# Patient Record
Sex: MALE | Race: WHITE | Employment: UNEMPLOYED | URBAN - METROPOLITAN AREA
[De-identification: names, ages, dates, MRNs, and addresses within clinical notes are randomized per-mention and may not be internally consistent; named-entity substitution may affect disease eponyms.]

---

## 2016-10-06 LAB — HBA1C MFR BLD: 8.9 %

## 2017-01-18 ENCOUNTER — OFFICE VISIT (OUTPATIENT)
Dept: CARDIOLOGY CLINIC | Age: 47
End: 2017-01-18

## 2017-01-18 VITALS
DIASTOLIC BLOOD PRESSURE: 88 MMHG | WEIGHT: 279 LBS | HEIGHT: 72 IN | HEART RATE: 72 BPM | BODY MASS INDEX: 37.79 KG/M2 | SYSTOLIC BLOOD PRESSURE: 138 MMHG

## 2017-01-18 DIAGNOSIS — I10 ESSENTIAL HYPERTENSION: Chronic | ICD-10-CM

## 2017-01-18 DIAGNOSIS — E78.2 MIXED HYPERLIPIDEMIA: Chronic | ICD-10-CM

## 2017-01-18 DIAGNOSIS — I25.2 HISTORY OF MYOCARDIAL INFARCTION: ICD-10-CM

## 2017-01-18 DIAGNOSIS — I25.10 CORONARY ARTERY DISEASE INVOLVING NATIVE CORONARY ARTERY OF NATIVE HEART WITHOUT ANGINA PECTORIS: Primary | ICD-10-CM

## 2017-01-18 DIAGNOSIS — Z98.61 HISTORY OF PTCA: ICD-10-CM

## 2017-01-18 PROCEDURE — 99214 OFFICE O/P EST MOD 30 MIN: CPT | Performed by: INTERNAL MEDICINE

## 2017-01-18 RX ORDER — AMOXICILLIN 500 MG
2 CAPSULE ORAL DAILY
COMMUNITY
End: 2017-08-11 | Stop reason: SDUPTHER

## 2017-01-26 ENCOUNTER — PROCEDURE VISIT (OUTPATIENT)
Dept: CARDIOLOGY CLINIC | Age: 47
End: 2017-01-26

## 2017-01-26 DIAGNOSIS — E78.2 MIXED HYPERLIPIDEMIA: Chronic | ICD-10-CM

## 2017-01-26 DIAGNOSIS — I10 ESSENTIAL HYPERTENSION: Chronic | ICD-10-CM

## 2017-01-26 DIAGNOSIS — R07.89 ATYPICAL CHEST PAIN: Primary | ICD-10-CM

## 2017-01-26 DIAGNOSIS — Z98.61 HISTORY OF PTCA: ICD-10-CM

## 2017-01-26 DIAGNOSIS — I25.2 HISTORY OF MYOCARDIAL INFARCTION: ICD-10-CM

## 2017-01-26 DIAGNOSIS — I25.10 CORONARY ARTERY DISEASE INVOLVING NATIVE CORONARY ARTERY OF NATIVE HEART WITHOUT ANGINA PECTORIS: ICD-10-CM

## 2017-01-26 PROCEDURE — 93017 CV STRESS TEST TRACING ONLY: CPT | Performed by: INTERNAL MEDICINE

## 2017-01-26 PROCEDURE — 93016 CV STRESS TEST SUPVJ ONLY: CPT | Performed by: INTERNAL MEDICINE

## 2017-01-26 PROCEDURE — 78452 HT MUSCLE IMAGE SPECT MULT: CPT | Performed by: INTERNAL MEDICINE

## 2017-01-26 PROCEDURE — A9500 TC99M SESTAMIBI: HCPCS | Performed by: INTERNAL MEDICINE

## 2017-01-26 PROCEDURE — 93018 CV STRESS TEST I&R ONLY: CPT | Performed by: INTERNAL MEDICINE

## 2017-01-27 ENCOUNTER — TELEPHONE (OUTPATIENT)
Dept: CARDIOLOGY CLINIC | Age: 47
End: 2017-01-27

## 2017-01-30 ENCOUNTER — TELEPHONE (OUTPATIENT)
Dept: CARDIOLOGY CLINIC | Age: 47
End: 2017-01-30

## 2017-02-15 ENCOUNTER — OFFICE VISIT (OUTPATIENT)
Dept: CARDIOLOGY CLINIC | Age: 47
End: 2017-02-15

## 2017-02-15 VITALS
HEIGHT: 72 IN | BODY MASS INDEX: 37.87 KG/M2 | DIASTOLIC BLOOD PRESSURE: 68 MMHG | SYSTOLIC BLOOD PRESSURE: 122 MMHG | WEIGHT: 279.6 LBS | HEART RATE: 78 BPM

## 2017-02-15 DIAGNOSIS — I10 ESSENTIAL HYPERTENSION: Chronic | ICD-10-CM

## 2017-02-15 DIAGNOSIS — E78.2 MIXED HYPERLIPIDEMIA: Chronic | ICD-10-CM

## 2017-02-15 DIAGNOSIS — I25.2 HISTORY OF MYOCARDIAL INFARCTION: ICD-10-CM

## 2017-02-15 DIAGNOSIS — Z98.61 HISTORY OF PTCA: ICD-10-CM

## 2017-02-15 DIAGNOSIS — I25.10 CORONARY ARTERY DISEASE INVOLVING NATIVE CORONARY ARTERY OF NATIVE HEART WITHOUT ANGINA PECTORIS: Primary | ICD-10-CM

## 2017-02-15 DIAGNOSIS — E11.59 TYPE 2 DIABETES MELLITUS WITH OTHER CIRCULATORY COMPLICATION, WITHOUT LONG-TERM CURRENT USE OF INSULIN (HCC): ICD-10-CM

## 2017-02-15 PROCEDURE — MISCLOD MISC IP SERVICE NONBILLABLE: Performed by: INTERNAL MEDICINE

## 2017-02-28 ENCOUNTER — SURG/PROC ORDERS (OUTPATIENT)
Dept: CARDIOLOGY | Age: 47
End: 2017-02-28

## 2017-02-28 ENCOUNTER — HOSPITAL ENCOUNTER (OUTPATIENT)
Dept: GENERAL RADIOLOGY | Age: 47
Discharge: HOME | End: 2017-02-28
Attending: INTERNAL MEDICINE | Admitting: INTERNAL MEDICINE

## 2017-02-28 DIAGNOSIS — Z01.818 PRE-OP EXAMINATION: ICD-10-CM

## 2017-02-28 LAB
ANION GAP SERPL CALCULATED.3IONS-SCNC: 17 MMOL/L (ref 4–16)
APTT: 23.4 SECONDS (ref 21.2–33)
BUN BLDV-MCNC: 15 MG/DL (ref 6–23)
CALCIUM SERPL-MCNC: 9.1 MG/DL (ref 8.3–10.6)
CHLORIDE BLD-SCNC: 98 MMOL/L (ref 99–110)
CO2: 22 MMOL/L (ref 21–32)
CREAT SERPL-MCNC: 0.8 MG/DL (ref 0.9–1.3)
GFR AFRICAN AMERICAN: >60 ML/MIN/1.73M2
GFR NON-AFRICAN AMERICAN: >60 ML/MIN/1.73M2
GLUCOSE BLD-MCNC: 170 MG/DL (ref 70–140)
HCT VFR BLD CALC: 48.7 % (ref 42–52)
HEMOGLOBIN: 16.3 GM/DL (ref 13.5–18)
INR BLD: 0.87 INDEX
MCH RBC QN AUTO: 31 PG (ref 27–31)
MCHC RBC AUTO-ENTMCNC: 33.5 % (ref 32–36)
MCV RBC AUTO: 92.8 FL (ref 78–100)
PDW BLD-RTO: 11.9 % (ref 11.7–14.9)
PLATELET # BLD: 229 K/CU MM (ref 140–440)
PMV BLD AUTO: 11.2 FL (ref 7.5–11.1)
POTASSIUM SERPL-SCNC: 4.4 MMOL/L (ref 3.5–5.1)
PROTHROMBIN TIME: 9.9 SECONDS (ref 9.12–12.5)
RBC # BLD: 5.25 M/CU MM (ref 4.6–6.2)
SODIUM BLD-SCNC: 137 MMOL/L (ref 135–145)
WBC # BLD: 7.6 K/CU MM (ref 4–10.5)

## 2017-02-28 RX ORDER — DIPHENHYDRAMINE HCL 25 MG
25 TABLET ORAL
Status: CANCELLED | OUTPATIENT
Start: 2017-02-28 | End: 2017-02-28

## 2017-02-28 RX ORDER — DIAZEPAM 5 MG/1
5 TABLET ORAL
Status: CANCELLED | OUTPATIENT
Start: 2017-02-28 | End: 2017-02-28

## 2017-02-28 RX ORDER — SODIUM CHLORIDE 0.9 % (FLUSH) 0.9 %
10 SYRINGE (ML) INJECTION PRN
Status: CANCELLED | OUTPATIENT
Start: 2017-02-28

## 2017-02-28 RX ORDER — SODIUM CHLORIDE 9 MG/ML
INJECTION, SOLUTION INTRAVENOUS CONTINUOUS
Status: CANCELLED | OUTPATIENT
Start: 2017-02-28

## 2017-02-28 RX ORDER — SODIUM CHLORIDE 0.9 % (FLUSH) 0.9 %
10 SYRINGE (ML) INJECTION EVERY 12 HOURS SCHEDULED
Status: CANCELLED | OUTPATIENT
Start: 2017-02-28

## 2017-03-03 ENCOUNTER — TELEPHONE (OUTPATIENT)
Dept: CARDIOLOGY CLINIC | Age: 47
End: 2017-03-03

## 2017-03-03 RX ORDER — METOPROLOL SUCCINATE 50 MG/1
50 TABLET, EXTENDED RELEASE ORAL DAILY
Qty: 90 TABLET | Refills: 3 | Status: SHIPPED | OUTPATIENT
Start: 2017-03-03 | End: 2017-07-12 | Stop reason: SDUPTHER

## 2017-03-07 RX ORDER — PRASUGREL 10 MG/1
10 TABLET, FILM COATED ORAL DAILY
Qty: 28 TABLET | Refills: 0 | COMMUNITY
Start: 2017-03-07 | End: 2017-03-13 | Stop reason: SDUPTHER

## 2017-03-13 ENCOUNTER — OFFICE VISIT (OUTPATIENT)
Dept: CARDIOLOGY CLINIC | Age: 47
End: 2017-03-13

## 2017-03-13 VITALS
WEIGHT: 280.6 LBS | HEIGHT: 72 IN | DIASTOLIC BLOOD PRESSURE: 86 MMHG | HEART RATE: 68 BPM | SYSTOLIC BLOOD PRESSURE: 118 MMHG | BODY MASS INDEX: 38.01 KG/M2

## 2017-03-13 DIAGNOSIS — I25.10 CORONARY ARTERY DISEASE INVOLVING NATIVE CORONARY ARTERY OF NATIVE HEART WITHOUT ANGINA PECTORIS: Primary | ICD-10-CM

## 2017-03-13 DIAGNOSIS — Z98.61 HISTORY OF PTCA 1: ICD-10-CM

## 2017-03-13 DIAGNOSIS — I10 ESSENTIAL HYPERTENSION: ICD-10-CM

## 2017-03-13 DIAGNOSIS — Z98.61 HISTORY OF PTCA: ICD-10-CM

## 2017-03-13 DIAGNOSIS — E78.2 MIXED HYPERLIPIDEMIA: ICD-10-CM

## 2017-03-13 DIAGNOSIS — E11.59 TYPE 2 DIABETES MELLITUS WITH OTHER CIRCULATORY COMPLICATION, WITHOUT LONG-TERM CURRENT USE OF INSULIN (HCC): ICD-10-CM

## 2017-03-13 DIAGNOSIS — I25.2 HISTORY OF MYOCARDIAL INFARCTION: ICD-10-CM

## 2017-03-13 PROCEDURE — 93000 ELECTROCARDIOGRAM COMPLETE: CPT | Performed by: INTERNAL MEDICINE

## 2017-03-13 PROCEDURE — 99214 OFFICE O/P EST MOD 30 MIN: CPT | Performed by: INTERNAL MEDICINE

## 2017-03-13 RX ORDER — PRASUGREL 10 MG/1
10 TABLET, FILM COATED ORAL DAILY
Qty: 28 TABLET | Refills: 0 | COMMUNITY
Start: 2017-03-13 | End: 2017-04-26

## 2017-03-14 ENCOUNTER — TELEPHONE (OUTPATIENT)
Dept: CARDIOLOGY CLINIC | Age: 47
End: 2017-03-14

## 2017-03-21 ENCOUNTER — PROCEDURE VISIT (OUTPATIENT)
Dept: CARDIOLOGY CLINIC | Age: 47
End: 2017-03-21

## 2017-03-21 DIAGNOSIS — E78.2 MIXED HYPERLIPIDEMIA: ICD-10-CM

## 2017-03-21 DIAGNOSIS — I25.10 CORONARY ARTERY DISEASE INVOLVING NATIVE CORONARY ARTERY OF NATIVE HEART WITHOUT ANGINA PECTORIS: ICD-10-CM

## 2017-03-21 DIAGNOSIS — Z98.61 HISTORY OF PTCA: ICD-10-CM

## 2017-03-21 DIAGNOSIS — I10 ESSENTIAL HYPERTENSION: ICD-10-CM

## 2017-03-21 DIAGNOSIS — Z98.61 HISTORY OF PTCA 1: ICD-10-CM

## 2017-03-21 DIAGNOSIS — I25.2 HISTORY OF MYOCARDIAL INFARCTION: ICD-10-CM

## 2017-03-21 DIAGNOSIS — Z92.89 H/O ECHOCARDIOGRAM: ICD-10-CM

## 2017-03-21 DIAGNOSIS — E11.59 TYPE 2 DIABETES MELLITUS WITH OTHER CIRCULATORY COMPLICATION, WITHOUT LONG-TERM CURRENT USE OF INSULIN (HCC): ICD-10-CM

## 2017-03-21 PROCEDURE — 93015 CV STRESS TEST SUPVJ I&R: CPT | Performed by: INTERNAL MEDICINE

## 2017-03-21 RX ORDER — VALSARTAN 320 MG/1
320 TABLET ORAL DAILY
Qty: 30 TABLET | Refills: 5 | Status: SHIPPED | OUTPATIENT
Start: 2017-03-21 | End: 2017-07-12 | Stop reason: DRUGHIGH

## 2017-04-25 ENCOUNTER — TELEPHONE (OUTPATIENT)
Dept: CARDIOLOGY CLINIC | Age: 47
End: 2017-04-25

## 2017-04-26 RX ORDER — ICOSAPENT ETHYL 1000 MG/1
2 CAPSULE ORAL 2 TIMES DAILY
Qty: 120 CAPSULE | Refills: 0 | COMMUNITY
Start: 2017-04-26 | End: 2017-06-05 | Stop reason: SDUPTHER

## 2017-04-26 RX ORDER — CLOPIDOGREL BISULFATE 75 MG/1
75 TABLET ORAL DAILY
Qty: 30 TABLET | Refills: 6 | Status: SHIPPED | OUTPATIENT
Start: 2017-04-26 | End: 2017-11-30 | Stop reason: SDUPTHER

## 2017-05-11 ENCOUNTER — TELEPHONE (OUTPATIENT)
Dept: CARDIOLOGY CLINIC | Age: 47
End: 2017-05-11

## 2017-06-05 RX ORDER — ICOSAPENT ETHYL 1000 MG/1
2 CAPSULE ORAL 2 TIMES DAILY
Qty: 120 CAPSULE | Refills: 0 | Status: SHIPPED | OUTPATIENT
Start: 2017-06-05 | End: 2017-07-12 | Stop reason: SDUPTHER

## 2017-07-10 ENCOUNTER — HOSPITAL ENCOUNTER (OUTPATIENT)
Dept: GENERAL RADIOLOGY | Age: 47
Discharge: HOME | End: 2017-07-10
Attending: INTERNAL MEDICINE | Admitting: INTERNAL MEDICINE

## 2017-07-10 LAB
ALBUMIN SERPL-MCNC: 4 GM/DL (ref 3.4–5)
ANION GAP SERPL CALCULATED.3IONS-SCNC: 17 MMOL/L (ref 4–16)
BUN BLDV-MCNC: 21 MG/DL (ref 6–23)
CALCIUM SERPL-MCNC: 9.2 MG/DL (ref 8.3–10.6)
CHLORIDE BLD-SCNC: 100 MMOL/L (ref 99–110)
CHOLESTEROL: 147 MG/DL
CO2: 21 MMOL/L (ref 21–32)
CREAT SERPL-MCNC: 0.9 MG/DL (ref 0.9–1.3)
ESTIMATED AVERAGE GLUCOSE: 174 MG/DL
GFR AFRICAN AMERICAN: >60 ML/MIN/1.73M2
GFR NON-AFRICAN AMERICAN: >60 ML/MIN/1.73M2
GLUCOSE BLD-MCNC: 103 MG/DL (ref 70–140)
HBA1C MFR BLD: 7.7 % (ref 4.2–6.3)
HDLC SERPL-MCNC: 53 MG/DL
LDL CHOLESTEROL DIRECT: 78 MG/DL
PHOSPHORUS: 2.9 MG/DL (ref 2.5–4.9)
POTASSIUM SERPL-SCNC: 4.4 MMOL/L (ref 3.5–5.1)
SODIUM BLD-SCNC: 138 MMOL/L (ref 135–145)
TRIGL SERPL-MCNC: 191 MG/DL

## 2017-07-12 ENCOUNTER — OFFICE VISIT (OUTPATIENT)
Dept: CARDIOLOGY CLINIC | Age: 47
End: 2017-07-12

## 2017-07-12 ENCOUNTER — TELEPHONE (OUTPATIENT)
Dept: CARDIOLOGY CLINIC | Age: 47
End: 2017-07-12

## 2017-07-12 VITALS
HEART RATE: 70 BPM | WEIGHT: 299 LBS | BODY MASS INDEX: 40.5 KG/M2 | SYSTOLIC BLOOD PRESSURE: 140 MMHG | DIASTOLIC BLOOD PRESSURE: 94 MMHG | HEIGHT: 72 IN

## 2017-07-12 DIAGNOSIS — I25.10 CORONARY ARTERY DISEASE INVOLVING NATIVE CORONARY ARTERY OF NATIVE HEART WITHOUT ANGINA PECTORIS: Primary | ICD-10-CM

## 2017-07-12 DIAGNOSIS — Z82.49 FAMILY HISTORY OF CORONARY ARTERY DISEASE: ICD-10-CM

## 2017-07-12 DIAGNOSIS — Z98.61 HISTORY OF PTCA: ICD-10-CM

## 2017-07-12 DIAGNOSIS — E78.5 HYPERLIPIDEMIA, UNSPECIFIED HYPERLIPIDEMIA TYPE: ICD-10-CM

## 2017-07-12 DIAGNOSIS — I25.10 CORONARY ARTERY DISEASE INVOLVING NATIVE HEART WITHOUT ANGINA PECTORIS, UNSPECIFIED VESSEL OR LESION TYPE: ICD-10-CM

## 2017-07-12 DIAGNOSIS — E11.59 TYPE 2 DIABETES MELLITUS WITH OTHER CIRCULATORY COMPLICATION, WITHOUT LONG-TERM CURRENT USE OF INSULIN (HCC): ICD-10-CM

## 2017-07-12 DIAGNOSIS — E78.2 MIXED HYPERLIPIDEMIA: ICD-10-CM

## 2017-07-12 DIAGNOSIS — I10 ESSENTIAL HYPERTENSION: ICD-10-CM

## 2017-07-12 DIAGNOSIS — I25.2 HISTORY OF MYOCARDIAL INFARCTION: ICD-10-CM

## 2017-07-12 PROCEDURE — 99214 OFFICE O/P EST MOD 30 MIN: CPT | Performed by: INTERNAL MEDICINE

## 2017-07-12 RX ORDER — METOPROLOL SUCCINATE 50 MG/1
50 TABLET, EXTENDED RELEASE ORAL 2 TIMES DAILY
Qty: 180 TABLET | Refills: 3 | Status: SHIPPED | OUTPATIENT
Start: 2017-07-12 | End: 2017-07-12 | Stop reason: DRUGHIGH

## 2017-07-12 RX ORDER — ASPIRIN 81 MG/1
81 TABLET ORAL DAILY
Qty: 30 TABLET | Refills: 6 | Status: SHIPPED | OUTPATIENT
Start: 2017-07-12

## 2017-07-12 RX ORDER — ATORVASTATIN CALCIUM 40 MG/1
40 TABLET, FILM COATED ORAL DAILY
Qty: 90 TABLET | Refills: 3 | Status: SHIPPED | OUTPATIENT
Start: 2017-07-12 | End: 2017-10-18 | Stop reason: SDUPTHER

## 2017-07-12 RX ORDER — VALSARTAN 320 MG/1
320 TABLET ORAL DAILY
Qty: 90 TABLET | Refills: 3 | Status: SHIPPED | OUTPATIENT
Start: 2017-07-12 | End: 2017-10-18 | Stop reason: SDUPTHER

## 2017-07-12 RX ORDER — ICOSAPENT ETHYL 1000 MG/1
2 CAPSULE ORAL 2 TIMES DAILY
Qty: 180 CAPSULE | Refills: 3 | Status: SHIPPED | OUTPATIENT
Start: 2017-07-12 | End: 2017-07-17 | Stop reason: SDUPTHER

## 2017-07-12 RX ORDER — METOPROLOL TARTRATE 100 MG/1
TABLET ORAL
Qty: 60 TABLET | Refills: 3 | Status: SHIPPED | OUTPATIENT
Start: 2017-07-12 | End: 2018-01-16 | Stop reason: ALTCHOICE

## 2017-07-12 RX ORDER — VALSARTAN 320 MG/1
320 TABLET ORAL DAILY
COMMUNITY
End: 2017-07-12 | Stop reason: SDUPTHER

## 2017-07-17 ENCOUNTER — TELEPHONE (OUTPATIENT)
Dept: CARDIOLOGY CLINIC | Age: 47
End: 2017-07-17

## 2017-07-17 RX ORDER — ICOSAPENT ETHYL 1000 MG/1
2 CAPSULE ORAL 2 TIMES DAILY
Qty: 64 CAPSULE | Refills: 3 | COMMUNITY
Start: 2017-07-17 | End: 2017-07-31 | Stop reason: SDUPTHER

## 2017-07-31 RX ORDER — ICOSAPENT ETHYL 1000 MG/1
2 CAPSULE ORAL 2 TIMES DAILY
Qty: 64 CAPSULE | Refills: 3 | COMMUNITY
Start: 2017-07-31 | End: 2017-08-11 | Stop reason: SDUPTHER

## 2017-08-01 ENCOUNTER — TELEPHONE (OUTPATIENT)
Dept: CARDIOLOGY CLINIC | Age: 47
End: 2017-08-01

## 2017-08-11 ENCOUNTER — TELEPHONE (OUTPATIENT)
Dept: CARDIOLOGY CLINIC | Age: 47
End: 2017-08-11

## 2017-08-11 RX ORDER — ICOSAPENT ETHYL 1000 MG/1
2 CAPSULE ORAL 2 TIMES DAILY
Qty: 64 CAPSULE | Refills: 3 | COMMUNITY
Start: 2017-08-11 | End: 2018-01-16 | Stop reason: ALTCHOICE

## 2017-08-11 RX ORDER — ICOSAPENT ETHYL 1000 MG/1
2 CAPSULE ORAL 2 TIMES DAILY
Qty: 60 CAPSULE | Refills: 3 | Status: SHIPPED | OUTPATIENT
Start: 2017-08-11 | End: 2018-01-16 | Stop reason: SDUPTHER

## 2017-10-18 ENCOUNTER — TELEPHONE (OUTPATIENT)
Dept: CARDIOLOGY CLINIC | Age: 47
End: 2017-10-18

## 2017-10-18 DIAGNOSIS — I10 ESSENTIAL HYPERTENSION: ICD-10-CM

## 2017-10-18 DIAGNOSIS — I25.10 CORONARY ARTERY DISEASE INVOLVING NATIVE HEART WITHOUT ANGINA PECTORIS, UNSPECIFIED VESSEL OR LESION TYPE: ICD-10-CM

## 2017-10-18 DIAGNOSIS — E78.5 HYPERLIPIDEMIA, UNSPECIFIED HYPERLIPIDEMIA TYPE: ICD-10-CM

## 2017-10-18 NOTE — TELEPHONE ENCOUNTER
Pt needs refills for Valsartan, Plavix and Metoprolol  Called in to [facility]. Pt is stating he doesn't have any more and is going to run out.

## 2017-10-23 RX ORDER — ATORVASTATIN CALCIUM 40 MG/1
40 TABLET, FILM COATED ORAL DAILY
Qty: 90 TABLET | Refills: 3 | Status: SHIPPED | OUTPATIENT
Start: 2017-10-23 | End: 2021-01-27 | Stop reason: SDUPTHER

## 2017-10-23 RX ORDER — METOPROLOL TARTRATE 50 MG/1
50 TABLET, FILM COATED ORAL 2 TIMES DAILY
Qty: 180 TABLET | Refills: 3 | Status: SHIPPED | OUTPATIENT
Start: 2017-10-23 | End: 2018-10-28 | Stop reason: SDUPTHER

## 2017-10-23 RX ORDER — VALSARTAN 320 MG/1
320 TABLET ORAL DAILY
Qty: 90 TABLET | Refills: 3 | Status: SHIPPED | OUTPATIENT
Start: 2017-10-23 | End: 2020-09-24

## 2017-12-01 RX ORDER — CLOPIDOGREL BISULFATE 75 MG/1
75 TABLET ORAL DAILY
Qty: 30 TABLET | Refills: 6 | Status: SHIPPED | OUTPATIENT
Start: 2017-12-01 | End: 2018-06-29 | Stop reason: SDUPTHER

## 2018-01-16 ENCOUNTER — OFFICE VISIT (OUTPATIENT)
Dept: CARDIOLOGY CLINIC | Age: 48
End: 2018-01-16

## 2018-01-16 VITALS
HEIGHT: 72 IN | SYSTOLIC BLOOD PRESSURE: 132 MMHG | DIASTOLIC BLOOD PRESSURE: 82 MMHG | HEART RATE: 62 BPM | WEIGHT: 315 LBS | BODY MASS INDEX: 42.66 KG/M2

## 2018-01-16 DIAGNOSIS — E78.2 MIXED HYPERLIPIDEMIA: ICD-10-CM

## 2018-01-16 DIAGNOSIS — I25.2 HISTORY OF MYOCARDIAL INFARCTION: ICD-10-CM

## 2018-01-16 DIAGNOSIS — Z82.49 FAMILY HISTORY OF CORONARY ARTERY DISEASE: ICD-10-CM

## 2018-01-16 DIAGNOSIS — E11.59 TYPE 2 DIABETES MELLITUS WITH OTHER CIRCULATORY COMPLICATION, WITHOUT LONG-TERM CURRENT USE OF INSULIN (HCC): ICD-10-CM

## 2018-01-16 DIAGNOSIS — I25.10 CORONARY ARTERY DISEASE INVOLVING NATIVE CORONARY ARTERY OF NATIVE HEART WITHOUT ANGINA PECTORIS: Primary | ICD-10-CM

## 2018-01-16 DIAGNOSIS — I10 ESSENTIAL HYPERTENSION: ICD-10-CM

## 2018-01-16 DIAGNOSIS — Z98.61 HISTORY OF PTCA: ICD-10-CM

## 2018-01-16 PROCEDURE — G8484 FLU IMMUNIZE NO ADMIN: HCPCS | Performed by: INTERNAL MEDICINE

## 2018-01-16 PROCEDURE — G8417 CALC BMI ABV UP PARAM F/U: HCPCS | Performed by: INTERNAL MEDICINE

## 2018-01-16 PROCEDURE — G8598 ASA/ANTIPLAT THER USED: HCPCS | Performed by: INTERNAL MEDICINE

## 2018-01-16 PROCEDURE — 1036F TOBACCO NON-USER: CPT | Performed by: INTERNAL MEDICINE

## 2018-01-16 PROCEDURE — 99214 OFFICE O/P EST MOD 30 MIN: CPT | Performed by: INTERNAL MEDICINE

## 2018-01-16 PROCEDURE — 3046F HEMOGLOBIN A1C LEVEL >9.0%: CPT | Performed by: INTERNAL MEDICINE

## 2018-01-16 PROCEDURE — G8427 DOCREV CUR MEDS BY ELIG CLIN: HCPCS | Performed by: INTERNAL MEDICINE

## 2018-01-16 RX ORDER — ICOSAPENT ETHYL 1000 MG/1
2 CAPSULE ORAL 2 TIMES DAILY
Qty: 120 CAPSULE | Refills: 11 | Status: SHIPPED | OUTPATIENT
Start: 2018-01-16 | End: 2020-09-24

## 2018-01-16 NOTE — PROGRESS NOTES
OFFICE PROGRESS NOTES      Amilcar Lara is a 56 y.o. male who has    CHIEF COMPLAINT AS FOLLOWS:  CHEST PAIN: Patient denies any C/O chest pains at this time. SOB: No C/O SOB at this time. LEG EDEMA: No leg edema   PALPITATIONS: Denies any C/O Palpitations                                   DIZZINESS: No C/O Dizziness                   . SYNCOPE: None   OTHER:                                     HPI: Patient is here for F/U on his CAD, HTN & Dyslipidemia problems. He does not have any complaints at this time.     Estella Penn has the following history recorded in care path:  Patient Active Problem List    Diagnosis Date Noted    Precordial pain      Priority: High    Coronary artery disease involving native coronary artery of native heart without angina pectoris      Priority: High    Mixed hyperlipidemia      Priority: High    Essential hypertension      Priority: High    H/O echocardiogram 04/15/2015     Priority: Low    Type 2 diabetes mellitus with circulatory disorder, without long-term current use of insulin (HCC)      Priority: Low    History of PTCA      Priority: Low    Chest pain 07/04/2012     Priority: Low    History of myocardial infarction 04/01/2006     Priority: Low    History of PTCA 04/01/2006     Priority: Low    Family history of coronary artery disease      Current Outpatient Prescriptions   Medication Sig Dispense Refill    clopidogrel (PLAVIX) 75 MG tablet Take 1 tablet by mouth daily 30 tablet 6    valsartan (DIOVAN) 320 MG tablet Take 1 tablet by mouth daily 90 tablet 3    atorvastatin (LIPITOR) 40 MG tablet Take 1 tablet by mouth daily 90 tablet 3    metoprolol tartrate (LOPRESSOR) 50 MG tablet Take 1 tablet by mouth 2 times daily 180 tablet 3    Icosapent Ethyl (VASCEPA) 1 g CAPS capsule Take 2 capsules by mouth 2 times daily 60 capsule 3    aspirin EC 81 MG EC tablet Take 1 tablet by mouth daily 30 tablet 6    metFORMIN (GLUCOPHAGE) 500 MG tablet Take 1 tablet by mouth 2 times daily (with meals) Start taking tomorrow 60 tablet 3    Insulin Glargine (TOUJEO SOLOSTAR SC) Inject into the skin      Insulin Aspart (NOVOLOG FLEXPEN SC) Inject into the skin as needed      furosemide (LASIX) 20 MG tablet Take 20 mg by mouth daily.  Vitamin D (CHOLECALCIFEROL) 1000 UNITS CAPS capsule Take 1,000 Units by mouth daily.  nitroGLYCERIN (NITROSTAT) 0.4 MG SL tablet Place 1 tablet under the tongue every 5 minutes as needed for Chest pain. 30 tablet 6    allopurinol (ZYLOPRIM) 100 MG tablet Take 100 mg by mouth daily.  FOLIC ACID PO Take  by mouth.  therapeutic multivitamin-minerals (THERAGRAN-M) tablet Take 1 tablet by mouth daily. No current facility-administered medications for this visit. Allergies: Review of patient's allergies indicates no known allergies. Past Medical History:   Diagnosis Date    CAD (coronary artery disease)     H/O MI.    Family history of coronary artery disease     H/O echocardiogram 6/08 6/08 EF>55% TRACE MR, TR    H/O echocardiogram 4/15/15    EF 50-55% Mild left atrial enlargement. Normal LV systolic function. Trace MR and TR. Normal sized abdominal aorta.      History of cardiac catheterization 03/01/2017    Stenting of the LAD    History of cardiovascular stress test 6/06 6/08 10/10 7/12     7/12 LAD territory ISCHEMIA, EF 62%    History of exercise stress test 03/20/2017    treadmill    History of myocardial infarction 4/2006    History of nuclear stress test 01/26/2017    lexiscan-scar,no ischemia,EF52%,apical hdyskinesis    History of PTCA 4/2006    STENT TO RCA    History of PTCA 7/12    STENT TO LAD, RCA    Hyperlipidemia     Hypertension 7/4/2012    Hypertriglyceridemia     Type II or unspecified type diabetes mellitus without mention of complication, not stated as uncontrolled      Past Surgical History:   Procedure Laterality Supple. No jugular venous distention noted. No carotid bruits. Cardiovascular  Normal S1 and S2 without obvious murmur or gallop. Extremities - No cyanosis, clubbing, or significant edema. Pulmonary  No respiratory distress. No wheezes or rales. Abdomen  No masses, tenderness, or organomegaly. Musculoskeletal  No significant edema. No joint deformities. No muscle wasting. Neurologic  Cranial nerves II through XII are grossly intact. There were no gross focal neurologic abnormalities. Lab Review   Lab Results   Component Value Date    CKTOTAL 123 07/07/2014    CKMB 1.3 07/04/2012    TROPONINI 0.031 07/05/2012     BNP:  No results found for: BNP  PT/INR:    Lab Results   Component Value Date    INR 0.87 02/28/2017     Lab Results   Component Value Date    LABA1C 7.7 (H) 07/10/2017    LABA1C 8.9 10/06/2016     Lab Results   Component Value Date    WBC 7.8 03/02/2017    HCT 45.7 03/02/2017    MCV 91.2 03/02/2017     03/02/2017     Lab Results   Component Value Date    CHOL 147 07/10/2017    TRIG 191 (H) 07/10/2017    HDL 53 07/10/2017    LDLCALC 73 06/29/2015    LDLDIRECT 78 07/10/2017     Lab Results   Component Value Date    ALT 42 (H) 06/29/2015    AST 23 06/29/2015     BMP:    Lab Results   Component Value Date     07/10/2017    K 4.4 07/10/2017     07/10/2017    CO2 21 07/10/2017    BUN 21 07/10/2017    CREATININE 0.9 07/10/2017     CMP:   Lab Results   Component Value Date     07/10/2017    K 4.4 07/10/2017     07/10/2017    CO2 21 07/10/2017    BUN 21 07/10/2017    PROT 6.7 06/29/2015    PROT 6.5 07/13/2012     TSH:  No results found for: TSH    QUALITY MEASURES REVIEWED:  1.CAD:Patient is taking anti platelet agent:Yes  2. DYSLIPIDEMIA: Patient is on cholesterol lowering medication:Yes  3. Beta-Blocker therapy for CAD, if prior Myocardial Infarction:Yes  4. Counselled regarding smoking cessation. 5. Atrial fibrillation & anticoagulation therapy No  6. Discussed weight management strategies. Impression:    1. Coronary artery disease involving native coronary artery of native heart without angina pectoris    2. Mixed hyperlipidemia    3. Essential hypertension    4. Type 2 diabetes mellitus with other circulatory complication, without long-term current use of insulin ([facility]    5. History of PTCA    6. History of myocardial infarction    7. Family history of coronary artery disease       Patient Active Problem List   Diagnosis Code    Chest pain R07.9    Type 2 diabetes mellitus with circulatory disorder, without long-term current use of insulin ([facility]) E11.59    History of myocardial infarction I25.2    History of PTCA Z98.61    History of PTCA Z80.64    H/O echocardiogram Z92.89    Precordial pain R07.2    Coronary artery disease involving native coronary artery of native heart without angina pectoris I25.10    Mixed hyperlipidemia E78.2    Essential hypertension I10    Family history of coronary artery disease Z82.49       Assessment & Plan:    CAD:Yes   clinically stable. Patient is on optimal medical regimen ( see medication list above )  -     CORONARY ARTERY DISEASE: Patient is currently  asymptomatic from CAD. - changes in  treatment:   no           - Testing ordered:  no  [facility] classification: 1  [place] RISK SCORE:  [place] RISK SCORE:  HTN:well controlled on current medical regimen, see list above. - changes in  treatment:   no   CARDIOMYOPATHY: None known   CONGESTIVE HEART FAILURE: NO KNOWN HISTORY.     VHD: No significant VHD noted  DYSLIPIDEMIA: Patient's profile is at / near [employer],                                                             Tolerating current medical regimen wellyes,                                  See most recent Lab values in Labs section above.    Diabetes mellitis: .yes,                                      Managed by family MD                                     BS under good control no Hgb A1c avilable yes,   OTHER RELEVANT DIAGNOSIS:as noted in patient's active problem list:Morbid Obesity, Had significant weight gain in last 10 months, about 36 pounds. TESTS ORDERED: Sleep Apnea study                                      All previously ordered tests reviewed. ARRHYTHMIAS: None known   MEDICATIONS: CPM   Office f/u in three months. Refer to weight loss Clinic. Primary/secondary prevention is the goal by aggressive risk modification, healthy and therapeutic life style changes for cardiovascular risk reduction. Various goals are discussed and multiple questions answered.

## 2018-01-17 ENCOUNTER — HOSPITAL ENCOUNTER (OUTPATIENT)
Dept: GENERAL RADIOLOGY | Age: 48
Discharge: HOME | End: 2018-01-17
Attending: INTERNAL MEDICINE | Admitting: INTERNAL MEDICINE

## 2018-01-17 LAB
ALBUMIN SERPL-MCNC: 4 GM/DL (ref 3.4–5)
ANION GAP SERPL CALCULATED.3IONS-SCNC: 16 MMOL/L (ref 4–16)
BUN BLDV-MCNC: 17 MG/DL (ref 6–23)
CALCIUM SERPL-MCNC: 9.2 MG/DL (ref 8.3–10.6)
CHLORIDE BLD-SCNC: 95 MMOL/L (ref 99–110)
CO2: 26 MMOL/L (ref 21–32)
CREAT SERPL-MCNC: 1.1 MG/DL (ref 0.9–1.3)
CREATININE URINE: 139.3 MG/DL (ref 39–259)
GFR AFRICAN AMERICAN: >60 ML/MIN/1.73M2
GFR NON-AFRICAN AMERICAN: >60 ML/MIN/1.73M2
GLUCOSE BLD-MCNC: 213 MG/DL (ref 70–99)
PHOSPHORUS: 4.6 MG/DL (ref 2.5–4.9)
POTASSIUM SERPL-SCNC: 4.5 MMOL/L (ref 3.5–5.1)
PROT/CREAT RATIO, UR: ABNORMAL
SODIUM BLD-SCNC: 137 MMOL/L (ref 135–145)
URINE TOTAL PROTEIN: >600 MG/DL

## 2018-01-19 ENCOUNTER — TELEPHONE (OUTPATIENT)
Dept: BARIATRICS/WEIGHT MGMT | Age: 48
End: 2018-01-19

## 2018-01-23 ENCOUNTER — TELEPHONE (OUTPATIENT)
Dept: BARIATRICS/WEIGHT MGMT | Age: 48
End: 2018-01-23

## 2018-01-23 NOTE — TELEPHONE ENCOUNTER
Received a referral from Dr Shirley Mckeon for bariatric program, called patient to sign up for seminar, no answer. Left message to call back.

## 2018-01-29 ENCOUNTER — TELEPHONE (OUTPATIENT)
Dept: BARIATRICS/WEIGHT MGMT | Age: 48
End: 2018-01-29

## 2018-06-15 ENCOUNTER — TELEPHONE (OUTPATIENT)
Dept: CARDIOLOGY CLINIC | Age: 48
End: 2018-06-15

## 2018-06-30 RX ORDER — CLOPIDOGREL BISULFATE 75 MG/1
75 TABLET ORAL DAILY
Qty: 30 TABLET | Refills: 5 | Status: SHIPPED | OUTPATIENT
Start: 2018-06-30 | End: 2021-01-27 | Stop reason: SDUPTHER

## 2018-08-06 ENCOUNTER — HOSPITAL ENCOUNTER (OUTPATIENT)
Dept: GENERAL RADIOLOGY | Age: 48
Discharge: HOME | End: 2018-08-06
Attending: INTERNAL MEDICINE | Admitting: INTERNAL MEDICINE

## 2018-08-06 ENCOUNTER — HOSPITAL ENCOUNTER (OUTPATIENT)
Dept: ULTRASOUND IMAGING | Age: 48
Discharge: HOME | End: 2018-08-06
Attending: INTERNAL MEDICINE | Admitting: INTERNAL MEDICINE

## 2018-08-06 DIAGNOSIS — Z78.9 AMERICAN DIABETES ASSOCIATION (ADA) 1100 CALORIE DIET: ICD-10-CM

## 2018-08-06 DIAGNOSIS — Q60.0 AGENESIS OF LEFT KIDNEY: ICD-10-CM

## 2018-08-06 DIAGNOSIS — R80.8 NEPHROGENOUS PROTEINURIA: ICD-10-CM

## 2018-08-06 LAB
ALBUMIN SERPL-MCNC: 4.1 GM/DL (ref 3.4–5)
ALP BLD-CCNC: 92 IU/L (ref 40–128)
ALT SERPL-CCNC: 35 U/L (ref 10–40)
ANION GAP SERPL CALCULATED.3IONS-SCNC: 20 MMOL/L (ref 4–16)
AST SERPL-CCNC: 26 IU/L (ref 15–37)
BACTERIA: NEGATIVE /HPF
BASOPHILS ABSOLUTE: 0.1 K/CU MM
BASOPHILS RELATIVE PERCENT: 0.5 % (ref 0–1)
BILIRUB SERPL-MCNC: 0.3 MG/DL (ref 0–1)
BILIRUBIN URINE: NEGATIVE MG/DL
BLOOD, URINE: NEGATIVE
BUN BLDV-MCNC: 12 MG/DL (ref 6–23)
CALCIUM SERPL-MCNC: 9.3 MG/DL (ref 8.3–10.6)
CHLORIDE BLD-SCNC: 96 MMOL/L (ref 99–110)
CHOLESTEROL: 140 MG/DL
CLARITY: CLEAR
CO2: 21 MMOL/L (ref 21–32)
COLOR: COLORLESS
CREAT SERPL-MCNC: 1.1 MG/DL (ref 0.9–1.3)
DIFFERENTIAL TYPE: ABNORMAL
EOSINOPHILS ABSOLUTE: 0.1 K/CU MM
EOSINOPHILS RELATIVE PERCENT: 0.8 % (ref 0–3)
ESTIMATED AVERAGE GLUCOSE: 232 MG/DL
GFR AFRICAN AMERICAN: >60 ML/MIN/1.73M2
GFR NON-AFRICAN AMERICAN: >60 ML/MIN/1.73M2
GLUCOSE FASTING: 102 MG/DL (ref 70–99)
GLUCOSE, URINE: NEGATIVE MG/DL
HBA1C MFR BLD: 9.7 % (ref 4.2–6.3)
HCT VFR BLD CALC: 47.8 % (ref 42–52)
HDLC SERPL-MCNC: 42 MG/DL
HEMOGLOBIN: 16.4 GM/DL (ref 13.5–18)
IMMATURE NEUTROPHIL %: 0.9 % (ref 0–0.43)
KETONES, URINE: NEGATIVE MG/DL
LDL CHOLESTEROL DIRECT: 82 MG/DL
LEUKOCYTE ESTERASE, URINE: NEGATIVE
LYMPHOCYTES ABSOLUTE: 2.8 K/CU MM
LYMPHOCYTES RELATIVE PERCENT: 27.1 % (ref 24–44)
MAGNESIUM: 1.8 MG/DL (ref 1.8–2.4)
MCH RBC QN AUTO: 32 PG (ref 27–31)
MCHC RBC AUTO-ENTMCNC: 34.3 % (ref 32–36)
MCV RBC AUTO: 93.4 FL (ref 78–100)
MONOCYTES ABSOLUTE: 0.9 K/CU MM
MONOCYTES RELATIVE PERCENT: 8.3 % (ref 0–4)
NITRITE URINE, QUANTITATIVE: NEGATIVE
NUCLEATED RBC %: 0 %
PDW BLD-RTO: 12.4 % (ref 11.7–14.9)
PH, URINE: 6 (ref 5–8)
PLATELET # BLD: 248 K/CU MM (ref 140–440)
PMV BLD AUTO: 10.8 FL (ref 7.5–11.1)
POTASSIUM SERPL-SCNC: 4.3 MMOL/L (ref 3.5–5.1)
PROTEIN UA: 100 MG/DL
RBC # BLD: 5.12 M/CU MM (ref 4.6–6.2)
RBC URINE: <1 /HPF (ref 0–3)
SEGMENTED NEUTROPHILS ABSOLUTE COUNT: 6.4 K/CU MM
SEGMENTED NEUTROPHILS RELATIVE PERCENT: 62.4 % (ref 36–66)
SODIUM BLD-SCNC: 137 MMOL/L (ref 135–145)
SPECIFIC GRAVITY UA: 1 (ref 1–1.03)
SQUAMOUS EPITHELIAL: <1 /HPF
TOTAL IMMATURE NEUTOROPHIL: 0.09 K/CU MM
TOTAL NUCLEATED RBC: 0 K/CU MM
TOTAL PROTEIN: 5.9 GM/DL (ref 6.4–8.2)
TOTAL RETICULOCYTE COUNT: 0.13 K/CU MM
TRICHOMONAS: ABNORMAL /HPF
TRIGL SERPL-MCNC: 235 MG/DL
TSH HIGH SENSITIVITY: 1.52 UIU/ML (ref 0.27–4.2)
URIC ACID: 5.8 MG/DL (ref 3.5–7.2)
UROBILINOGEN, URINE: NORMAL MG/DL (ref 0.2–1)
WBC # BLD: 10.2 K/CU MM (ref 4–10.5)
WBC UA: <1 /HPF (ref 0–2)

## 2018-08-14 ENCOUNTER — HOSPITAL ENCOUNTER (OUTPATIENT)
Dept: OTHER | Age: 48
Discharge: HOME | End: 2018-08-14
Attending: INTERNAL MEDICINE | Admitting: INTERNAL MEDICINE

## 2018-08-15 LAB
Lab: 24 HRS
VOLUME, (UVOL): 2000 MLS

## 2018-08-16 LAB — PROTEIN ELP 24 HOUR URINE: 6690 MG/24 HR

## 2018-08-19 LAB
ALBUMIN, U: DETECTED
ALPHA 1, URINE: DETECTED
ALPHA 2, URINE: DETECTED
BETA URINE: DETECTED
FREE KAPPA LT CHAINS,UR: 5.93 MG/DL
FREE LAMBDA LT CHAINS,UR: 0.95 MG/DL
GAMMA GLOBULIN, URINE: DETECTED
IFE INTERPRETATION:U: ABNORMAL
KAPPA/LAMBDA RATIO,U: 6.24 RATIO
Lab: 24 HR
PROTEIN, TOTAL URINE: 4738 MG/D
URINE FREE KAPPA EXCRETION/DAY: 118.6 MG/D
URINE FREE LAMBDA EXCRETION/DAY: 19 MG/D
VOLUME, (UVOL): 2000 ML

## 2018-08-31 NOTE — TELEPHONE ENCOUNTER
2nd message left on voicemail to call and advise if patient wants to go to [facility] for valsartan or change to another medication and continue to go to [facility].

## 2018-09-05 RX ORDER — VALSARTAN 320 MG/1
TABLET ORAL
Qty: 30 TABLET | Refills: 2 | OUTPATIENT
Start: 2018-09-05

## 2018-10-29 RX ORDER — METOPROLOL TARTRATE 50 MG/1
50 TABLET, FILM COATED ORAL 2 TIMES DAILY
Qty: 60 TABLET | Refills: 0 | Status: SHIPPED | OUTPATIENT
Start: 2018-10-29 | End: 2018-11-23 | Stop reason: SDUPTHER

## 2018-11-27 RX ORDER — METOPROLOL TARTRATE 50 MG/1
50 TABLET, FILM COATED ORAL 2 TIMES DAILY
Qty: 60 TABLET | Refills: 11 | Status: SHIPPED | OUTPATIENT
Start: 2018-11-27 | End: 2021-01-27 | Stop reason: SDUPTHER

## 2018-11-30 ENCOUNTER — HOSPITAL ENCOUNTER (OUTPATIENT)
Age: 48
Discharge: HOME | End: 2018-11-30
Payer: COMMERCIAL

## 2018-11-30 LAB
ALBUMIN SERPL-MCNC: 4 GM/DL (ref 3.4–5)
ANION GAP SERPL CALCULATED.3IONS-SCNC: 17 MMOL/L (ref 4–16)
BUN BLDV-MCNC: 15 MG/DL (ref 6–23)
CALCIUM SERPL-MCNC: 8.8 MG/DL (ref 8.3–10.6)
CHLORIDE BLD-SCNC: 98 MMOL/L (ref 99–110)
CO2: 23 MMOL/L (ref 21–32)
CREAT SERPL-MCNC: 1 MG/DL (ref 0.9–1.3)
CREATININE URINE: 95.3 MG/DL (ref 39–259)
GFR AFRICAN AMERICAN: >60 ML/MIN/1.73M2
GFR NON-AFRICAN AMERICAN: >60 ML/MIN/1.73M2
GLUCOSE BLD-MCNC: 144 MG/DL (ref 70–99)
PHOSPHORUS: 3.3 MG/DL (ref 2.5–4.9)
POTASSIUM SERPL-SCNC: 4.4 MMOL/L (ref 3.5–5.1)
PROT/CREAT RATIO, UR: 5
SODIUM BLD-SCNC: 138 MMOL/L (ref 135–145)
URINE TOTAL PROTEIN: 474.2 MG/DL

## 2018-11-30 PROCEDURE — 84403 ASSAY OF TOTAL TESTOSTERONE: CPT

## 2018-11-30 PROCEDURE — 82570 ASSAY OF URINE CREATININE: CPT

## 2018-11-30 PROCEDURE — 36415 COLL VENOUS BLD VENIPUNCTURE: CPT

## 2018-11-30 PROCEDURE — 84156 ASSAY OF PROTEIN URINE: CPT

## 2018-11-30 PROCEDURE — 80069 RENAL FUNCTION PANEL: CPT

## 2018-11-30 PROCEDURE — 84270 ASSAY OF SEX HORMONE GLOBUL: CPT

## 2018-12-04 LAB
SEX HORMONE BINDING GLOBULIN: 55
TESTOSTERONE FREE PERCENT: 1.3
TESTOSTERONE FREE: 51
TESTOSTERONE TOTAL-MALE: 380

## 2019-08-16 ENCOUNTER — HOSPITAL ENCOUNTER (OUTPATIENT)
Age: 49
Discharge: HOME | End: 2019-08-16
Payer: COMMERCIAL

## 2019-08-16 LAB
ALBUMIN SERPL-MCNC: 4.2 GM/DL (ref 3.4–5)
ALBUMIN SERPL-MCNC: 4.3 GM/DL (ref 3.4–5)
ALP BLD-CCNC: 89 IU/L (ref 40–128)
ALT SERPL-CCNC: 29 U/L (ref 10–40)
ANION GAP SERPL CALCULATED.3IONS-SCNC: 16 MMOL/L (ref 4–16)
ANION GAP SERPL CALCULATED.3IONS-SCNC: 18 MMOL/L (ref 4–16)
AST SERPL-CCNC: 22 IU/L (ref 15–37)
BACTERIA: NEGATIVE /HPF
BASOPHILS ABSOLUTE: 0.1 K/CU MM
BASOPHILS RELATIVE PERCENT: 0.5 % (ref 0–1)
BILIRUB SERPL-MCNC: 0.3 MG/DL (ref 0–1)
BILIRUBIN URINE: ABNORMAL MG/DL
BLOOD, URINE: NEGATIVE
BUN BLDV-MCNC: 24 MG/DL (ref 6–23)
BUN BLDV-MCNC: 25 MG/DL (ref 6–23)
CALCIUM SERPL-MCNC: 9.6 MG/DL (ref 8.3–10.6)
CALCIUM SERPL-MCNC: 9.7 MG/DL (ref 8.3–10.6)
CHLORIDE BLD-SCNC: 97 MMOL/L (ref 99–110)
CHLORIDE BLD-SCNC: 99 MMOL/L (ref 99–110)
CHOLESTEROL: 182 MG/DL
CLARITY: CLEAR
CO2: 21 MMOL/L (ref 21–32)
CO2: 22 MMOL/L (ref 21–32)
COLOR: ABNORMAL
CREAT SERPL-MCNC: 1.6 MG/DL (ref 0.9–1.3)
CREAT SERPL-MCNC: 1.6 MG/DL (ref 0.9–1.3)
CREATININE URINE: 419.2 MG/DL (ref 39–259)
D DIMER: <200 NG/ML(DDU)
DIFFERENTIAL TYPE: ABNORMAL
EOSINOPHILS ABSOLUTE: 0.1 K/CU MM
EOSINOPHILS RELATIVE PERCENT: 0.9 % (ref 0–3)
ESTIMATED AVERAGE GLUCOSE: 177 MG/DL
GFR AFRICAN AMERICAN: 56 ML/MIN/1.73M2
GFR AFRICAN AMERICAN: 56 ML/MIN/1.73M2
GFR NON-AFRICAN AMERICAN: 46 ML/MIN/1.73M2
GFR NON-AFRICAN AMERICAN: 46 ML/MIN/1.73M2
GLUCOSE BLD-MCNC: 127 MG/DL (ref 70–99)
GLUCOSE BLD-MCNC: 138 MG/DL (ref 70–99)
GLUCOSE, URINE: NEGATIVE MG/DL
HBA1C MFR BLD: 7.8 % (ref 4.2–6.3)
HCT VFR BLD CALC: 49.4 % (ref 42–52)
HDLC SERPL-MCNC: 40 MG/DL
HEMOGLOBIN: 16.1 GM/DL (ref 13.5–18)
HYALINE CASTS: 2 /LPF
ICTOTEST: NEGATIVE
IMMATURE NEUTROPHIL %: 0.9 % (ref 0–0.43)
KETONES, URINE: ABNORMAL MG/DL
LDL CHOLESTEROL DIRECT: 113 MG/DL
LEUKOCYTE ESTERASE, URINE: NEGATIVE
LYMPHOCYTES ABSOLUTE: 2.5 K/CU MM
LYMPHOCYTES RELATIVE PERCENT: 19.4 % (ref 24–44)
MCH RBC QN AUTO: 31.1 PG (ref 27–31)
MCHC RBC AUTO-ENTMCNC: 32.6 % (ref 32–36)
MCV RBC AUTO: 95.6 FL (ref 78–100)
MONOCYTES ABSOLUTE: 1 K/CU MM
MONOCYTES RELATIVE PERCENT: 7.5 % (ref 0–4)
MUCUS: ABNORMAL HPF
NITRITE URINE, QUANTITATIVE: NEGATIVE
NUCLEATED RBC %: 0 %
PDW BLD-RTO: 12.9 % (ref 11.7–14.9)
PH, URINE: 5 (ref 5–8)
PHOSPHORUS: 3.5 MG/DL (ref 2.5–4.9)
PLATELET # BLD: 269 K/CU MM (ref 140–440)
PMV BLD AUTO: 12 FL (ref 7.5–11.1)
POTASSIUM SERPL-SCNC: 5.1 MMOL/L (ref 3.5–5.1)
POTASSIUM SERPL-SCNC: 5.2 MMOL/L (ref 3.5–5.1)
PROT/CREAT RATIO, UR: ABNORMAL
PROTEIN UA: >500 MG/DL
RBC # BLD: 5.17 M/CU MM (ref 4.6–6.2)
RBC URINE: 3 /HPF (ref 0–3)
SEGMENTED NEUTROPHILS ABSOLUTE COUNT: 9.2 K/CU MM
SEGMENTED NEUTROPHILS RELATIVE PERCENT: 70.8 % (ref 36–66)
SODIUM BLD-SCNC: 135 MMOL/L (ref 135–145)
SODIUM BLD-SCNC: 138 MMOL/L (ref 135–145)
SPECIFIC GRAVITY UA: 1.04 (ref 1–1.03)
SPECIFIC GRAVITY UA: ABNORMAL (ref 1–1.03)
SQUAMOUS EPITHELIAL: 1 /HPF
TOTAL IMMATURE NEUTOROPHIL: 0.12 K/CU MM
TOTAL NUCLEATED RBC: 0 K/CU MM
TOTAL PROTEIN: 6.3 GM/DL (ref 6.4–8.2)
TRICHOMONAS: ABNORMAL /HPF
TRIGL SERPL-MCNC: 229 MG/DL
TSH HIGH SENSITIVITY: 1.96 UIU/ML (ref 0.27–4.2)
URIC ACID: 7.3 MG/DL (ref 3.5–7.2)
URINE TOTAL PROTEIN: 67.2 MG/DL
UROBILINOGEN, URINE: NORMAL MG/DL (ref 0.2–1)
WBC # BLD: 13 K/CU MM (ref 4–10.5)
WBC UA: 5 /HPF (ref 0–2)

## 2019-08-16 PROCEDURE — 83036 HEMOGLOBIN GLYCOSYLATED A1C: CPT

## 2019-08-16 PROCEDURE — 81001 URINALYSIS AUTO W/SCOPE: CPT

## 2019-08-16 PROCEDURE — 84156 ASSAY OF PROTEIN URINE: CPT

## 2019-08-16 PROCEDURE — 85379 FIBRIN DEGRADATION QUANT: CPT

## 2019-08-16 PROCEDURE — 84443 ASSAY THYROID STIM HORMONE: CPT

## 2019-08-16 PROCEDURE — 85025 COMPLETE CBC W/AUTO DIFF WBC: CPT

## 2019-08-16 PROCEDURE — 80053 COMPREHEN METABOLIC PANEL: CPT

## 2019-08-16 PROCEDURE — 82570 ASSAY OF URINE CREATININE: CPT

## 2019-08-16 PROCEDURE — 36415 COLL VENOUS BLD VENIPUNCTURE: CPT

## 2019-08-16 PROCEDURE — 83721 ASSAY OF BLOOD LIPOPROTEIN: CPT

## 2019-08-16 PROCEDURE — 80061 LIPID PANEL: CPT

## 2019-08-16 PROCEDURE — 84550 ASSAY OF BLOOD/URIC ACID: CPT

## 2019-08-16 PROCEDURE — 80069 RENAL FUNCTION PANEL: CPT

## 2020-03-23 ENCOUNTER — HOSPITAL ENCOUNTER (OUTPATIENT)
Age: 50
Discharge: HOME | End: 2020-03-23
Payer: COMMERCIAL

## 2020-03-23 ENCOUNTER — HOSPITAL ENCOUNTER (OUTPATIENT)
Age: 50
Setting detail: SPECIMEN
Discharge: HOME | End: 2020-03-23
Payer: COMMERCIAL

## 2020-03-23 LAB
ALBUMIN SERPL-MCNC: 3.8 GM/DL (ref 3.4–5)
ANION GAP SERPL CALCULATED.3IONS-SCNC: 15 MMOL/L (ref 4–16)
BACTERIA: NEGATIVE /HPF
BILIRUBIN URINE: NEGATIVE MG/DL
BLOOD, URINE: NEGATIVE
BUN BLDV-MCNC: 23 MG/DL (ref 6–23)
CALCIUM SERPL-MCNC: 9 MG/DL (ref 8.3–10.6)
CHLORIDE BLD-SCNC: 98 MMOL/L (ref 99–110)
CHOLESTEROL, FASTING: 138 MG/DL
CLARITY: CLEAR
CO2: 23 MMOL/L (ref 21–32)
COLOR: YELLOW
CREAT SERPL-MCNC: 1.8 MG/DL (ref 0.9–1.3)
CREATININE URINE: 183.5 MG/DL (ref 39–259)
ESTIMATED AVERAGE GLUCOSE: 169 MG/DL
GFR AFRICAN AMERICAN: 49 ML/MIN/1.73M2
GFR NON-AFRICAN AMERICAN: 40 ML/MIN/1.73M2
GLUCOSE BLD-MCNC: 170 MG/DL (ref 70–99)
GLUCOSE, URINE: 50 MG/DL
HBA1C MFR BLD: 7.5 % (ref 4.2–6.3)
HDLC SERPL-MCNC: 36 MG/DL
KETONES, URINE: NEGATIVE MG/DL
LDL CHOLESTEROL DIRECT: 80 MG/DL
LEUKOCYTE ESTERASE, URINE: NEGATIVE
MUCUS: ABNORMAL HPF
NITRITE URINE, QUANTITATIVE: NEGATIVE
PH, URINE: 5 (ref 5–8)
PHOSPHORUS: 3.5 MG/DL (ref 2.5–4.9)
POTASSIUM SERPL-SCNC: 4.6 MMOL/L (ref 3.5–5.1)
PROT/CREAT RATIO, UR: ABNORMAL
PROTEIN UA: >500 MG/DL
RBC URINE: <1 /HPF (ref 0–3)
SODIUM BLD-SCNC: 136 MMOL/L (ref 135–145)
SPECIFIC GRAVITY UA: 1.02 (ref 1–1.03)
SQUAMOUS EPITHELIAL: <1 /HPF
TRICHOMONAS: ABNORMAL /HPF
TRIGLYCERIDE, FASTING: 206 MG/DL
URINE TOTAL PROTEIN: 562.4 MG/DL
UROBILINOGEN, URINE: NORMAL MG/DL (ref 0.2–1)
WBC UA: 1 /HPF (ref 0–2)

## 2020-03-23 PROCEDURE — 82570 ASSAY OF URINE CREATININE: CPT

## 2020-03-23 PROCEDURE — 36415 COLL VENOUS BLD VENIPUNCTURE: CPT

## 2020-03-23 PROCEDURE — 84156 ASSAY OF PROTEIN URINE: CPT

## 2020-03-23 PROCEDURE — 80061 LIPID PANEL: CPT

## 2020-03-23 PROCEDURE — 83036 HEMOGLOBIN GLYCOSYLATED A1C: CPT

## 2020-03-23 PROCEDURE — 81001 URINALYSIS AUTO W/SCOPE: CPT

## 2020-03-23 PROCEDURE — 80069 RENAL FUNCTION PANEL: CPT

## 2020-09-22 ENCOUNTER — HOSPITAL ENCOUNTER (OUTPATIENT)
Age: 50
Discharge: HOME | End: 2020-09-22
Payer: COMMERCIAL

## 2020-09-22 LAB
ALBUMIN SERPL-MCNC: 4.3 GM/DL (ref 3.4–5)
ANION GAP SERPL CALCULATED.3IONS-SCNC: 12 MMOL/L (ref 4–16)
BUN BLDV-MCNC: 23 MG/DL (ref 6–23)
CALCIUM SERPL-MCNC: 9 MG/DL (ref 8.3–10.6)
CHLORIDE BLD-SCNC: 98 MMOL/L (ref 99–110)
CO2: 26 MMOL/L (ref 21–32)
CREAT SERPL-MCNC: 1.7 MG/DL (ref 0.9–1.3)
CREATININE URINE: 206.6 MG/DL (ref 39–259)
GFR AFRICAN AMERICAN: 52 ML/MIN/1.73M2
GFR NON-AFRICAN AMERICAN: 43 ML/MIN/1.73M2
GLUCOSE BLD-MCNC: 105 MG/DL (ref 70–99)
PHOSPHORUS: 3.7 MG/DL (ref 2.5–4.9)
POTASSIUM SERPL-SCNC: 4.5 MMOL/L (ref 3.5–5.1)
PROT/CREAT RATIO, UR: ABNORMAL
SODIUM BLD-SCNC: 136 MMOL/L (ref 135–145)
URINE TOTAL PROTEIN: >600 MG/DL

## 2020-09-22 PROCEDURE — 82570 ASSAY OF URINE CREATININE: CPT

## 2020-09-22 PROCEDURE — 84156 ASSAY OF PROTEIN URINE: CPT

## 2020-09-22 PROCEDURE — 36415 COLL VENOUS BLD VENIPUNCTURE: CPT

## 2020-09-22 PROCEDURE — 80069 RENAL FUNCTION PANEL: CPT

## 2020-09-24 PROBLEM — I15.9 HYPERTENSION, SECONDARY: Status: ACTIVE | Noted: 2020-09-24

## 2020-09-24 PROBLEM — Q60.2 RENAL AGENESIS: Status: ACTIVE | Noted: 2020-09-24

## 2020-09-24 PROBLEM — N18.30 CKD (CHRONIC KIDNEY DISEASE), STAGE III (HCC): Status: ACTIVE | Noted: 2020-09-24

## 2020-09-24 PROBLEM — R80.1 PERSISTENT PROTEINURIA: Status: ACTIVE | Noted: 2020-09-24

## 2020-09-24 PROBLEM — N52.1 ERECTILE DISORDER DUE TO MEDICAL CONDITION IN MALE PATIENT: Status: ACTIVE | Noted: 2020-09-24

## 2020-09-24 PROBLEM — E88.81 METABOLIC SYNDROME: Status: ACTIVE | Noted: 2020-09-24

## 2020-09-24 PROBLEM — E88.810 METABOLIC SYNDROME: Status: ACTIVE | Noted: 2020-09-24

## 2020-09-24 PROBLEM — N17.9 ACUTE RENAL FAILURE (HCC): Status: ACTIVE | Noted: 2020-09-24

## 2020-09-24 PROBLEM — E11.21 DIABETIC NEPHROPATHY ASSOCIATED WITH TYPE 2 DIABETES MELLITUS (HCC): Status: ACTIVE | Noted: 2020-09-24

## 2021-01-14 ENCOUNTER — TELEPHONE (OUTPATIENT)
Dept: CARDIOLOGY CLINIC | Age: 51
End: 2021-01-14

## 2021-01-14 NOTE — TELEPHONE ENCOUNTER
Faxed records release request to [phone] [employer] [place]. Faxed to [phone]. Call [phone]  Option 2 to check on progress.

## 2021-01-20 ENCOUNTER — HOSPITAL ENCOUNTER (OUTPATIENT)
Age: 51
Discharge: HOME | End: 2021-01-20
Payer: COMMERCIAL

## 2021-01-20 LAB
ALBUMIN SERPL-MCNC: 3.9 GM/DL (ref 3.4–5)
ANION GAP SERPL CALCULATED.3IONS-SCNC: 12 MMOL/L (ref 4–16)
BACTERIA: NEGATIVE /HPF
BASOPHILS ABSOLUTE: 0.1 K/CU MM
BASOPHILS RELATIVE PERCENT: 0.5 % (ref 0–1)
BILIRUBIN URINE: NEGATIVE MG/DL
BLOOD, URINE: NEGATIVE
BUN BLDV-MCNC: 24 MG/DL (ref 6–23)
CALCIUM SERPL-MCNC: 8.5 MG/DL (ref 8.3–10.6)
CHLORIDE BLD-SCNC: 98 MMOL/L (ref 99–110)
CHOLESTEROL: 174 MG/DL
CLARITY: CLEAR
CO2: 23 MMOL/L (ref 21–32)
COLOR: YELLOW
CREAT SERPL-MCNC: 1.6 MG/DL (ref 0.9–1.3)
CREATININE URINE: 51.4 MG/DL (ref 39–259)
DIFFERENTIAL TYPE: ABNORMAL
EOSINOPHILS ABSOLUTE: 0.2 K/CU MM
EOSINOPHILS RELATIVE PERCENT: 1.8 % (ref 0–3)
ESTIMATED AVERAGE GLUCOSE: 174 MG/DL
GFR AFRICAN AMERICAN: 56 ML/MIN/1.73M2
GFR NON-AFRICAN AMERICAN: 46 ML/MIN/1.73M2
GLUCOSE BLD-MCNC: 127 MG/DL (ref 70–99)
GLUCOSE, URINE: NEGATIVE MG/DL
HBA1C MFR BLD: 7.7 % (ref 4.2–6.3)
HCT VFR BLD CALC: 51.3 % (ref 42–52)
HDLC SERPL-MCNC: 39 MG/DL
HEMOGLOBIN: 16.9 GM/DL (ref 13.5–18)
IMMATURE NEUTROPHIL %: 0.8 % (ref 0–0.43)
KETONES, URINE: NEGATIVE MG/DL
LDL CHOLESTEROL DIRECT: 92 MG/DL
LEUKOCYTE ESTERASE, URINE: NEGATIVE
LYMPHOCYTES ABSOLUTE: 2.2 K/CU MM
LYMPHOCYTES RELATIVE PERCENT: 24.4 % (ref 24–44)
MCH RBC QN AUTO: 31 PG (ref 27–31)
MCHC RBC AUTO-ENTMCNC: 32.9 % (ref 32–36)
MCV RBC AUTO: 94 FL (ref 78–100)
MONOCYTES ABSOLUTE: 0.6 K/CU MM
MONOCYTES RELATIVE PERCENT: 6.1 % (ref 0–4)
NITRITE URINE, QUANTITATIVE: NEGATIVE
NUCLEATED RBC %: 0 %
PDW BLD-RTO: 12.4 % (ref 11.7–14.9)
PH, URINE: 5 (ref 5–8)
PHOSPHORUS: 3.3 MG/DL (ref 2.5–4.9)
PLATELET # BLD: 244 K/CU MM (ref 140–440)
PMV BLD AUTO: 11.2 FL (ref 7.5–11.1)
POTASSIUM SERPL-SCNC: 4.6 MMOL/L (ref 3.5–5.1)
PROT/CREAT RATIO, UR: 6.4
PROTEIN UA: >500 MG/DL
RBC # BLD: 5.46 M/CU MM (ref 4.6–6.2)
RBC URINE: 1 /HPF (ref 0–3)
SEGMENTED NEUTROPHILS ABSOLUTE COUNT: 6.1 K/CU MM
SEGMENTED NEUTROPHILS RELATIVE PERCENT: 66.4 % (ref 36–66)
SODIUM BLD-SCNC: 133 MMOL/L (ref 135–145)
SPECIFIC GRAVITY UA: 1.01 (ref 1–1.03)
SQUAMOUS EPITHELIAL: <1 /HPF
TOTAL IMMATURE NEUTOROPHIL: 0.07 K/CU MM
TOTAL NUCLEATED RBC: 0 K/CU MM
TRICHOMONAS: ABNORMAL /HPF
TRIGL SERPL-MCNC: 360 MG/DL
TSH HIGH SENSITIVITY: 1.78 UIU/ML (ref 0.27–4.2)
URIC ACID: 5.9 MG/DL (ref 3.5–7.2)
URINE TOTAL PROTEIN: 331.3 MG/DL
UROBILINOGEN, URINE: NORMAL MG/DL (ref 0.2–1)
WBC # BLD: 9.1 K/CU MM (ref 4–10.5)
WBC UA: 1 /HPF (ref 0–2)

## 2021-01-20 PROCEDURE — 81001 URINALYSIS AUTO W/SCOPE: CPT

## 2021-01-20 PROCEDURE — 82570 ASSAY OF URINE CREATININE: CPT

## 2021-01-20 PROCEDURE — 84550 ASSAY OF BLOOD/URIC ACID: CPT

## 2021-01-20 PROCEDURE — 80061 LIPID PANEL: CPT

## 2021-01-20 PROCEDURE — 83721 ASSAY OF BLOOD LIPOPROTEIN: CPT

## 2021-01-20 PROCEDURE — 84443 ASSAY THYROID STIM HORMONE: CPT

## 2021-01-20 PROCEDURE — 85025 COMPLETE CBC W/AUTO DIFF WBC: CPT

## 2021-01-20 PROCEDURE — 36415 COLL VENOUS BLD VENIPUNCTURE: CPT

## 2021-01-20 PROCEDURE — 84156 ASSAY OF PROTEIN URINE: CPT

## 2021-01-20 PROCEDURE — 83036 HEMOGLOBIN GLYCOSYLATED A1C: CPT

## 2021-01-20 PROCEDURE — 80069 RENAL FUNCTION PANEL: CPT

## 2021-02-02 ENCOUNTER — TELEPHONE (OUTPATIENT)
Dept: CARDIOLOGY CLINIC | Age: 51
End: 2021-02-02

## 2021-02-02 NOTE — TELEPHONE ENCOUNTER
Faxed records release 2nd request to [phone] [employer] [place]. Faxed to [phone]. Call [phone]  Option 2 to check on progress.

## 2021-05-06 ENCOUNTER — HOSPITAL ENCOUNTER (OUTPATIENT)
Age: 51
Discharge: HOME | End: 2021-05-06
Payer: COMMERCIAL

## 2021-05-06 LAB
ALBUMIN SERPL-MCNC: 4.1 GM/DL (ref 3.4–5)
ALP BLD-CCNC: 89 IU/L (ref 40–128)
ALT SERPL-CCNC: 21 U/L (ref 10–40)
ANION GAP SERPL CALCULATED.3IONS-SCNC: 12 MMOL/L (ref 4–16)
AST SERPL-CCNC: 15 IU/L (ref 15–37)
BILIRUB SERPL-MCNC: 0.3 MG/DL (ref 0–1)
BUN BLDV-MCNC: 30 MG/DL (ref 6–23)
CALCIUM SERPL-MCNC: 8.9 MG/DL (ref 8.3–10.6)
CHLORIDE BLD-SCNC: 103 MMOL/L (ref 99–110)
CO2: 22 MMOL/L (ref 21–32)
CREAT SERPL-MCNC: 1.9 MG/DL (ref 0.9–1.3)
CREATININE URINE: 154.9 MG/DL (ref 39–259)
ESTIMATED AVERAGE GLUCOSE: 171 MG/DL
GFR AFRICAN AMERICAN: 46 ML/MIN/1.73M2
GFR NON-AFRICAN AMERICAN: 38 ML/MIN/1.73M2
GLUCOSE BLD-MCNC: 124 MG/DL (ref 70–99)
HBA1C MFR BLD: 7.6 % (ref 4.2–6.3)
POTASSIUM SERPL-SCNC: 4.5 MMOL/L (ref 3.5–5.1)
PROT/CREAT RATIO, UR: >3.9
SODIUM BLD-SCNC: 137 MMOL/L (ref 135–145)
TOTAL PROTEIN: 6.1 GM/DL (ref 6.4–8.2)
URINE TOTAL PROTEIN: >600 MG/DL

## 2021-05-06 PROCEDURE — 82570 ASSAY OF URINE CREATININE: CPT

## 2021-05-06 PROCEDURE — 36415 COLL VENOUS BLD VENIPUNCTURE: CPT

## 2021-05-06 PROCEDURE — 83036 HEMOGLOBIN GLYCOSYLATED A1C: CPT

## 2021-05-06 PROCEDURE — 80053 COMPREHEN METABOLIC PANEL: CPT

## 2021-05-06 PROCEDURE — 84156 ASSAY OF PROTEIN URINE: CPT

## 2021-06-15 ENCOUNTER — TELEPHONE (OUTPATIENT)
Dept: CARDIOLOGY CLINIC | Age: 51
End: 2021-06-15

## 2021-06-15 ENCOUNTER — OFFICE VISIT (OUTPATIENT)
Dept: FAMILY MEDICINE CLINIC | Age: 51
End: 2021-06-15
Payer: COMMERCIAL

## 2021-06-15 VITALS
HEIGHT: 72 IN | DIASTOLIC BLOOD PRESSURE: 79 MMHG | OXYGEN SATURATION: 97 % | HEART RATE: 70 BPM | BODY MASS INDEX: 41.04 KG/M2 | WEIGHT: 303 LBS | RESPIRATION RATE: 16 BRPM | SYSTOLIC BLOOD PRESSURE: 136 MMHG

## 2021-06-15 DIAGNOSIS — I15.9 HYPERTENSION, SECONDARY: ICD-10-CM

## 2021-06-15 DIAGNOSIS — E66.01 CLASS 3 SEVERE OBESITY DUE TO EXCESS CALORIES WITH SERIOUS COMORBIDITY AND BODY MASS INDEX (BMI) OF 40.0 TO 44.9 IN ADULT (HCC): ICD-10-CM

## 2021-06-15 DIAGNOSIS — M48.061 SPINAL STENOSIS OF LUMBAR REGION, UNSPECIFIED WHETHER NEUROGENIC CLAUDICATION PRESENT: ICD-10-CM

## 2021-06-15 DIAGNOSIS — N18.32 STAGE 3B CHRONIC KIDNEY DISEASE (HCC): ICD-10-CM

## 2021-06-15 DIAGNOSIS — E88.81 METABOLIC SYNDROME: ICD-10-CM

## 2021-06-15 DIAGNOSIS — Z12.11 COLON CANCER SCREENING: ICD-10-CM

## 2021-06-15 DIAGNOSIS — Z76.89 ENCOUNTER TO ESTABLISH CARE: Primary | ICD-10-CM

## 2021-06-15 DIAGNOSIS — E11.59 TYPE 2 DIABETES MELLITUS WITH OTHER CIRCULATORY COMPLICATION, WITHOUT LONG-TERM CURRENT USE OF INSULIN (HCC): ICD-10-CM

## 2021-06-15 DIAGNOSIS — M54.50 LUMBAR BACK PAIN: ICD-10-CM

## 2021-06-15 DIAGNOSIS — N52.1 ERECTILE DISORDER DUE TO MEDICAL CONDITION IN MALE PATIENT: ICD-10-CM

## 2021-06-15 DIAGNOSIS — K59.00 CONSTIPATION, UNSPECIFIED CONSTIPATION TYPE: ICD-10-CM

## 2021-06-15 DIAGNOSIS — I10 ESSENTIAL HYPERTENSION: ICD-10-CM

## 2021-06-15 DIAGNOSIS — E11.21 DIABETIC NEPHROPATHY ASSOCIATED WITH TYPE 2 DIABETES MELLITUS (HCC): ICD-10-CM

## 2021-06-15 DIAGNOSIS — I25.10 CORONARY ARTERY DISEASE INVOLVING NATIVE CORONARY ARTERY OF NATIVE HEART WITHOUT ANGINA PECTORIS: ICD-10-CM

## 2021-06-15 PROBLEM — E66.813 CLASS 3 SEVERE OBESITY DUE TO EXCESS CALORIES WITH SERIOUS COMORBIDITY AND BODY MASS INDEX (BMI) OF 40.0 TO 44.9 IN ADULT: Status: ACTIVE | Noted: 2021-06-15

## 2021-06-15 PROCEDURE — 3051F HG A1C>EQUAL 7.0%<8.0%: CPT | Performed by: STUDENT IN AN ORGANIZED HEALTH CARE EDUCATION/TRAINING PROGRAM

## 2021-06-15 PROCEDURE — 99204 OFFICE O/P NEW MOD 45 MIN: CPT | Performed by: STUDENT IN AN ORGANIZED HEALTH CARE EDUCATION/TRAINING PROGRAM

## 2021-06-15 RX ORDER — NITROGLYCERIN 0.4 MG/1
0.4 TABLET SUBLINGUAL EVERY 5 MIN PRN
COMMUNITY
End: 2021-08-16 | Stop reason: SDUPTHER

## 2021-06-15 RX ORDER — INSULIN LISPRO 200 [IU]/ML
INJECTION, SOLUTION SUBCUTANEOUS
COMMUNITY
Start: 2021-05-03 | End: 2021-06-15

## 2021-06-15 RX ORDER — PEN NEEDLE, DIABETIC 30 GX5/16"
1 NEEDLE, DISPOSABLE MISCELLANEOUS DAILY
Qty: 100 EACH | Refills: 5 | Status: SHIPPED | OUTPATIENT
Start: 2021-06-15 | End: 2021-06-15

## 2021-06-15 RX ORDER — LIRAGLUTIDE 6 MG/ML
1.8 INJECTION SUBCUTANEOUS DAILY
Qty: 15 PEN | Refills: 3 | Status: SHIPPED | OUTPATIENT
Start: 2021-06-15 | End: 2022-06-06 | Stop reason: SDUPTHER

## 2021-06-15 RX ORDER — GLUCOSAMINE HCL/CHONDROITIN SU 500-400 MG
1 CAPSULE ORAL
COMMUNITY

## 2021-06-15 RX ORDER — PEN NEEDLE, DIABETIC 30 GX5/16"
1 NEEDLE, DISPOSABLE MISCELLANEOUS DAILY
Qty: 500 EACH | Refills: 2
Start: 2021-06-15 | End: 2022-06-06

## 2021-06-15 RX ORDER — METOPROLOL TARTRATE 50 MG/1
50 TABLET, FILM COATED ORAL 2 TIMES DAILY
Qty: 180 TABLET | Refills: 3 | Status: SHIPPED | OUTPATIENT
Start: 2021-06-15 | End: 2022-06-06 | Stop reason: SDUPTHER

## 2021-06-15 RX ORDER — POLYETHYLENE GLYCOL 3350 17 G/17G
17 POWDER, FOR SOLUTION ORAL DAILY PRN
Qty: 510 G | Refills: 0 | Status: SHIPPED | OUTPATIENT
Start: 2021-06-15 | End: 2021-07-15

## 2021-06-15 RX ORDER — LIRAGLUTIDE 6 MG/ML
INJECTION SUBCUTANEOUS
COMMUNITY
Start: 2021-06-01 | End: 2021-06-15 | Stop reason: SDUPTHER

## 2021-06-15 RX ORDER — PEN NEEDLE, DIABETIC 30 GX5/16"
1 NEEDLE, DISPOSABLE MISCELLANEOUS DAILY
COMMUNITY
End: 2021-06-15 | Stop reason: SDUPTHER

## 2021-06-15 RX ORDER — INSULIN DEGLUDEC 200 U/ML
66 INJECTION, SOLUTION SUBCUTANEOUS NIGHTLY
Qty: 8 PEN | Refills: 3 | Status: SHIPPED | OUTPATIENT
Start: 2021-06-15 | End: 2021-07-15

## 2021-06-15 RX ORDER — CALCIUM POLYCARBOPHIL 625 MG
625 TABLET ORAL DAILY
Qty: 14 TABLET | Refills: 0 | Status: SHIPPED | OUTPATIENT
Start: 2021-06-15

## 2021-06-15 RX ORDER — FUROSEMIDE 20 MG/1
20 TABLET ORAL DAILY
Qty: 90 TABLET | Refills: 1 | Status: SHIPPED | OUTPATIENT
Start: 2021-06-15 | End: 2021-08-16 | Stop reason: SDUPTHER

## 2021-06-15 RX ORDER — INSULIN DEGLUDEC 200 U/ML
INJECTION, SOLUTION SUBCUTANEOUS
COMMUNITY
Start: 2021-05-03 | End: 2021-06-15 | Stop reason: SDUPTHER

## 2021-06-15 ASSESSMENT — ENCOUNTER SYMPTOMS
WHEEZING: 0
BACK PAIN: 1
SHORTNESS OF BREATH: 0
SORE THROAT: 0
NAUSEA: 0
ABDOMINAL PAIN: 0

## 2021-06-15 NOTE — PROGRESS NOTES
6/15/2021    Landry Morley    Chief Complaint   Patient presents with    New Patient     Presenting to establish PCP. Previous Dr. Margy Ortiz.  Weight Management     Would like referral to Dr. Rodriguez Aguillon        HPI  History was obtained from patient. Nancy Mendiola is a 49 y.o. male with a PMHx CAD s/p stent placement LAD/RCA, HTN, DM2, CKD, HLD, obesity  as listed below who presents today to establish care. Patient complaining of constipation, was prescribed senna a few years ago, currently not working. Normally has BM daily, well formed. Sometimes can be constipated for 2-3 days. Patient notes chronic back pain 2/2 herniated disc. 2/2012 MRI reviewed severe effacement of right L5 nerve root, spinal canal stenosis. 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 Hx. CKD, proteinuria following with Dr. Tanvir Regalado, plan for 24 hour urine next visit    Hx. CAD s/p stent placement has followed with Dr. Naheed Vigil in the past however has not seen since 2018, plans to re-establish with him. HTN stable today    DM2 Dr. Tanvir Regalado stopped metformin around 2 years ago. Recent a1c 5/2021 7.6. Currently on 65U Tresiba daily. Per patient glucose in AM in 130s. No noted hypoglycemic episodes. Patient recently quit alcohol, improved diet   Hx. Of signifiant obesity however weight has trending down. Through diet/decreased alcohol. Patient assuming a1c will be better with weight loss    Living with wife, has  3 adult children. 1. Encounter to establish care    2. Coronary artery disease involving native coronary artery of native heart without angina pectoris    3. Essential hypertension    4. Type 2 diabetes mellitus with other circulatory complication, without long-term current use of insulin ([address]    5. Hypertension, secondary    6. Diabetic nephropathy associated with type 2 diabetes mellitus ([address]    7. Stage 3b chronic kidney disease ([address]    8.  Metabolic syndrome 9. Erectile disorder due to medical condition in male patient    10. Class 3 severe obesity due to excess calories with serious comorbidity and body mass index (BMI) of 40.0 to 44.9 in adult ([facility]    11. Colon cancer screening    12. Lumbar back pain    13. Spinal stenosis of lumbar region, unspecified whether neurogenic claudication present    14. Constipation, unspecified constipation type             REVIEW OF SYMPTOMS    Review of Systems   Constitutional: Negative for chills and fatigue. HENT: Negative for congestion and sore throat. Respiratory: Negative for shortness of breath and wheezing. Cardiovascular: Negative for chest pain and palpitations. Gastrointestinal: Negative for abdominal pain and nausea. Genitourinary: Negative for frequency and urgency. Musculoskeletal: Positive for arthralgias and back pain. Neurological: Negative for light-headedness. PAST MEDICAL HISTORY  Past Medical History:   Diagnosis Date    CAD (coronary artery disease)     H/O MI.    Family history of coronary artery disease     H/O echocardiogram 6/08 6/08 EF>55% TRACE MR, TR    H/O echocardiogram 4/15/15    EF 50-55% Mild left atrial enlargement. Normal LV systolic function. Trace MR and TR. Normal sized abdominal aorta.      History of cardiac catheterization 03/01/2017    Stenting of the LAD    History of cardiovascular stress test 6/06 6/08 10/10 7/12     7/12 LAD territory ISCHEMIA, EF 62%    History of exercise stress test 03/20/2017    treadmill    History of myocardial infarction 4/2006    History of nuclear stress test 01/26/2017    lexiscan-scar,no ischemia,EF52%,apical hdyskinesis    History of PTCA 4/2006    STENT TO RCA    History of PTCA 7/12    STENT TO LAD, RCA    Hyperlipidemia     Hypertension 7/4/2012    Hypertriglyceridemia     Type II or unspecified type diabetes mellitus without mention of complication, not stated as uncontrolled        FAMILY HISTORY  Family History Problem Relation Age of Onset    High Blood Pressure Mother     High Cholesterol Mother     Heart Disease Father     Cancer Sister     Arthritis Maternal Grandmother     Cancer Maternal Grandfather        SOCIAL HISTORY  Social History     Socioeconomic History    Marital status:      Spouse name: None    Number of children: None    Years of education: None    Highest education level: None   Occupational History    Occupation:    Tobacco Use    Smoking status: Former Smoker     Packs/day: 1.00     Types: Cigarettes     Quit date: 12/3/2016     Years since quittin.5    Smokeless tobacco: Never Used   Substance and Sexual Activity    Alcohol use: Yes     Alcohol/week: 1.0 standard drinks     Types: 1 Cans of beer per week     Comment: once a week beer liquor    Drug use: No    Sexual activity: Yes     Partners: Female   Other Topics Concern    None   Social History Narrative    None     Social Determinants of Health     Financial Resource Strain:     Difficulty of Paying Living Expenses:    Food Insecurity:     Worried About Running Out of Food in the Last Year:     Ran Out of Food in the Last Year:    Transportation Needs:     Lack of Transportation (Medical):      Lack of Transportation (Non-Medical):    Physical Activity:     Days of Exercise per Week:     Minutes of Exercise per Session:    Stress:     Feeling of Stress :    Social Connections:     Frequency of Communication with Friends and Family:     Frequency of Social Gatherings with Friends and Family:     Attends Mandaen Services:     Active Member of Clubs or Organizations:     Attends Club or Organization Meetings:     Marital Status:    Intimate Partner Violence:     Fear of Current or Ex-Partner:     Emotionally Abused:     Physically Abused:     Sexually Abused:         SURGICAL HISTORY  Past Surgical History:   Procedure Laterality Date    CORONARY ANGIOPLASTY WITH STENT PLACEMENT      HERNIA REPAIR                   CURRENT MEDICATIONS  Current Outpatient Medications   Medication Sig Dispense Refill    nitroGLYCERIN (NITROSTAT) 0.4 MG SL tablet Place 0.4 mg under the tongue every 5 minutes as needed for Chest pain up to max of 3 total doses. If no relief after 1 dose, call 911.  blood glucose monitor strips 1 strip by Other route Test 4 times a day & as needed for symptoms of irregular blood glucose. Dispense sufficient amount for indicated testing frequency plus additional to accommodate PRN testing needs.       Insulin Pen Needle (PEN NEEDLES 3/16\") 31G X 5 MM MISC 1 each by Does not apply route daily To be used  4 times daily as directed on package 100 each 5    metoprolol tartrate (LOPRESSOR) 50 MG tablet Take 1 tablet by mouth 2 times daily 180 tablet 3    furosemide (LASIX) 20 MG tablet Take 1 tablet by mouth daily 90 tablet 1    TRESIBA FLEXTOUCH 200 UNIT/ML SOPN Inject 66 Units into the skin nightly 8 pen 3    VICTOZA 18 MG/3ML SOPN SC injection Inject 1.8 mg into the skin daily 15 pen 3    Calcium Polycarbophil (FIBER) 625 MG TABS Take 1 tablet by mouth daily 14 tablet 0    polyethylene glycol (GLYCOLAX) 17 GM/SCOOP powder Take 17 g by mouth daily as needed (constipation) 510 g 0    sildenafil (VIAGRA) 100 MG tablet Take 1 tablet by mouth as needed for Erectile Dysfunction (as needed  daily PRN sexual dysfunction) 20 tablet 5    atorvastatin (LIPITOR) 40 MG tablet Take 1 tablet by mouth daily 90 tablet 3    clopidogrel (PLAVIX) 75 MG tablet Take 1 tablet by mouth daily 90 tablet 3    olmesartan (BENICAR) 40 MG tablet Take 1 tablet by mouth daily 90 tablet 3    allopurinol (ZYLOPRIM) 100 MG tablet Take 1 tablet by mouth daily 90 tablet 3    insulin lispro (HUMALOG) 100 UNIT/ML injection vial Inject into the skin 3 times daily (before meals) SS      Omega-3 Fatty Acids (FISH OIL) 1200 MG CAPS Take by mouth 2 times daily      aspirin EC 81 MG EC tablet Take 1 tablet by mouth daily 30 tablet 6    Vitamin D (CHOLECALCIFEROL) 1000 UNITS CAPS capsule Take 1,000 Units by mouth daily.  FOLIC ACID PO Take  by mouth.  therapeutic multivitamin-minerals (THERAGRAN-M) tablet Take 1 tablet by mouth daily. No current facility-administered medications for this visit. ALLERGIES  No Known Allergies    PHYSICAL EXAM    /79   Pulse 70   Resp 16   Ht 6' (1.829 m)   Wt (!) 303 lb (137.4 kg)   SpO2 97%   BMI 41.09 kg/m²     Physical Exam  Constitutional:       Appearance: Normal appearance. HENT:      Head: Normocephalic and atraumatic. Eyes:      Extraocular Movements: Extraocular movements intact. Pupils: Pupils are equal, round, and reactive to light. Cardiovascular:      Rate and Rhythm: Normal rate and regular rhythm. Pulses: Normal pulses. Heart sounds: No murmur heard. No friction rub. No gallop. Musculoskeletal:      Cervical back: Neck supple. Feet:      Right foot:      Protective Sensation: 10 sites tested. 10 sites sensed. Left foot:      Protective Sensation: 10 sites tested. 10 sites sensed. Skin:     General: Skin is warm and dry. Neurological:      General: No focal deficit present. Mental Status: He is alert. Psychiatric:         Mood and Affect: Mood normal.         Behavior: Behavior normal.         ASSESSMENT & PLAN    1. Encounter to establish care  -prior labs reviewed diabetes moderately controlled  -plan to have colon ca. Screening sometime this year  -need to discuss covid vaccine next visit    2. Coronary artery disease involving native coronary artery of native heart without angina pectoris  -plan to re-establish with Cardiology Dr. Esther Rodriguez  -continue  Bb, Statin, 81asa, arb    3. Essential hypertension  -stable on current regimen  - metoprolol tartrate (LOPRESSOR) 50 MG tablet; Take 1 tablet by mouth 2 times daily  Dispense: 180 tablet; Refill: 3  - furosemide (LASIX) 20 MG tablet;  Take 1 tablet by mouth daily  Dispense: 90 tablet; Refill: 1    4. Type 2 diabetes mellitus with other circulatory complication, without long-term current use of insulin (HCC)  -will keep current regimen for now. Trial of diet/lifestyle moderately controlled DM  -no episodes of hypoglycemia  -consider addition of SGLT2    - Insulin Pen Needle (PEN NEEDLES 3/16\") 31G X 5 MM MISC; 1 each by Does not apply route daily To be used  4 times daily as directed on package  Dispense: 100 each; Refill: 5  - TRESIBA FLEXTOUCH 200 UNIT/ML SOPN; Inject 66 Units into the skin nightly  Dispense: 8 pen; Refill: 3  - VICTOZA 18 MG/3ML SOPN SC injection; Inject 1.8 mg into the skin daily  Dispense: 15 pen; Refill: 3  -  DIABETES FOOT EXAM  - Hemoglobin A1C; Future  - BASIC METABOLIC PANEL; Future    6. Diabetic nephropathy associated with type 2 diabetes mellitus ([facility]  -fairly well controlled per patient improvement with weight loss    7. Stage 3b chronic kidney disease ([facility]  -continue to monitor, continue arb   -following w/ Nephrology Dr. Felicia Fry, plan further assess proteinuria 24 hr urine pending    8. Metabolic syndrome  -weight trending down, improving    9. Erectile disorder due to medical condition in male patient  -continue viagra PRN    10. Class 3 severe obesity due to excess calories with serious comorbidity and body mass index (BMI) of 40.0 to 44.9 in adult [facility])  -as above    11. Colon cancer screening  - Ambulatory referral to Gastroenterology    12. Lumbar back pain  -significant hx. 2/2 herniated disc. 2/2012 MRI reviewed severe effacement of right L5 nerve root, spinal canal stenosis. Injury work related  - [id number], [place], Pain Management, [facility]    10. Spinal stenosis of lumbar region, unspecified whether neurogenic claudication present  -stable  [facility] Cardiology, [facility]    14.  Constipation, unspecified constipation type  -stable  - Calcium Polycarbophil (FIBER) 625 MG TABS; Take 1 tablet by mouth

## 2021-06-17 ENCOUNTER — TELEPHONE (OUTPATIENT)
Dept: CARDIOLOGY CLINIC | Age: 51
End: 2021-06-17

## 2021-06-21 ENCOUNTER — TELEPHONE (OUTPATIENT)
Dept: CARDIOLOGY CLINIC | Age: 51
End: 2021-06-21

## 2021-06-28 ENCOUNTER — TELEPHONE (OUTPATIENT)
Dept: FAMILY MEDICINE CLINIC | Age: 51
End: 2021-06-28

## 2021-06-28 DIAGNOSIS — E11.59 TYPE 2 DIABETES MELLITUS WITH OTHER CIRCULATORY COMPLICATION, WITHOUT LONG-TERM CURRENT USE OF INSULIN (HCC): ICD-10-CM

## 2021-07-07 RX ORDER — INSULIN LISPRO 100 [IU]/ML
INJECTION, SOLUTION INTRAVENOUS; SUBCUTANEOUS
Qty: 15 PEN | Refills: 1 | OUTPATIENT
Start: 2021-07-07

## 2021-07-07 RX ORDER — PEN NEEDLE, DIABETIC 30 GX5/16"
1 NEEDLE, DISPOSABLE MISCELLANEOUS DAILY
Qty: 500 EACH | Refills: 2 | OUTPATIENT
Start: 2021-07-07 | End: 2021-08-06

## 2021-08-11 ENCOUNTER — HOSPITAL ENCOUNTER (OUTPATIENT)
Age: 51
Discharge: HOME | End: 2021-08-11
Payer: COMMERCIAL

## 2021-08-11 DIAGNOSIS — E11.59 TYPE 2 DIABETES MELLITUS WITH OTHER CIRCULATORY COMPLICATION, WITHOUT LONG-TERM CURRENT USE OF INSULIN (HCC): ICD-10-CM

## 2021-08-11 DIAGNOSIS — E11.21 DIABETIC NEPHROPATHY ASSOCIATED WITH TYPE 2 DIABETES MELLITUS (HCC): ICD-10-CM

## 2021-08-11 DIAGNOSIS — N18.32 STAGE 3B CHRONIC KIDNEY DISEASE (HCC): ICD-10-CM

## 2021-08-11 LAB
ALBUMIN SERPL-MCNC: 4 GM/DL (ref 3.4–5)
ANION GAP SERPL CALCULATED.3IONS-SCNC: 14 MMOL/L (ref 4–16)
BUN BLDV-MCNC: 30 MG/DL (ref 6–23)
CALCIUM SERPL-MCNC: 9.1 MG/DL (ref 8.3–10.6)
CHLORIDE BLD-SCNC: 101 MMOL/L (ref 99–110)
CO2: 21 MMOL/L (ref 21–32)
CREAT SERPL-MCNC: 1.7 MG/DL (ref 0.9–1.3)
ESTIMATED AVERAGE GLUCOSE: 148 MG/DL
GFR AFRICAN AMERICAN: 52 ML/MIN/1.73M2
GFR NON-AFRICAN AMERICAN: 43 ML/MIN/1.73M2
GLUCOSE BLD-MCNC: 104 MG/DL (ref 70–99)
HBA1C MFR BLD: 6.8 % (ref 4.2–6.3)
PHOSPHORUS: 3.6 MG/DL (ref 2.5–4.9)
POTASSIUM SERPL-SCNC: 4.4 MMOL/L (ref 3.5–5.1)
SODIUM BLD-SCNC: 136 MMOL/L (ref 135–145)

## 2021-08-11 PROCEDURE — 80069 RENAL FUNCTION PANEL: CPT

## 2021-08-11 PROCEDURE — 36415 COLL VENOUS BLD VENIPUNCTURE: CPT

## 2021-08-11 PROCEDURE — 80048 BASIC METABOLIC PNL TOTAL CA: CPT

## 2021-08-11 PROCEDURE — 84156 ASSAY OF PROTEIN URINE: CPT

## 2021-08-11 PROCEDURE — 83036 HEMOGLOBIN GLYCOSYLATED A1C: CPT

## 2021-08-12 NOTE — RESULT ENCOUNTER NOTE
Landry, Your recent lab results show improvement in hgba1c now 6.8, excellent job. In addition your CKD is stable.

## 2021-08-16 ENCOUNTER — VIRTUAL VISIT (OUTPATIENT)
Dept: FAMILY MEDICINE CLINIC | Age: 51
End: 2021-08-16
Payer: COMMERCIAL

## 2021-08-16 DIAGNOSIS — E11.59 TYPE 2 DIABETES MELLITUS WITH OTHER CIRCULATORY COMPLICATION, WITHOUT LONG-TERM CURRENT USE OF INSULIN (HCC): Primary | ICD-10-CM

## 2021-08-16 DIAGNOSIS — N18.32 STAGE 3B CHRONIC KIDNEY DISEASE (HCC): ICD-10-CM

## 2021-08-16 DIAGNOSIS — Z12.11 COLON CANCER SCREENING: ICD-10-CM

## 2021-08-16 DIAGNOSIS — I10 ESSENTIAL HYPERTENSION: ICD-10-CM

## 2021-08-16 DIAGNOSIS — I25.10 CORONARY ARTERY DISEASE INVOLVING NATIVE CORONARY ARTERY OF NATIVE HEART WITHOUT ANGINA PECTORIS: ICD-10-CM

## 2021-08-16 DIAGNOSIS — K59.00 CONSTIPATION, UNSPECIFIED CONSTIPATION TYPE: ICD-10-CM

## 2021-08-16 DIAGNOSIS — E78.1 HYPERTRIGLYCERIDEMIA: ICD-10-CM

## 2021-08-16 PROCEDURE — 99214 OFFICE O/P EST MOD 30 MIN: CPT | Performed by: STUDENT IN AN ORGANIZED HEALTH CARE EDUCATION/TRAINING PROGRAM

## 2021-08-16 RX ORDER — POLYETHYLENE GLYCOL 3350 17 G/17G
17 POWDER, FOR SOLUTION ORAL DAILY PRN
COMMUNITY
End: 2021-08-16 | Stop reason: SDUPTHER

## 2021-08-16 RX ORDER — POLYETHYLENE GLYCOL 3350 17 G/17G
17 POWDER, FOR SOLUTION ORAL DAILY PRN
Qty: 527 G | Refills: 5 | Status: SHIPPED | OUTPATIENT
Start: 2021-08-16

## 2021-08-16 RX ORDER — SENNOSIDES 8.6 MG/1
TABLET ORAL
Qty: 30 TABLET | Refills: 5 | Status: SHIPPED | OUTPATIENT
Start: 2021-08-16 | End: 2022-06-06 | Stop reason: SDUPTHER

## 2021-08-16 RX ORDER — PEN NEEDLE, DIABETIC 30 GX5/16"
1 NEEDLE, DISPOSABLE MISCELLANEOUS DAILY
Qty: 100 EACH | Refills: 5 | Status: SHIPPED | OUTPATIENT
Start: 2021-08-16 | End: 2022-06-06 | Stop reason: SDUPTHER

## 2021-08-16 RX ORDER — SENNOSIDES 8.6 MG/1
TABLET ORAL
COMMUNITY
Start: 2021-06-09 | End: 2021-08-16 | Stop reason: SDUPTHER

## 2021-08-16 RX ORDER — ATORVASTATIN CALCIUM 40 MG/1
40 TABLET, FILM COATED ORAL DAILY
Qty: 90 TABLET | Refills: 3 | Status: SHIPPED | OUTPATIENT
Start: 2021-08-16 | End: 2022-06-06 | Stop reason: SDUPTHER

## 2021-08-16 RX ORDER — FUROSEMIDE 20 MG/1
20 TABLET ORAL DAILY
Qty: 90 TABLET | Refills: 1 | Status: SHIPPED | OUTPATIENT
Start: 2021-08-16 | End: 2022-01-18 | Stop reason: SDUPTHER

## 2021-08-16 RX ORDER — ALLOPURINOL 100 MG/1
100 TABLET ORAL DAILY
Qty: 90 TABLET | Refills: 3 | Status: SHIPPED | OUTPATIENT
Start: 2021-08-16 | End: 2022-08-24 | Stop reason: ALTCHOICE

## 2021-08-16 RX ORDER — INSULIN DEGLUDEC 200 U/ML
INJECTION, SOLUTION SUBCUTANEOUS
COMMUNITY
Start: 2021-08-02 | End: 2022-06-09

## 2021-08-16 RX ORDER — NITROGLYCERIN 0.4 MG/1
0.4 TABLET SUBLINGUAL EVERY 5 MIN PRN
Qty: 25 TABLET | Refills: 1 | Status: SHIPPED | OUTPATIENT
Start: 2021-08-16 | End: 2022-09-22 | Stop reason: SDUPTHER

## 2021-08-16 ASSESSMENT — ENCOUNTER SYMPTOMS
BACK PAIN: 0
WHEEZING: 0
SHORTNESS OF BREATH: 0
ABDOMINAL PAIN: 0
NAUSEA: 0
SORE THROAT: 0

## 2021-08-16 NOTE — PROGRESS NOTES
with Nephrology plan for 24 hour urine  -constipation fair control on current regimen  -elevation TG earlier this year will repeat and consider adjustment pending labs  -Discussed to reestablish with Cardiology  -Will consider colon cancer screening, requesting cologuard    SUBJECTIVE/OBJECTIVE:  DAVIS Cortes presents for 3 month follow up. No acute complaints compliant with medications. Patient now off humalog, a1c much improved on 1.8mg victoza. Currently on 66U Tresiba. Patient does check bp at home he states they are now well controlled 130s SBP. Following with Dr. Nilesh Ramirez. Review of Systems   Constitutional: Negative for chills and fatigue. HENT: Negative for congestion and sore throat. Respiratory: Negative for shortness of breath and wheezing. Cardiovascular: Negative for chest pain and palpitations. Gastrointestinal: Negative for abdominal pain and nausea. Genitourinary: Negative for frequency and urgency. Musculoskeletal: Negative for back pain. Neurological: Negative for light-headedness.        Patient-Reported Vitals 8/16/2021   Patient-Reported Weight 303lbs   Patient-Reported Height 6        Physical Exam      Constitutional: [x] Appears well-developed and well-nourished [x] No apparent distress      [] Abnormal -     Mental status: [x] Alert and awake  [x] Oriented to person/place/time [x] Able to follow commands    [] Abnormal -     Eyes:   EOM    [x]  Normal    [] Abnormal -   Sclera  [x]  Normal    [] Abnormal -          Discharge [x]  None visible   [] Abnormal -     HENT: [x] Normocephalic, atraumatic  [] Abnormal -   [x] Mouth/Throat: Mucous membranes are moist    External Ears [x] Normal  [] Abnormal -    Neck: [x] No visualized mass [] Abnormal -     Pulmonary/Chest: [x] Respiratory effort normal   [x] No visualized signs of difficulty breathing or respiratory distress        [] Abnormal -      Musculoskeletal:   [x] Normal gait with no signs of ataxia         [x] Normal range of motion of neck        [] Abnormal -     Neurological:        [x] No Facial Asymmetry (Cranial nerve 7 motor function) (limited exam due to video visit)          [x] No gaze palsy        [] Abnormal -          Skin:        [x] No significant exanthematous lesions or discoloration noted on facial skin         [] Abnormal -            Psychiatric:       [x] Normal Affect [] Abnormal -        [x] No Hallucinations    Other pertinent observable physical exam findings:-          On this date 8/16/2021 I have spent 25 minutes reviewing previous notes, test results and face to face (virtual) with the patient discussing the diagnosis and importance of compliance with the treatment plan as well as documenting on the day of the visit. Kenya Syed, was evaluated through a synchronous (real-time) audio-video encounter. The patient (or guardian if applicable) is aware that this is a billable service. Verbal consent to proceed has been obtained within the past 12 months. The visit was conducted pursuant to the emergency declaration under the [address] [place] [address] and the [employer] and [employer] General Act. Patient identification was verified, and a caregiver was present when appropriate. The patient was located in a state where the provider was credentialed to provide care. An electronic signature was used to authenticate this note.     --Milagro Dee DO

## 2021-08-19 ENCOUNTER — TELEPHONE (OUTPATIENT)
Dept: CARDIOLOGY CLINIC | Age: 51
End: 2021-08-19

## 2021-08-27 ENCOUNTER — TELEPHONE (OUTPATIENT)
Dept: FAMILY MEDICINE CLINIC | Age: 51
End: 2021-08-27

## 2021-08-31 LAB — FECAL BLOOD IMMUNOCHEMICAL TEST: NORMAL

## 2021-09-12 ENCOUNTER — HOSPITAL ENCOUNTER (OUTPATIENT)
Age: 51
Setting detail: SPECIMEN
Discharge: HOME | End: 2021-09-12
Payer: COMMERCIAL

## 2021-09-12 PROCEDURE — 84156 ASSAY OF PROTEIN URINE: CPT

## 2021-09-12 PROCEDURE — 81050 URINALYSIS VOLUME MEASURE: CPT

## 2021-09-13 LAB
Lab: 24 HRS
PROTEIN 24 HOUR URINE: 7431 MG/24 HR
VOLUME, (UVOL): 3000 MLS

## 2021-10-19 LAB — DIABETIC RETINOPATHY: POSITIVE

## 2022-01-05 ENCOUNTER — OFFICE VISIT (OUTPATIENT)
Dept: FAMILY MEDICINE CLINIC | Age: 52
End: 2022-01-05
Payer: COMMERCIAL

## 2022-01-05 ENCOUNTER — HOSPITAL ENCOUNTER (OUTPATIENT)
Age: 52
Setting detail: SPECIMEN
Discharge: HOME | End: 2022-01-05
Payer: COMMERCIAL

## 2022-01-05 VITALS — WEIGHT: 291 LBS | BODY MASS INDEX: 39.47 KG/M2 | TEMPERATURE: 97.2 F | OXYGEN SATURATION: 96 % | HEART RATE: 72 BPM

## 2022-01-05 DIAGNOSIS — H66.003 NON-RECURRENT ACUTE SUPPURATIVE OTITIS MEDIA OF BOTH EARS WITHOUT SPONTANEOUS RUPTURE OF TYMPANIC MEMBRANES: Primary | ICD-10-CM

## 2022-01-05 DIAGNOSIS — R09.81 NASAL CONGESTION: ICD-10-CM

## 2022-01-05 DIAGNOSIS — R51.9 GENERALIZED HEADACHE: ICD-10-CM

## 2022-01-05 DIAGNOSIS — R05.9 COUGH: ICD-10-CM

## 2022-01-05 DIAGNOSIS — Z20.822 CLOSE EXPOSURE TO COVID-19 VIRUS: ICD-10-CM

## 2022-01-05 PROCEDURE — G8417 CALC BMI ABV UP PARAM F/U: HCPCS | Performed by: NURSE PRACTITIONER

## 2022-01-05 PROCEDURE — G8427 DOCREV CUR MEDS BY ELIG CLIN: HCPCS | Performed by: NURSE PRACTITIONER

## 2022-01-05 PROCEDURE — U0005 INFEC AGEN DETEC AMPLI PROBE: HCPCS

## 2022-01-05 PROCEDURE — 99213 OFFICE O/P EST LOW 20 MIN: CPT | Performed by: NURSE PRACTITIONER

## 2022-01-05 PROCEDURE — 3017F COLORECTAL CA SCREEN DOC REV: CPT | Performed by: NURSE PRACTITIONER

## 2022-01-05 PROCEDURE — G8484 FLU IMMUNIZE NO ADMIN: HCPCS | Performed by: NURSE PRACTITIONER

## 2022-01-05 PROCEDURE — 1036F TOBACCO NON-USER: CPT | Performed by: NURSE PRACTITIONER

## 2022-01-05 PROCEDURE — U0003 INFECTIOUS AGENT DETECTION BY NUCLEIC ACID (DNA OR RNA); SEVERE ACUTE RESPIRATORY SYNDROME CORONAVIRUS 2 (SARS-COV-2) (CORONAVIRUS DISEASE [COVID-19]), AMPLIFIED PROBE TECHNIQUE, MAKING USE OF HIGH THROUGHPUT TECHNOLOGIES AS DESCRIBED BY CMS-2020-01-R: HCPCS

## 2022-01-05 RX ORDER — AMOXICILLIN 875 MG/1
875 TABLET, COATED ORAL 2 TIMES DAILY
Qty: 20 TABLET | Refills: 0 | Status: SHIPPED | OUTPATIENT
Start: 2022-01-05 | End: 2022-01-15

## 2022-01-05 NOTE — PATIENT INSTRUCTIONS
Your COVID 19 test can take 1-5 days for the results to come back. We ask that you make a Mychart page and view your test results this way. You will need to Self quarantine until you know your results. Increase fluids and rest  Saline nasal spray as needed for nasal congestion  Warm salt gargles as needed for throat discomfort  Monitor temperature twice a day  Tylenol as needed for fevers and/or discomfort. Big deep breaths periodically throughout the day  Regular Mucinex over the counter as needed for chest congestion  If symptoms worsen -Go to the ER. Follow up with your primary care provider      To Whom it May Concern:    Fran Ramos was tested for COVID-19 1/5/2022. He/she must stay home until test results are back. If test is positive, he/she must quarantine for a total of 10 days starting from day one of symptom onset. He/she must also be fever-free for 24 hours at that time, and also have improvement in symptoms. CDC guidelines, however, are often changing and quarantine guidelines may be based on type of occupation. We do not recommend retesting as patients may continue to test positive for months even though no longer contagious. It is suggested you call [address] or [address] [place] [address] [employer] with any questions regarding quarantine timeframe/return to work/school details.

## 2022-01-05 NOTE — PROGRESS NOTES
1/5/2022    HPI:  Chief complaint and history of present illness as per medical assistant/nurse documented today in the Flu/COVID-19 clinic. Patient is here with complaints of cough, nasal congestion, headache, sore throat, and bilateral ear pain x 2 days. Patient states he has been in close contact with a friend that has recently tested positive for covid. Patient states he has had his covid vaccine. MEDICATIONS:  Prior to Visit Medications    Medication Sig Taking? Authorizing Provider   amoxicillin (AMOXIL) 875 MG tablet Take 1 tablet by mouth 2 times daily for 10 days Yes Jp Callejas, APRN - CNP   atorvastatin (LIPITOR) 40 MG tablet Take 1 tablet by mouth daily  Raza Fairchild DO   allopurinol (ZYLOPRIM) 100 MG tablet Take 1 tablet by mouth daily  Raza Fairchild DO   furosemide (LASIX) 20 MG tablet Take 1 tablet by mouth daily  Raza Fairchild DO   nitroGLYCERIN (NITROSTAT) 0.4 MG SL tablet Place 1 tablet under the tongue every 5 minutes as needed for Chest pain up to max of 3 total doses. If no relief after 1 dose, call 911. Raza Fairchild DO   Insulin Pen Needle (PEN NEEDLES 3/16\") 31G X 5 MM MISC 1 each by Does not apply route daily Use one needle two times daily as directed on package. Raza Fairchild DO   TRESIBA FLEXTOUCH 200 UNIT/ML SOPN INJECT 66 UNITS INTO THE SKIN NIGHTLY  Historical Provider, MD ANDREA SENNA LAXATIVE 8.6 MG tablet TAKE 2 TABLETS BY MOUTH AT BEDTIME AS NEEDED  Raza Fairchild DO   polyethylene glycol (MIRALAX MIX-IN PAX) 17 g packet Take 17 g by mouth daily as needed for Constipation  Raza Fairchild DO   blood glucose monitor strips 1 strip by Other route Test 4 times a day & as needed for symptoms of irregular blood glucose. Dispense sufficient amount for indicated testing frequency plus additional to accommodate PRN testing needs.   Historical Provider, MD   metoprolol tartrate (LOPRESSOR) 50 MG tablet Take 1 tablet by mouth 2 times daily  Raza Fairchild DO   VICTOZA 18 myocardial infarction 2006    History of nuclear stress test 2017    lexiscan-scar,no ischemia,EF52%,apical hdyskinesis    History of PTCA 2006    STENT TO RCA    History of PTCA     STENT TO LAD, RCA    Hyperlipidemia     Hypertension 2012    Hypertriglyceridemia     Type II or unspecified type diabetes mellitus without mention of complication, not stated as uncontrolled    ,   Past Surgical History:   Procedure Laterality Date    CORONARY ANGIOPLASTY WITH STENT PLACEMENT      HERNIA REPAIR     ,   Social History     Tobacco Use    Smoking status: Former Smoker     Packs/day: 1.00     Types: Cigarettes     Quit date: 12/3/2016     Years since quittin.0    Smokeless tobacco: Never Used   Substance Use Topics    Alcohol use:  Yes     Alcohol/week: 1.0 standard drink     Types: 1 Cans of beer per week     Comment: once a week beer liquor    Drug use: No   ,   Family History   Problem Relation Age of Onset    High Blood Pressure Mother     High Cholesterol Mother     Heart Disease Father     Cancer Sister     Arthritis Maternal Grandmother     Cancer Maternal Grandfather    ,   Immunization History   Administered Date(s) Administered    COVID-19, Pfizer, PF, 30mcg/0.3mL 2021, 2021   ,   Health Maintenance   Topic Date Due    Hepatitis C screen  Never done    Pneumococcal 0-64 years Vaccine (1 of 2 - PPSV23) Never done    Depression Screen  Never done    HIV screen  Never done    Diabetic retinal exam  Never done    Hepatitis B vaccine (1 of 3 - Risk 3-dose series) Never done    DTaP/Tdap/Td vaccine (1 - Tdap) Never done    Shingles Vaccine (1 of 2) Never done    COVID-19 Vaccine (3 - Booster for [address] series) 10/24/2021    Lipid screen  2022    Diabetic foot exam  06/15/2022    A1C test (Diabetic or Prediabetic)  2022    Potassium monitoring  2022    Creatinine monitoring  2022    Colon cancer screen fecal DNA test ([facility])  08/31/2024    Flu vaccine  Completed    Hepatitis A vaccine  Aged Out    Hib vaccine  Aged Out    Meningococcal (ACWY) vaccine  Aged Out       PHYSICAL EXAM:  Physical Exam  Constitutional:       Appearance: Normal appearance. HENT:      Head: Normocephalic. Right Ear: Ear canal and external ear normal. Tympanic membrane is erythematous. Left Ear: Ear canal and external ear normal. Tympanic membrane is erythematous. Nose: Congestion (slight) present. Mouth/Throat:      Lips: Pink. Mouth: Mucous membranes are moist.      Pharynx: Oropharynx is clear. Cardiovascular:      Rate and Rhythm: Normal rate and regular rhythm. Heart sounds: Normal heart sounds. Pulmonary:      Effort: Pulmonary effort is normal.      Breath sounds: Normal breath sounds. Musculoskeletal:      Cervical back: Neck supple. Skin:     General: Skin is warm and dry. Neurological:      Mental Status: He is alert and oriented to person, place, and time. Psychiatric:         Mood and Affect: Mood normal.         Behavior: Behavior normal.         ASSESSMENT/PLAN:  1. Non-recurrent acute suppurative otitis media of both ears without spontaneous rupture of tympanic membranes  - amoxicillin (AMOXIL) 875 MG tablet; Take 1 tablet by mouth 2 times daily for 10 days  Dispense: 20 tablet; Refill: 0    2. Cough  Your COVID 19 test can take 1-5 days for the results to come back. We ask that you make a Mychart page and view your test results this way. You will need to Self quarantine until you know your results. Increase fluids and rest  Saline nasal spray as needed for nasal congestion  Warm salt gargles as needed for throat discomfort  Monitor temperature twice a day  Tylenol as needed for fevers and/or discomfort. Big deep breaths periodically throughout the day  Regular Mucinex over the counter as needed for chest congestion  If symptoms worsen -Go to the ER.    Follow up with your primary care provider  - Covid-19 Ambulatory    3. Nasal congestion  - Covid-19 Ambulatory    4. Generalized headache  - Covid-19 Ambulatory    5. Close exposure to COVID-19 virus  - Covid-19 Ambulatory      FOLLOW-UP:  Return if symptoms worsen or fail to improve.     In addition to other information, the printed after visit summary provided to the patient includes:  [x] COVID-19 Self care instructions  [x] COVID-19 General patient information

## 2022-01-05 NOTE — PROGRESS NOTES
1/5/22  Landry Morley  1970    FLU/COVID-19 CLINIC EVALUATION    HPI SYMPTOMS:    Employer:Unemployed    [] Fevers -unsure  [] Chills  [x] Cough  [] Coughing up blood  [] Chest Congestion  [x] Nasal Congestion  [] Feeling short of breath  [] Sometimes  [] Frequently  [] All the time  [] Chest pain  [x] Headaches  [x]Tolerable  [] Severe  [x] Sore throat  [] Muscle aches  [] Nausea  [] Vomiting  []Unable to keep fluids down  [] Diarrhea  []Severe    [x] OTHER SYMPTOMS:  Bilateral ear pain    Symptom Duration:   [] 1  [x] 2   [] 3   [] 4    [] 5   [] 6   [] 7   [] 8   [] 9   [] 10   [] 11   [] 12   [] 13   [] 14   [] Longer than 14 days    Symptom course:   [x] Worsening     [] Stable     [] Improving    RISK FACTORS:    [] Pregnant or possibly pregnant  [] Age over 61  [x] Diabetes -type 2  [x] Heart disease  [] Asthma  [] COPD/Other chronic lung diseases  [] Active Cancer  [] On Chemotherapy  [] Taking oral steroids  [] History Lymphoma/Leukemia  [x] Close contact with a lab confirmed COVID-19 patient within 14 days of symptom onset -friend  [] History of travel from affected geographical areas within 14 days of symptom onset       VITALS:  There were no vitals filed for this visit. TESTS:    POCT FLU:  [] Positive     []Negative    ASSESSMENT:    [] Flu  [] Possible COVID-19  [] Strep    PLAN:    [] Discharge home with written instructions for:  [] Flu management  [] Possible COVID-19 infection with self-quarantine and management of symptoms  [] Follow-up with primary care physician or emergency department if worsens  [] Evaluation per physician/NP/PA in clinic  [] Sent to ER       An  electronic signature was used to authenticate this note.      --Orion Paz MA on 1/5/2022 at 2:53 PM

## 2022-01-06 LAB
SARS-COV-2: NOT DETECTED
SOURCE: NORMAL

## 2022-01-17 ENCOUNTER — HOSPITAL ENCOUNTER (OUTPATIENT)
Age: 52
Discharge: HOME | End: 2022-01-17
Payer: COMMERCIAL

## 2022-01-17 DIAGNOSIS — R80.1 PERSISTENT PROTEINURIA: ICD-10-CM

## 2022-01-17 DIAGNOSIS — N18.32 STAGE 3B CHRONIC KIDNEY DISEASE (HCC): ICD-10-CM

## 2022-01-17 LAB
ALBUMIN SERPL-MCNC: 3.8 GM/DL (ref 3.4–5)
ALP BLD-CCNC: 79 IU/L (ref 40–128)
ALT SERPL-CCNC: 32 U/L (ref 10–40)
ANION GAP SERPL CALCULATED.3IONS-SCNC: 11 MMOL/L (ref 4–16)
AST SERPL-CCNC: 19 IU/L (ref 15–37)
BILIRUB SERPL-MCNC: 0.4 MG/DL (ref 0–1)
BUN BLDV-MCNC: 25 MG/DL (ref 6–23)
CALCIUM SERPL-MCNC: 8.6 MG/DL (ref 8.3–10.6)
CHLORIDE BLD-SCNC: 100 MMOL/L (ref 99–110)
CO2: 23 MMOL/L (ref 21–32)
CREAT SERPL-MCNC: 1.8 MG/DL (ref 0.9–1.3)
CREATININE URINE: 175.1 MG/DL (ref 39–259)
GFR AFRICAN AMERICAN: 49 ML/MIN/1.73M2
GFR NON-AFRICAN AMERICAN: 40 ML/MIN/1.73M2
GLUCOSE BLD-MCNC: 107 MG/DL (ref 70–99)
POTASSIUM SERPL-SCNC: 5.3 MMOL/L (ref 3.5–5.1)
PROT/CREAT RATIO, UR: >3.4
SODIUM BLD-SCNC: 134 MMOL/L (ref 135–145)
TOTAL PROTEIN: 6 GM/DL (ref 6.4–8.2)
URINE TOTAL PROTEIN: >600 MG/DL

## 2022-01-17 PROCEDURE — 80053 COMPREHEN METABOLIC PANEL: CPT

## 2022-01-17 PROCEDURE — 82570 ASSAY OF URINE CREATININE: CPT

## 2022-01-17 PROCEDURE — 36415 COLL VENOUS BLD VENIPUNCTURE: CPT

## 2022-01-17 PROCEDURE — 84156 ASSAY OF PROTEIN URINE: CPT

## 2022-03-21 LAB — DIABETIC RETINOPATHY: POSITIVE

## 2022-05-17 ENCOUNTER — HOSPITAL ENCOUNTER (OUTPATIENT)
Age: 52
Discharge: HOME | End: 2022-05-17
Payer: COMMERCIAL

## 2022-05-17 DIAGNOSIS — E78.1 HYPERTRIGLYCERIDEMIA: ICD-10-CM

## 2022-05-17 LAB
ALBUMIN SERPL-MCNC: 4 GM/DL (ref 3.4–5)
ALP BLD-CCNC: 84 IU/L (ref 40–128)
ALT SERPL-CCNC: 23 U/L (ref 10–40)
ANION GAP SERPL CALCULATED.3IONS-SCNC: 13 MMOL/L (ref 4–16)
AST SERPL-CCNC: 17 IU/L (ref 15–37)
BACTERIA: NEGATIVE /HPF
BILIRUB SERPL-MCNC: 0.2 MG/DL (ref 0–1)
BILIRUBIN URINE: NEGATIVE MG/DL
BLOOD, URINE: NEGATIVE
BUN BLDV-MCNC: 42 MG/DL (ref 6–23)
CALCIUM SERPL-MCNC: 9 MG/DL (ref 8.3–10.6)
CHLORIDE BLD-SCNC: 100 MMOL/L (ref 99–110)
CLARITY: CLEAR
CO2: 23 MMOL/L (ref 21–32)
COLOR: YELLOW
CREAT SERPL-MCNC: 2.2 MG/DL (ref 0.9–1.3)
GFR AFRICAN AMERICAN: 38 ML/MIN/1.73M2
GFR NON-AFRICAN AMERICAN: 32 ML/MIN/1.73M2
GLUCOSE BLD-MCNC: 130 MG/DL (ref 70–99)
GLUCOSE, URINE: NEGATIVE MG/DL
KETONES, URINE: NEGATIVE MG/DL
LEUKOCYTE ESTERASE, URINE: NEGATIVE
NITRITE URINE, QUANTITATIVE: NEGATIVE
PH, URINE: 6 (ref 5–8)
POTASSIUM SERPL-SCNC: 4.7 MMOL/L (ref 3.5–5.1)
PROTEIN UA: >300 MG/DL
RBC URINE: <1 /HPF (ref 0–3)
SODIUM BLD-SCNC: 136 MMOL/L (ref 135–145)
SPECIFIC GRAVITY UA: 1.02 (ref 1–1.03)
TOTAL PROTEIN: 6 GM/DL (ref 6.4–8.2)
TRICHOMONAS: ABNORMAL /HPF
UROBILINOGEN, URINE: 0.2 MG/DL (ref 0.2–1)
WBC UA: 1 /HPF (ref 0–2)

## 2022-05-17 PROCEDURE — 80053 COMPREHEN METABOLIC PANEL: CPT

## 2022-05-17 PROCEDURE — 36415 COLL VENOUS BLD VENIPUNCTURE: CPT

## 2022-05-17 PROCEDURE — 81001 URINALYSIS AUTO W/SCOPE: CPT

## 2022-05-17 PROCEDURE — 84156 ASSAY OF PROTEIN URINE: CPT

## 2022-05-17 PROCEDURE — 82570 ASSAY OF URINE CREATININE: CPT

## 2022-05-18 LAB
CREATININE URINE: 79.5 MG/DL (ref 39–259)
PROT/CREAT RATIO, UR: ABNORMAL
URINE TOTAL PROTEIN: >600 MG/DL

## 2022-06-06 ENCOUNTER — OFFICE VISIT (OUTPATIENT)
Dept: FAMILY MEDICINE CLINIC | Age: 52
End: 2022-06-06
Payer: COMMERCIAL

## 2022-06-06 VITALS
BODY MASS INDEX: 39.96 KG/M2 | RESPIRATION RATE: 16 BRPM | HEART RATE: 83 BPM | OXYGEN SATURATION: 96 % | WEIGHT: 295 LBS | DIASTOLIC BLOOD PRESSURE: 89 MMHG | SYSTOLIC BLOOD PRESSURE: 143 MMHG | HEIGHT: 72 IN

## 2022-06-06 DIAGNOSIS — I10 ESSENTIAL HYPERTENSION: ICD-10-CM

## 2022-06-06 DIAGNOSIS — I25.10 CORONARY ARTERY DISEASE INVOLVING NATIVE CORONARY ARTERY OF NATIVE HEART WITHOUT ANGINA PECTORIS: ICD-10-CM

## 2022-06-06 DIAGNOSIS — K59.00 CONSTIPATION, UNSPECIFIED CONSTIPATION TYPE: ICD-10-CM

## 2022-06-06 DIAGNOSIS — Z11.3 SCREENING EXAMINATION FOR STD (SEXUALLY TRANSMITTED DISEASE): Primary | ICD-10-CM

## 2022-06-06 DIAGNOSIS — N18.32 STAGE 3B CHRONIC KIDNEY DISEASE (HCC): ICD-10-CM

## 2022-06-06 DIAGNOSIS — E11.59 TYPE 2 DIABETES MELLITUS WITH OTHER CIRCULATORY COMPLICATION, WITHOUT LONG-TERM CURRENT USE OF INSULIN (HCC): ICD-10-CM

## 2022-06-06 PROCEDURE — 3046F HEMOGLOBIN A1C LEVEL >9.0%: CPT | Performed by: STUDENT IN AN ORGANIZED HEALTH CARE EDUCATION/TRAINING PROGRAM

## 2022-06-06 PROCEDURE — 3017F COLORECTAL CA SCREEN DOC REV: CPT | Performed by: STUDENT IN AN ORGANIZED HEALTH CARE EDUCATION/TRAINING PROGRAM

## 2022-06-06 PROCEDURE — 2022F DILAT RTA XM EVC RTNOPTHY: CPT | Performed by: STUDENT IN AN ORGANIZED HEALTH CARE EDUCATION/TRAINING PROGRAM

## 2022-06-06 PROCEDURE — G8427 DOCREV CUR MEDS BY ELIG CLIN: HCPCS | Performed by: STUDENT IN AN ORGANIZED HEALTH CARE EDUCATION/TRAINING PROGRAM

## 2022-06-06 PROCEDURE — 99214 OFFICE O/P EST MOD 30 MIN: CPT | Performed by: STUDENT IN AN ORGANIZED HEALTH CARE EDUCATION/TRAINING PROGRAM

## 2022-06-06 PROCEDURE — G8417 CALC BMI ABV UP PARAM F/U: HCPCS | Performed by: STUDENT IN AN ORGANIZED HEALTH CARE EDUCATION/TRAINING PROGRAM

## 2022-06-06 PROCEDURE — 1036F TOBACCO NON-USER: CPT | Performed by: STUDENT IN AN ORGANIZED HEALTH CARE EDUCATION/TRAINING PROGRAM

## 2022-06-06 RX ORDER — PEN NEEDLE, DIABETIC 30 GX5/16"
1 NEEDLE, DISPOSABLE MISCELLANEOUS DAILY
Qty: 500 EACH | Refills: 1 | Status: SHIPPED | OUTPATIENT
Start: 2022-06-06 | End: 2022-09-22 | Stop reason: SDUPTHER

## 2022-06-06 RX ORDER — SENNOSIDES 8.6 MG/1
TABLET ORAL
Qty: 30 TABLET | Refills: 5 | Status: SHIPPED | OUTPATIENT
Start: 2022-06-06

## 2022-06-06 RX ORDER — ATORVASTATIN CALCIUM 40 MG/1
40 TABLET, FILM COATED ORAL DAILY
Qty: 90 TABLET | Refills: 1 | Status: SHIPPED | OUTPATIENT
Start: 2022-06-06 | End: 2022-09-22 | Stop reason: SDUPTHER

## 2022-06-06 RX ORDER — METOPROLOL TARTRATE 50 MG/1
50 TABLET, FILM COATED ORAL 2 TIMES DAILY
Qty: 180 TABLET | Refills: 1 | Status: SHIPPED | OUTPATIENT
Start: 2022-06-06 | End: 2022-08-24 | Stop reason: SDUPTHER

## 2022-06-06 RX ORDER — ALLOPURINOL 100 MG/1
100 TABLET ORAL DAILY
Qty: 90 TABLET | Refills: 1 | Status: CANCELLED | OUTPATIENT
Start: 2022-06-06 | End: 2022-09-04

## 2022-06-06 RX ORDER — LIRAGLUTIDE 6 MG/ML
1.8 INJECTION SUBCUTANEOUS DAILY
Qty: 15 PEN | Refills: 3 | Status: SHIPPED | OUTPATIENT
Start: 2022-06-06 | End: 2022-09-22

## 2022-06-06 NOTE — LETTER
[address] [place] [address] Ady Meyer  Phone: [phone]  Fax: [phone]    Svetlana House DO         June 6, 2022     Patient: Ina Wilde   YOB: 1970   Date of Visit: 6/6/2022       To Whom It May Concern: It is my medical opinion that Ruben Ruiz requires a disability parking placard for the following reasons:  He cannot walk 200 feet without stopping to rest.  Duration of need: 5 years    If you have any questions or concerns, please don't hesitate to call.     Sincerely,        Raza Fairchild DO

## 2022-06-06 NOTE — PROGRESS NOTES
6/7/2022    Landry Morley    Chief Complaint   Patient presents with    3 Month Follow-Up     Presenting for 3 month follow up. Last visit was 8/16/2021.  Other     TRESIBA FLEXTOUCH 200 UNIT/ML SOPN needs rx-        HPI  History was obtained from patient. Amelia Vitale is a 47 y.o. male with a PMHx as listed below who presents today for followup on chronic conditions. Follows with Dr. Gege braxton, interested in starting SGLT2    Currently on Tresiba 66U nightly  Glucose numbers running 110-120    Ex-Wife positive syphilis he would like to be checked, last sexually active over 1 year ago. Patient interested in STD screening, no symptoms. 1. Screening examination for STD (sexually transmitted disease)    2. Coronary artery disease involving native coronary artery of native heart without angina pectoris    3. Constipation, unspecified constipation type    4. Type 2 diabetes mellitus with other circulatory complication, without long-term current use of insulin (HCC)    5. Stage 3b chronic kidney disease ([facility] [address]    6. Essential hypertension             REVIEW OF SYMPTOMS    Review of Systems   Constitutional: Negative for chills and fatigue. HENT: Negative for congestion and sore throat. Respiratory: Negative for shortness of breath and wheezing. Cardiovascular: Negative for chest pain and palpitations. Gastrointestinal: Negative for abdominal pain and nausea. Genitourinary: Negative for frequency and urgency. Neurological: Negative for light-headedness. PAST MEDICAL HISTORY  Past Medical History:   Diagnosis Date    CAD (coronary artery disease)     H/O MI.    Family history of coronary artery disease     H/O echocardiogram 6/08 6/08 EF>55% TRACE MR, TR    H/O echocardiogram 4/15/15    EF 50-55% Mild left atrial enlargement. Normal LV systolic function. Trace MR and TR. Normal sized abdominal aorta.      History of cardiac catheterization 03/01/2017    Stenting of the LAD    History of cardiovascular stress test 6/06 6/08 10/10 7/12     7/12 LAD territory ISCHEMIA, EF 62%    History of exercise stress test 2017    treadmill    History of myocardial infarction 2006    History of nuclear stress test 2017    lexiscan-scar,no ischemia,EF52%,apical hdyskinesis    History of PTCA 2006    STENT TO RCA    History of PTCA     STENT TO LAD, RCA    Hyperlipidemia     Hypertension 2012    Hypertriglyceridemia     Type II or unspecified type diabetes mellitus without mention of complication, not stated as uncontrolled        FAMILY HISTORY  Family History   Problem Relation Age of Onset    High Blood Pressure Mother     High Cholesterol Mother     Heart Disease Father     Cancer Sister     Arthritis Maternal Grandmother     Cancer Maternal Grandfather        SOCIAL HISTORY  Social History     Socioeconomic History    Marital status:      Spouse name: None    Number of children: None    Years of education: None    Highest education level: None   Occupational History    Occupation:    Tobacco Use    Smoking status: Former Smoker     Packs/day: 1.00     Types: Cigarettes     Quit date: 12/3/2016     Years since quittin.5    Smokeless tobacco: Never Used   Substance and Sexual Activity    Alcohol use: Yes     Alcohol/week: 1.0 standard drink     Types: 1 Cans of beer per week     Comment: once a week beer liquor    Drug use: No    Sexual activity: Yes     Partners: Female   Other Topics Concern    None   Social History Narrative    None     Social Determinants of Health     Financial Resource Strain:     Difficulty of Paying Living Expenses: Not on file   Food Insecurity:     Worried About Running Out of Food in the Last Year: Not on file    Trace of Food in the Last Year: Not on file   Transportation Needs:     Lack of Transportation (Medical): Not on file    Lack of Transportation (Non-Medical):  Not on file   Physical Activity:     Days of Exercise per Week: Not on file    Minutes of Exercise per Session: Not on file   Stress:     Feeling of Stress : Not on file   Social Connections:     Frequency of Communication with Friends and Family: Not on file    Frequency of Social Gatherings with Friends and Family: Not on file    Attends Synagogue Services: Not on file    Active Member of [address] [place] [address] or Organizations: Not on file    Attends Club or Organization Meetings: Not on file    Marital Status: Not on file   Intimate Partner Violence:     Fear of Current or Ex-Partner: Not on file    Emotionally Abused: Not on file    Physically Abused: Not on file    Sexually Abused: Not on file   Housing Stability:     Unable to Pay for Housing in the Last Year: Not on file    Number of [place] in the Last Year: Not on file    Unstable Housing in the Last Year: Not on file        SURGICAL HISTORY  Past Surgical History:   Procedure Laterality Date    CORONARY ANGIOPLASTY WITH STENT PLACEMENT  2006    HERNIA REPAIR                   CURRENT MEDICATIONS  Current Outpatient Medications   Medication Sig Dispense Refill    atorvastatin (LIPITOR) 40 MG tablet Take 1 tablet by mouth daily 90 tablet 1    SM SENNA LAXATIVE 8.6 MG tablet TAKE 2 TABLETS BY MOUTH AT BEDTIME AS NEEDED 30 tablet 5    Insulin Pen Needle (PEN NEEDLES 3/16\") 31G X 5 MM MISC 1 each by Does not apply route daily Use one needle two times daily as directed on package.  500 each 1    metoprolol tartrate (LOPRESSOR) 50 MG tablet Take 1 tablet by mouth 2 times daily 180 tablet 1    VICTOZA 18 MG/3ML SOPN SC injection Inject 1.8 mg into the skin daily 15 pen 3    dapagliflozin (FARXIGA) 5 MG tablet Take 1 tablet by mouth every morning 30 tablet 5    olmesartan (BENICAR) 40 MG tablet Take 1 tablet by mouth daily 90 tablet 3    clopidogrel (PLAVIX) 75 MG tablet Take 1 tablet by mouth daily 90 tablet 3    furosemide (LASIX) 20 MG tablet Take 1 tablet by mouth daily 90 tablet 1    allopurinol (ZYLOPRIM) 100 MG tablet Take 1 tablet by mouth daily 90 tablet 3    nitroGLYCERIN (NITROSTAT) 0.4 MG SL tablet Place 1 tablet under the tongue every 5 minutes as needed for Chest pain up to max of 3 total doses. If no relief after 1 dose, call 911. 25 tablet 1    TRESIBA FLEXTOUCH 200 UNIT/ML SOPN INJECT 66 UNITS INTO THE SKIN NIGHTLY      polyethylene glycol (MIRALAX MIX-IN PAX) 17 g packet Take 17 g by mouth daily as needed for Constipation 527 g 5    blood glucose monitor strips 1 strip by Other route Test 4 times a day & as needed for symptoms of irregular blood glucose. Dispense sufficient amount for indicated testing frequency plus additional to accommodate PRN testing needs.  Insulin Pen Needle (PEN NEEDLES 3/16\") 31G X 5 MM MISC 1 each by Does not apply route daily To be used 4 times daily as directed on package 500 each 2    sildenafil (VIAGRA) 100 MG tablet Take 1 tablet by mouth as needed for Erectile Dysfunction (as needed  daily PRN sexual dysfunction) 20 tablet 5    Omega-3 Fatty Acids (FISH OIL) 1200 MG CAPS Take by mouth 2 times daily      aspirin EC 81 MG EC tablet Take 1 tablet by mouth daily 30 tablet 6    Vitamin D (CHOLECALCIFEROL) 1000 UNITS CAPS capsule Take 1,000 Units by mouth daily.  FOLIC ACID PO Take  by mouth.  therapeutic multivitamin-minerals (THERAGRAN-M) tablet Take 1 tablet by mouth daily.  Calcium Polycarbophil (FIBER) 625 MG TABS Take 1 tablet by mouth daily (Patient not taking: Reported on 9/14/2021) 14 tablet 0    insulin lispro (HUMALOG) 100 UNIT/ML injection vial Inject into the skin 3 times daily (before meals) SS (Patient not taking: Reported on 9/14/2021)       No current facility-administered medications for this visit.        ALLERGIES  No Known Allergies    PHYSICAL EXAM    BP (!) 143/89   Pulse 83   Resp 16   Ht 6' (1.829 m)   Wt 295 lb (133.8 kg)   SpO2 96%   BMI 40.01 kg/m²     Physical Exam  Constitutional:       Appearance: Normal appearance. HENT:      Head: Normocephalic and atraumatic. Eyes:      Extraocular Movements: Extraocular movements intact. Pupils: Pupils are equal, round, and reactive to light. Cardiovascular:      Rate and Rhythm: Normal rate and regular rhythm. Pulses: Normal pulses. Heart sounds: No murmur heard. No friction rub. No gallop. Pulmonary:      Effort: Pulmonary effort is normal.      Breath sounds: Normal breath sounds. Skin:     General: Skin is warm and dry. Neurological:      General: No focal deficit present. Mental Status: He is alert. Psychiatric:         Mood and Affect: Mood normal.         Behavior: Behavior normal.         ASSESSMENT & PLAN    1. Coronary artery disease involving native coronary artery of native heart without angina pectoris  -will refer cardiology patient has followed with in the past  - atorvastatin (LIPITOR) 40 MG tablet; Take 1 tablet by mouth daily  Dispense: 90 tablet; Refill: 1  - LIPID PANEL; Future  - Fredy Correa MD, Cardiology, [place]    2. Constipation, unspecified constipation type  stable  - SM SENNA LAXATIVE 8.6 MG tablet; TAKE 2 TABLETS BY MOUTH AT BEDTIME AS NEEDED  Dispense: 30 tablet; Refill: 5    3. Type 2 diabetes mellitus with other circulatory complication, without long-term current use of insulin ([facility])  - Insulin Pen Needle (PEN NEEDLES 3/16\") 31G X 5 MM MISC; 1 each by Does not apply route daily Use one needle two times daily as directed on package. Dispense: 500 each; Refill: 1  - VICTOZA 18 MG/3ML SOPN SC injection; Inject 1.8 mg into the skin daily  Dispense: 15 pen; Refill: 3  - Hemoglobin A1C; Future  - dapagliflozin (FARXIGA) 5 MG tablet; Take 1 tablet by mouth every morning  Dispense: 30 tablet; Refill: 5  - CBC with Auto Differential; Future    4.  Stage 3b chronic kidney disease ([facility] [address]  -following with nephrology we will add farxiga for proteinuria    -

## 2022-06-07 ASSESSMENT — ENCOUNTER SYMPTOMS
WHEEZING: 0
SHORTNESS OF BREATH: 0
ABDOMINAL PAIN: 0
NAUSEA: 0
SORE THROAT: 0

## 2022-06-08 ENCOUNTER — TELEPHONE (OUTPATIENT)
Dept: CARDIOLOGY CLINIC | Age: 52
End: 2022-06-08

## 2022-06-08 NOTE — TELEPHONE ENCOUNTER
Left message for patient requesting a return call to schedule a consult with previous [facility] patient for cad per referral from Dr. Jaden Vaca.

## 2022-06-09 RX ORDER — INSULIN DEGLUDEC 200 U/ML
INJECTION, SOLUTION SUBCUTANEOUS
Qty: 9 ML | Refills: 0 | Status: SHIPPED | OUTPATIENT
Start: 2022-06-09 | End: 2022-08-02

## 2022-06-10 ENCOUNTER — TELEPHONE (OUTPATIENT)
Dept: CARDIOLOGY CLINIC | Age: 52
End: 2022-06-10

## 2022-06-10 NOTE — TELEPHONE ENCOUNTER
Left message for patient requesting a return call to schedule a consult with previous [facility] patient for cad per referral from Dr. Andrei Welsh.

## 2022-06-13 ENCOUNTER — TELEPHONE (OUTPATIENT)
Dept: CARDIOLOGY CLINIC | Age: 52
End: 2022-06-13

## 2022-06-13 NOTE — TELEPHONE ENCOUNTER
Left message for patient requesting a return call to schedule a consult with previous [facility] patient for cad per referral from Dr. Jermaine Bowman.

## 2022-06-20 ENCOUNTER — TELEPHONE (OUTPATIENT)
Dept: FAMILY MEDICINE CLINIC | Age: 52
End: 2022-06-20

## 2022-07-08 ENCOUNTER — TELEPHONE (OUTPATIENT)
Dept: FAMILY MEDICINE CLINIC | Age: 52
End: 2022-07-08

## 2022-07-08 NOTE — TELEPHONE ENCOUNTER
Prior auth sent on cover my meds.      Landry Morley ([place]: [id number])  Victoza 18MG/3ML pen-injectors    Wait for Determination

## 2022-08-02 RX ORDER — INSULIN DEGLUDEC 200 U/ML
INJECTION, SOLUTION SUBCUTANEOUS
Qty: 9 ML | Refills: 0 | Status: SHIPPED | OUTPATIENT
Start: 2022-08-02 | End: 2022-08-30

## 2022-08-22 ENCOUNTER — HOSPITAL ENCOUNTER (OUTPATIENT)
Age: 52
Discharge: HOME | End: 2022-08-22
Payer: COMMERCIAL

## 2022-08-22 DIAGNOSIS — E11.59 TYPE 2 DIABETES MELLITUS WITH OTHER CIRCULATORY COMPLICATION, WITHOUT LONG-TERM CURRENT USE OF INSULIN (HCC): ICD-10-CM

## 2022-08-22 DIAGNOSIS — N18.32 STAGE 3B CHRONIC KIDNEY DISEASE (HCC): ICD-10-CM

## 2022-08-22 DIAGNOSIS — Z11.3 SCREENING EXAMINATION FOR STD (SEXUALLY TRANSMITTED DISEASE): ICD-10-CM

## 2022-08-22 DIAGNOSIS — I25.10 CORONARY ARTERY DISEASE INVOLVING NATIVE CORONARY ARTERY OF NATIVE HEART WITHOUT ANGINA PECTORIS: ICD-10-CM

## 2022-08-22 LAB
ALBUMIN SERPL-MCNC: 4.3 GM/DL (ref 3.4–5)
ANION GAP SERPL CALCULATED.3IONS-SCNC: 9 MMOL/L (ref 4–16)
BASOPHILS ABSOLUTE: 0.1 K/CU MM
BASOPHILS RELATIVE PERCENT: 0.5 % (ref 0–1)
BUN BLDV-MCNC: 37 MG/DL (ref 6–23)
CALCIUM SERPL-MCNC: 9.3 MG/DL (ref 8.3–10.6)
CHLORIDE BLD-SCNC: 100 MMOL/L (ref 99–110)
CHOLESTEROL: 188 MG/DL
CO2: 26 MMOL/L (ref 21–32)
CREAT SERPL-MCNC: 2.4 MG/DL (ref 0.9–1.3)
DIFFERENTIAL TYPE: ABNORMAL
EOSINOPHILS ABSOLUTE: 0.1 K/CU MM
EOSINOPHILS RELATIVE PERCENT: 1.2 % (ref 0–3)
ESTIMATED AVERAGE GLUCOSE: 180 MG/DL
GFR AFRICAN AMERICAN: 35 ML/MIN/1.73M2
GFR NON-AFRICAN AMERICAN: 29 ML/MIN/1.73M2
GLUCOSE BLD-MCNC: 125 MG/DL (ref 70–99)
HBA1C MFR BLD: 7.9 % (ref 4.2–6.3)
HCT VFR BLD CALC: 46.1 % (ref 42–52)
HDLC SERPL-MCNC: 37 MG/DL
HEMOGLOBIN: 15.1 GM/DL (ref 13.5–18)
HEPATITIS C ANTIBODY: NON REACTIVE
IMMATURE NEUTROPHIL %: 0.5 % (ref 0–0.43)
LDL CHOLESTEROL CALCULATED: 104 MG/DL
LYMPHOCYTES ABSOLUTE: 2.4 K/CU MM
LYMPHOCYTES RELATIVE PERCENT: 22.7 % (ref 24–44)
MCH RBC QN AUTO: 31.3 PG (ref 27–31)
MCHC RBC AUTO-ENTMCNC: 32.8 % (ref 32–36)
MCV RBC AUTO: 95.6 FL (ref 78–100)
MONOCYTES ABSOLUTE: 0.8 K/CU MM
MONOCYTES RELATIVE PERCENT: 7.6 % (ref 0–4)
NUCLEATED RBC %: 0 %
PDW BLD-RTO: 12.8 % (ref 11.7–14.9)
PHOSPHORUS: 4.3 MG/DL (ref 2.5–4.9)
PLATELET # BLD: 240 K/CU MM (ref 140–440)
PMV BLD AUTO: 11.5 FL (ref 7.5–11.1)
POTASSIUM SERPL-SCNC: 4.9 MMOL/L (ref 3.5–5.1)
RBC # BLD: 4.82 M/CU MM (ref 4.6–6.2)
SEGMENTED NEUTROPHILS ABSOLUTE COUNT: 7.3 K/CU MM
SEGMENTED NEUTROPHILS RELATIVE PERCENT: 67.5 % (ref 36–66)
SODIUM BLD-SCNC: 135 MMOL/L (ref 135–145)
TOTAL IMMATURE NEUTOROPHIL: 0.05 K/CU MM
TOTAL NUCLEATED RBC: 0 K/CU MM
TRIGL SERPL-MCNC: 234 MG/DL
URIC ACID: 6.9 MG/DL (ref 3.5–7.2)
WBC # BLD: 10.8 K/CU MM (ref 4–10.5)

## 2022-08-22 PROCEDURE — 82570 ASSAY OF URINE CREATININE: CPT

## 2022-08-22 PROCEDURE — 84550 ASSAY OF BLOOD/URIC ACID: CPT

## 2022-08-22 PROCEDURE — 83036 HEMOGLOBIN GLYCOSYLATED A1C: CPT

## 2022-08-22 PROCEDURE — 87491 CHLMYD TRACH DNA AMP PROBE: CPT

## 2022-08-22 PROCEDURE — 85025 COMPLETE CBC W/AUTO DIFF WBC: CPT

## 2022-08-22 PROCEDURE — 36415 COLL VENOUS BLD VENIPUNCTURE: CPT

## 2022-08-22 PROCEDURE — 87591 N.GONORRHOEAE DNA AMP PROB: CPT

## 2022-08-22 PROCEDURE — 80069 RENAL FUNCTION PANEL: CPT

## 2022-08-22 PROCEDURE — 84156 ASSAY OF PROTEIN URINE: CPT

## 2022-08-22 PROCEDURE — 80061 LIPID PANEL: CPT

## 2022-08-22 PROCEDURE — 86803 HEPATITIS C AB TEST: CPT

## 2022-08-22 PROCEDURE — 87389 HIV-1 AG W/HIV-1&-2 AB AG IA: CPT

## 2022-08-22 PROCEDURE — 86592 SYPHILIS TEST NON-TREP QUAL: CPT

## 2022-08-22 PROCEDURE — 86694 HERPES SIMPLEX NES ANTBDY: CPT

## 2022-08-23 LAB
CREATININE URINE: 145.5 MG/DL (ref 39–259)
PROT/CREAT RATIO, UR: 4.1
URINE TOTAL PROTEIN: >600 MG/DL

## 2022-08-24 LAB
HIV SCREEN: NON REACTIVE
HSV I/II IGG: 0.25 IV
HSVI/II COMB AB IGM: 0.23 IV
RPR: NON REACTIVE

## 2022-08-25 LAB
CHLAMYDIA TRACHOMATIS AMPLIFIED DET: NEGATIVE
N GONORRHOEAE AMPLIFIED DET: NEGATIVE

## 2022-08-30 RX ORDER — INSULIN DEGLUDEC 200 U/ML
INJECTION, SOLUTION SUBCUTANEOUS
Qty: 9 ML | Refills: 0 | Status: SHIPPED | OUTPATIENT
Start: 2022-08-30 | End: 2022-09-22 | Stop reason: SDUPTHER

## 2022-08-30 NOTE — RESULT ENCOUNTER NOTE
Please discuss with patient labs show a1c has trended up. I think at this point we should increase farxiga to 10mg this can also help with proteinuria.

## 2022-09-08 RX ORDER — INSULIN DEGLUDEC 200 U/ML
INJECTION, SOLUTION SUBCUTANEOUS
Qty: 9 ML | Refills: 0 | OUTPATIENT
Start: 2022-09-08

## 2022-09-19 RX ORDER — METHYLPREDNISOLONE 4 MG/1
TABLET ORAL
Qty: 1 KIT | Refills: 0 | Status: SHIPPED | OUTPATIENT
Start: 2022-09-19 | End: 2022-09-25

## 2022-09-19 RX ORDER — CYCLOBENZAPRINE HCL 10 MG
10 TABLET ORAL 3 TIMES DAILY PRN
Qty: 30 TABLET | Refills: 0 | Status: SHIPPED | OUTPATIENT
Start: 2022-09-19 | End: 2022-09-29

## 2022-09-19 NOTE — TELEPHONE ENCOUNTER
FARXIGA-THE 10 MG NEEDS A PA  PT'S BACK IS ACTING UP-PAIN,FEELS LIKE MUSCLES ARE SWOLLEN.  CAN FLEXERIL BE CALLED IN AND WHAT ABOUT COLD/HEAT-WHICH 1ST

## 2022-09-19 NOTE — TELEPHONE ENCOUNTER
Called [facility] and they stated a PA is not needed and medication is ready to be picked up. Called and notified the patient, patient voiced understanding. Please advised the patients back pain, patient advised to alternate heat and ice at the time of the call.

## 2022-09-22 ENCOUNTER — OFFICE VISIT (OUTPATIENT)
Dept: FAMILY MEDICINE CLINIC | Age: 52
End: 2022-09-22
Payer: COMMERCIAL

## 2022-09-22 VITALS
RESPIRATION RATE: 16 BRPM | HEIGHT: 72 IN | WEIGHT: 292.1 LBS | HEART RATE: 67 BPM | DIASTOLIC BLOOD PRESSURE: 90 MMHG | BODY MASS INDEX: 39.56 KG/M2 | OXYGEN SATURATION: 96 % | SYSTOLIC BLOOD PRESSURE: 132 MMHG

## 2022-09-22 DIAGNOSIS — I25.10 CORONARY ARTERY DISEASE INVOLVING NATIVE CORONARY ARTERY OF NATIVE HEART WITHOUT ANGINA PECTORIS: ICD-10-CM

## 2022-09-22 DIAGNOSIS — Z23 NEED FOR IMMUNIZATION AGAINST INFLUENZA: ICD-10-CM

## 2022-09-22 DIAGNOSIS — E11.59 TYPE 2 DIABETES MELLITUS WITH OTHER CIRCULATORY COMPLICATION, WITHOUT LONG-TERM CURRENT USE OF INSULIN (HCC): ICD-10-CM

## 2022-09-22 DIAGNOSIS — I10 ESSENTIAL HYPERTENSION: Primary | ICD-10-CM

## 2022-09-22 DIAGNOSIS — Z12.5 SCREENING PSA (PROSTATE SPECIFIC ANTIGEN): ICD-10-CM

## 2022-09-22 DIAGNOSIS — Z13.220 LIPID SCREENING: ICD-10-CM

## 2022-09-22 PROCEDURE — 2022F DILAT RTA XM EVC RTNOPTHY: CPT | Performed by: STUDENT IN AN ORGANIZED HEALTH CARE EDUCATION/TRAINING PROGRAM

## 2022-09-22 PROCEDURE — 3051F HG A1C>EQUAL 7.0%<8.0%: CPT | Performed by: STUDENT IN AN ORGANIZED HEALTH CARE EDUCATION/TRAINING PROGRAM

## 2022-09-22 PROCEDURE — 99214 OFFICE O/P EST MOD 30 MIN: CPT | Performed by: STUDENT IN AN ORGANIZED HEALTH CARE EDUCATION/TRAINING PROGRAM

## 2022-09-22 PROCEDURE — 90674 CCIIV4 VAC NO PRSV 0.5 ML IM: CPT | Performed by: STUDENT IN AN ORGANIZED HEALTH CARE EDUCATION/TRAINING PROGRAM

## 2022-09-22 PROCEDURE — 3017F COLORECTAL CA SCREEN DOC REV: CPT | Performed by: STUDENT IN AN ORGANIZED HEALTH CARE EDUCATION/TRAINING PROGRAM

## 2022-09-22 PROCEDURE — 1036F TOBACCO NON-USER: CPT | Performed by: STUDENT IN AN ORGANIZED HEALTH CARE EDUCATION/TRAINING PROGRAM

## 2022-09-22 PROCEDURE — G8417 CALC BMI ABV UP PARAM F/U: HCPCS | Performed by: STUDENT IN AN ORGANIZED HEALTH CARE EDUCATION/TRAINING PROGRAM

## 2022-09-22 PROCEDURE — 90471 IMMUNIZATION ADMIN: CPT | Performed by: STUDENT IN AN ORGANIZED HEALTH CARE EDUCATION/TRAINING PROGRAM

## 2022-09-22 PROCEDURE — G8427 DOCREV CUR MEDS BY ELIG CLIN: HCPCS | Performed by: STUDENT IN AN ORGANIZED HEALTH CARE EDUCATION/TRAINING PROGRAM

## 2022-09-22 RX ORDER — INSULIN DEGLUDEC 200 U/ML
INJECTION, SOLUTION SUBCUTANEOUS
Qty: 9 ML | Refills: 1 | Status: SHIPPED | OUTPATIENT
Start: 2022-09-22

## 2022-09-22 RX ORDER — NITROGLYCERIN 0.4 MG/1
0.4 TABLET SUBLINGUAL EVERY 5 MIN PRN
Qty: 25 TABLET | Refills: 1 | Status: SHIPPED | OUTPATIENT
Start: 2022-09-22

## 2022-09-22 RX ORDER — PEN NEEDLE, DIABETIC 30 GX5/16"
1 NEEDLE, DISPOSABLE MISCELLANEOUS DAILY
Qty: 500 EACH | Refills: 1 | Status: SHIPPED | OUTPATIENT
Start: 2022-09-22

## 2022-09-22 RX ORDER — ATORVASTATIN CALCIUM 40 MG/1
40 TABLET, FILM COATED ORAL DAILY
Qty: 90 TABLET | Refills: 1 | Status: SHIPPED | OUTPATIENT
Start: 2022-09-22

## 2022-09-22 ASSESSMENT — ENCOUNTER SYMPTOMS
ABDOMINAL PAIN: 0
WHEEZING: 0
SORE THROAT: 0
NAUSEA: 0
SHORTNESS OF BREATH: 0

## 2022-09-22 ASSESSMENT — PATIENT HEALTH QUESTIONNAIRE - PHQ9
SUM OF ALL RESPONSES TO PHQ QUESTIONS 1-9: 0
SUM OF ALL RESPONSES TO PHQ QUESTIONS 1-9: 0
SUM OF ALL RESPONSES TO PHQ9 QUESTIONS 1 & 2: 0
1. LITTLE INTEREST OR PLEASURE IN DOING THINGS: 0
SUM OF ALL RESPONSES TO PHQ QUESTIONS 1-9: 0
SUM OF ALL RESPONSES TO PHQ QUESTIONS 1-9: 0
2. FEELING DOWN, DEPRESSED OR HOPELESS: 0

## 2022-09-22 NOTE — PATIENT INSTRUCTIONS
[facility]  Address: [address], [place], [address] [facility]  Hours:   Open ?  Closes 5PM  Phone: [phone]    Immunizations   Please obtain Tdap (once every 10 years), New COVID vaccination, and Shingrex (2 dose series 2nd dose 2-6 months after 1st)

## 2022-11-22 DIAGNOSIS — E11.59 TYPE 2 DIABETES MELLITUS WITH OTHER CIRCULATORY COMPLICATION, WITHOUT LONG-TERM CURRENT USE OF INSULIN (HCC): ICD-10-CM

## 2022-11-22 RX ORDER — INSULIN DEGLUDEC 200 U/ML
INJECTION, SOLUTION SUBCUTANEOUS
Qty: 9 ML | Refills: 1 | Status: SHIPPED | OUTPATIENT
Start: 2022-11-22

## 2023-01-03 ENCOUNTER — HOSPITAL ENCOUNTER (OUTPATIENT)
Age: 53
Discharge: HOME | End: 2023-01-03
Payer: COMMERCIAL

## 2023-01-03 DIAGNOSIS — Q60.2 RENAL AGENESIS: ICD-10-CM

## 2023-01-03 DIAGNOSIS — R80.1 PERSISTENT PROTEINURIA: ICD-10-CM

## 2023-01-03 DIAGNOSIS — E11.21 DIABETIC NEPHROPATHY ASSOCIATED WITH TYPE 2 DIABETES MELLITUS (HCC): ICD-10-CM

## 2023-01-03 DIAGNOSIS — I15.9 HYPERTENSION, SECONDARY: ICD-10-CM

## 2023-01-03 DIAGNOSIS — N18.32 STAGE 3B CHRONIC KIDNEY DISEASE (HCC): ICD-10-CM

## 2023-01-03 LAB
ALBUMIN SERPL-MCNC: 4 GM/DL (ref 3.4–5)
ANION GAP SERPL CALCULATED.3IONS-SCNC: 12 MMOL/L (ref 4–16)
BUN BLDV-MCNC: 41 MG/DL (ref 6–23)
CALCIUM SERPL-MCNC: 8.9 MG/DL (ref 8.3–10.6)
CHLORIDE BLD-SCNC: 101 MMOL/L (ref 99–110)
CO2: 22 MMOL/L (ref 21–32)
CREAT SERPL-MCNC: 2.7 MG/DL (ref 0.9–1.3)
CREATININE URINE: 102.5 MG/DL (ref 39–259)
GFR SERPL CREATININE-BSD FRML MDRD: 28 ML/MIN/1.73M2
GLUCOSE BLD-MCNC: 114 MG/DL (ref 70–99)
PHOSPHORUS: 4.1 MG/DL (ref 2.5–4.9)
POTASSIUM SERPL-SCNC: 5 MMOL/L (ref 3.5–5.1)
PROT/CREAT RATIO, UR: 4.3
SODIUM BLD-SCNC: 135 MMOL/L (ref 135–145)
URINE TOTAL PROTEIN: 443.1 MG/DL

## 2023-01-03 PROCEDURE — 36415 COLL VENOUS BLD VENIPUNCTURE: CPT

## 2023-01-03 PROCEDURE — 82570 ASSAY OF URINE CREATININE: CPT

## 2023-01-03 PROCEDURE — 80069 RENAL FUNCTION PANEL: CPT

## 2023-01-03 PROCEDURE — 84156 ASSAY OF PROTEIN URINE: CPT

## 2023-01-27 DIAGNOSIS — E11.59 TYPE 2 DIABETES MELLITUS WITH OTHER CIRCULATORY COMPLICATION, WITHOUT LONG-TERM CURRENT USE OF INSULIN (HCC): ICD-10-CM

## 2023-01-27 RX ORDER — INSULIN DEGLUDEC 200 U/ML
INJECTION, SOLUTION SUBCUTANEOUS
Qty: 9 ML | Refills: 1 | Status: SHIPPED | OUTPATIENT
Start: 2023-01-27

## 2023-01-31 ENCOUNTER — HOSPITAL ENCOUNTER (OUTPATIENT)
Age: 53
Discharge: HOME | End: 2023-01-31
Payer: COMMERCIAL

## 2023-01-31 LAB
ALBUMIN SERPL-MCNC: 3.8 GM/DL (ref 3.4–5)
ALP BLD-CCNC: 65 IU/L (ref 40–128)
ALT SERPL-CCNC: 22 U/L (ref 10–40)
ANION GAP SERPL CALCULATED.3IONS-SCNC: 12 MMOL/L (ref 4–16)
AST SERPL-CCNC: 19 IU/L (ref 15–37)
BASOPHILS ABSOLUTE: 0.1 K/CU MM
BASOPHILS RELATIVE PERCENT: 0.5 % (ref 0–1)
BILIRUB SERPL-MCNC: 0.2 MG/DL (ref 0–1)
BUN SERPL-MCNC: 48 MG/DL (ref 6–23)
CALCIUM SERPL-MCNC: 8.6 MG/DL (ref 8.3–10.6)
CHLORIDE BLD-SCNC: 101 MMOL/L (ref 99–110)
CHOLEST SERPL-MCNC: 130 MG/DL
CO2: 21 MMOL/L (ref 21–32)
CREAT SERPL-MCNC: 2.6 MG/DL (ref 0.9–1.3)
DIFFERENTIAL TYPE: ABNORMAL
EOSINOPHILS ABSOLUTE: 0.1 K/CU MM
EOSINOPHILS RELATIVE PERCENT: 1.2 % (ref 0–3)
ESTIMATED AVERAGE GLUCOSE: 160 MG/DL
GFR SERPL CREATININE-BSD FRML MDRD: 29 ML/MIN/1.73M2
GLUCOSE SERPL-MCNC: 101 MG/DL (ref 70–99)
HBA1C MFR BLD: 7.2 % (ref 4.2–6.3)
HCT VFR BLD CALC: 43.4 % (ref 42–52)
HDLC SERPL-MCNC: 40 MG/DL
HEMOGLOBIN: 14.2 GM/DL (ref 13.5–18)
IMMATURE NEUTROPHIL %: 0.4 % (ref 0–0.43)
LDLC SERPL CALC-MCNC: 58 MG/DL
LYMPHOCYTES ABSOLUTE: 2 K/CU MM
LYMPHOCYTES RELATIVE PERCENT: 19.2 % (ref 24–44)
MCH RBC QN AUTO: 31.1 PG (ref 27–31)
MCHC RBC AUTO-ENTMCNC: 32.7 % (ref 32–36)
MCV RBC AUTO: 95 FL (ref 78–100)
MONOCYTES ABSOLUTE: 0.8 K/CU MM
MONOCYTES RELATIVE PERCENT: 8.1 % (ref 0–4)
NUCLEATED RBC %: 0 %
PDW BLD-RTO: 12.5 % (ref 11.7–14.9)
PLATELET # BLD: 260 K/CU MM (ref 140–440)
PMV BLD AUTO: 12.2 FL (ref 7.5–11.1)
POTASSIUM SERPL-SCNC: 4.9 MMOL/L (ref 3.5–5.1)
PROSTATE SPECIFIC ANTIGEN: 0.37 NG/ML (ref 0–4)
RBC # BLD: 4.57 M/CU MM (ref 4.6–6.2)
SEGMENTED NEUTROPHILS ABSOLUTE COUNT: 7.2 K/CU MM
SEGMENTED NEUTROPHILS RELATIVE PERCENT: 70.6 % (ref 36–66)
SODIUM BLD-SCNC: 134 MMOL/L (ref 135–145)
TOTAL IMMATURE NEUTOROPHIL: 0.04 K/CU MM
TOTAL NUCLEATED RBC: 0 K/CU MM
TOTAL PROTEIN: 5.7 GM/DL (ref 6.4–8.2)
TRIGL SERPL-MCNC: 160 MG/DL
WBC # BLD: 10.2 K/CU MM (ref 4–10.5)

## 2023-01-31 PROCEDURE — 80053 COMPREHEN METABOLIC PANEL: CPT

## 2023-01-31 PROCEDURE — G0103 PSA SCREENING: HCPCS

## 2023-01-31 PROCEDURE — 36415 COLL VENOUS BLD VENIPUNCTURE: CPT

## 2023-01-31 PROCEDURE — 85025 COMPLETE CBC W/AUTO DIFF WBC: CPT

## 2023-01-31 PROCEDURE — 83036 HEMOGLOBIN GLYCOSYLATED A1C: CPT

## 2023-01-31 PROCEDURE — 80061 LIPID PANEL: CPT

## 2023-02-02 ENCOUNTER — OFFICE VISIT (OUTPATIENT)
Dept: FAMILY MEDICINE CLINIC | Age: 53
End: 2023-02-02
Payer: COMMERCIAL

## 2023-02-02 VITALS
HEART RATE: 65 BPM | RESPIRATION RATE: 16 BRPM | WEIGHT: 294.6 LBS | HEIGHT: 72 IN | SYSTOLIC BLOOD PRESSURE: 144 MMHG | OXYGEN SATURATION: 96 % | DIASTOLIC BLOOD PRESSURE: 88 MMHG | BODY MASS INDEX: 39.9 KG/M2

## 2023-02-02 DIAGNOSIS — I25.10 CORONARY ARTERY DISEASE INVOLVING NATIVE CORONARY ARTERY OF NATIVE HEART WITHOUT ANGINA PECTORIS: ICD-10-CM

## 2023-02-02 DIAGNOSIS — K59.00 CONSTIPATION, UNSPECIFIED CONSTIPATION TYPE: ICD-10-CM

## 2023-02-02 DIAGNOSIS — E66.01 CLASS 2 SEVERE OBESITY DUE TO EXCESS CALORIES WITH SERIOUS COMORBIDITY AND BODY MASS INDEX (BMI) OF 39.0 TO 39.9 IN ADULT (HCC): Primary | ICD-10-CM

## 2023-02-02 DIAGNOSIS — E11.59 TYPE 2 DIABETES MELLITUS WITH OTHER CIRCULATORY COMPLICATION, WITHOUT LONG-TERM CURRENT USE OF INSULIN (HCC): ICD-10-CM

## 2023-02-02 PROBLEM — E66.812 CLASS 2 SEVERE OBESITY DUE TO EXCESS CALORIES WITH SERIOUS COMORBIDITY AND BODY MASS INDEX (BMI) OF 39.0 TO 39.9 IN ADULT: Status: ACTIVE | Noted: 2021-06-15

## 2023-02-02 PROCEDURE — G8427 DOCREV CUR MEDS BY ELIG CLIN: HCPCS | Performed by: STUDENT IN AN ORGANIZED HEALTH CARE EDUCATION/TRAINING PROGRAM

## 2023-02-02 PROCEDURE — 3051F HG A1C>EQUAL 7.0%<8.0%: CPT | Performed by: STUDENT IN AN ORGANIZED HEALTH CARE EDUCATION/TRAINING PROGRAM

## 2023-02-02 PROCEDURE — G8482 FLU IMMUNIZE ORDER/ADMIN: HCPCS | Performed by: STUDENT IN AN ORGANIZED HEALTH CARE EDUCATION/TRAINING PROGRAM

## 2023-02-02 PROCEDURE — 2022F DILAT RTA XM EVC RTNOPTHY: CPT | Performed by: STUDENT IN AN ORGANIZED HEALTH CARE EDUCATION/TRAINING PROGRAM

## 2023-02-02 PROCEDURE — 3017F COLORECTAL CA SCREEN DOC REV: CPT | Performed by: STUDENT IN AN ORGANIZED HEALTH CARE EDUCATION/TRAINING PROGRAM

## 2023-02-02 PROCEDURE — 3079F DIAST BP 80-89 MM HG: CPT | Performed by: STUDENT IN AN ORGANIZED HEALTH CARE EDUCATION/TRAINING PROGRAM

## 2023-02-02 PROCEDURE — G8417 CALC BMI ABV UP PARAM F/U: HCPCS | Performed by: STUDENT IN AN ORGANIZED HEALTH CARE EDUCATION/TRAINING PROGRAM

## 2023-02-02 PROCEDURE — 1036F TOBACCO NON-USER: CPT | Performed by: STUDENT IN AN ORGANIZED HEALTH CARE EDUCATION/TRAINING PROGRAM

## 2023-02-02 PROCEDURE — 3077F SYST BP >= 140 MM HG: CPT | Performed by: STUDENT IN AN ORGANIZED HEALTH CARE EDUCATION/TRAINING PROGRAM

## 2023-02-02 PROCEDURE — 99214 OFFICE O/P EST MOD 30 MIN: CPT | Performed by: STUDENT IN AN ORGANIZED HEALTH CARE EDUCATION/TRAINING PROGRAM

## 2023-02-02 RX ORDER — LIRAGLUTIDE 6 MG/ML
1.8 INJECTION SUBCUTANEOUS DAILY
Qty: 9 ML | Refills: 0 | Status: SHIPPED | OUTPATIENT
Start: 2023-02-02 | End: 2023-03-04

## 2023-02-02 RX ORDER — POLYETHYLENE GLYCOL 3350 17 G/17G
17 POWDER, FOR SOLUTION ORAL DAILY PRN
Qty: 527 G | Refills: 5 | Status: SHIPPED | OUTPATIENT
Start: 2023-02-02

## 2023-02-02 RX ORDER — ATORVASTATIN CALCIUM 40 MG/1
40 TABLET, FILM COATED ORAL DAILY
Qty: 90 TABLET | Refills: 1 | Status: SHIPPED | OUTPATIENT
Start: 2023-02-02

## 2023-02-02 RX ORDER — LANCETS
1 EACH MISCELLANEOUS DAILY
Qty: 200 EACH | Refills: 1 | Status: SHIPPED | OUTPATIENT
Start: 2023-02-02 | End: 2023-08-01

## 2023-02-02 RX ORDER — INSULIN DEGLUDEC 200 U/ML
66 INJECTION, SOLUTION SUBCUTANEOUS DAILY
Qty: 9 ML | Refills: 1 | Status: SHIPPED | OUTPATIENT
Start: 2023-02-02 | End: 2023-08-01

## 2023-02-02 RX ORDER — BLOOD SUGAR DIAGNOSTIC
1 STRIP MISCELLANEOUS DAILY
Qty: 100 EACH | Refills: 3 | Status: SHIPPED | OUTPATIENT
Start: 2023-02-02

## 2023-02-02 RX ORDER — DIPHENHYDRAMINE HCL 25 MG
1 TABLET ORAL DAILY
Qty: 1 KIT | Refills: 1 | Status: SHIPPED | OUTPATIENT
Start: 2023-02-02 | End: 2023-08-01

## 2023-02-02 ASSESSMENT — PATIENT HEALTH QUESTIONNAIRE - PHQ9
SUM OF ALL RESPONSES TO PHQ9 QUESTIONS 1 & 2: 0
1. LITTLE INTEREST OR PLEASURE IN DOING THINGS: 0
2. FEELING DOWN, DEPRESSED OR HOPELESS: 0
SUM OF ALL RESPONSES TO PHQ QUESTIONS 1-9: 0

## 2023-02-02 ASSESSMENT — ENCOUNTER SYMPTOMS
SORE THROAT: 0
WHEEZING: 0
ABDOMINAL PAIN: 0
NAUSEA: 0
SHORTNESS OF BREATH: 0

## 2023-02-02 NOTE — PROGRESS NOTES
2/2/2023    [facility] [address]    Chief Complaint   Patient presents with    3 Month Follow-Up     Dr. Jefry Altamirano said he is having some serious kidney issues- took him off [place]     Other     True Metrix Blood Sugar meter- Would like new one sent to [employer]   Will need test trips and lancets as well        HPI  History was obtained from pateint. Cleveland Ferris is a 47 y.o. male with a PMHx as listed below who presents today for follow up on chronic condtions. Patient has been off Belkis Mates since 1/5/23. Following with Dr. Jefry Altamirano Cr trending up    Glucose levels usually 116-130 fairly good control on current regimen a1c 7.2  Currently on 66U Tresiba    1. Class 2 severe obesity due to excess calories with serious comorbidity and body mass index (BMI) of 39.0 to 39.9 in adult [facility])    2. Type 2 diabetes mellitus with other circulatory complication, without long-term current use of insulin ([address]    3. Coronary artery disease involving native coronary artery of native heart without angina pectoris    4. Constipation, unspecified constipation type             REVIEW OF SYMPTOMS    Review of Systems   Constitutional:  Negative for chills and fatigue. HENT:  Negative for congestion and sore throat. Respiratory:  Negative for shortness of breath and wheezing. Cardiovascular:  Negative for chest pain and palpitations. Gastrointestinal:  Negative for abdominal pain and nausea. Genitourinary:  Negative for frequency and urgency. Neurological:  Negative for light-headedness. PAST MEDICAL HISTORY  Past Medical History:   Diagnosis Date    CAD (coronary artery disease)     H/O MI. Family history of coronary artery disease     H/O echocardiogram 6/08 6/08 EF>55% TRACE MR, TR    H/O echocardiogram 4/15/15    EF 50-55% Mild left atrial enlargement. Normal LV systolic function. Trace MR and TR. Normal sized abdominal aorta.      History of cardiac catheterization 03/01/2017    Stenting of the LAD    History of cardiovascular stress test 6/06 6/08 10/10 7/12     7/12 LAD territory ISCHEMIA, EF 62%    History of exercise stress test 2017    treadmill    History of myocardial infarction 2006    History of nuclear stress test 2017    lexiscan-scar,no ischemia,EF52%,apical hdyskinesis    History of PTCA 2006    STENT TO RCA    History of PTCA     STENT TO LAD, RCA    Hyperlipidemia     Hypertension 2012    Hypertriglyceridemia     Type II or unspecified type diabetes mellitus without mention of complication, not stated as uncontrolled        FAMILY HISTORY  Family History   Problem Relation Age of Onset    High Blood Pressure Mother     High Cholesterol Mother     Heart Disease Father     Cancer Sister     Arthritis Maternal Grandmother     Cancer Maternal Grandfather        SOCIAL HISTORY  Social History     Socioeconomic History    Marital status:    Occupational History    Occupation:    Tobacco Use    Smoking status: Former     Packs/day: 1.00     Types: Cigarettes     Quit date: 12/3/2016     Years since quittin.1    Smokeless tobacco: Never   Substance and Sexual Activity    Alcohol use:  Yes     Alcohol/week: 1.0 standard drink     Types: 1 Cans of beer per week     Comment: once a week beer liquor    Drug use: No    Sexual activity: Yes     Partners: Female        SURGICAL HISTORY  Past Surgical History:   Procedure Laterality Date    CORONARY ANGIOPLASTY WITH STENT PLACEMENT  2006    HERNIA REPAIR                   CURRENT MEDICATIONS  Current Outpatient Medications   Medication Sig Dispense Refill    VICTOZA 18 MG/3ML SOPN SC injection Inject 1.8 mg into the skin daily 9 mL 0    Insulin Degludec (TRESIBA FLEXTOUCH) 200 UNIT/ML SOPN Inject 66 Units into the skin daily 9 mL 1    atorvastatin (LIPITOR) 40 MG tablet Take 1 tablet by mouth daily 90 tablet 1    polyethylene glycol (MIRALAX MIX-IN PAX) 17 g packet Take 17 g by mouth daily as needed for Constipation 527 g 5 Lancets Thin MISC 1 actuation by Does not apply route daily 200 each 1    blood glucose test strips (RELION TRUE METRIX TEST STRIPS) strip 1 each by In Vitro route daily As needed. 100 each 3    Blood Glucose Monitoring Suppl (TRUE METRIX AIR GLUCOSE METER) w/Device KIT 1 actuation by Does not apply route daily 1 kit 1    olmesartan (BENICAR) 40 MG tablet Take 1 tablet by mouth daily 90 tablet 3    metoprolol tartrate (LOPRESSOR) 50 MG tablet Take 1 tablet by mouth 2 times daily 180 tablet 3    clopidogrel (PLAVIX) 75 MG tablet Take 1 tablet by mouth daily 90 tablet 3    nitroGLYCERIN (NITROSTAT) 0.4 MG SL tablet Place 1 tablet under the tongue every 5 minutes as needed for Chest pain up to max of 3 total doses. If no relief after 1 dose, call 911. 25 tablet 1    Insulin Pen Needle (PEN NEEDLES 3/16\") 31G X 5 MM MISC 1 each by Does not apply route daily Use one needle two times daily as directed on package. 500 each 1    sildenafil (VIAGRA) 100 MG tablet Take 1 tablet by mouth as needed for Erectile Dysfunction (as needed  daily PRN sexual dysfunction) 20 tablet 5    SM SENNA LAXATIVE 8.6 MG tablet TAKE 2 TABLETS BY MOUTH AT BEDTIME AS NEEDED 30 tablet 5    blood glucose monitor strips 1 strip by Other route Test 4 times a day & as needed for symptoms of irregular blood glucose. Dispense sufficient amount for indicated testing frequency plus additional to accommodate PRN testing needs. Insulin Pen Needle (PEN NEEDLES 3/16\") 31G X 5 MM MISC 1 each by Does not apply route daily To be used 4 times daily as directed on package 500 each 2    aspirin EC 81 MG EC tablet Take 1 tablet by mouth daily 30 tablet 6    FOLIC ACID PO Take  by mouth. therapeutic multivitamin-minerals (THERAGRAN-M) tablet Take 1 tablet by mouth daily. No current facility-administered medications for this visit.        ALLERGIES  No Known Allergies    PHYSICAL EXAM    BP (!) 144/88 (Site: Left Upper Arm, Position: Sitting, Cuff Size: Large Adult)   Pulse 65   Resp 16   Ht 6' (1.829 m)   Wt 294 lb 9.6 oz (133.6 kg)   SpO2 96%   BMI 39.95 kg/m²     Physical Exam  Constitutional:       Appearance: Normal appearance. HENT:      Head: Normocephalic and atraumatic. Eyes:      Extraocular Movements: Extraocular movements intact. Pupils: Pupils are equal, round, and reactive to light. Cardiovascular:      Rate and Rhythm: Normal rate and regular rhythm. Pulses: Normal pulses. Heart sounds: No murmur heard. No friction rub. No gallop. Abdominal:      General: There is no distension. Tenderness: There is no abdominal tenderness. Skin:     General: Skin is warm and dry. Neurological:      General: No focal deficit present. Mental Status: He is alert. Psychiatric:         Mood and Affect: Mood normal.         Behavior: Behavior normal.       ASSESSMENT & PLAN    1. Type 2 diabetes mellitus with other circulatory complication, without long-term current use of insulin (HCC)    - VICTOZA 18 MG/3ML SOPN SC injection; Inject 1.8 mg into the skin daily  Dispense: 9 mL; Refill: 0  - Insulin Degludec (TRESIBA FLEXTOUCH) 200 UNIT/ML SOPN; Inject 66 Units into the skin daily  Dispense: 9 mL; Refill: 1  - Lancets Thin MISC; 1 actuation by Does not apply route daily  Dispense: 200 each; Refill: 1  - blood glucose test strips (RELION TRUE METRIX TEST STRIPS) strip; 1 each by In Vitro route daily As needed. Dispense: 100 each; Refill: 3  - Blood Glucose Monitoring Suppl (TRUE METRIX AIR GLUCOSE METER) w/Device KIT; 1 actuation by Does not apply route daily  Dispense: 1 kit; Refill: 1    2. Coronary artery disease involving native coronary artery of native heart without angina pectoris    - atorvastatin (LIPITOR) 40 MG tablet; Take 1 tablet by mouth daily  Dispense: 90 tablet; Refill: 1  - Leann Joy MD, Cardiology, [facility]    3.  Constipation, unspecified constipation type    - polyethylene glycol (MIRALAX MIX-IN PAX) 17 g packet; Take 17 g by mouth daily as needed for Constipation  Dispense: 527 g; Refill: 5    4. Class 2 severe obesity due to excess calories with serious comorbidity and body mass index (BMI) of 39.0 to 39.9 in adult [facility])    - [facility]    Referral to weight clinic  Reestablish with Cardiology  Discussed to monitor diabetes at home closely, he will bring home readings in if CKD continues to worsen may consider d/c GLP-1 and strarting short acting insulin/ freestyle alin     Return in about 6 weeks (around 3/16/2023).          Electronically signed by Sasha Lakhani DO on 2/2/2023

## 2023-02-03 ENCOUNTER — TELEPHONE (OUTPATIENT)
Dept: CARDIOLOGY CLINIC | Age: 53
End: 2023-02-03

## 2023-02-03 ENCOUNTER — TELEPHONE (OUTPATIENT)
Dept: BARIATRICS/WEIGHT MGMT | Age: 53
End: 2023-02-03

## 2023-02-03 NOTE — TELEPHONE ENCOUNTER
Left message for patient requesting a return call to schedule a consult with previous [facility] patient for cad per referral from Dr. Case Lomeli.

## 2023-02-07 ENCOUNTER — TELEPHONE (OUTPATIENT)
Dept: CARDIOLOGY CLINIC | Age: 53
End: 2023-02-07

## 2023-02-07 NOTE — TELEPHONE ENCOUNTER
Left message for patient requesting a return call to schedule a consult with previous [facility] patient for cad per referral from Dr. Brandon Don.

## 2023-03-08 ENCOUNTER — HOSPITAL ENCOUNTER (OUTPATIENT)
Age: 53
Discharge: HOME | End: 2023-03-08
Payer: COMMERCIAL

## 2023-03-08 LAB
ALBUMIN SERPL-MCNC: 4 GM/DL (ref 3.4–5)
ANION GAP SERPL CALCULATED.3IONS-SCNC: 11 MMOL/L (ref 4–16)
BUN SERPL-MCNC: 37 MG/DL (ref 6–23)
CALCIUM SERPL-MCNC: 8.9 MG/DL (ref 8.3–10.6)
CHLORIDE BLD-SCNC: 101 MMOL/L (ref 99–110)
CO2: 24 MMOL/L (ref 21–32)
CREAT SERPL-MCNC: 2.9 MG/DL (ref 0.9–1.3)
CREATININE URINE: 91.3 MG/DL (ref 39–259)
GFR SERPL CREATININE-BSD FRML MDRD: 25 ML/MIN/1.73M2
GLUCOSE SERPL-MCNC: 74 MG/DL (ref 70–99)
PHOSPHORUS: 4.2 MG/DL (ref 2.5–4.9)
POTASSIUM SERPL-SCNC: 5.2 MMOL/L (ref 3.5–5.1)
PROT/CREAT RATIO, UR: 4.1
SODIUM BLD-SCNC: 136 MMOL/L (ref 135–145)
URINE TOTAL PROTEIN: 371.5 MG/DL

## 2023-03-08 PROCEDURE — 84156 ASSAY OF PROTEIN URINE: CPT

## 2023-03-08 PROCEDURE — 36415 COLL VENOUS BLD VENIPUNCTURE: CPT

## 2023-03-08 PROCEDURE — 82570 ASSAY OF URINE CREATININE: CPT

## 2023-03-08 PROCEDURE — 80069 RENAL FUNCTION PANEL: CPT

## 2023-03-09 PROBLEM — E87.5 HYPERPOTASSEMIA: Status: ACTIVE | Noted: 2023-03-09

## 2023-03-14 SDOH — ECONOMIC STABILITY: FOOD INSECURITY: WITHIN THE PAST 12 MONTHS, THE FOOD YOU BOUGHT JUST DIDN'T LAST AND YOU DIDN'T HAVE MONEY TO GET MORE.: NEVER TRUE

## 2023-03-14 SDOH — ECONOMIC STABILITY: TRANSPORTATION INSECURITY
IN THE PAST 12 MONTHS, HAS LACK OF TRANSPORTATION KEPT YOU FROM MEETINGS, WORK, OR FROM GETTING THINGS NEEDED FOR DAILY LIVING?: NO

## 2023-03-14 SDOH — ECONOMIC STABILITY: INCOME INSECURITY: HOW HARD IS IT FOR YOU TO PAY FOR THE VERY BASICS LIKE FOOD, HOUSING, MEDICAL CARE, AND HEATING?: NOT HARD AT ALL

## 2023-03-14 SDOH — ECONOMIC STABILITY: HOUSING INSECURITY
IN THE LAST 12 MONTHS, WAS THERE A TIME WHEN YOU DID NOT HAVE A STEADY PLACE TO SLEEP OR SLEPT IN A SHELTER (INCLUDING NOW)?: NO

## 2023-03-14 SDOH — ECONOMIC STABILITY: FOOD INSECURITY: WITHIN THE PAST 12 MONTHS, YOU WORRIED THAT YOUR FOOD WOULD RUN OUT BEFORE YOU GOT MONEY TO BUY MORE.: NEVER TRUE

## 2023-03-16 ENCOUNTER — OFFICE VISIT (OUTPATIENT)
Dept: FAMILY MEDICINE CLINIC | Age: 53
End: 2023-03-16
Payer: COMMERCIAL

## 2023-03-16 VITALS
BODY MASS INDEX: 39.96 KG/M2 | HEIGHT: 72 IN | SYSTOLIC BLOOD PRESSURE: 150 MMHG | RESPIRATION RATE: 16 BRPM | DIASTOLIC BLOOD PRESSURE: 88 MMHG | OXYGEN SATURATION: 98 % | HEART RATE: 67 BPM | WEIGHT: 295 LBS

## 2023-03-16 DIAGNOSIS — E66.01 CLASS 2 SEVERE OBESITY DUE TO EXCESS CALORIES WITH SERIOUS COMORBIDITY AND BODY MASS INDEX (BMI) OF 39.0 TO 39.9 IN ADULT (HCC): ICD-10-CM

## 2023-03-16 DIAGNOSIS — I10 ESSENTIAL HYPERTENSION: ICD-10-CM

## 2023-03-16 DIAGNOSIS — E11.59 TYPE 2 DIABETES MELLITUS WITH OTHER CIRCULATORY COMPLICATION, WITHOUT LONG-TERM CURRENT USE OF INSULIN (HCC): Primary | ICD-10-CM

## 2023-03-16 DIAGNOSIS — N18.32 STAGE 3B CHRONIC KIDNEY DISEASE (HCC): ICD-10-CM

## 2023-03-16 PROCEDURE — G8427 DOCREV CUR MEDS BY ELIG CLIN: HCPCS | Performed by: STUDENT IN AN ORGANIZED HEALTH CARE EDUCATION/TRAINING PROGRAM

## 2023-03-16 PROCEDURE — 1036F TOBACCO NON-USER: CPT | Performed by: STUDENT IN AN ORGANIZED HEALTH CARE EDUCATION/TRAINING PROGRAM

## 2023-03-16 PROCEDURE — G8417 CALC BMI ABV UP PARAM F/U: HCPCS | Performed by: STUDENT IN AN ORGANIZED HEALTH CARE EDUCATION/TRAINING PROGRAM

## 2023-03-16 PROCEDURE — 99214 OFFICE O/P EST MOD 30 MIN: CPT | Performed by: STUDENT IN AN ORGANIZED HEALTH CARE EDUCATION/TRAINING PROGRAM

## 2023-03-16 PROCEDURE — 3017F COLORECTAL CA SCREEN DOC REV: CPT | Performed by: STUDENT IN AN ORGANIZED HEALTH CARE EDUCATION/TRAINING PROGRAM

## 2023-03-16 PROCEDURE — 3074F SYST BP LT 130 MM HG: CPT | Performed by: STUDENT IN AN ORGANIZED HEALTH CARE EDUCATION/TRAINING PROGRAM

## 2023-03-16 PROCEDURE — G8482 FLU IMMUNIZE ORDER/ADMIN: HCPCS | Performed by: STUDENT IN AN ORGANIZED HEALTH CARE EDUCATION/TRAINING PROGRAM

## 2023-03-16 PROCEDURE — 3078F DIAST BP <80 MM HG: CPT | Performed by: STUDENT IN AN ORGANIZED HEALTH CARE EDUCATION/TRAINING PROGRAM

## 2023-03-16 PROCEDURE — 3051F HG A1C>EQUAL 7.0%<8.0%: CPT | Performed by: STUDENT IN AN ORGANIZED HEALTH CARE EDUCATION/TRAINING PROGRAM

## 2023-03-16 PROCEDURE — 2022F DILAT RTA XM EVC RTNOPTHY: CPT | Performed by: STUDENT IN AN ORGANIZED HEALTH CARE EDUCATION/TRAINING PROGRAM

## 2023-03-16 RX ORDER — TIRZEPATIDE 2.5 MG/.5ML
2.5 INJECTION, SOLUTION SUBCUTANEOUS WEEKLY
Qty: 2 ML | Refills: 0 | Status: SHIPPED | OUTPATIENT
Start: 2023-03-30 | End: 2023-04-29

## 2023-03-16 ASSESSMENT — ENCOUNTER SYMPTOMS
SHORTNESS OF BREATH: 0
NAUSEA: 0
WHEEZING: 0
ABDOMINAL PAIN: 0
SORE THROAT: 0

## 2023-03-16 NOTE — PATIENT INSTRUCTIONS
[address] [facility]   [address], [facility]   [place], [address]   [phone]

## 2023-03-16 NOTE — PROGRESS NOTES
3/22/2023    Landry Morley    Chief Complaint   Patient presents with    Follow-up    Obesity    Hypertension       HPI  History was obtained from patient. Janes Candelaria is a 47 y.o. male with a PMHx as listed below who presents today for follow-up on chronic conditions. No acute complaints. Glucose at home between 100-160  Currently on 66U tresiba nightly   Recall last A1c was 7.2 overall fair control  Blood pressure mild elevation today he was recently started on amlodipine and stopped Benicar by nephrology. 1. Type 2 diabetes mellitus with other circulatory complication, without long-term current use of insulin ([facility] [address]    2. Essential hypertension    3. Stage 3b chronic kidney disease (HCC)    4. Class 2 severe obesity due to excess calories with serious comorbidity and body mass index (BMI) of 39.0 to 39.9 in adult [facility])             REVIEW OF SYMPTOMS    Review of Systems   Constitutional:  Negative for chills and fatigue. HENT:  Negative for congestion and sore throat. Respiratory:  Negative for shortness of breath and wheezing. Cardiovascular:  Negative for chest pain and palpitations. Gastrointestinal:  Negative for abdominal pain and nausea. Genitourinary:  Negative for frequency and urgency. Neurological:  Negative for light-headedness. PAST MEDICAL HISTORY  Past Medical History:   Diagnosis Date    CAD (coronary artery disease)     H/O MI. Family history of coronary artery disease     H/O echocardiogram 6/08 6/08 EF>55% TRACE MR, TR    H/O echocardiogram 4/15/15    EF 50-55% Mild left atrial enlargement. Normal LV systolic function. Trace MR and TR. Normal sized abdominal aorta.      History of cardiac catheterization 03/01/2017    Stenting of the LAD    History of cardiovascular stress test 6/06 6/08 10/10 7/12     7/12 LAD territory ISCHEMIA, EF 62%    History of exercise stress test 03/20/2017    treadmill    History of myocardial infarction 4/2006    History of nuclear stress

## 2023-03-22 RX ORDER — AMLODIPINE BESYLATE 5 MG/1
5 TABLET ORAL DAILY
Qty: 30 TABLET | Refills: 5 | Status: SHIPPED | OUTPATIENT
Start: 2023-03-22

## 2023-03-22 RX ORDER — CYCLOBENZAPRINE HCL 10 MG
10 TABLET ORAL 3 TIMES DAILY PRN
Qty: 30 TABLET | Refills: 0 | Status: SHIPPED | OUTPATIENT
Start: 2023-03-22 | End: 2023-04-01

## 2023-03-22 NOTE — TELEPHONE ENCOUNTER
Patient would like a pain medicine called in for his back ----ongoing back pain to go [place]. He said the pain is so bad, he can't stand up.

## 2023-04-24 ENCOUNTER — HOSPITAL ENCOUNTER (OUTPATIENT)
Age: 53
Discharge: HOME | End: 2023-04-24
Payer: COMMERCIAL

## 2023-04-24 DIAGNOSIS — R80.1 PERSISTENT PROTEINURIA: ICD-10-CM

## 2023-04-24 DIAGNOSIS — N17.9 ACUTE RENAL FAILURE, UNSPECIFIED ACUTE RENAL FAILURE TYPE (HCC): ICD-10-CM

## 2023-04-24 DIAGNOSIS — I10 ESSENTIAL HYPERTENSION: ICD-10-CM

## 2023-04-24 DIAGNOSIS — N18.32 STAGE 3B CHRONIC KIDNEY DISEASE (HCC): ICD-10-CM

## 2023-04-24 DIAGNOSIS — I15.9 HYPERTENSION, SECONDARY: ICD-10-CM

## 2023-04-24 DIAGNOSIS — E11.21 DIABETIC NEPHROPATHY ASSOCIATED WITH TYPE 2 DIABETES MELLITUS (HCC): ICD-10-CM

## 2023-04-24 LAB
ALBUMIN SERPL-MCNC: 4 GM/DL (ref 3.4–5)
ANION GAP SERPL CALCULATED.3IONS-SCNC: 16 MMOL/L (ref 4–16)
BUN SERPL-MCNC: 37 MG/DL (ref 6–23)
CALCIUM SERPL-MCNC: 8.8 MG/DL (ref 8.3–10.6)
CHLORIDE BLD-SCNC: 101 MMOL/L (ref 99–110)
CO2: 19 MMOL/L (ref 21–32)
CREAT SERPL-MCNC: 2.8 MG/DL (ref 0.9–1.3)
GFR SERPL CREATININE-BSD FRML MDRD: 26 ML/MIN/1.73M2
GLUCOSE SERPL-MCNC: 92 MG/DL (ref 70–99)
PHOSPHORUS: 3.6 MG/DL (ref 2.5–4.9)
POTASSIUM SERPL-SCNC: 4.7 MMOL/L (ref 3.5–5.1)
SODIUM BLD-SCNC: 136 MMOL/L (ref 135–145)

## 2023-04-24 PROCEDURE — 36415 COLL VENOUS BLD VENIPUNCTURE: CPT

## 2023-04-24 PROCEDURE — 80069 RENAL FUNCTION PANEL: CPT

## 2023-04-24 PROCEDURE — 84156 ASSAY OF PROTEIN URINE: CPT

## 2023-04-24 PROCEDURE — 82570 ASSAY OF URINE CREATININE: CPT

## 2023-04-25 LAB
CREATININE URINE: 148 MG/DL (ref 39–259)
DIABETIC RETINOPATHY: POSITIVE
PROT/CREAT RATIO, UR: 4.1
URINE TOTAL PROTEIN: >600 MG/DL

## 2023-05-09 DIAGNOSIS — E11.59 TYPE 2 DIABETES MELLITUS WITH OTHER CIRCULATORY COMPLICATION, WITHOUT LONG-TERM CURRENT USE OF INSULIN (HCC): ICD-10-CM

## 2023-05-09 RX ORDER — TIRZEPATIDE 2.5 MG/.5ML
INJECTION, SOLUTION SUBCUTANEOUS
Qty: 2 ML | Refills: 0 | Status: SHIPPED | OUTPATIENT
Start: 2023-05-09

## 2023-06-10 DIAGNOSIS — K59.00 CONSTIPATION, UNSPECIFIED CONSTIPATION TYPE: ICD-10-CM

## 2023-06-10 DIAGNOSIS — E11.59 TYPE 2 DIABETES MELLITUS WITH OTHER CIRCULATORY COMPLICATION, WITHOUT LONG-TERM CURRENT USE OF INSULIN (HCC): ICD-10-CM

## 2023-06-12 RX ORDER — SENNOSIDES A AND B 8.6 MG/1
TABLET, FILM COATED ORAL
Qty: 30 TABLET | Refills: 0 | OUTPATIENT
Start: 2023-06-12

## 2023-06-12 RX ORDER — TIRZEPATIDE 2.5 MG/.5ML
INJECTION, SOLUTION SUBCUTANEOUS
Qty: 2 ML | Refills: 0 | Status: SHIPPED | OUTPATIENT
Start: 2023-06-12

## 2023-06-12 RX ORDER — INSULIN DEGLUDEC 200 U/ML
INJECTION, SOLUTION SUBCUTANEOUS
Qty: 9 ML | Refills: 0 | Status: SHIPPED | OUTPATIENT
Start: 2023-06-12 | End: 2023-07-10

## 2023-06-19 ENCOUNTER — HOSPITAL ENCOUNTER (OUTPATIENT)
Age: 53
Discharge: HOME | End: 2023-06-19
Payer: COMMERCIAL

## 2023-06-19 DIAGNOSIS — E11.59 TYPE 2 DIABETES MELLITUS WITH OTHER CIRCULATORY COMPLICATION, WITHOUT LONG-TERM CURRENT USE OF INSULIN (HCC): ICD-10-CM

## 2023-06-19 LAB
ESTIMATED AVERAGE GLUCOSE: 131 MG/DL
HBA1C MFR BLD: 6.2 % (ref 4.2–6.3)

## 2023-06-19 PROCEDURE — 83036 HEMOGLOBIN GLYCOSYLATED A1C: CPT

## 2023-06-19 PROCEDURE — 36415 COLL VENOUS BLD VENIPUNCTURE: CPT

## 2023-06-20 ENCOUNTER — OFFICE VISIT (OUTPATIENT)
Dept: FAMILY MEDICINE CLINIC | Age: 53
End: 2023-06-20
Payer: COMMERCIAL

## 2023-06-20 VITALS
RESPIRATION RATE: 16 BRPM | HEIGHT: 72 IN | BODY MASS INDEX: 40.09 KG/M2 | WEIGHT: 296 LBS | HEART RATE: 61 BPM | OXYGEN SATURATION: 97 % | SYSTOLIC BLOOD PRESSURE: 138 MMHG | DIASTOLIC BLOOD PRESSURE: 86 MMHG

## 2023-06-20 DIAGNOSIS — I25.10 CORONARY ARTERY DISEASE INVOLVING NATIVE CORONARY ARTERY OF NATIVE HEART WITHOUT ANGINA PECTORIS: Primary | ICD-10-CM

## 2023-06-20 DIAGNOSIS — E11.59 TYPE 2 DIABETES MELLITUS WITH OTHER CIRCULATORY COMPLICATION, WITHOUT LONG-TERM CURRENT USE OF INSULIN (HCC): ICD-10-CM

## 2023-06-20 PROCEDURE — 2022F DILAT RTA XM EVC RTNOPTHY: CPT | Performed by: STUDENT IN AN ORGANIZED HEALTH CARE EDUCATION/TRAINING PROGRAM

## 2023-06-20 PROCEDURE — 99213 OFFICE O/P EST LOW 20 MIN: CPT | Performed by: STUDENT IN AN ORGANIZED HEALTH CARE EDUCATION/TRAINING PROGRAM

## 2023-06-20 PROCEDURE — 1036F TOBACCO NON-USER: CPT | Performed by: STUDENT IN AN ORGANIZED HEALTH CARE EDUCATION/TRAINING PROGRAM

## 2023-06-20 PROCEDURE — 3075F SYST BP GE 130 - 139MM HG: CPT | Performed by: STUDENT IN AN ORGANIZED HEALTH CARE EDUCATION/TRAINING PROGRAM

## 2023-06-20 PROCEDURE — 3017F COLORECTAL CA SCREEN DOC REV: CPT | Performed by: STUDENT IN AN ORGANIZED HEALTH CARE EDUCATION/TRAINING PROGRAM

## 2023-06-20 PROCEDURE — 3079F DIAST BP 80-89 MM HG: CPT | Performed by: STUDENT IN AN ORGANIZED HEALTH CARE EDUCATION/TRAINING PROGRAM

## 2023-06-20 PROCEDURE — G8417 CALC BMI ABV UP PARAM F/U: HCPCS | Performed by: STUDENT IN AN ORGANIZED HEALTH CARE EDUCATION/TRAINING PROGRAM

## 2023-06-20 PROCEDURE — G8427 DOCREV CUR MEDS BY ELIG CLIN: HCPCS | Performed by: STUDENT IN AN ORGANIZED HEALTH CARE EDUCATION/TRAINING PROGRAM

## 2023-06-20 PROCEDURE — 3044F HG A1C LEVEL LT 7.0%: CPT | Performed by: STUDENT IN AN ORGANIZED HEALTH CARE EDUCATION/TRAINING PROGRAM

## 2023-06-20 RX ORDER — INSULIN DEGLUDEC 200 U/ML
INJECTION, SOLUTION SUBCUTANEOUS
Qty: 9 ML | Refills: 0 | Status: CANCELLED | OUTPATIENT
Start: 2023-06-20

## 2023-06-20 RX ORDER — TIRZEPATIDE 2.5 MG/.5ML
INJECTION, SOLUTION SUBCUTANEOUS
Qty: 2 ML | Refills: 0 | Status: CANCELLED | OUTPATIENT
Start: 2023-06-20

## 2023-06-20 RX ORDER — TIRZEPATIDE 5 MG/.5ML
5 INJECTION, SOLUTION SUBCUTANEOUS WEEKLY
Qty: 6 ML | Refills: 0 | Status: SHIPPED | OUTPATIENT
Start: 2023-06-20 | End: 2023-09-18

## 2023-06-20 ASSESSMENT — ENCOUNTER SYMPTOMS
SHORTNESS OF BREATH: 0
NAUSEA: 0
SORE THROAT: 0
WHEEZING: 0
ABDOMINAL PAIN: 0

## 2023-06-20 NOTE — PATIENT INSTRUCTIONS
[facility]  [address], [address] [facility], [address]  [phone]    When starting mounjaro 5mg go down to 40U of [place]. If glucose lower than 90 call office.  Ok to slowly increase tresiba from 40U can go up 1-2 U every 2-3 days until glucose  in AM

## 2023-06-20 NOTE — PROGRESS NOTES
UNITS UNDER THE SKIN AT BEDTIME 9 mL 0    MOUNJARO 2.5 MG/0.5ML SOPN SC injection inject 0.5ml into the skin once a week 2 mL 0    amLODIPine (NORVASC) 10 MG tablet Take 1 tablet by mouth daily 90 tablet 3    atorvastatin (LIPITOR) 40 MG tablet Take 1 tablet by mouth daily 90 tablet 1    polyethylene glycol (MIRALAX MIX-IN PAX) 17 g packet Take 17 g by mouth daily as needed for Constipation 527 g 5    metoprolol tartrate (LOPRESSOR) 50 MG tablet Take 1 tablet by mouth 2 times daily 180 tablet 3    clopidogrel (PLAVIX) 75 MG tablet Take 1 tablet by mouth daily 90 tablet 3    nitroGLYCERIN (NITROSTAT) 0.4 MG SL tablet Place 1 tablet under the tongue every 5 minutes as needed for Chest pain up to max of 3 total doses. If no relief after 1 dose, call 911. 25 tablet 1    sildenafil (VIAGRA) 100 MG tablet Take 1 tablet by mouth as needed for Erectile Dysfunction (as needed  daily PRN sexual dysfunction) 20 tablet 5    SM SENNA LAXATIVE 8.6 MG tablet TAKE 2 TABLETS BY MOUTH AT BEDTIME AS NEEDED 30 tablet 5    aspirin EC 81 MG EC tablet Take 1 tablet by mouth daily 30 tablet 6    FOLIC ACID PO Take  by mouth. therapeutic multivitamin-minerals (THERAGRAN-M) tablet Take 1 tablet by mouth daily      Lancets Thin MISC 1 actuation by Does not apply route daily 200 each 1    blood glucose test strips (RELION TRUE METRIX TEST STRIPS) strip 1 each by In Vitro route daily As needed. 100 each 3    Blood Glucose Monitoring Suppl (TRUE METRIX AIR GLUCOSE METER) w/Device KIT 1 actuation by Does not apply route daily 1 kit 1    Insulin Pen Needle (PEN NEEDLES 3/16\") 31G X 5 MM MISC 1 each by Does not apply route daily Use one needle two times daily as directed on package. 500 each 1    blood glucose monitor strips 1 strip by Other route Test 4 times a day & as needed for symptoms of irregular blood glucose. Dispense sufficient amount for indicated testing frequency plus additional to accommodate PRN testing needs.       Insulin Pen

## 2023-06-21 ENCOUNTER — TELEPHONE (OUTPATIENT)
Dept: CARDIOLOGY CLINIC | Age: 53
End: 2023-06-21

## 2023-06-21 NOTE — TELEPHONE ENCOUNTER
Left message for patient requesting a return call to schedule a consult with previous [facility] patient for cad per referral from Dr. Jenelle Villela.

## 2023-06-22 NOTE — RESULT ENCOUNTER NOTE
Please discuss with patient a1c now very well controlled. As discussed at appt please go down on insulin as goal is to increase mounjaro to help with weight loss. \"When starting mounjaro 5mg go down to 40U of [place]. If glucose lower than 90 call office.  Ok to slowly increase tresiba from 40U can go up 1-2 U every 2-3 days until glucose  in AM\"

## 2023-06-27 ENCOUNTER — TELEPHONE (OUTPATIENT)
Dept: CARDIOLOGY CLINIC | Age: 53
End: 2023-06-27

## 2023-06-27 NOTE — TELEPHONE ENCOUNTER
Left message for patient requesting a return call to schedule a consult with previous [facility] patient for cad per referral from Dr. Amy Blackburn.

## 2023-07-05 ENCOUNTER — TELEPHONE (OUTPATIENT)
Dept: CARDIOLOGY CLINIC | Age: 53
End: 2023-07-05

## 2023-07-05 NOTE — TELEPHONE ENCOUNTER
Left message for patient requesting a return call to schedule a consult with previous [facility] patient for cad per referral from Dr. Aly Blackman.

## 2023-07-09 DIAGNOSIS — E11.59 TYPE 2 DIABETES MELLITUS WITH OTHER CIRCULATORY COMPLICATION, WITHOUT LONG-TERM CURRENT USE OF INSULIN (HCC): ICD-10-CM

## 2023-07-10 ENCOUNTER — HOSPITAL ENCOUNTER (OUTPATIENT)
Age: 53
Discharge: HOME | End: 2023-07-10
Payer: COMMERCIAL

## 2023-07-10 DIAGNOSIS — N17.9 ACUTE RENAL FAILURE, UNSPECIFIED ACUTE RENAL FAILURE TYPE (HCC): ICD-10-CM

## 2023-07-10 DIAGNOSIS — N18.4 CHRONIC KIDNEY DISEASE, STAGE IV (SEVERE) (HCC): ICD-10-CM

## 2023-07-10 LAB
ALBUMIN SERPL-MCNC: 3.8 GM/DL (ref 3.4–5)
ANION GAP SERPL CALCULATED.3IONS-SCNC: 13 MMOL/L (ref 4–16)
BUN SERPL-MCNC: 57 MG/DL (ref 6–23)
CALCIUM SERPL-MCNC: 8.3 MG/DL (ref 8.3–10.6)
CHLORIDE BLD-SCNC: 104 MMOL/L (ref 99–110)
CO2: 20 MMOL/L (ref 21–32)
CREAT SERPL-MCNC: 4 MG/DL (ref 0.9–1.3)
GFR SERPL CREATININE-BSD FRML MDRD: 17 ML/MIN/1.73M2
GLUCOSE SERPL-MCNC: 87 MG/DL (ref 70–99)
PHOSPHORUS: 5.4 MG/DL (ref 2.5–4.9)
POTASSIUM SERPL-SCNC: 5.2 MMOL/L (ref 3.5–5.1)
SODIUM BLD-SCNC: 137 MMOL/L (ref 135–145)

## 2023-07-10 PROCEDURE — 36415 COLL VENOUS BLD VENIPUNCTURE: CPT

## 2023-07-10 PROCEDURE — 80069 RENAL FUNCTION PANEL: CPT

## 2023-07-10 RX ORDER — INSULIN DEGLUDEC 200 U/ML
INJECTION, SOLUTION SUBCUTANEOUS
Qty: 9 ML | Refills: 0 | Status: SHIPPED | OUTPATIENT
Start: 2023-07-10

## 2023-07-27 ENCOUNTER — TELEPHONE (OUTPATIENT)
Dept: FAMILY MEDICINE CLINIC | Age: 53
End: 2023-07-27

## 2023-07-27 NOTE — TELEPHONE ENCOUNTER
Patient has ADAM, plan was to repeat kidney funciton has he done yet? How has his glucose levels been now off mounjaro?

## 2023-07-27 NOTE — TELEPHONE ENCOUNTER
Patient states is morning numbers have been in the 90's and afternoon/evening numbers never above 150. Will go on Saturday and get labs, sees Dr. Cody De Los Santos Aug. 18th.

## 2023-07-31 ENCOUNTER — HOSPITAL ENCOUNTER (OUTPATIENT)
Age: 53
Discharge: HOME | End: 2023-07-31
Payer: COMMERCIAL

## 2023-07-31 ENCOUNTER — TELEPHONE (OUTPATIENT)
Dept: FAMILY MEDICINE CLINIC | Age: 53
End: 2023-07-31

## 2023-07-31 DIAGNOSIS — E11.21 DIABETIC NEPHROPATHY ASSOCIATED WITH TYPE 2 DIABETES MELLITUS (HCC): ICD-10-CM

## 2023-07-31 DIAGNOSIS — N17.9 ACUTE RENAL FAILURE, UNSPECIFIED ACUTE RENAL FAILURE TYPE (HCC): ICD-10-CM

## 2023-07-31 DIAGNOSIS — R80.1 PERSISTENT PROTEINURIA: ICD-10-CM

## 2023-07-31 DIAGNOSIS — N18.32 STAGE 3B CHRONIC KIDNEY DISEASE (HCC): Primary | ICD-10-CM

## 2023-07-31 DIAGNOSIS — Q60.2 RENAL AGENESIS: ICD-10-CM

## 2023-07-31 LAB
ANION GAP SERPL CALCULATED.3IONS-SCNC: 10 MMOL/L (ref 4–16)
BUN SERPL-MCNC: 50 MG/DL (ref 6–23)
CALCIUM SERPL-MCNC: 7.8 MG/DL (ref 8.3–10.6)
CHLORIDE BLD-SCNC: 103 MMOL/L (ref 99–110)
CO2: 20 MMOL/L (ref 21–32)
CREAT SERPL-MCNC: 4.1 MG/DL (ref 0.9–1.3)
CREATININE URINE: 124.7 MG/DL (ref 39–259)
GFR SERPL CREATININE-BSD FRML MDRD: 17 ML/MIN/1.73M2
GLUCOSE SERPL-MCNC: 71 MG/DL (ref 70–99)
POTASSIUM SERPL-SCNC: 4.8 MMOL/L (ref 3.5–5.1)
PROT/CREAT RATIO, UR: 4.3
SODIUM BLD-SCNC: 133 MMOL/L (ref 135–145)
URINE TOTAL PROTEIN: 531.2 MG/DL

## 2023-07-31 PROCEDURE — 36415 COLL VENOUS BLD VENIPUNCTURE: CPT

## 2023-07-31 PROCEDURE — 82570 ASSAY OF URINE CREATININE: CPT

## 2023-07-31 PROCEDURE — 84156 ASSAY OF PROTEIN URINE: CPT

## 2023-07-31 PROCEDURE — 80048 BASIC METABOLIC PNL TOTAL CA: CPT

## 2023-07-31 NOTE — TELEPHONE ENCOUNTER
Patient called and stated that he needs a UA order faxed to [facility] Fax [phone]([facility] they have the sample)  Patient has already given the sample. Dr Va Platt is out of the office. Patient states he always has a UA when blood work is done. The labs were drawn today also.

## 2023-08-01 NOTE — TELEPHONE ENCOUNTER
Called [facility] and the test was ran for the urine.  The order was pulled from the system

## 2023-08-11 DIAGNOSIS — E11.59 TYPE 2 DIABETES MELLITUS WITH OTHER CIRCULATORY COMPLICATION, WITHOUT LONG-TERM CURRENT USE OF INSULIN (HCC): ICD-10-CM

## 2023-08-11 RX ORDER — INSULIN DEGLUDEC 200 U/ML
INJECTION, SOLUTION SUBCUTANEOUS
Qty: 9 ML | Refills: 0 | Status: SHIPPED | OUTPATIENT
Start: 2023-08-11

## 2023-08-15 ENCOUNTER — HOSPITAL ENCOUNTER (OUTPATIENT)
Age: 53
Discharge: HOME | End: 2023-08-15
Payer: COMMERCIAL

## 2023-08-15 DIAGNOSIS — N17.9 ACUTE RENAL FAILURE, UNSPECIFIED ACUTE RENAL FAILURE TYPE (HCC): ICD-10-CM

## 2023-08-15 DIAGNOSIS — E11.21 DIABETIC NEPHROPATHY ASSOCIATED WITH TYPE 2 DIABETES MELLITUS (HCC): ICD-10-CM

## 2023-08-15 DIAGNOSIS — Q60.2 RENAL AGENESIS: ICD-10-CM

## 2023-08-15 DIAGNOSIS — R80.1 PERSISTENT PROTEINURIA: ICD-10-CM

## 2023-08-15 LAB
ANION GAP SERPL CALCULATED.3IONS-SCNC: 10 MMOL/L (ref 4–16)
BUN SERPL-MCNC: 48 MG/DL (ref 6–23)
CALCIUM SERPL-MCNC: 8.7 MG/DL (ref 8.3–10.6)
CHLORIDE BLD-SCNC: 104 MMOL/L (ref 99–110)
CO2: 21 MMOL/L (ref 21–32)
CREAT SERPL-MCNC: 3.7 MG/DL (ref 0.9–1.3)
GFR SERPL CREATININE-BSD FRML MDRD: 19 ML/MIN/1.73M2
GLUCOSE SERPL-MCNC: 90 MG/DL (ref 70–99)
POTASSIUM SERPL-SCNC: 5.2 MMOL/L (ref 3.5–5.1)
SODIUM BLD-SCNC: 135 MMOL/L (ref 135–145)

## 2023-08-15 PROCEDURE — 36415 COLL VENOUS BLD VENIPUNCTURE: CPT

## 2023-08-15 PROCEDURE — 80048 BASIC METABOLIC PNL TOTAL CA: CPT

## 2023-09-09 DIAGNOSIS — I25.10 CORONARY ARTERY DISEASE INVOLVING NATIVE CORONARY ARTERY OF NATIVE HEART WITHOUT ANGINA PECTORIS: ICD-10-CM

## 2023-09-09 DIAGNOSIS — E11.59 TYPE 2 DIABETES MELLITUS WITH OTHER CIRCULATORY COMPLICATION, WITHOUT LONG-TERM CURRENT USE OF INSULIN (HCC): ICD-10-CM

## 2023-09-11 RX ORDER — INSULIN DEGLUDEC 200 U/ML
INJECTION, SOLUTION SUBCUTANEOUS
Qty: 9 ML | Refills: 0 | Status: SHIPPED | OUTPATIENT
Start: 2023-09-11

## 2023-09-11 RX ORDER — ATORVASTATIN CALCIUM 40 MG/1
40 TABLET, FILM COATED ORAL DAILY
Qty: 90 TABLET | Refills: 0 | Status: SHIPPED | OUTPATIENT
Start: 2023-09-11

## 2023-09-19 ENCOUNTER — HOSPITAL ENCOUNTER (OUTPATIENT)
Age: 53
Discharge: HOME | End: 2023-09-19
Payer: COMMERCIAL

## 2023-09-19 DIAGNOSIS — Z12.5 SCREENING PSA (PROSTATE SPECIFIC ANTIGEN): ICD-10-CM

## 2023-09-19 DIAGNOSIS — Z13.220 LIPID SCREENING: ICD-10-CM

## 2023-09-19 DIAGNOSIS — I25.10 CORONARY ARTERY DISEASE INVOLVING NATIVE CORONARY ARTERY OF NATIVE HEART WITHOUT ANGINA PECTORIS: ICD-10-CM

## 2023-09-19 DIAGNOSIS — I15.9 HYPERTENSION, SECONDARY: ICD-10-CM

## 2023-09-19 DIAGNOSIS — N18.32 STAGE 3B CHRONIC KIDNEY DISEASE (HCC): ICD-10-CM

## 2023-09-19 DIAGNOSIS — E11.59 TYPE 2 DIABETES MELLITUS WITH OTHER CIRCULATORY COMPLICATION, WITHOUT LONG-TERM CURRENT USE OF INSULIN (HCC): ICD-10-CM

## 2023-09-19 DIAGNOSIS — R80.1 PERSISTENT PROTEINURIA: ICD-10-CM

## 2023-09-19 DIAGNOSIS — I10 ESSENTIAL HYPERTENSION: ICD-10-CM

## 2023-09-19 DIAGNOSIS — Q60.2 RENAL AGENESIS: ICD-10-CM

## 2023-09-19 DIAGNOSIS — E87.5 HYPERPOTASSEMIA: ICD-10-CM

## 2023-09-19 LAB
ALBUMIN SERPL-MCNC: 4 GM/DL (ref 3.4–5)
ALP BLD-CCNC: 90 IU/L (ref 40–128)
ALT SERPL-CCNC: 22 U/L (ref 10–40)
ANION GAP SERPL CALCULATED.3IONS-SCNC: 14 MMOL/L (ref 4–16)
AST SERPL-CCNC: 15 IU/L (ref 15–37)
BASOPHILS ABSOLUTE: 0 K/CU MM
BASOPHILS RELATIVE PERCENT: 0.6 % (ref 0–1)
BILIRUB SERPL-MCNC: 0.2 MG/DL (ref 0–1)
BUN SERPL-MCNC: 50 MG/DL (ref 6–23)
CALCIUM SERPL-MCNC: 8.8 MG/DL (ref 8.3–10.6)
CHLORIDE BLD-SCNC: 107 MMOL/L (ref 99–110)
CHOLEST SERPL-MCNC: 113 MG/DL
CO2: 18 MMOL/L (ref 21–32)
CREAT SERPL-MCNC: 3.7 MG/DL (ref 0.9–1.3)
CREATININE URINE: 67.7 MG/DL (ref 39–259)
DIFFERENTIAL TYPE: ABNORMAL
EOSINOPHILS ABSOLUTE: 0.1 K/CU MM
EOSINOPHILS RELATIVE PERCENT: 1.7 % (ref 0–3)
ESTIMATED AVERAGE GLUCOSE: 137 MG/DL
GFR SERPL CREATININE-BSD FRML MDRD: 19 ML/MIN/1.73M2
GLUCOSE SERPL-MCNC: 56 MG/DL (ref 70–99)
HBA1C MFR BLD: 6.4 % (ref 4.2–6.3)
HCT VFR BLD CALC: 38.8 % (ref 42–52)
HDLC SERPL-MCNC: 33 MG/DL
HEMOGLOBIN: 12.2 GM/DL (ref 13.5–18)
IMMATURE NEUTROPHIL %: 0.3 % (ref 0–0.43)
LDLC SERPL CALC-MCNC: 64 MG/DL
LYMPHOCYTES ABSOLUTE: 1.7 K/CU MM
LYMPHOCYTES RELATIVE PERCENT: 24.3 % (ref 24–44)
MCH RBC QN AUTO: 29.5 PG (ref 27–31)
MCHC RBC AUTO-ENTMCNC: 31.4 % (ref 32–36)
MCV RBC AUTO: 93.9 FL (ref 78–100)
MONOCYTES ABSOLUTE: 0.6 K/CU MM
MONOCYTES RELATIVE PERCENT: 8.1 % (ref 0–4)
NUCLEATED RBC %: 0 %
PDW BLD-RTO: 12.8 % (ref 11.7–14.9)
PLATELET # BLD: 260 K/CU MM (ref 140–440)
PMV BLD AUTO: 12.3 FL (ref 7.5–11.1)
POTASSIUM SERPL-SCNC: 4.9 MMOL/L (ref 3.5–5.1)
PROSTATE SPECIFIC ANTIGEN: 0.36 NG/ML (ref 0–4)
PROT/CREAT RATIO, UR: 5
RBC # BLD: 4.13 M/CU MM (ref 4.6–6.2)
SEGMENTED NEUTROPHILS ABSOLUTE COUNT: 4.5 K/CU MM
SEGMENTED NEUTROPHILS RELATIVE PERCENT: 65 % (ref 36–66)
SODIUM BLD-SCNC: 139 MMOL/L (ref 135–145)
TOTAL IMMATURE NEUTOROPHIL: 0.02 K/CU MM
TOTAL NUCLEATED RBC: 0 K/CU MM
TOTAL PROTEIN: 6.1 GM/DL (ref 6.4–8.2)
TRIGL SERPL-MCNC: 82 MG/DL
URINE TOTAL PROTEIN: 337.9 MG/DL
WBC # BLD: 6.9 K/CU MM (ref 4–10.5)

## 2023-09-19 PROCEDURE — 80053 COMPREHEN METABOLIC PANEL: CPT

## 2023-09-19 PROCEDURE — 36415 COLL VENOUS BLD VENIPUNCTURE: CPT

## 2023-09-19 PROCEDURE — 83036 HEMOGLOBIN GLYCOSYLATED A1C: CPT

## 2023-09-19 PROCEDURE — 82570 ASSAY OF URINE CREATININE: CPT

## 2023-09-19 PROCEDURE — 85025 COMPLETE CBC W/AUTO DIFF WBC: CPT

## 2023-09-19 PROCEDURE — G0103 PSA SCREENING: HCPCS

## 2023-09-19 PROCEDURE — 80048 BASIC METABOLIC PNL TOTAL CA: CPT

## 2023-09-19 PROCEDURE — 84156 ASSAY OF PROTEIN URINE: CPT

## 2023-09-19 PROCEDURE — 80061 LIPID PANEL: CPT

## 2023-09-21 ENCOUNTER — TELEPHONE (OUTPATIENT)
Dept: FAMILY MEDICINE CLINIC | Age: 53
End: 2023-09-21

## 2023-09-21 ENCOUNTER — OFFICE VISIT (OUTPATIENT)
Dept: FAMILY MEDICINE CLINIC | Age: 53
End: 2023-09-21
Payer: COMMERCIAL

## 2023-09-21 VITALS
OXYGEN SATURATION: 97 % | HEIGHT: 72 IN | DIASTOLIC BLOOD PRESSURE: 80 MMHG | HEART RATE: 58 BPM | WEIGHT: 302 LBS | BODY MASS INDEX: 40.9 KG/M2 | SYSTOLIC BLOOD PRESSURE: 124 MMHG

## 2023-09-21 DIAGNOSIS — E11.21 DIABETIC NEPHROPATHY ASSOCIATED WITH TYPE 2 DIABETES MELLITUS (HCC): ICD-10-CM

## 2023-09-21 DIAGNOSIS — I25.10 CORONARY ARTERY DISEASE INVOLVING NATIVE CORONARY ARTERY OF NATIVE HEART WITHOUT ANGINA PECTORIS: ICD-10-CM

## 2023-09-21 DIAGNOSIS — K59.00 CONSTIPATION, UNSPECIFIED CONSTIPATION TYPE: ICD-10-CM

## 2023-09-21 DIAGNOSIS — E11.59 TYPE 2 DIABETES MELLITUS WITH OTHER CIRCULATORY COMPLICATION, WITHOUT LONG-TERM CURRENT USE OF INSULIN (HCC): ICD-10-CM

## 2023-09-21 DIAGNOSIS — E66.01 CLASS 2 SEVERE OBESITY DUE TO EXCESS CALORIES WITH SERIOUS COMORBIDITY AND BODY MASS INDEX (BMI) OF 39.0 TO 39.9 IN ADULT (HCC): Primary | ICD-10-CM

## 2023-09-21 PROCEDURE — 3074F SYST BP LT 130 MM HG: CPT | Performed by: STUDENT IN AN ORGANIZED HEALTH CARE EDUCATION/TRAINING PROGRAM

## 2023-09-21 PROCEDURE — 3017F COLORECTAL CA SCREEN DOC REV: CPT | Performed by: STUDENT IN AN ORGANIZED HEALTH CARE EDUCATION/TRAINING PROGRAM

## 2023-09-21 PROCEDURE — 3044F HG A1C LEVEL LT 7.0%: CPT | Performed by: STUDENT IN AN ORGANIZED HEALTH CARE EDUCATION/TRAINING PROGRAM

## 2023-09-21 PROCEDURE — G8427 DOCREV CUR MEDS BY ELIG CLIN: HCPCS | Performed by: STUDENT IN AN ORGANIZED HEALTH CARE EDUCATION/TRAINING PROGRAM

## 2023-09-21 PROCEDURE — 2022F DILAT RTA XM EVC RTNOPTHY: CPT | Performed by: STUDENT IN AN ORGANIZED HEALTH CARE EDUCATION/TRAINING PROGRAM

## 2023-09-21 PROCEDURE — 1036F TOBACCO NON-USER: CPT | Performed by: STUDENT IN AN ORGANIZED HEALTH CARE EDUCATION/TRAINING PROGRAM

## 2023-09-21 PROCEDURE — 99214 OFFICE O/P EST MOD 30 MIN: CPT | Performed by: STUDENT IN AN ORGANIZED HEALTH CARE EDUCATION/TRAINING PROGRAM

## 2023-09-21 PROCEDURE — G8417 CALC BMI ABV UP PARAM F/U: HCPCS | Performed by: STUDENT IN AN ORGANIZED HEALTH CARE EDUCATION/TRAINING PROGRAM

## 2023-09-21 PROCEDURE — 3079F DIAST BP 80-89 MM HG: CPT | Performed by: STUDENT IN AN ORGANIZED HEALTH CARE EDUCATION/TRAINING PROGRAM

## 2023-09-21 RX ORDER — CLOPIDOGREL BISULFATE 75 MG/1
75 TABLET ORAL DAILY
Qty: 90 TABLET | Refills: 3 | Status: SHIPPED | OUTPATIENT
Start: 2023-09-21 | End: 2024-09-15

## 2023-09-21 RX ORDER — ATORVASTATIN CALCIUM 40 MG/1
40 TABLET, FILM COATED ORAL DAILY
Qty: 90 TABLET | Refills: 1 | Status: SHIPPED | OUTPATIENT
Start: 2023-09-21

## 2023-09-21 RX ORDER — ACYCLOVIR 400 MG/1
1 TABLET ORAL
Qty: 3 EACH | Refills: 2 | Status: SHIPPED | OUTPATIENT
Start: 2023-09-21 | End: 2024-03-19

## 2023-09-21 RX ORDER — SENNOSIDES 8.6 MG/1
TABLET ORAL
Qty: 30 TABLET | Refills: 5 | Status: SHIPPED | OUTPATIENT
Start: 2023-09-21 | End: 2023-09-22 | Stop reason: SDUPTHER

## 2023-09-21 RX ORDER — INSULIN DEGLUDEC 200 U/ML
INJECTION, SOLUTION SUBCUTANEOUS
Qty: 27 ML | Refills: 1 | Status: SHIPPED | OUTPATIENT
Start: 2023-09-21 | End: 2024-03-21

## 2023-09-21 RX ORDER — ACYCLOVIR 400 MG/1
1 TABLET ORAL
Qty: 1 EACH | Refills: 0 | Status: SHIPPED | OUTPATIENT
Start: 2023-09-21

## 2023-09-21 ASSESSMENT — ENCOUNTER SYMPTOMS
SORE THROAT: 0
ABDOMINAL PAIN: 0
NAUSEA: 0
SHORTNESS OF BREATH: 0
WHEEZING: 0

## 2023-09-21 NOTE — PROGRESS NOTES
PLACEMENT  2006    HERNIA REPAIR                   CURRENT MEDICATIONS  Current Outpatient Medications   Medication Sig Dispense Refill    atorvastatin (LIPITOR) 40 MG tablet Take 1 tablet by mouth daily 90 tablet 1    clopidogrel (PLAVIX) 75 MG tablet Take 1 tablet by mouth daily 90 tablet 3    Insulin Degludec (TRESIBA FLEXTOUCH) 200 UNIT/ML SOPN INJECT 50 UNITS UNDER THE SKIN AT BEDTIME. 27 mL 1    Continuous Blood Gluc  ([facility]) AMOL 1 application  by Does not apply route 4 times daily (after meals and at bedtime) 1 each 0    Continuous Blood Gluc Sensor (DEXCOM G7 SENSOR) MISC 1 application  by Does not apply route 4 times daily (after meals and at bedtime) 3 each 2    Glucose 2 g CHEW Take 2 g by mouth daily as needed (hypoglycemia) 15 tablet 0    SM SENNA LAXATIVE 8.6 MG tablet TAKE 2 TABLETS BY MOUTH AT BEDTIME AS NEEDED 30 tablet 5    amLODIPine (NORVASC) 10 MG tablet Take 1 tablet by mouth daily 90 tablet 3    polyethylene glycol (MIRALAX MIX-IN PAX) 17 g packet Take 17 g by mouth daily as needed for Constipation 527 g 5    Lancets Thin MISC 1 actuation by Does not apply route daily 200 each 1    blood glucose test strips (RELION TRUE METRIX TEST STRIPS) strip 1 each by In Vitro route daily As needed. 100 each 3    Blood Glucose Monitoring Suppl (TRUE METRIX AIR GLUCOSE METER) w/Device KIT 1 actuation by Does not apply route daily 1 kit 1    metoprolol tartrate (LOPRESSOR) 50 MG tablet Take 1 tablet by mouth 2 times daily 180 tablet 3    nitroGLYCERIN (NITROSTAT) 0.4 MG SL tablet Place 1 tablet under the tongue every 5 minutes as needed for Chest pain up to max of 3 total doses. If no relief after 1 dose, call 911. 25 tablet 1    Insulin Pen Needle (PEN NEEDLES 3/16\") 31G X 5 MM MISC 1 each by Does not apply route daily Use one needle two times daily as directed on package.  500 each 1    sildenafil (VIAGRA) 100 MG tablet Take 1 tablet by mouth as needed for Erectile Dysfunction (as

## 2023-09-22 RX ORDER — SENNOSIDES A AND B 8.6 MG/1
TABLET, FILM COATED ORAL
Qty: 30 TABLET | Refills: 5 | Status: SHIPPED | OUTPATIENT
Start: 2023-09-22

## 2023-10-24 ENCOUNTER — HOSPITAL ENCOUNTER (OUTPATIENT)
Age: 53
Discharge: HOME | End: 2023-10-24
Payer: MEDICARE

## 2023-10-24 DIAGNOSIS — E11.21 DIABETIC NEPHROPATHY ASSOCIATED WITH TYPE 2 DIABETES MELLITUS (HCC): ICD-10-CM

## 2023-10-24 DIAGNOSIS — N17.9 ACUTE RENAL FAILURE, UNSPECIFIED ACUTE RENAL FAILURE TYPE (HCC): ICD-10-CM

## 2023-10-24 DIAGNOSIS — R80.1 PERSISTENT PROTEINURIA: ICD-10-CM

## 2023-10-24 DIAGNOSIS — E11.59 TYPE 2 DIABETES MELLITUS WITH OTHER CIRCULATORY COMPLICATION, WITHOUT LONG-TERM CURRENT USE OF INSULIN (HCC): ICD-10-CM

## 2023-10-24 DIAGNOSIS — N18.32 STAGE 3B CHRONIC KIDNEY DISEASE (HCC): ICD-10-CM

## 2023-10-24 DIAGNOSIS — I15.9 HYPERTENSION, SECONDARY: ICD-10-CM

## 2023-10-24 LAB
ANION GAP SERPL CALCULATED.3IONS-SCNC: 12 MMOL/L (ref 4–16)
BUN SERPL-MCNC: 62 MG/DL (ref 6–23)
CALCIUM SERPL-MCNC: 8.8 MG/DL (ref 8.3–10.6)
CHLORIDE BLD-SCNC: 102 MMOL/L (ref 99–110)
CO2: 21 MMOL/L (ref 21–32)
CREAT SERPL-MCNC: 4 MG/DL (ref 0.9–1.3)
GFR SERPL CREATININE-BSD FRML MDRD: 17 ML/MIN/1.73M2
GLUCOSE SERPL-MCNC: 119 MG/DL (ref 70–99)
POTASSIUM SERPL-SCNC: 4.7 MMOL/L (ref 3.5–5.1)
SODIUM BLD-SCNC: 135 MMOL/L (ref 135–145)

## 2023-10-24 PROCEDURE — 36415 COLL VENOUS BLD VENIPUNCTURE: CPT

## 2023-10-24 PROCEDURE — 80048 BASIC METABOLIC PNL TOTAL CA: CPT

## 2023-10-30 ENCOUNTER — HOSPITAL ENCOUNTER (OUTPATIENT)
Age: 53
Discharge: HOME | End: 2023-10-30
Payer: MEDICARE

## 2023-10-30 DIAGNOSIS — I15.9 HYPERTENSION, SECONDARY: ICD-10-CM

## 2023-10-30 DIAGNOSIS — E11.21 DIABETIC NEPHROPATHY ASSOCIATED WITH TYPE 2 DIABETES MELLITUS (HCC): ICD-10-CM

## 2023-10-30 DIAGNOSIS — N18.32 STAGE 3B CHRONIC KIDNEY DISEASE (HCC): ICD-10-CM

## 2023-10-30 DIAGNOSIS — N17.9 ACUTE RENAL FAILURE, UNSPECIFIED ACUTE RENAL FAILURE TYPE (HCC): ICD-10-CM

## 2023-10-30 DIAGNOSIS — E11.59 TYPE 2 DIABETES MELLITUS WITH OTHER CIRCULATORY COMPLICATION, WITHOUT LONG-TERM CURRENT USE OF INSULIN (HCC): ICD-10-CM

## 2023-10-30 DIAGNOSIS — R80.1 PERSISTENT PROTEINURIA: ICD-10-CM

## 2023-10-30 LAB
ANION GAP SERPL CALCULATED.3IONS-SCNC: 12 MMOL/L (ref 4–16)
BUN SERPL-MCNC: 55 MG/DL (ref 6–23)
CALCIUM SERPL-MCNC: 8.8 MG/DL (ref 8.3–10.6)
CHLORIDE BLD-SCNC: 105 MMOL/L (ref 99–110)
CO2: 21 MMOL/L (ref 21–32)
CREAT SERPL-MCNC: 4 MG/DL (ref 0.9–1.3)
CREATININE URINE: 58.1 MG/DL (ref 39–259)
GFR SERPL CREATININE-BSD FRML MDRD: 17 ML/MIN/1.73M2
GLUCOSE SERPL-MCNC: 95 MG/DL (ref 70–99)
POTASSIUM SERPL-SCNC: 5.7 MMOL/L (ref 3.5–5.1)
PROT/CREAT RATIO, UR: 5.6
SODIUM BLD-SCNC: 138 MMOL/L (ref 135–145)
URINE TOTAL PROTEIN: 324.6 MG/DL

## 2023-10-30 PROCEDURE — 84156 ASSAY OF PROTEIN URINE: CPT

## 2023-10-30 PROCEDURE — 36415 COLL VENOUS BLD VENIPUNCTURE: CPT

## 2023-10-30 PROCEDURE — 80048 BASIC METABOLIC PNL TOTAL CA: CPT

## 2023-10-30 PROCEDURE — 82570 ASSAY OF URINE CREATININE: CPT

## 2023-11-10 ENCOUNTER — HOSPITAL ENCOUNTER (OUTPATIENT)
Age: 53
Discharge: HOME | End: 2023-11-10
Payer: MEDICARE

## 2023-11-10 DIAGNOSIS — E87.5 HYPERPOTASSEMIA: ICD-10-CM

## 2023-11-10 DIAGNOSIS — N18.32 STAGE 3B CHRONIC KIDNEY DISEASE (HCC): ICD-10-CM

## 2023-11-10 LAB
ANION GAP SERPL CALCULATED.3IONS-SCNC: 12 MMOL/L (ref 4–16)
BUN SERPL-MCNC: 54 MG/DL (ref 6–23)
CALCIUM SERPL-MCNC: 8.6 MG/DL (ref 8.3–10.6)
CHLORIDE BLD-SCNC: 105 MMOL/L (ref 99–110)
CO2: 21 MMOL/L (ref 21–32)
CREAT SERPL-MCNC: 4.3 MG/DL (ref 0.9–1.3)
GFR SERPL CREATININE-BSD FRML MDRD: 16 ML/MIN/1.73M2
GLUCOSE SERPL-MCNC: 76 MG/DL (ref 70–99)
POTASSIUM SERPL-SCNC: 4.6 MMOL/L (ref 3.5–5.1)
SODIUM BLD-SCNC: 138 MMOL/L (ref 135–145)

## 2023-11-10 PROCEDURE — 84156 ASSAY OF PROTEIN URINE: CPT

## 2023-11-10 PROCEDURE — 82570 ASSAY OF URINE CREATININE: CPT

## 2023-11-10 PROCEDURE — 80048 BASIC METABOLIC PNL TOTAL CA: CPT

## 2023-11-10 PROCEDURE — 36415 COLL VENOUS BLD VENIPUNCTURE: CPT

## 2023-11-27 ENCOUNTER — HOSPITAL ENCOUNTER (OUTPATIENT)
Age: 53
Discharge: HOME | End: 2023-11-27
Payer: MEDICARE

## 2023-11-27 DIAGNOSIS — N18.32 STAGE 3B CHRONIC KIDNEY DISEASE (HCC): ICD-10-CM

## 2023-11-27 DIAGNOSIS — E87.5 HYPERPOTASSEMIA: ICD-10-CM

## 2023-11-27 LAB
ANION GAP SERPL CALCULATED.3IONS-SCNC: 13 MMOL/L (ref 4–16)
BUN SERPL-MCNC: 50 MG/DL (ref 6–23)
CALCIUM SERPL-MCNC: 8.5 MG/DL (ref 8.3–10.6)
CHLORIDE BLD-SCNC: 101 MMOL/L (ref 99–110)
CO2: 19 MMOL/L (ref 21–32)
CREAT SERPL-MCNC: 3.8 MG/DL (ref 0.9–1.3)
GFR SERPL CREATININE-BSD FRML MDRD: 18 ML/MIN/1.73M2
GLUCOSE SERPL-MCNC: 96 MG/DL (ref 70–99)
POTASSIUM SERPL-SCNC: 4.9 MMOL/L (ref 3.5–5.1)
SODIUM BLD-SCNC: 133 MMOL/L (ref 135–145)

## 2023-11-27 PROCEDURE — 36415 COLL VENOUS BLD VENIPUNCTURE: CPT

## 2023-11-27 PROCEDURE — 80048 BASIC METABOLIC PNL TOTAL CA: CPT

## 2023-11-28 DIAGNOSIS — I25.10 CORONARY ARTERY DISEASE INVOLVING NATIVE CORONARY ARTERY OF NATIVE HEART WITHOUT ANGINA PECTORIS: ICD-10-CM

## 2023-11-30 ENCOUNTER — TELEPHONE (OUTPATIENT)
Dept: FAMILY MEDICINE CLINIC | Age: 53
End: 2023-11-30

## 2023-11-30 RX ORDER — NITROGLYCERIN 0.4 MG/1
0.4 TABLET SUBLINGUAL EVERY 5 MIN PRN
Qty: 10 TABLET | Refills: 0 | Status: SHIPPED | OUTPATIENT
Start: 2023-11-30 | End: 2024-02-28

## 2023-11-30 NOTE — TELEPHONE ENCOUNTER
Please make sure patient is aware not to use nitrostat with sildenafil. Nitrostat is for emergency use only.

## 2023-12-21 ENCOUNTER — OFFICE VISIT (OUTPATIENT)
Dept: FAMILY MEDICINE CLINIC | Age: 53
End: 2023-12-21
Payer: MEDICARE

## 2023-12-21 VITALS
HEART RATE: 59 BPM | DIASTOLIC BLOOD PRESSURE: 66 MMHG | OXYGEN SATURATION: 98 % | SYSTOLIC BLOOD PRESSURE: 108 MMHG | HEIGHT: 72 IN | BODY MASS INDEX: 40.73 KG/M2 | WEIGHT: 300.7 LBS

## 2023-12-21 DIAGNOSIS — E11.59 TYPE 2 DIABETES MELLITUS WITH OTHER CIRCULATORY COMPLICATION, WITHOUT LONG-TERM CURRENT USE OF INSULIN (HCC): ICD-10-CM

## 2023-12-21 DIAGNOSIS — Z23 NEED FOR PROPHYLACTIC VACCINATION AGAINST STREPTOCOCCUS PNEUMONIAE (PNEUMOCOCCUS): ICD-10-CM

## 2023-12-21 DIAGNOSIS — Z87.891 PERSONAL HISTORY OF TOBACCO USE: ICD-10-CM

## 2023-12-21 DIAGNOSIS — R53.83 OTHER FATIGUE: ICD-10-CM

## 2023-12-21 DIAGNOSIS — Z00.00 INITIAL MEDICARE ANNUAL WELLNESS VISIT: Primary | ICD-10-CM

## 2023-12-21 DIAGNOSIS — E55.9 VITAMIN D DEFICIENCY: ICD-10-CM

## 2023-12-21 PROBLEM — N18.30 CKD (CHRONIC KIDNEY DISEASE), STAGE III (HCC): Status: RESOLVED | Noted: 2020-09-24 | Resolved: 2023-12-21

## 2023-12-21 LAB — HBA1C MFR BLD: 5.9 %

## 2023-12-21 PROCEDURE — G0009 ADMIN PNEUMOCOCCAL VACCINE: HCPCS | Performed by: STUDENT IN AN ORGANIZED HEALTH CARE EDUCATION/TRAINING PROGRAM

## 2023-12-21 PROCEDURE — G0296 VISIT TO DETERM LDCT ELIG: HCPCS | Performed by: STUDENT IN AN ORGANIZED HEALTH CARE EDUCATION/TRAINING PROGRAM

## 2023-12-21 PROCEDURE — 90677 PCV20 VACCINE IM: CPT | Performed by: STUDENT IN AN ORGANIZED HEALTH CARE EDUCATION/TRAINING PROGRAM

## 2023-12-21 PROCEDURE — 2022F DILAT RTA XM EVC RTNOPTHY: CPT | Performed by: STUDENT IN AN ORGANIZED HEALTH CARE EDUCATION/TRAINING PROGRAM

## 2023-12-21 PROCEDURE — G0438 PPPS, INITIAL VISIT: HCPCS | Performed by: STUDENT IN AN ORGANIZED HEALTH CARE EDUCATION/TRAINING PROGRAM

## 2023-12-21 PROCEDURE — 83036 HEMOGLOBIN GLYCOSYLATED A1C: CPT | Performed by: STUDENT IN AN ORGANIZED HEALTH CARE EDUCATION/TRAINING PROGRAM

## 2023-12-21 PROCEDURE — G8417 CALC BMI ABV UP PARAM F/U: HCPCS | Performed by: STUDENT IN AN ORGANIZED HEALTH CARE EDUCATION/TRAINING PROGRAM

## 2023-12-21 PROCEDURE — 99213 OFFICE O/P EST LOW 20 MIN: CPT | Performed by: STUDENT IN AN ORGANIZED HEALTH CARE EDUCATION/TRAINING PROGRAM

## 2023-12-21 PROCEDURE — 3044F HG A1C LEVEL LT 7.0%: CPT | Performed by: STUDENT IN AN ORGANIZED HEALTH CARE EDUCATION/TRAINING PROGRAM

## 2023-12-21 PROCEDURE — 3074F SYST BP LT 130 MM HG: CPT | Performed by: STUDENT IN AN ORGANIZED HEALTH CARE EDUCATION/TRAINING PROGRAM

## 2023-12-21 PROCEDURE — 1036F TOBACCO NON-USER: CPT | Performed by: STUDENT IN AN ORGANIZED HEALTH CARE EDUCATION/TRAINING PROGRAM

## 2023-12-21 PROCEDURE — G8427 DOCREV CUR MEDS BY ELIG CLIN: HCPCS | Performed by: STUDENT IN AN ORGANIZED HEALTH CARE EDUCATION/TRAINING PROGRAM

## 2023-12-21 PROCEDURE — 3078F DIAST BP <80 MM HG: CPT | Performed by: STUDENT IN AN ORGANIZED HEALTH CARE EDUCATION/TRAINING PROGRAM

## 2023-12-21 PROCEDURE — G8482 FLU IMMUNIZE ORDER/ADMIN: HCPCS | Performed by: STUDENT IN AN ORGANIZED HEALTH CARE EDUCATION/TRAINING PROGRAM

## 2023-12-21 PROCEDURE — 3017F COLORECTAL CA SCREEN DOC REV: CPT | Performed by: STUDENT IN AN ORGANIZED HEALTH CARE EDUCATION/TRAINING PROGRAM

## 2023-12-21 ASSESSMENT — PATIENT HEALTH QUESTIONNAIRE - PHQ9
2. FEELING DOWN, DEPRESSED OR HOPELESS: 0
SUM OF ALL RESPONSES TO PHQ QUESTIONS 1-9: 0
SUM OF ALL RESPONSES TO PHQ QUESTIONS 1-9: 0
1. LITTLE INTEREST OR PLEASURE IN DOING THINGS: 0
SUM OF ALL RESPONSES TO PHQ QUESTIONS 1-9: 0
SUM OF ALL RESPONSES TO PHQ9 QUESTIONS 1 & 2: 0
SUM OF ALL RESPONSES TO PHQ QUESTIONS 1-9: 0

## 2023-12-21 ASSESSMENT — LIFESTYLE VARIABLES
HOW MANY STANDARD DRINKS CONTAINING ALCOHOL DO YOU HAVE ON A TYPICAL DAY: 1 OR 2
HOW OFTEN DO YOU HAVE A DRINK CONTAINING ALCOHOL: MONTHLY OR LESS

## 2023-12-21 NOTE — PROGRESS NOTES
1/2/2024    Landry Morley    Chief Complaint   Patient presents with    3 Month Follow-Up     Type 2 dm     Medicare AWV       HPI  History was obtained from patient.  Landry is a 53 y.o. male with a PMHx as listed below who presents today for follow up on chronic conditoins    Weight stable, patient has yet to visit weight loss center    Can't use SGLT2 due to worsened kidney fucniton  On lokelma    Currently on 40U Tresiba  Usually glucose has been 90 to 150.   Established by Retina Physician.    Issues with fatigue as going to bathroom regularly. Admits to snoring.     1. Initial Medicare annual wellness visit    2. Personal history of tobacco use    3. Need for prophylactic vaccination against Streptococcus pneumoniae (pneumococcus)    4. Type 2 diabetes mellitus with other circulatory complication, without long-term current use of insulin (HCC)    5. Other fatigue    6. Vitamin D deficiency             REVIEW OF SYMPTOMS    Review of Systems   Constitutional:  Negative for chills and fatigue.   HENT:  Negative for congestion and sore throat.    Respiratory:  Negative for shortness of breath and wheezing.    Cardiovascular:  Negative for chest pain and palpitations.   Gastrointestinal:  Negative for abdominal pain and nausea.   Genitourinary:  Negative for frequency and urgency.   Neurological:  Negative for light-headedness.       PAST MEDICAL HISTORY  Past Medical History:   Diagnosis Date    CAD (coronary artery disease)     H/O MI.    Family history of coronary artery disease     H/O echocardiogram 6/08 6/08 EF>55% TRACE MR, TR    H/O echocardiogram 4/15/15    EF 50-55% Mild left atrial enlargement. Normal LV systolic function. Trace MR and TR. Normal sized abdominal aorta.     History of cardiac catheterization 03/01/2017    Stenting of the LAD    History of cardiovascular stress test 6/06 6/08 10/10 7/12     7/12 LAD territory ISCHEMIA, EF 62%    History of exercise stress test 03/20/2017    treadmill 
  FOLIC ACID PO Take by mouth  Provider, MD Sonya       CareTeam (Including outside providers/suppliers regularly involved in providing care):   Patient Care Team:  Raza Fairchild DO as PCP - General (Family Medicine)  Raza Fairchild DO as PCP - Empaneled Provider  Bandar Yan MD as Consulting Physician (Cardiology)     Reviewed and updated this visit:  Allergies  Meds

## 2023-12-21 NOTE — PATIENT INSTRUCTIONS
Instructions  Your Care Instructions     Coronary artery disease, also called heart disease, occurs when a substance called plaque builds up in the vessels that supply oxygen-rich blood to your heart muscle. This can narrow the blood vessels and reduce blood flow. A heart attack happens when blood flow is completely blocked. A high-fat diet, smoking, and other factors increase the risk of heart disease.  Your doctor has found that you have a chance of having heart disease. You can do lots of things to keep your heart healthy. It may not be easy, but you can change your diet, exercise more, and quit smoking. These steps really work to lower your chance of heart disease.  Follow-up care is a key part of your treatment and safety. Be sure to make and go to all appointments, and call your doctor if you are having problems. It's also a good idea to know your test results and keep a list of the medicines you take.  How can you care for yourself at home?  Diet    Use less salt when you cook and eat. This helps lower your blood pressure. Taste food before salting. Add only a little salt when you think you need it. With time, your taste buds will adjust to less salt.     Eat fewer snack items, fast foods, canned soups, and other high-salt, high-fat, processed foods.     Read food labels and try to avoid saturated and trans fats. They increase your risk of heart disease by raising cholesterol levels.     Limit the amount of solid fat-butter, margarine, and shortening-you eat. Use olive, peanut, or canola oil when you cook. Bake, broil, and steam foods instead of frying them.     Eat a variety of fruit and vegetables every day. Dark green, deep orange, red, or yellow fruits and vegetables are especially good for you. Examples include spinach, carrots, peaches, and berries.     Foods high in fiber can reduce your cholesterol and provide important vitamins and minerals. High-fiber foods include whole-grain cereals and breads,

## 2024-01-02 ASSESSMENT — ENCOUNTER SYMPTOMS
NAUSEA: 0
SHORTNESS OF BREATH: 0
ABDOMINAL PAIN: 0
SORE THROAT: 0
WHEEZING: 0

## 2024-01-17 ENCOUNTER — OFFICE VISIT (OUTPATIENT)
Dept: BARIATRICS/WEIGHT MGMT | Age: 54
End: 2024-01-17

## 2024-01-17 VITALS — BODY MASS INDEX: 39.63 KG/M2 | HEIGHT: 72 IN | WEIGHT: 292.6 LBS

## 2024-01-17 DIAGNOSIS — E66.9 OBESITY, CLASS II, BMI 35-39.9: Primary | ICD-10-CM

## 2024-01-17 PROCEDURE — NBSRV NON-BILLABLE SERVICE: Performed by: SURGERY

## 2024-01-17 NOTE — PROGRESS NOTES
Outpatient Nutrition Counseling - Non-Surgical Weight Loss Program    REASON FOR VISIT: Initial Non-Surgical Program    Chief Complaint:    Chief Complaint   Patient presents with    Weight Management       SUBJECTIVE:  Pt here for initial nutrition consult in non-surgical wt loss program. His challenges include DM, hyperpotassemia, sedentary lifestyle (until recently-now going to gym), skipping meals and night time snacking.  The patient is a 53 y.o. male being seen for obesity, enrolled in Non-Surgical Weight Loss Program; Landry's, Height: 182.9 cm (6'), Weight - Scale: 132.7 kg (292 lb 9.6 oz), Current Body mass index is 39.68 kg/m².  patient's PCP is Raza Fairchild DO     Comorbid Conditions:  Significant diseases affecting this patient are   Past Medical History:   Diagnosis Date    CAD (coronary artery disease)     H/O MI.    Family history of coronary artery disease     H/O echocardiogram 6/08 6/08 EF>55% TRACE MR, TR    H/O echocardiogram 4/15/15    EF 50-55% Mild left atrial enlargement. Normal LV systolic function. Trace MR and TR. Normal sized abdominal aorta.     History of cardiac catheterization 03/01/2017    Stenting of the LAD    History of cardiovascular stress test 6/06 6/08 10/10 7/12     7/12 LAD territory ISCHEMIA, EF 62%    History of exercise stress test 03/20/2017    treadmill    History of myocardial infarction 4/2006    History of nuclear stress test 01/26/2017    lexiscan-scar,no ischemia,EF52%,apical hdyskinesis    History of PTCA 4/2006    STENT TO RCA    History of PTCA 7/12    STENT TO LAD, RCA    Hyperlipidemia     Hypertension 7/4/2012    Hypertriglyceridemia     Type II or unspecified type diabetes mellitus without mention of complication, not stated as uncontrolled    .    Review of Systems - Review of Systems  Otherwise per HPI.    Allergies:  No Known Allergies    Past Surgical History:  Past Surgical History:   Procedure Laterality Date    CORONARY ANGIOPLASTY WITH STENT

## 2024-01-26 ENCOUNTER — OFFICE VISIT (OUTPATIENT)
Dept: BARIATRICS/WEIGHT MGMT | Age: 54
End: 2024-01-26

## 2024-01-26 VITALS — HEIGHT: 72 IN | WEIGHT: 295.2 LBS | BODY MASS INDEX: 39.98 KG/M2

## 2024-01-26 DIAGNOSIS — E66.9 OBESITY, CLASS II, BMI 35-39.9: Primary | ICD-10-CM

## 2024-01-26 NOTE — PROGRESS NOTES
OutpatientNutrition Counseling - Non-Surgical Weight Loss Program    REASON FOR VISIT:    Chief Complaint:    Chief Complaint   Patient presents with    Weight Management         OBJECTIVE:  Physical Exam   Ht 1.829 m (6')   Wt 133.9 kg (295 lb 3.2 oz)   BMI 40.04 kg/m²                Patient gained 2.6lbs

## 2024-02-05 ENCOUNTER — HOSPITAL ENCOUNTER (OUTPATIENT)
Age: 54
Discharge: HOME | End: 2024-02-05
Payer: MEDICARE

## 2024-02-05 DIAGNOSIS — E11.59 TYPE 2 DIABETES MELLITUS WITH OTHER CIRCULATORY COMPLICATION, WITHOUT LONG-TERM CURRENT USE OF INSULIN (HCC): ICD-10-CM

## 2024-02-05 DIAGNOSIS — E87.5 HYPERPOTASSEMIA: ICD-10-CM

## 2024-02-05 DIAGNOSIS — R53.83 OTHER FATIGUE: ICD-10-CM

## 2024-02-05 DIAGNOSIS — N18.32 STAGE 3B CHRONIC KIDNEY DISEASE (HCC): ICD-10-CM

## 2024-02-05 DIAGNOSIS — E55.9 VITAMIN D DEFICIENCY: ICD-10-CM

## 2024-02-05 LAB
25(OH)D3 SERPL-MCNC: 16.57 NG/ML
25(OH)D3 SERPL-MCNC: 17.09 NG/ML
ANION GAP SERPL CALCULATED.3IONS-SCNC: 15 MMOL/L (ref 7–16)
BUN SERPL-MCNC: 67 MG/DL (ref 6–23)
CALCIUM SERPL-MCNC: 8.7 MG/DL (ref 8.3–10.6)
CHLORIDE BLD-SCNC: 100 MMOL/L (ref 99–110)
CO2: 21 MMOL/L (ref 21–32)
CREAT SERPL-MCNC: 4.8 MG/DL (ref 0.9–1.3)
CREATININE URINE: 75.7 MG/DL (ref 39–259)
FOLATE SERPL-MCNC: 13.1 NG/ML (ref 3.1–17.5)
GFR SERPL CREATININE-BSD FRML MDRD: 14 ML/MIN/1.73M2
GLUCOSE SERPL-MCNC: 101 MG/DL (ref 70–99)
POTASSIUM SERPL-SCNC: 4.6 MMOL/L (ref 3.5–5.1)
PROT/CREAT RATIO, UR: 3.2
SODIUM BLD-SCNC: 136 MMOL/L (ref 135–145)
URINE TOTAL PROTEIN: 243.3 MG/DL
VITAMIN B-12: 1073 PG/ML (ref 211–911)

## 2024-02-05 PROCEDURE — 84156 ASSAY OF PROTEIN URINE: CPT

## 2024-02-05 PROCEDURE — 82570 ASSAY OF URINE CREATININE: CPT

## 2024-02-05 PROCEDURE — 36415 COLL VENOUS BLD VENIPUNCTURE: CPT

## 2024-02-05 PROCEDURE — 80048 BASIC METABOLIC PNL TOTAL CA: CPT

## 2024-02-05 PROCEDURE — 82306 VITAMIN D 25 HYDROXY: CPT

## 2024-02-05 PROCEDURE — 82607 VITAMIN B-12: CPT

## 2024-02-05 PROCEDURE — 82746 ASSAY OF FOLIC ACID SERUM: CPT

## 2024-02-06 PROBLEM — N04.9 NEPHROTIC SYNDROME: Status: ACTIVE | Noted: 2024-02-06

## 2024-02-06 PROBLEM — N18.4 CHRONIC KIDNEY DISEASE, STAGE IV (SEVERE) (HCC): Status: ACTIVE | Noted: 2024-02-06

## 2024-02-08 DIAGNOSIS — E55.9 VITAMIN D DEFICIENCY: Primary | ICD-10-CM

## 2024-02-08 DIAGNOSIS — I25.10 CORONARY ARTERY DISEASE INVOLVING NATIVE CORONARY ARTERY OF NATIVE HEART WITHOUT ANGINA PECTORIS: ICD-10-CM

## 2024-02-08 RX ORDER — CHOLECALCIFEROL (VITAMIN D3) 125 MCG
1 CAPSULE ORAL DAILY
Qty: 90 CAPSULE | Refills: 1 | Status: SHIPPED | OUTPATIENT
Start: 2024-02-08

## 2024-02-08 RX ORDER — NITROGLYCERIN 0.4 MG/1
0.4 TABLET SUBLINGUAL EVERY 5 MIN PRN
Qty: 10 TABLET | Refills: 0 | Status: SHIPPED | OUTPATIENT
Start: 2024-02-08 | End: 2024-05-08

## 2024-02-08 NOTE — RESULT ENCOUNTER NOTE
Patient has low vitamin D. Likely due to his kidney disease I would recommend we start him on supplementation 5000 IU daily. O/w labs ok chronic kidney disease he is already aware of and a little worse than prior

## 2024-02-16 ENCOUNTER — OFFICE VISIT (OUTPATIENT)
Dept: BARIATRICS/WEIGHT MGMT | Age: 54
End: 2024-02-16

## 2024-02-16 VITALS — HEIGHT: 72 IN | WEIGHT: 282.6 LBS | BODY MASS INDEX: 38.28 KG/M2

## 2024-02-16 DIAGNOSIS — E66.9 OBESITY, CLASS II, BMI 35-39.9: Primary | ICD-10-CM

## 2024-02-16 NOTE — PROGRESS NOTES
OutpatientNutrition Counseling - Non-Surgical Weight Loss Program    REASON FOR VISIT:    Chief Complaint:    Chief Complaint   Patient presents with    Weight Management     Weigh In         OBJECTIVE:  Physical Exam   Ht 1.829 m (6')   Wt 128.2 kg (282 lb 9.6 oz)   BMI 38.33 kg/m²          Landry lost 12.6 lbs since 1/26/24.

## 2024-03-01 ENCOUNTER — OFFICE VISIT (OUTPATIENT)
Dept: BARIATRICS/WEIGHT MGMT | Age: 54
End: 2024-03-01

## 2024-03-01 VITALS — WEIGHT: 277.2 LBS | HEIGHT: 72 IN | BODY MASS INDEX: 37.55 KG/M2

## 2024-03-01 DIAGNOSIS — E66.9 OBESITY, CLASS II, BMI 35-39.9: Primary | ICD-10-CM

## 2024-03-01 NOTE — PROGRESS NOTES
Outpatient[facility]utrition Counseling - Non-Surgical Weight Loss Program    REASON FOR VISIT:    Chief Complaint:    Chief Complaint   Patient presents with    Weight Management     Weigh In         OBJECTIVE:  Physical Exam   Ht 1.829 m (6')   Wt 125.7 kg (277 lb 3.2 oz)   BMI 37.60 kg/m²            Landry lost 5.4 lbs in last two weeks.

## 2024-03-08 ENCOUNTER — OFFICE VISIT (OUTPATIENT)
Dept: BARIATRICS/WEIGHT MGMT | Age: 54
End: 2024-03-08

## 2024-03-08 VITALS — BODY MASS INDEX: 36.87 KG/M2 | WEIGHT: 272.2 LBS | HEIGHT: 72 IN

## 2024-03-08 DIAGNOSIS — E66.9 OBESITY, CLASS II, BMI 35-39.9: Primary | ICD-10-CM

## 2024-03-08 NOTE — PROGRESS NOTES
Outpatient[facility]rition Counseling - Non-Surgical Weight Loss Program    REASON FOR VISIT:    Chief Complaint:    Chief Complaint   Patient presents with    Weight Management         OBJECTIVE:  Physical Exam   Ht 1.829 m (6')   Wt 123.5 kg (272 lb 3.2 oz)   BMI 36.92 kg/m²                Patient lost 5.0lbs

## 2024-03-18 ENCOUNTER — HOSPITAL ENCOUNTER (OUTPATIENT)
Age: 54
Discharge: HOME | End: 2024-03-18
Payer: MEDICARE

## 2024-03-18 DIAGNOSIS — I15.9 HYPERTENSION, SECONDARY: ICD-10-CM

## 2024-03-18 DIAGNOSIS — Q60.2 RENAL AGENESIS: ICD-10-CM

## 2024-03-18 DIAGNOSIS — R80.1 PERSISTENT PROTEINURIA: ICD-10-CM

## 2024-03-18 DIAGNOSIS — N17.9 ACUTE RENAL FAILURE, UNSPECIFIED ACUTE RENAL FAILURE TYPE (HCC): ICD-10-CM

## 2024-03-18 DIAGNOSIS — E87.5 HYPERPOTASSEMIA: ICD-10-CM

## 2024-03-18 DIAGNOSIS — E11.21 DIABETIC NEPHROPATHY ASSOCIATED WITH TYPE 2 DIABETES MELLITUS (HCC): ICD-10-CM

## 2024-03-18 LAB
ANION GAP SERPL CALCULATED.3IONS-SCNC: 0 MMOL/L (ref 7–16)
BUN SERPL-MCNC: 66 MG/DL (ref 6–23)
CALCIUM SERPL-MCNC: 8.8 MG/DL (ref 8.3–10.6)
CHLORIDE BLD-SCNC: 109 MMOL/L (ref 99–110)
CO2: 17 MMOL/L (ref 21–32)
CREAT SERPL-MCNC: 4.3 MG/DL (ref 0.9–1.3)
CREATININE URINE: 66 MG/DL (ref 39–259)
GFR SERPL CREATININE-BSD FRML MDRD: 16 ML/MIN/1.73M2
GLUCOSE SERPL-MCNC: 96 MG/DL (ref 70–99)
POTASSIUM SERPL-SCNC: 4 MMOL/L (ref 3.5–5.1)
PROT/CREAT RATIO, UR: 3.7
SODIUM BLD-SCNC: 126 MMOL/L (ref 135–145)
URINE TOTAL PROTEIN: 242.4 MG/DL

## 2024-03-18 PROCEDURE — 36415 COLL VENOUS BLD VENIPUNCTURE: CPT

## 2024-03-18 PROCEDURE — 80048 BASIC METABOLIC PNL TOTAL CA: CPT

## 2024-03-18 PROCEDURE — 82570 ASSAY OF URINE CREATININE: CPT

## 2024-03-18 PROCEDURE — 84156 ASSAY OF PROTEIN URINE: CPT

## 2024-03-21 ENCOUNTER — OFFICE VISIT (OUTPATIENT)
Dept: FAMILY MEDICINE CLINIC | Age: 54
End: 2024-03-21
Payer: MEDICARE

## 2024-03-21 VITALS
SYSTOLIC BLOOD PRESSURE: 122 MMHG | HEIGHT: 72 IN | BODY MASS INDEX: 37.25 KG/M2 | OXYGEN SATURATION: 99 % | DIASTOLIC BLOOD PRESSURE: 78 MMHG | HEART RATE: 53 BPM | WEIGHT: 275 LBS

## 2024-03-21 DIAGNOSIS — I25.10 CORONARY ARTERY DISEASE INVOLVING NATIVE CORONARY ARTERY OF NATIVE HEART WITHOUT ANGINA PECTORIS: ICD-10-CM

## 2024-03-21 DIAGNOSIS — E11.59 TYPE 2 DIABETES MELLITUS WITH OTHER CIRCULATORY COMPLICATION, WITHOUT LONG-TERM CURRENT USE OF INSULIN (HCC): Primary | ICD-10-CM

## 2024-03-21 DIAGNOSIS — I10 ESSENTIAL HYPERTENSION: ICD-10-CM

## 2024-03-21 DIAGNOSIS — K21.9 GASTROESOPHAGEAL REFLUX DISEASE, UNSPECIFIED WHETHER ESOPHAGITIS PRESENT: ICD-10-CM

## 2024-03-21 DIAGNOSIS — E11.21 DIABETIC NEPHROPATHY ASSOCIATED WITH TYPE 2 DIABETES MELLITUS (HCC): ICD-10-CM

## 2024-03-21 LAB — HBA1C MFR BLD: 6.3 %

## 2024-03-21 PROCEDURE — 1036F TOBACCO NON-USER: CPT | Performed by: STUDENT IN AN ORGANIZED HEALTH CARE EDUCATION/TRAINING PROGRAM

## 2024-03-21 PROCEDURE — 3017F COLORECTAL CA SCREEN DOC REV: CPT | Performed by: STUDENT IN AN ORGANIZED HEALTH CARE EDUCATION/TRAINING PROGRAM

## 2024-03-21 PROCEDURE — 3044F HG A1C LEVEL LT 7.0%: CPT | Performed by: STUDENT IN AN ORGANIZED HEALTH CARE EDUCATION/TRAINING PROGRAM

## 2024-03-21 PROCEDURE — 2022F DILAT RTA XM EVC RTNOPTHY: CPT | Performed by: STUDENT IN AN ORGANIZED HEALTH CARE EDUCATION/TRAINING PROGRAM

## 2024-03-21 PROCEDURE — 83036 HEMOGLOBIN GLYCOSYLATED A1C: CPT | Performed by: STUDENT IN AN ORGANIZED HEALTH CARE EDUCATION/TRAINING PROGRAM

## 2024-03-21 PROCEDURE — 3078F DIAST BP <80 MM HG: CPT | Performed by: STUDENT IN AN ORGANIZED HEALTH CARE EDUCATION/TRAINING PROGRAM

## 2024-03-21 PROCEDURE — 3074F SYST BP LT 130 MM HG: CPT | Performed by: STUDENT IN AN ORGANIZED HEALTH CARE EDUCATION/TRAINING PROGRAM

## 2024-03-21 PROCEDURE — G8427 DOCREV CUR MEDS BY ELIG CLIN: HCPCS | Performed by: STUDENT IN AN ORGANIZED HEALTH CARE EDUCATION/TRAINING PROGRAM

## 2024-03-21 PROCEDURE — 99214 OFFICE O/P EST MOD 30 MIN: CPT | Performed by: STUDENT IN AN ORGANIZED HEALTH CARE EDUCATION/TRAINING PROGRAM

## 2024-03-21 PROCEDURE — G8417 CALC BMI ABV UP PARAM F/U: HCPCS | Performed by: STUDENT IN AN ORGANIZED HEALTH CARE EDUCATION/TRAINING PROGRAM

## 2024-03-21 PROCEDURE — G8482 FLU IMMUNIZE ORDER/ADMIN: HCPCS | Performed by: STUDENT IN AN ORGANIZED HEALTH CARE EDUCATION/TRAINING PROGRAM

## 2024-03-21 RX ORDER — INSULIN DEGLUDEC 200 U/ML
10 INJECTION, SOLUTION SUBCUTANEOUS NIGHTLY
Qty: 15 ML | Refills: 1
Start: 2024-03-21 | End: 2024-09-17

## 2024-03-21 RX ORDER — ATORVASTATIN CALCIUM 40 MG/1
40 TABLET, FILM COATED ORAL DAILY
Qty: 90 TABLET | Refills: 1 | Status: SHIPPED | OUTPATIENT
Start: 2024-03-21

## 2024-03-21 RX ORDER — FAMOTIDINE 10 MG
10 TABLET ORAL DAILY PRN
Qty: 15 TABLET | Refills: 0 | Status: SHIPPED | OUTPATIENT
Start: 2024-03-21 | End: 2024-04-05

## 2024-03-21 ASSESSMENT — PATIENT HEALTH QUESTIONNAIRE - PHQ9
SUM OF ALL RESPONSES TO PHQ9 QUESTIONS 1 & 2: 0
SUM OF ALL RESPONSES TO PHQ QUESTIONS 1-9: 0
2. FEELING DOWN, DEPRESSED OR HOPELESS: NOT AT ALL
1. LITTLE INTEREST OR PLEASURE IN DOING THINGS: NOT AT ALL
SUM OF ALL RESPONSES TO PHQ QUESTIONS 1-9: 0

## 2024-03-21 NOTE — PROGRESS NOTES
4/2/2024    Landry Morley    Chief Complaint   Patient presents with    3 Month Follow-Up     - no complaints  - pt states he is taking tresiba prn per blood sugar levels. Pt would like to discuss this med       HPI  History was obtained from patient.  Landry is a 53 y.o. male with a PMHx as listed below who presents today for follow up on chronic conditions.     Admits to some hypoglycemia with 20U glucose  Following with [facility].   1. Type 2 diabetes mellitus with other circulatory complication, without long-term current use of insulin (HCC)    2. Coronary artery disease involving native coronary artery of native heart without angina pectoris    3. Essential hypertension    4. Diabetic nephropathy associated with type 2 diabetes mellitus (HCC)    5. Gastroesophageal reflux disease, unspecified whether esophagitis present             REVIEW OF SYMPTOMS    Review of Systems   Constitutional:  Negative for chills and fatigue.   HENT:  Negative for congestion and sore throat.    Respiratory:  Negative for shortness of breath and wheezing.    Cardiovascular:  Negative for chest pain and palpitations.   Gastrointestinal:  Negative for abdominal pain and nausea.   Genitourinary:  Negative for frequency and urgency.   Neurological:  Negative for light-headedness.       PAST MEDICAL HISTORY  Past Medical History:   Diagnosis Date    CAD (coronary artery disease)     H/O MI.    Family history of coronary artery disease     H/O echocardiogram 6/08 6/08 EF>55% TRACE MR, TR    H/O echocardiogram 4/15/15    EF 50-55% Mild left atrial enlargement. Normal LV systolic function. Trace MR and TR. Normal sized abdominal aorta.     History of cardiac catheterization 03/01/2017    Stenting of the LAD    History of cardiovascular stress test 6/06 6/08 10/10 7/12     7/12 LAD territory ISCHEMIA, EF 62%    History of exercise stress test 03/20/2017    treadmill    History of myocardial infarction 4/2006    History of nuclear

## 2024-03-25 DIAGNOSIS — I25.10 CORONARY ARTERY DISEASE INVOLVING NATIVE CORONARY ARTERY OF NATIVE HEART WITHOUT ANGINA PECTORIS: ICD-10-CM

## 2024-03-25 RX ORDER — PEN NEEDLE, DIABETIC 31 GX3/16"
NEEDLE, DISPOSABLE MISCELLANEOUS
Qty: 100 EACH | Refills: 5 | Status: SHIPPED | OUTPATIENT
Start: 2024-03-25

## 2024-03-25 RX ORDER — NITROGLYCERIN 0.4 MG/1
TABLET SUBLINGUAL
Qty: 25 TABLET | Refills: 0 | Status: SHIPPED | OUTPATIENT
Start: 2024-03-25

## 2024-03-26 ENCOUNTER — HOSPITAL ENCOUNTER (OUTPATIENT)
Age: 54
Discharge: HOME | End: 2024-03-26
Payer: MEDICARE

## 2024-03-26 DIAGNOSIS — Q60.2 RENAL AGENESIS: ICD-10-CM

## 2024-03-26 DIAGNOSIS — N18.4 CHRONIC KIDNEY DISEASE, STAGE IV (SEVERE) (HCC): ICD-10-CM

## 2024-03-26 DIAGNOSIS — E88.810 METABOLIC SYNDROME: ICD-10-CM

## 2024-03-26 DIAGNOSIS — N17.0 ACUTE RENAL FAILURE WITH TUBULAR NECROSIS (HCC): ICD-10-CM

## 2024-03-26 DIAGNOSIS — N04.9 NEPHROTIC SYNDROME: ICD-10-CM

## 2024-03-26 LAB
ANION GAP SERPL CALCULATED.3IONS-SCNC: 15 MMOL/L (ref 7–16)
BUN SERPL-MCNC: 68 MG/DL (ref 6–23)
CALCIUM SERPL-MCNC: 9 MG/DL (ref 8.3–10.6)
CHLORIDE BLD-SCNC: 101 MMOL/L (ref 99–110)
CO2: 19 MMOL/L (ref 21–32)
CREAT SERPL-MCNC: 5 MG/DL (ref 0.9–1.3)
GFR SERPL CREATININE-BSD FRML MDRD: 13 ML/MIN/1.73M2
GLUCOSE SERPL-MCNC: 95 MG/DL (ref 70–99)
POTASSIUM SERPL-SCNC: 4.9 MMOL/L (ref 3.5–5.1)
SODIUM BLD-SCNC: 135 MMOL/L (ref 135–145)

## 2024-03-26 PROCEDURE — 36415 COLL VENOUS BLD VENIPUNCTURE: CPT

## 2024-03-26 PROCEDURE — 80048 BASIC METABOLIC PNL TOTAL CA: CPT

## 2024-04-02 ASSESSMENT — ENCOUNTER SYMPTOMS
NAUSEA: 0
WHEEZING: 0
SORE THROAT: 0
SHORTNESS OF BREATH: 0
ABDOMINAL PAIN: 0

## 2024-04-16 DIAGNOSIS — K21.9 GASTROESOPHAGEAL REFLUX DISEASE, UNSPECIFIED WHETHER ESOPHAGITIS PRESENT: ICD-10-CM

## 2024-04-16 NOTE — TELEPHONE ENCOUNTER
Patient: Tin Maher Date: 2022  : 1951 Attending: Cris Quintero DO  70 year old male       Chief Complaint:  VF Cardiac arrest  LVEF: 65%  Primary Cardiologist: Tony Jones DO        - Remains intubated on multiple sedation medications.  On NO now.  Vent settings still high. Some AF - rate ok.       Vitals Last Value 24 Hour Range  Temperature 96.6 °F (35.9 °C) Temp  Min: 96.6 °F (35.9 °C)  Max: 98.4 °F (36.9 °C)  Pulse 70 Pulse  Min: 70  Max: 94  Respiratory (!) 26 Resp  Min: 0  Max: 26  Blood Pressure 139/49 BP  Min: 127/40  Max: 144/45  Arterial /45 Arterial Line BP  Min: 110/43   Min taken time: 22 0300  Max: 234/159   Max taken time: 22 1100  Pulse Oximetry 96 % SpO2  Min: 84 %  Max: 100 %    Vitals Today Admission  Weight 89.7 kg (197 lb 12 oz) Weight: 80.6 kg (177 lb 11.1 oz)    Weight over the past 48 Hours:  Patient Vitals for the past 48 hrs:   Weight   22 0334 89.7 kg (197 lb 12 oz)        Intake/Output:    I/O last 3 completed shifts:  In: 4734.7 [I.V.:1834.9; Blood:386; NG/GT:1693; IV Piggyback:820.9]  Out: 2721 [Urine:2645; Chest Tube:76]      Intake/Output Summary (Last 24 hours) at 2022 0935  Last data filed at 2022 0900  Gross per 24 hour   Intake 4589.04 ml   Output 2676 ml   Net 1913.04 ml       Rhythm: SR 70s - some flutter overnight - some jenny, more likely from PAC's    Medications/Infusions:  Scheduled:    methylPREDNISolone (SOLU-Medrol) IV  40 mg Intravenous 2 times per day    albumin human 25%  25 g Intravenous BID    sodium chloride (PF)  2 mL Intracatheter 2 times per day    insulin lispro   Subcutaneous 4 times per day    metoPROLOL tartrate  25 mg Oral 2 times per day    chlorhexidine gluconate  15 mL Swish & Spit 2 times per day    pantoprazole  40 mg Per NG tube Daily    ophthalmic gel   Both Eyes 6 times per day    cefepime (MAXIPIME) IVPB  1,000 mg Intravenous 2 times per day    atorvastatin  80 mg Oral  Would patient like a 3 month supply?  Daily    aspirin  81 mg Oral Daily    Potassium Standard Replacement Protocol   Does not apply See Admin Instructions    docusate sodium-sennosides  1 tablet Oral BID    polyethylene glycol  17 g Oral Daily        Physical Exam:   General : sedated, intubated, agitated  Heart: -regular rate and rhythm  Lungs: coarse rales, intubated  Abdomen:  soft, nondistended, line remains in R groin  Extremities: extremities normal, atraumatic, no cyanosis or edema  Pulses: +1 Bilateral Dorsalis Pedis  Neurological: Arousable, sedated, agitated    Laboratory Results:    Recent Labs   Lab 05/31/22  0438 05/31/22  0006 05/30/22  2103 05/30/22  1744 05/30/22  1102 05/30/22  0632 05/29/22  1907 05/29/22  0626   WBC 17.1*  --   --   --   --  24.9*  --  21.5*   HCT 21.9*  --   --  23.2*  --  21.8*  --  22.8*   HGB 6.7*  --   --  7.0*  --  6.5*  --  7.1*   PLT 51*  --   --  47*  --   --   --  44*   PTT 63*  --   --   --  61* 58*   < >  --    SODIUM 147* 147* 146*  --   --  143  --   --    POTASSIUM 5.5* 5.9* 6.1* 6.7*  --  6.1*   < >  --    CHLORIDE 113* 113* 112*  --   --  111*  --   --    CO2 31 33* 33*  --   --  30  --   --    GLUCOSE 241* 220* 227*  --   --  139*  --   --    BUN 96* 97* 94*  --   --  91*  --   --    CREATININE 2.06* 2.01* 1.97*  --   --  1.77*  --   --     < > = values in this interval not displayed.         Imaging:  XR CHEST PA OR AP 1 VIEW   Final Result by Larissa Webster DO (05/31 0711)   1.    No significant interval change to bilateral pulmonary infiltrates and   left-sided pleural effusion.      Electronically Signed by: LARISSA WEBSTER D.O.    Signed on: 5/31/2022 7:11 AM          XR CHEST PA OR AP 1 VIEW   Final Result by Maggie Shukla MD (05/30 0845)    No significant interval change.      Electronically Signed by: MAGGIE SHUKLA M.D.    Signed on: 5/30/2022 8:45 AM          XR CHEST PA OR AP 1 VIEW   Final Result by Sanjeev Maldonado MD (05/29 0704)   Grossly stable pulmonary opacities.      Electronically  Signed by: NEELA BERNARD M.D.    Signed on: 5/29/2022 7:04 AM          XR CHEST PA OR AP 1 VIEW   Final Result by Maggie Shukla MD (05/28 2012)   1.  Right chest tube in stable position without evidence of pneumothorax.   2.  Extensive, diffuse bilateral pulmonary infiltrates.  Small left pleural   effusion.      Electronically Signed by: MAGGIE SHUKLA M.D.    Signed on: 5/28/2022 11:34 AM          CT CHEST WO CONTRAST   Final Result by Maggie Shukla MD (05/28 6705)   1.  Extensive, diffuse bilateral patchy groundglass and airspace opacities   throughout the lungs compatible with pneumonia.     2.  Small left pleural effusion.     3.  Right chest tube in place without evidence of pneumothorax.     4.  Multiple bilateral rib fractures.      Electronically Signed by: MAGGIE SHUKLA M.D.    Signed on: 5/28/2022 7:45 AM          XR CHEST PA OR AP 1 VIEW   Final Result by Bucky Graham MD (05/27 7557)   1. Adequate positioning of the left PICC line.   2. Diffuse airspace and interstitial parenchymal opacities as described.   Findings compatible with pneumonia versus edema.      Electronically Signed by: BUCKY GRAHAM M.D.    Signed on: 5/27/2022 1:24 PM          XR CHEST PA OR AP 1 VIEW   Final Result by Francisco Gray MD (05/26 2021)      1.  Redemonstration of diffuse interstitial and alveolar opacities   throughout both lungs which appear slightly more confluent at the   peripheral right lung base, which may be on the basis of pneumonia or   edema.   2.  Suspected small pleural effusions   3.  Stable positioning of the lines and tubes.            Electronically Signed by: FRANCISCO GRAY M.D.    Signed on: 5/26/2022 8:21 PM          XR CHEST PA OR AP 1 VIEW   Final Result by Bucky Graham MD (05/26 7334)   1.  Overall no significant change.  Diffuse airspace parenchymal opacities   compatible with pneumonia versus edema.   2.  No pneumothorax.      Electronically Signed by: BUCKY GRAHAM M.D.    Signed on: 5/26/2022 12:31  PM          XR CHEST PA OR AP 1 VIEW   Final Result by Sosa Wells MD (05/26 9327)   1.  Slight interval increase in diffuse bilateral pulmonary infiltrates.      Electronically Signed by: SOSA WELLS M.D.    Signed on: 5/26/2022 9:35 AM          XR CHEST PA OR AP 1 VIEW   Final Result by Geovanny Foster MD (05/25 4097)   FINDINGS/IMPRESSION:        Interval placement of endotracheal tube, with its tip at the level of the   clavicles.      Nasogastric tube extends into the stomach.      Stable right-sided central venous catheter and right-sided chest tube.      Stable diffuse bilateral pulmonary opacities.  No new infiltrate.      Stable cardiac size.  No pneumothorax.      Electronically Signed by: GEOVANNY FOSTER M.D.    Signed on: 5/25/2022 10:51 AM          XR CHEST PA OR AP 1 VIEW   Final Result by Geovanny Foster MD (05/25 6423)   FINDINGS/IMPRESSION:        Stable right-sided central venous catheter and right-sided chest tube.      Stable extensive diffuse bilateral pulmonary opacities.  No new infiltrate   or pleural effusion.      Stable cardiac size.  No pneumothorax.      Electronically Signed by: GEOVANNY FOSTER M.D.    Signed on: 5/25/2022 7:49 AM          XR CHEST PA OR AP 1 VIEW   Final Result by Julieta Small MD (05/24 4042)   1.  Stable diffuse bilateral interstitial opacities.      Electronically Signed by: JULIETA SMALL M.D.    Signed on: 5/24/2022 9:33 PM          XR CHEST PA OR AP 1 VIEW   Final Result by Jamil Lyles MD (05/24 1877)   1.    Status post extubation and nasogastric tube removal.   2.    Cardiomegaly and pulmonary vascular congestion with diffuse   interstitial pulmonary edema, similar.   3.    Slight improved left pleural effusion and adjacent airspace opacity.      Electronically Signed by: JAMIL LYLES M.D.    Signed on: 5/24/2022 8:10 AM          XR CHEST PA OR AP 1 VIEW   Final Result by Yani Hart MD (05/22 3606)    1.  Persistent bilateral opacities with slight improved aeration.  Small   left effusion.      *The absence of findings should not deter follow-up or further evaluation   of concerning clinical findings.    *Please note that each modality type (e.g. x-ray, ultrasound, CT, MRI,   nuclear medicine) has specific optimal value for assessing specific   clinical questions.  In addition, each modality type has limitations.  If   you have any questions, feel free to contact me.      FOR PHYSICIAN USE ONLY: Please note that this report was generated using   voice recognition software.  If you require clarification or feel that   there is an error in this report, please contact me.      Electronically Signed by: NEAL SEXTON M.D.    Signed on: 5/22/2022 12:01 PM          XR CHEST PA OR AP 1 VIEW   Final Result by Geovanny Foster MD (05/21 0713)   FINDINGS/IMPRESSION:        Stable endotracheal tube, nasogastric tube and right-sided central venous   catheter, as well as right-sided chest tube.        Stable bilateral pulmonary opacities.  No new infiltrate.  Stable   right-sided rib fractures.         Stable blunting of costophrenic angles, suggesting small pleural   effusions.  No pneumothorax.      Electronically Signed by: GEOVANNY FOSTER M.D.    Signed on: 5/21/2022 7:13 AM          XR CHEST PA OR AP 1 VIEW   Final Result by Larissa Webster DO (05/20 6595)   1.    Interval placement of a right-sided chest tube.  Prior pneumothorax   is not seen.    2.    Bilateral pulmonary opacities appear less dense.  Small volume   pleural fluid or thickening blunts the costophrenic angles.      Electronically Signed by: LARISSA WEBSTER D.O.    Signed on: 5/20/2022 9:27 AM          XR CHEST PA OR AP 1 VIEW   Final Result by Larissa Webster DO (05/20 3079)   1.    No significant change to the extensive bilateral pulmonary opacities.    Possible small volume left pleural fluid or thickening.    2.    Size increased right  apical pneumothorax.  Interval pleural   separation is now 1.6 cm.  ICU nurse Eve notified by telephone 05/28/2022   at 0704 hours.      Electronically Signed by: SONALI WEBSTER D.O.    Signed on: 5/20/2022 7:04 AM          US KIDNEY BILATERAL   Final Result by Sosa Wells MD (05/19 9020)   1.    No evidence of hydronephrosis.    2.    Small right renal cyst.       Electronically Signed by: SOSA WELLS M.D.    Signed on: 5/19/2022 4:48 PM          XR CHEST PA OR AP 1 VIEW   Final Result by Duglas Hernandez MD (05/19 1612)   1.    Extensive bilateral pulmonary infiltrates without significant change.   2.    Right-sided thoracic vent tube with tiny right apical pneumothorax.      Electronically Signed by: DUGLAS HERNANDEZ M.D.    Signed on: 5/19/2022 4:12 PM          XR ABDOMEN 1 VIEW   Final Result by Duglas Hernandez MD (05/19 7343)   1.    Nonspecific appearance abdomen without acute obstruction or mass.   2.    Mild gaseous distention stomach.      Electronically Signed by: DGULAS HERNANDEZ M.D.    Signed on: 5/19/2022 4:14 PM          XR CHEST PA OR AP 1 VIEW   Final Result by Dario Sanchez MD (05/19 3493)   1. Diffuse bilateral infiltrates without change.   2. Cardiomegaly.      Electronically Signed by: DARIO SANCHEZ JR, M.D.    Signed on: 5/19/2022 9:03 AM          XR CHEST PA OR AP 1 VIEW   Final Result by Maggie Shukla MD (05/18 8609)   1.  Worsening, extensive, diffuse bilateral airspace opacities.   2.  Suspected trace bilateral pleural effusions.   3.  Stable support tubes and catheters.  No evidence of pneumothorax.      Electronically Signed by: MAGGIE SHUKLA M.D.    Signed on: 5/18/2022 10:48 PM          XR CHEST PA OR AP 1 VIEW   Final Result by Duglas Hernandez MD (05/18 6825)   1.    Extensive bilateral coarse interstitial and airspace infiltrates show   interval slight worsening in the lung bases.   2.    Left lower lung consolidation with air bronchograms slightly   increased.   3.    Right-sided thoracic  vent tube seen without significant pneumothorax.      Electronically Signed by: MELVIN CARTAGENA M.D.    Signed on: 5/18/2022 7:35 AM          Cath/PV Case   Final Result by Garth Gudino MD (05/17 1847)      CT HEAD WO CONTRAST   Final Result by Sosa Wells MD (05/17 1721)   1.    No acute intracranial abnormalities.      Electronically Signed by: SOSA WELLS M.D.    Signed on: 5/17/2022 5:21 PM          XR CHEST PA OR AP 1 VIEW   Final Result by Neela Bernard MD (05/17 1637)   Interval placement of right-sided thoravent catheter with   resolution of previously noted right-sided pneumothorax.  Extensive   bilateral pulmonary opacities again noted.      Electronically Signed by: NEELA BERNARD M.D.    Signed on: 5/17/2022 4:37 PM          XR CHEST PA OR AP 1 VIEW   Final Result by Neela Bernard MD (05/17 1616)   1.  Supportive devices described.   2.  Interval enlargement of right-sided pneumothorax.   3.  Interval increased bilateral pulmonary opacities.      Electronically Signed by: NEELA BERNARD M.D.    Signed on: 5/17/2022 4:16 PM          XR CHEST PA OR AP 1 VIEW   Final Result by Larissa Webster DO (05/17 1701)   1.    Endotracheal tube terminates approximately 5.4 cm above the grace.    2.    Small right pneumothorax.   3.    Displaced fracture right ribs 6 and 7.   4.    Extensive bilateral mixed interstitial and airspace pulmonary   opacities.  Edema versus infectious or inflammatory.         Comment: Dr. Poe notified by telephone 05/17/2022 at 1452 hours.      Electronically Signed by: LARISSA WEBSTER D.O.    Signed on: 5/17/2022 2:52 PM          US VASC LOWER EXTREMITY VENOUS DUPLEX BILATERAL    (Results Pending)   Echo:  STUDY CONCLUSIONS  SUMMARY:     1. Left ventricle: The cavity size is normal. Wall thickness is normal.     The ejection fraction was measured by biplane method of disks. The     ejection fraction is 60%.  2. Right ventricle: The cavity size is normal. Wall thickness is  normal.     Systolic function is normal.  3. Pulmonary arteries: The peak pressure during systole by Doppler is 16mm     Hg.  4. Very limited study despite contrast usage.    Assessment:   70 year old s/p VF arrest on  5/17 - LAD stent patent (placed many years ago), EF 60%    Anoxic encephalopathy - much improved - awake and alert    resp failure - extubated 5/23 - better, on High flow - > re -intubated 5/25    SHOCK  - off pressors - better    A fib  Off IV AMIO --> NSR  / A fib as well - off amio  - ? Toxicity less likely     Premature atrial beats  Noted on tele    Thrombocytopenia noted - heparin and aspirin on hold  HIT panel pending    KAYKAY with CKD on BUMEX    History of coronary artery disease and stent 10 years ago    Right-sided pneumothorax due to broken ribs from CPR        Plan:    Vent management per intensivist    Re-intubated  due to pulmonary edema  Lasix gtt started    Concern for acute amiodarone toxicity - Amio discontinued - Metoprolol scheduled doses ordered  Wean pressors as tolerated  Dr. Jones d/w Dr. Bush - possibly Sotalol in a few days.   Some jenny - some pauses, up to 2-3sec , will monitor  Hold Metoprolol per parameters  Add low dose sotalol  6/1   IN SR today    ICD placement before discharge when more stable. VF arrest prior to arrival  Consider dual chamber device for intermittent complete heart block AV dissociation  Another brief episode noted overnight 5/29 of CHB, jenny to 30s. No jenny overnight 5/30    Cardiac cath with patent LAD stent, normal circumflex system, mild PDA smooth stenosis.  Medical management - no intervention necessary    Echo shows LVEF of 60%    Meds per NGT for HR/BP - HR SR 80s with some PAF, rate up to 110s.       Will follow. Dicussed with CCR[facility] bedside. CPM from CV perspective.   Family updated 5/31 at bedside    ANDRÉS Alcaraz / Tony Jones, DO

## 2024-04-18 RX ORDER — FAMOTIDINE 10 MG/1
10 TABLET ORAL DAILY PRN
Qty: 90 TABLET | Refills: 0 | Status: SHIPPED | OUTPATIENT
Start: 2024-04-18

## 2024-05-20 ENCOUNTER — HOSPITAL ENCOUNTER (OUTPATIENT)
Age: 54
Discharge: HOME | End: 2024-05-20
Payer: MEDICARE

## 2024-05-20 DIAGNOSIS — I15.9 HYPERTENSION, SECONDARY: ICD-10-CM

## 2024-05-20 DIAGNOSIS — N18.4 CHRONIC KIDNEY DISEASE, STAGE IV (SEVERE) (HCC): ICD-10-CM

## 2024-05-20 LAB
ANION GAP SERPL CALCULATED.3IONS-SCNC: 12 MMOL/L (ref 7–16)
BUN SERPL-MCNC: 71 MG/DL (ref 6–23)
CALCIUM SERPL-MCNC: 8.3 MG/DL (ref 8.3–10.6)
CHLORIDE BLD-SCNC: 103 MMOL/L (ref 99–110)
CO2: 21 MMOL/L (ref 21–32)
CREAT SERPL-MCNC: 5.3 MG/DL (ref 0.9–1.3)
GFR, ESTIMATED: 12 ML/MIN/1.73M2
GLUCOSE SERPL-MCNC: 95 MG/DL (ref 70–99)
POTASSIUM SERPL-SCNC: 5.1 MMOL/L (ref 3.5–5.1)
SODIUM BLD-SCNC: 136 MMOL/L (ref 135–145)

## 2024-05-20 PROCEDURE — 36415 COLL VENOUS BLD VENIPUNCTURE: CPT

## 2024-05-20 PROCEDURE — 80048 BASIC METABOLIC PNL TOTAL CA: CPT

## 2024-05-22 DIAGNOSIS — I25.10 CORONARY ARTERY DISEASE INVOLVING NATIVE CORONARY ARTERY OF NATIVE HEART WITHOUT ANGINA PECTORIS: ICD-10-CM

## 2024-05-23 RX ORDER — NITROGLYCERIN 0.4 MG/1
TABLET SUBLINGUAL
Qty: 25 TABLET | Refills: 0 | Status: SHIPPED | OUTPATIENT
Start: 2024-05-23

## 2024-06-13 ENCOUNTER — HOSPITAL ENCOUNTER (INPATIENT)
Age: 54
LOS: 3 days | Discharge: HOME | DRG: 871 | End: 2024-06-16
Attending: STUDENT IN AN ORGANIZED HEALTH CARE EDUCATION/TRAINING PROGRAM | Admitting: FAMILY MEDICINE
Payer: MEDICARE

## 2024-06-13 ENCOUNTER — APPOINTMENT (OUTPATIENT)
Dept: GENERAL RADIOLOGY | Age: 54
DRG: 871 | End: 2024-06-13
Payer: MEDICARE

## 2024-06-13 ENCOUNTER — APPOINTMENT (OUTPATIENT)
Dept: ULTRASOUND IMAGING | Age: 54
DRG: 871 | End: 2024-06-13
Payer: MEDICARE

## 2024-06-13 DIAGNOSIS — J18.9 PNEUMONIA OF RIGHT LOWER LOBE DUE TO INFECTIOUS ORGANISM: ICD-10-CM

## 2024-06-13 DIAGNOSIS — J96.01 SEPSIS WITH ACUTE HYPOXIC RESPIRATORY FAILURE WITHOUT SEPTIC SHOCK, DUE TO UNSPECIFIED ORGANISM (HCC): Primary | ICD-10-CM

## 2024-06-13 DIAGNOSIS — J96.01 ACUTE HYPOXIC RESPIRATORY FAILURE (HCC): ICD-10-CM

## 2024-06-13 DIAGNOSIS — A41.9 SEPSIS WITH ACUTE HYPOXIC RESPIRATORY FAILURE WITHOUT SEPTIC SHOCK, DUE TO UNSPECIFIED ORGANISM (HCC): Primary | ICD-10-CM

## 2024-06-13 DIAGNOSIS — R65.20 SEPSIS WITH ACUTE HYPOXIC RESPIRATORY FAILURE WITHOUT SEPTIC SHOCK, DUE TO UNSPECIFIED ORGANISM (HCC): Primary | ICD-10-CM

## 2024-06-13 LAB
ANION GAP SERPL CALCULATED.3IONS-SCNC: 19 MMOL/L (ref 7–16)
BACTERIA: NEGATIVE /HPF
BASE EXCESS: 12 (ref 0–3)
BASE EXCESS: 9 (ref 0–3)
BASOPHILS ABSOLUTE: 0.1 K/CU MM
BASOPHILS RELATIVE PERCENT: 0.3 % (ref 0–1)
BILIRUBIN, URINE: NEGATIVE MG/DL
BLOOD, URINE: ABNORMAL
BUN SERPL-MCNC: 67 MG/DL (ref 6–23)
CALCIUM SERPL-MCNC: 8.8 MG/DL (ref 8.3–10.6)
CARBON MONOXIDE, BLOOD: 1.3 % (ref 0–5)
CHLORIDE BLD-SCNC: 104 MMOL/L (ref 99–110)
CHLORIDE URINE RANDOM: 34 MMOL/L (ref 43–210)
CLARITY: CLEAR
CO2 CONTENT: 14.7 MMOL/L (ref 21–32)
CO2: 15 MMOL/L (ref 21–32)
COLOR: YELLOW
COMMENT: ABNORMAL
COMMENT: ABNORMAL
CREAT SERPL-MCNC: 5.5 MG/DL (ref 0.9–1.3)
DIFFERENTIAL TYPE: ABNORMAL
EOSINOPHILS ABSOLUTE: 0.2 K/CU MM
EOSINOPHILS RELATIVE PERCENT: 1.2 % (ref 0–3)
GFR, ESTIMATED: 12 ML/MIN/1.73M2
GLUCOSE BLD-MCNC: 146 MG/DL (ref 70–99)
GLUCOSE BLD-MCNC: 182 MG/DL (ref 70–99)
GLUCOSE BLD-MCNC: 245 MG/DL (ref 70–99)
GLUCOSE SERPL-MCNC: 198 MG/DL (ref 70–99)
GLUCOSE URINE: 100 MG/DL
HCO3 ARTERIAL: 13.8 MMOL/L (ref 21–28)
HCO3 VENOUS: 19.2 MMOL/L (ref 22–29)
HCT VFR BLD CALC: 38.6 % (ref 42–52)
HEMOGLOBIN: 12.5 GM/DL (ref 13.5–18)
IMMATURE NEUTROPHIL %: 0.5 % (ref 0–0.43)
INFLUENZA A ANTIGEN: NOT DETECTED
INFLUENZA B ANTIGEN: NOT DETECTED
KETONES, URINE: NEGATIVE MG/DL
LACTIC ACID, SEPSIS: 1.5 MMOL/L (ref 0.4–2)
LEUKOCYTE ESTERASE, URINE: NEGATIVE
LYMPHOCYTES ABSOLUTE: 1.7 K/CU MM
LYMPHOCYTES RELATIVE PERCENT: 11.8 % (ref 24–44)
MAGNESIUM: 2.1 MG/DL (ref 1.8–2.4)
MCH RBC QN AUTO: 29.6 PG (ref 27–31)
MCHC RBC AUTO-ENTMCNC: 32.4 % (ref 32–36)
MCV RBC AUTO: 91.3 FL (ref 78–100)
METHEMOGLOBIN ARTERIAL: 0.9 %
MONOCYTES ABSOLUTE: 0.8 K/CU MM
MONOCYTES RELATIVE PERCENT: 5.5 % (ref 0–4)
MUCUS: ABNORMAL HPF
NEUTROPHILS ABSOLUTE: 11.8 K/CU MM
NEUTROPHILS RELATIVE PERCENT: 80.7 % (ref 36–66)
NITRITE URINE, QUANTITATIVE: NEGATIVE
NUCLEATED RBC %: 0 %
O2 SAT, VEN: 73 % (ref 50–70)
O2 SATURATION: 91.9 % (ref 94–98)
PCO2 ARTERIAL: 30 MMHG (ref 35–48)
PCO2 VENOUS: 49 MMHG (ref 41–51)
PDW BLD-RTO: 13.7 % (ref 11.7–14.9)
PH BLOOD: 7.27 (ref 7.35–7.45)
PH VENOUS: 7.2 (ref 7.32–7.43)
PH, URINE: 5.5 (ref 5–8)
PLATELET # BLD: 342 K/CU MM (ref 140–440)
PMV BLD AUTO: 10.6 FL (ref 7.5–11.1)
PO2 ARTERIAL: 77 MMHG (ref 83–108)
PO2 VENOUS: 46 MMHG (ref 28–48)
POTASSIUM SERPL-SCNC: 4.9 MMOL/L (ref 3.5–5.1)
POTASSIUM, UR: 28.1 MMOL/L (ref 22–119)
PRO-BNP: ABNORMAL PG/ML
PROCALCITONIN SERPL-MCNC: 0.14 NG/ML
PROTEIN UA: >300 MG/DL
RBC # BLD: 4.23 M/CU MM (ref 4.6–6.2)
RBC URINE: <1 /HPF (ref 0–3)
SARS-COV-2 RDRP RESP QL NAA+PROBE: NOT DETECTED
SODIUM BLD-SCNC: 138 MMOL/L (ref 135–145)
SODIUM URINE: 50 MMOL/L (ref 35–167)
SOURCE: NORMAL
SPECIFIC GRAVITY UA: 1.02 (ref 1–1.03)
TOTAL IMMATURE NEUTOROPHIL: 0.08 K/CU MM
TOTAL NUCLEATED RBC: 0 K/CU MM
TRICHOMONAS: ABNORMAL /HPF
TROPONIN, HIGH SENSITIVITY: 51 NG/L (ref 0–22)
TROPONIN, HIGH SENSITIVITY: 99 NG/L (ref 0–22)
UROBILINOGEN, URINE: 0.2 MG/DL (ref 0.2–1)
WBC # BLD: 14.6 K/CU MM (ref 4–10.5)
WBC UA: 3 /HPF (ref 0–2)

## 2024-06-13 PROCEDURE — 96365 THER/PROPH/DIAG IV INF INIT: CPT

## 2024-06-13 PROCEDURE — 85025 COMPLETE CBC W/AUTO DIFF WBC: CPT

## 2024-06-13 PROCEDURE — 84145 PROCALCITONIN (PCT): CPT

## 2024-06-13 PROCEDURE — 82962 GLUCOSE BLOOD TEST: CPT

## 2024-06-13 PROCEDURE — 6360000002 HC RX W HCPCS: Performed by: STUDENT IN AN ORGANIZED HEALTH CARE EDUCATION/TRAINING PROGRAM

## 2024-06-13 PROCEDURE — 2500000003 HC RX 250 WO HCPCS: Performed by: STUDENT IN AN ORGANIZED HEALTH CARE EDUCATION/TRAINING PROGRAM

## 2024-06-13 PROCEDURE — 6360000002 HC RX W HCPCS

## 2024-06-13 PROCEDURE — 87635 SARS-COV-2 COVID-19 AMP PRB: CPT

## 2024-06-13 PROCEDURE — 82805 BLOOD GASES W/O2 SATURATION: CPT

## 2024-06-13 PROCEDURE — 76775 US EXAM ABDO BACK WALL LIM: CPT

## 2024-06-13 PROCEDURE — 80053 COMPREHEN METABOLIC PANEL: CPT

## 2024-06-13 PROCEDURE — 71045 X-RAY EXAM CHEST 1 VIEW: CPT

## 2024-06-13 PROCEDURE — 6370000000 HC RX 637 (ALT 250 FOR IP)

## 2024-06-13 PROCEDURE — 2500000003 HC RX 250 WO HCPCS: Performed by: INTERNAL MEDICINE

## 2024-06-13 PROCEDURE — 6370000000 HC RX 637 (ALT 250 FOR IP): Performed by: STUDENT IN AN ORGANIZED HEALTH CARE EDUCATION/TRAINING PROGRAM

## 2024-06-13 PROCEDURE — 99222 1ST HOSP IP/OBS MODERATE 55: CPT | Performed by: INTERNAL MEDICINE

## 2024-06-13 PROCEDURE — 83880 ASSAY OF NATRIURETIC PEPTIDE: CPT

## 2024-06-13 PROCEDURE — 83735 ASSAY OF MAGNESIUM: CPT

## 2024-06-13 PROCEDURE — 96368 THER/DIAG CONCURRENT INF: CPT

## 2024-06-13 PROCEDURE — 81001 URINALYSIS AUTO W/SCOPE: CPT

## 2024-06-13 PROCEDURE — 80048 BASIC METABOLIC PNL TOTAL CA: CPT

## 2024-06-13 PROCEDURE — 6370000000 HC RX 637 (ALT 250 FOR IP): Performed by: FAMILY MEDICINE

## 2024-06-13 PROCEDURE — 82803 BLOOD GASES ANY COMBINATION: CPT

## 2024-06-13 PROCEDURE — 96375 TX/PRO/DX INJ NEW DRUG ADDON: CPT

## 2024-06-13 PROCEDURE — 2580000003 HC RX 258: Performed by: INTERNAL MEDICINE

## 2024-06-13 PROCEDURE — 94660 CPAP INITIATION&MGMT: CPT

## 2024-06-13 PROCEDURE — 2580000003 HC RX 258

## 2024-06-13 PROCEDURE — 87502 INFLUENZA DNA AMP PROBE: CPT

## 2024-06-13 PROCEDURE — 84300 ASSAY OF URINE SODIUM: CPT

## 2024-06-13 PROCEDURE — 2580000003 HC RX 258: Performed by: STUDENT IN AN ORGANIZED HEALTH CARE EDUCATION/TRAINING PROGRAM

## 2024-06-13 PROCEDURE — 2700000000 HC OXYGEN THERAPY PER DAY

## 2024-06-13 PROCEDURE — 5A09357 ASSISTANCE WITH RESPIRATORY VENTILATION, LESS THAN 24 CONSECUTIVE HOURS, CONTINUOUS POSITIVE AIRWAY PRESSURE: ICD-10-PCS | Performed by: STUDENT IN AN ORGANIZED HEALTH CARE EDUCATION/TRAINING PROGRAM

## 2024-06-13 PROCEDURE — 93005 ELECTROCARDIOGRAM TRACING: CPT | Performed by: STUDENT IN AN ORGANIZED HEALTH CARE EDUCATION/TRAINING PROGRAM

## 2024-06-13 PROCEDURE — 36600 WITHDRAWAL OF ARTERIAL BLOOD: CPT

## 2024-06-13 PROCEDURE — 83605 ASSAY OF LACTIC ACID: CPT

## 2024-06-13 PROCEDURE — 99285 EMERGENCY DEPT VISIT HI MDM: CPT

## 2024-06-13 PROCEDURE — 87040 BLOOD CULTURE FOR BACTERIA: CPT

## 2024-06-13 PROCEDURE — 94640 AIRWAY INHALATION TREATMENT: CPT

## 2024-06-13 PROCEDURE — 84133 ASSAY OF URINE POTASSIUM: CPT

## 2024-06-13 PROCEDURE — 2060000000 HC ICU INTERMEDIATE R&B

## 2024-06-13 PROCEDURE — 82436 ASSAY OF URINE CHLORIDE: CPT

## 2024-06-13 PROCEDURE — 84484 ASSAY OF TROPONIN QUANT: CPT

## 2024-06-13 RX ORDER — CLOPIDOGREL BISULFATE 75 MG/1
75 TABLET ORAL DAILY
Status: DISCONTINUED | OUTPATIENT
Start: 2024-06-13 | End: 2024-06-16 | Stop reason: HOSPADM

## 2024-06-13 RX ORDER — HEPARIN SODIUM 5000 [USP'U]/ML
5000 INJECTION, SOLUTION INTRAVENOUS; SUBCUTANEOUS EVERY 8 HOURS SCHEDULED
Status: DISCONTINUED | OUTPATIENT
Start: 2024-06-13 | End: 2024-06-16 | Stop reason: HOSPADM

## 2024-06-13 RX ORDER — IPRATROPIUM BROMIDE AND ALBUTEROL SULFATE 2.5; .5 MG/3ML; MG/3ML
1 SOLUTION RESPIRATORY (INHALATION)
Status: DISCONTINUED | OUTPATIENT
Start: 2024-06-13 | End: 2024-06-16 | Stop reason: HOSPADM

## 2024-06-13 RX ORDER — AMLODIPINE BESYLATE 10 MG/1
10 TABLET ORAL DAILY
Status: DISCONTINUED | OUTPATIENT
Start: 2024-06-13 | End: 2024-06-16 | Stop reason: HOSPADM

## 2024-06-13 RX ORDER — INSULIN LISPRO 100 [IU]/ML
0-4 INJECTION, SOLUTION INTRAVENOUS; SUBCUTANEOUS NIGHTLY
Status: DISCONTINUED | OUTPATIENT
Start: 2024-06-13 | End: 2024-06-16 | Stop reason: HOSPADM

## 2024-06-13 RX ORDER — GUAIFENESIN 600 MG/1
600 TABLET, EXTENDED RELEASE ORAL 2 TIMES DAILY
Status: DISCONTINUED | OUTPATIENT
Start: 2024-06-13 | End: 2024-06-16 | Stop reason: HOSPADM

## 2024-06-13 RX ORDER — IPRATROPIUM BROMIDE AND ALBUTEROL SULFATE 2.5; .5 MG/3ML; MG/3ML
2 SOLUTION RESPIRATORY (INHALATION) ONCE
Status: COMPLETED | OUTPATIENT
Start: 2024-06-13 | End: 2024-06-13

## 2024-06-13 RX ORDER — SODIUM CHLORIDE 0.9 % (FLUSH) 0.9 %
5-40 SYRINGE (ML) INJECTION EVERY 12 HOURS SCHEDULED
Status: DISCONTINUED | OUTPATIENT
Start: 2024-06-13 | End: 2024-06-16 | Stop reason: HOSPADM

## 2024-06-13 RX ORDER — MAGNESIUM SULFATE IN WATER 40 MG/ML
2000 INJECTION, SOLUTION INTRAVENOUS ONCE
Status: COMPLETED | OUTPATIENT
Start: 2024-06-13 | End: 2024-06-13

## 2024-06-13 RX ORDER — ENOXAPARIN SODIUM 100 MG/ML
40 INJECTION SUBCUTANEOUS DAILY
Status: DISCONTINUED | OUTPATIENT
Start: 2024-06-13 | End: 2024-06-13

## 2024-06-13 RX ORDER — DEXTROSE MONOHYDRATE 100 MG/ML
INJECTION, SOLUTION INTRAVENOUS CONTINUOUS PRN
Status: DISCONTINUED | OUTPATIENT
Start: 2024-06-13 | End: 2024-06-16 | Stop reason: HOSPADM

## 2024-06-13 RX ORDER — 0.9 % SODIUM CHLORIDE 0.9 %
30 INTRAVENOUS SOLUTION INTRAVENOUS ONCE
Status: COMPLETED | OUTPATIENT
Start: 2024-06-13 | End: 2024-06-13

## 2024-06-13 RX ORDER — SODIUM CHLORIDE 0.9 % (FLUSH) 0.9 %
5-40 SYRINGE (ML) INJECTION PRN
Status: DISCONTINUED | OUTPATIENT
Start: 2024-06-13 | End: 2024-06-16 | Stop reason: HOSPADM

## 2024-06-13 RX ORDER — ALBUTEROL SULFATE 2.5 MG/3ML
SOLUTION RESPIRATORY (INHALATION)
Status: DISPENSED
Start: 2024-06-13 | End: 2024-06-13

## 2024-06-13 RX ORDER — ACETAMINOPHEN 650 MG/1
650 SUPPOSITORY RECTAL EVERY 6 HOURS PRN
Status: DISCONTINUED | OUTPATIENT
Start: 2024-06-13 | End: 2024-06-16 | Stop reason: HOSPADM

## 2024-06-13 RX ORDER — ACETAMINOPHEN 325 MG/1
650 TABLET ORAL EVERY 6 HOURS PRN
Status: DISCONTINUED | OUTPATIENT
Start: 2024-06-13 | End: 2024-06-16 | Stop reason: HOSPADM

## 2024-06-13 RX ORDER — GLUCAGON 1 MG/ML
1 KIT INJECTION PRN
Status: DISCONTINUED | OUTPATIENT
Start: 2024-06-13 | End: 2024-06-16 | Stop reason: HOSPADM

## 2024-06-13 RX ORDER — TORSEMIDE 20 MG/1
20 TABLET ORAL DAILY
Status: DISCONTINUED | OUTPATIENT
Start: 2024-06-13 | End: 2024-06-13

## 2024-06-13 RX ORDER — SODIUM BICARBONATE 650 MG/1
650 TABLET ORAL 4 TIMES DAILY
Status: DISCONTINUED | OUTPATIENT
Start: 2024-06-13 | End: 2024-06-15

## 2024-06-13 RX ORDER — SODIUM CHLORIDE 9 MG/ML
INJECTION, SOLUTION INTRAVENOUS PRN
Status: DISCONTINUED | OUTPATIENT
Start: 2024-06-13 | End: 2024-06-16 | Stop reason: HOSPADM

## 2024-06-13 RX ORDER — INSULIN LISPRO 100 [IU]/ML
0-4 INJECTION, SOLUTION INTRAVENOUS; SUBCUTANEOUS
Status: DISCONTINUED | OUTPATIENT
Start: 2024-06-13 | End: 2024-06-16 | Stop reason: HOSPADM

## 2024-06-13 RX ORDER — ATORVASTATIN CALCIUM 40 MG/1
40 TABLET, FILM COATED ORAL DAILY
Status: DISCONTINUED | OUTPATIENT
Start: 2024-06-13 | End: 2024-06-16 | Stop reason: HOSPADM

## 2024-06-13 RX ORDER — METOPROLOL TARTRATE 50 MG/1
50 TABLET, FILM COATED ORAL 2 TIMES DAILY
Status: DISCONTINUED | OUTPATIENT
Start: 2024-06-13 | End: 2024-06-16 | Stop reason: HOSPADM

## 2024-06-13 RX ADMIN — CEFTRIAXONE 1000 MG: 1 INJECTION, POWDER, FOR SOLUTION INTRAMUSCULAR; INTRAVENOUS at 08:31

## 2024-06-13 RX ADMIN — DOXYCYCLINE 100 MG: 100 INJECTION, POWDER, LYOPHILIZED, FOR SOLUTION INTRAVENOUS at 08:38

## 2024-06-13 RX ADMIN — HEPARIN SODIUM 5000 UNITS: 5000 INJECTION INTRAVENOUS; SUBCUTANEOUS at 21:44

## 2024-06-13 RX ADMIN — IPRATROPIUM BROMIDE AND ALBUTEROL SULFATE 1 DOSE: 2.5; .5 SOLUTION RESPIRATORY (INHALATION) at 20:25

## 2024-06-13 RX ADMIN — SODIUM BICARBONATE 650 MG: 650 TABLET ORAL at 13:53

## 2024-06-13 RX ADMIN — AMLODIPINE BESYLATE 10 MG: 10 TABLET ORAL at 17:33

## 2024-06-13 RX ADMIN — ATORVASTATIN CALCIUM 40 MG: 40 TABLET, FILM COATED ORAL at 17:33

## 2024-06-13 RX ADMIN — ACETAMINOPHEN 650 MG: 325 TABLET ORAL at 21:43

## 2024-06-13 RX ADMIN — METOPROLOL TARTRATE 50 MG: 50 TABLET, FILM COATED ORAL at 21:44

## 2024-06-13 RX ADMIN — SODIUM CHLORIDE, PRESERVATIVE FREE 10 ML: 5 INJECTION INTRAVENOUS at 21:45

## 2024-06-13 RX ADMIN — MAGNESIUM SULFATE HEPTAHYDRATE 2000 MG: 40 INJECTION, SOLUTION INTRAVENOUS at 08:12

## 2024-06-13 RX ADMIN — SODIUM BICARBONATE: 84 INJECTION, SOLUTION INTRAVENOUS at 13:54

## 2024-06-13 RX ADMIN — GUAIFENESIN 600 MG: 600 TABLET, EXTENDED RELEASE ORAL at 21:44

## 2024-06-13 RX ADMIN — SODIUM BICARBONATE 650 MG: 650 TABLET ORAL at 17:19

## 2024-06-13 RX ADMIN — WATER 125 MG: 1 INJECTION INTRAMUSCULAR; INTRAVENOUS; SUBCUTANEOUS at 08:09

## 2024-06-13 RX ADMIN — CLOPIDOGREL BISULFATE 75 MG: 75 TABLET ORAL at 17:33

## 2024-06-13 RX ADMIN — HEPARIN SODIUM 5000 UNITS: 5000 INJECTION INTRAVENOUS; SUBCUTANEOUS at 13:54

## 2024-06-13 RX ADMIN — IPRATROPIUM BROMIDE AND ALBUTEROL SULFATE 1 DOSE: 2.5; .5 SOLUTION RESPIRATORY (INHALATION) at 16:11

## 2024-06-13 RX ADMIN — IPRATROPIUM BROMIDE AND ALBUTEROL SULFATE 2 DOSE: 2.5; .5 SOLUTION RESPIRATORY (INHALATION) at 07:24

## 2024-06-13 RX ADMIN — SODIUM BICARBONATE 650 MG: 650 TABLET ORAL at 21:44

## 2024-06-13 RX ADMIN — SODIUM CHLORIDE 2340 ML: 9 INJECTION, SOLUTION INTRAVENOUS at 08:47

## 2024-06-13 ASSESSMENT — PAIN - FUNCTIONAL ASSESSMENT: PAIN_FUNCTIONAL_ASSESSMENT: ACTIVITIES ARE NOT PREVENTED

## 2024-06-13 ASSESSMENT — PAIN SCALES - GENERAL
PAINLEVEL_OUTOF10: 0
PAINLEVEL_OUTOF10: 0

## 2024-06-13 ASSESSMENT — PAIN DESCRIPTION - DESCRIPTORS: DESCRIPTORS: ACHING

## 2024-06-13 ASSESSMENT — PAIN DESCRIPTION - ORIENTATION: ORIENTATION: MID

## 2024-06-13 ASSESSMENT — PAIN DESCRIPTION - LOCATION: LOCATION: HEAD

## 2024-06-13 NOTE — ED NOTES
Patient remains on bipap. Resps even and unlabored. Updated on plan of care. Denies any further needs, questions, concerns or complaints.

## 2024-06-13 NOTE — CONSULTS
CARDIOLOGY CONSULT NOTE         Reason for consultation: Elevated troponin      Primary care physician: Raza Fairchild DO      Chief Complaints :  Chief Complaint   Patient presents with    Shortness of Breath     Patient arrives on 15L NRB        History of present illness:Landry is a 54 y.o.year old male who has prior history of coronary artery disease, obesity, end-stage renal disease who is now admitted with pneumonia and sepsis.  We are asked to see him for elevated troponin.  Patient himself denies any chest discomfort or chest pressure.  He has previous history of LAD stenting in 2017.    Review of Systems:     All systems negative except as marked.        Physical Examination:    Vitals:    06/13/24 1546   BP: (!) 148/88   Pulse: 73   Resp: 18   Temp: 97.7 °F (36.5 °C)   SpO2: 100%        General Appearance: Obese  male, no distress, conversant    Constitutional:  No acute distress, non-toxic appearance.    HENT:  Normocephalic, Atraumatic,   Eyes:  PERRL, EOMI, Conjunctiva normal, No discharge.   Respiratory:  No respiratory distress, No wheezing  Cardiovascular: S1, S2, no murmurs, gallops. JVD wnl  Abdomen /GI:   Soft, No tenderness   Genitourinary: No costovertebral angle tenderness   Musculoskeletal: Positive edema, no tenderness, no deformities.   Integument:  Well hydrated, no rash   Neurologic:  Alert & oriented x 3, no focal deficits noted       Medical decision making and Data review:    Lab Review   Recent Labs     06/13/24  0748   WBC 14.6*   HGB 12.5*   HCT 38.6*         Recent Labs     06/13/24  0748      K 4.9      CO2 15*   BUN 67*   CREATININE 5.5*     No results for input(s): \"AST\", \"ALT\", \"BILIDIR\", \"BILITOT\", \"ALKPHOS\" in the last 72 hours.    Invalid input(s): \"ALB\"  No results for input(s): \"TROPONINT\" in the last 72 hours.    Recent Labs     06/13/24  0748   PROBNP 16,743*     Lab Results   Component Value Date    INR 0.87 02/28/2017    PROTIME 9.9

## 2024-06-13 NOTE — ED NOTES
Patient remains on Bipap. Resps even and unlabored. Patient states he feels better with the bipap on. Updated on plan of care. Placed on cardiac monitor. Verbalizes understanding and denies any further needs, questions, concerns or complaints.

## 2024-06-13 NOTE — ED NOTES
Patient remains on bipap. Updated on plan of care. Verbalizes understanding and denies any further needs, questions, concerns or complaints. Remains on cardiac monitor.

## 2024-06-13 NOTE — ED NOTES
ED TO INPATIENT SBAR HANDOFF    Patient Name: Landry Morley   :  1970  54 y.o.   Preferred Name  Landry   Family/Caregiver Present no   Restraints no   C-SSRS: Risk of Suicide: No Risk  Sitter no   Sepsis Risk Score Sepsis Risk Score: 8.53      Situation  Chief Complaint   Patient presents with    Shortness of Breath     Patient arrives on 15L NRB     Brief Description of Patient's Condition: Patient is A & O x 4 from home. Patient was 77% on EMS arrival. Patient was placed on bipap on arrival to ER. Bipap helped patient and he is now stating he is feeling better. Patient will use urinal at bedside. Resps even and unlabored.   Mental Status: oriented, alert, coherent, logical, thought processes intact, and able to concentrate and follow conversation  Arrived from: home    Imaging:   XR CHEST PORTABLE   Final Result   Right lower lobe lobar pneumonia.         Electronically signed by Edward Tolbert        Abnormal labs:   Abnormal Labs Reviewed   CBC WITH AUTO DIFFERENTIAL - Abnormal; Notable for the following components:       Result Value    WBC 14.6 (*)     RBC 4.23 (*)     Hemoglobin 12.5 (*)     Hematocrit 38.6 (*)     Neutrophils % 80.7 (*)     Lymphocytes % 11.8 (*)     Monocytes % 5.5 (*)     Immature Neutrophil % 0.5 (*)     All other components within normal limits   TROPONIN - Abnormal; Notable for the following components:    Troponin, High Sensitivity 51 (*)     All other components within normal limits   BRAIN NATRIURETIC PEPTIDE - Abnormal; Notable for the following components:    Pro-BNP 16,743 (*)     All other components within normal limits   BLOOD GAS, VENOUS - Abnormal; Notable for the following components:    pH, Kj 7.20 (*)     Base Excess 9 (*)     HCO3, Venous 19.2 (*)     O2 Sat, Kj 73.0 (*)     All other components within normal limits        Background  History:   Past Medical History:   Diagnosis Date    CAD (coronary artery disease)     H/O MI.    Family history of coronary

## 2024-06-13 NOTE — H&P
V2.0  History and Physical      Name:  Landry Morley /Age/Sex: 1970  (54 y.o. male)   MRN & CSN:  [id number] & [id number] Encounter Date/Time: 2024 10:00 AM EDT   Location:  [facility] PCP: Raza Fairchild DO       Hospital Day: 1    Assessment and Plan:   Landry Morley is a 54 y.o. male with a pmh of CAD, HLD, HTN, hypertriglycerides, T2DM, active smoker who presents with Sepsis (HCC)    Hospital Problems             Last Modified POA    * (Principal) Sepsis (HCC) 2024 Yes         Sepsis  Acute hypoxic respiratory failure secondary to CAP,  specifically  right lower lobe pneumonia  Metabolic acidosis   pH 7.2, HCO3 19.2, O2 sat 73% on admission,.  Oxygen saturation upon presentation sustained in  and requiring supplemental oxygen subsequently needing BiPAP              Admit to stepdown ICU              Consult pulmonology for BiPAP management              IV abx doxycycline, and Rocephin given in ED, changed to azithromycin and Rocephin IV for empiric coverage will customize pending cultures              Pending cultures- /blood              IV solumedrol 40 mg q 12hr.            DuoNebs              Respiratory interventions- IS, supplemental oxygen, pulmonary hygiene              Pending repeat gases, if better can come off BiPAP             Imaging as follow: Chest x-ray shows right lower lobe pneumonia  ED workup positive for leukocytosis, no lactic acidosis, subjective fevers, appears to be afebrile here inpatient, BNP 16,743    Elevated troponin  --Troponin 51, repeat 99  --Elevation likely demand from multiple factors, could be from hypoxia, CKD, and hypervolemia  - Will consult cardiology, as patient has significant history of CAD with stent placement.  - No chest pain on exam  - Will keep n.p.o., if no further testing today, will release diet.    CAD s/p multivessel stent placement  --Continue metoprolol, Plavix, statin  -- Patient is also on torsemide, will hold in light of

## 2024-06-13 NOTE — ED NOTES
Medication History  [facility]    Patient Name: Landry Morley 1970     Medication history has been completed by: Bridgett Christina [facility]    Source(s) of information: patient and insurance claims      Primary Care Physician: Raza Fairchild DO     Pharmacy: [facility]    Allergies as of 06/13/2024    (No Known Allergies)        Prior to Admission medications    Medication Sig Start Date End Date Taking? Authorizing Provider   JONAH OMEGA-3 KRILL OIL PO Take 1 capsule by mouth daily   Yes Provider, MD Sonya   nitroGLYCERIN (NITROSTAT) 0.4 MG SL tablet PLACE 1 TABLET UNDER TONGUE FOR CHEST PAIN. CALL 911 IF NO IMPROVEMENT AFTER 5 MIN. REPEAT DOSE TWICE IF NEEDED; limit of 2 tabs in one day. Do not take same day as sildenafil 5/23/24   Raza Fairchild DO   polyethylene glycol (MIRALAX MIX-IN PAX) 17 g packet Take 1 packet by mouth daily as needed for Constipation 5/21/24   Adrian Negro MD   Finerenone (KERENDIA) 10 MG TABS Take 10 mg by mouth daily (with breakfast) 5/21/24 6/20/24  Adrian Negro MD   sodium zirconium cyclosilicate (LOKELMA) 10 g PACK oral suspension Take 1 packet by mouth daily 5/21/24 11/17/24  Adrian Negro MD   HEARTBURN RELIEF 10 MG tablet Take 1 tablet by mouth daily as needed 4/18/24   Raza Fairchild DO   Insulin Pen Needle (TRUEPLUS 5-BEVEL PEN NEEDLES) 31G X 5 MM MISC Use one needle two times daily as directed on package. 3/25/24   Yudelka Davis, APRN - CNP   atorvastatin (LIPITOR) 40 MG tablet Take 1 tablet by mouth daily 3/21/24   Raza Fairchild DO   Insulin Degludec (TRESIBA FLEXTOUCH) 200 UNIT/ML SOPN Inject 10 Units into the skin at bedtime 3/21/24 9/17/24  Raza Fairchild DO   torsemide (DEMADEX) 20 MG tablet Take 1 tablet by mouth daily 3/19/24 7/17/24  Adrian Negro MD   Cholecalciferol (VITAMIN D) 125 MCG (5000 UT) CAPS Take 1 capsule by mouth daily  Patient taking differently: Take 1 capsule by mouth daily OTC 2/8/24   Raza Fairchild, DO

## 2024-06-13 NOTE — ED PROVIDER NOTES
frequency plus additional to accommodate PRN testing needs.      Insulin Pen Needle (PEN NEEDLES 3/16\") 31G X 5 MM MISC 1 each by Does not apply route daily To be used 4 times daily as directed on package 500 each 2    aspirin EC 81 MG EC tablet Take 1 tablet by mouth daily (Patient taking differently: Take 1 tablet by mouth daily OTC) 30 tablet 6         Nursing Notes Reviewed        PHYSICAL EXAM     ED Triage Vitals   BP Temp Temp src Pulse Resp SpO2 Height Weight   -- -- -- -- -- -- -- --     Triage VS:    ED Triage Vitals   Enc Vitals Group      BP       Pulse       Resp       Temp       Temp src       SpO2       Weight       Height       Head Circumference       Peak Flow       Pain Score       Pain Loc       Pain Edu?       Excl. in GC?      Initial vital signs and nursing assessment reviewed and vitals are/show: Afebrile, Hypertensive, Borderline tachycardic, and Borderline tachypneic .   Pulsoximetry is abnormal per my interpretation.    Additional Vital Signs:  Vitals:    06/13/24 0923   BP:    Pulse: 69   Resp: 16   Temp:    SpO2: 98%       Physical Exam  Constitutional:       General: He is not in acute distress.     Appearance: Normal appearance. He is obese. He is not ill-appearing, toxic-appearing or diaphoretic.   HENT:      Head: Normocephalic and atraumatic.      Right Ear: External ear normal.      Left Ear: External ear normal.   Eyes:      General: No scleral icterus.        Right eye: No discharge.         Left eye: No discharge.   Cardiovascular:      Rate and Rhythm: Normal rate and regular rhythm.      Comments: Bilateral radial pulses equal bilateral DP pulses equal  Pulmonary:      Effort: No respiratory distress.      Breath sounds: No stridor. No wheezing, rhonchi or rales.      Comments: Increased work of breathing.  Tachypnea.  Unable to speak in full sentences.  Coarse breath sounds bilaterally with decreased air movement bilaterally.  Chest:      Chest wall: No tenderness.

## 2024-06-14 LAB
ALBUMIN SERPL-MCNC: 4.1 GM/DL (ref 3.4–5)
ALP BLD-CCNC: 98 IU/L (ref 40–128)
ALT SERPL-CCNC: 16 U/L (ref 10–40)
ANION GAP SERPL CALCULATED.3IONS-SCNC: 17 MMOL/L (ref 7–16)
AST SERPL-CCNC: 51 IU/L (ref 15–37)
BASE EXCESS: 7 (ref 0–3)
BASOPHILS ABSOLUTE: 0 K/CU MM
BASOPHILS RELATIVE PERCENT: 0.1 % (ref 0–1)
BILIRUB SERPL-MCNC: 0.2 MG/DL (ref 0–1)
BUN SERPL-MCNC: 70 MG/DL (ref 6–23)
CALCIUM SERPL-MCNC: 9 MG/DL (ref 8.3–10.6)
CHLORIDE BLD-SCNC: 98 MMOL/L (ref 99–110)
CO2: 17 MMOL/L (ref 21–32)
COMMENT: ABNORMAL
CREAT SERPL-MCNC: 5.1 MG/DL (ref 0.9–1.3)
DIFFERENTIAL TYPE: ABNORMAL
EKG ATRIAL RATE: 83 BPM
EKG DIAGNOSIS: NORMAL
EKG P AXIS: 21 DEGREES
EKG P-R INTERVAL: 202 MS
EKG Q-T INTERVAL: 386 MS
EKG QRS DURATION: 102 MS
EKG QTC CALCULATION (BAZETT): 453 MS
EKG R AXIS: -8 DEGREES
EKG T AXIS: 107 DEGREES
EKG VENTRICULAR RATE: 83 BPM
EOSINOPHILS ABSOLUTE: 0 K/CU MM
EOSINOPHILS RELATIVE PERCENT: 0 % (ref 0–3)
GFR, ESTIMATED: 13 ML/MIN/1.73M2
GLUCOSE BLD-MCNC: 150 MG/DL (ref 70–99)
GLUCOSE BLD-MCNC: 179 MG/DL (ref 70–99)
GLUCOSE BLD-MCNC: 208 MG/DL (ref 70–99)
GLUCOSE BLD-MCNC: 231 MG/DL (ref 70–99)
GLUCOSE SERPL-MCNC: 177 MG/DL (ref 70–99)
HBV SURFACE AB SERPL IA-ACNC: <3.5 M[IU]/ML
HBV SURFACE AG SERPL QL IA: NON REACTIVE
HCO3 VENOUS: 19.1 MMOL/L (ref 22–29)
HCT VFR BLD CALC: 33.3 % (ref 42–52)
HEMOGLOBIN: 11 GM/DL (ref 13.5–18)
IMMATURE NEUTROPHIL %: 0.4 % (ref 0–0.43)
LYMPHOCYTES ABSOLUTE: 0.7 K/CU MM
LYMPHOCYTES RELATIVE PERCENT: 4.8 % (ref 24–44)
MCH RBC QN AUTO: 29.8 PG (ref 27–31)
MCHC RBC AUTO-ENTMCNC: 33 % (ref 32–36)
MCV RBC AUTO: 90.2 FL (ref 78–100)
MONOCYTES ABSOLUTE: 0.7 K/CU MM
MONOCYTES RELATIVE PERCENT: 4.7 % (ref 0–4)
NEUTROPHILS ABSOLUTE: 12.4 K/CU MM
NEUTROPHILS RELATIVE PERCENT: 90 % (ref 36–66)
NUCLEATED RBC %: 0 %
O2 SAT, VEN: 65.8 % (ref 50–70)
PCO2 VENOUS: 38 MMHG (ref 41–51)
PDW BLD-RTO: 13.7 % (ref 11.7–14.9)
PH VENOUS: 7.31 (ref 7.32–7.43)
PLATELET # BLD: 298 K/CU MM (ref 140–440)
PMV BLD AUTO: 10.6 FL (ref 7.5–11.1)
PO2 VENOUS: 38 MMHG (ref 28–48)
POTASSIUM SERPL-SCNC: 5.2 MMOL/L (ref 3.5–5.1)
RBC # BLD: 3.69 M/CU MM (ref 4.6–6.2)
SODIUM BLD-SCNC: 132 MMOL/L (ref 135–145)
TOTAL IMMATURE NEUTOROPHIL: 0.06 K/CU MM
TOTAL NUCLEATED RBC: 0 K/CU MM
TOTAL PROTEIN: 6.5 GM/DL (ref 6.4–8.2)
WBC # BLD: 13.8 K/CU MM (ref 4–10.5)

## 2024-06-14 PROCEDURE — 86706 HEP B SURFACE ANTIBODY: CPT

## 2024-06-14 PROCEDURE — 2060000000 HC ICU INTERMEDIATE R&B

## 2024-06-14 PROCEDURE — 6370000000 HC RX 637 (ALT 250 FOR IP): Performed by: INTERNAL MEDICINE

## 2024-06-14 PROCEDURE — 6360000002 HC RX W HCPCS

## 2024-06-14 PROCEDURE — 87340 HEPATITIS B SURFACE AG IA: CPT

## 2024-06-14 PROCEDURE — 6370000000 HC RX 637 (ALT 250 FOR IP)

## 2024-06-14 PROCEDURE — 6370000000 HC RX 637 (ALT 250 FOR IP): Performed by: HOSPITALIST

## 2024-06-14 PROCEDURE — 87449 NOS EACH ORGANISM AG IA: CPT

## 2024-06-14 PROCEDURE — 2500000003 HC RX 250 WO HCPCS: Performed by: INTERNAL MEDICINE

## 2024-06-14 PROCEDURE — 94660 CPAP INITIATION&MGMT: CPT

## 2024-06-14 PROCEDURE — 2580000003 HC RX 258: Performed by: INTERNAL MEDICINE

## 2024-06-14 PROCEDURE — 85025 COMPLETE CBC W/AUTO DIFF WBC: CPT

## 2024-06-14 PROCEDURE — 2580000003 HC RX 258

## 2024-06-14 PROCEDURE — 86704 HEP B CORE ANTIBODY TOTAL: CPT

## 2024-06-14 PROCEDURE — 94640 AIRWAY INHALATION TREATMENT: CPT

## 2024-06-14 PROCEDURE — 87899 AGENT NOS ASSAY W/OPTIC: CPT

## 2024-06-14 PROCEDURE — 6370000000 HC RX 637 (ALT 250 FOR IP): Performed by: FAMILY MEDICINE

## 2024-06-14 PROCEDURE — 80053 COMPREHEN METABOLIC PANEL: CPT

## 2024-06-14 PROCEDURE — 82962 GLUCOSE BLOOD TEST: CPT

## 2024-06-14 PROCEDURE — 93010 ELECTROCARDIOGRAM REPORT: CPT | Performed by: INTERNAL MEDICINE

## 2024-06-14 PROCEDURE — 2700000000 HC OXYGEN THERAPY PER DAY

## 2024-06-14 PROCEDURE — 82805 BLOOD GASES W/O2 SATURATION: CPT

## 2024-06-14 PROCEDURE — 94761 N-INVAS EAR/PLS OXIMETRY MLT: CPT

## 2024-06-14 PROCEDURE — 36415 COLL VENOUS BLD VENIPUNCTURE: CPT

## 2024-06-14 RX ORDER — POLYETHYLENE GLYCOL 3350 17 G/17G
17 POWDER, FOR SOLUTION ORAL DAILY
Status: DISCONTINUED | OUTPATIENT
Start: 2024-06-14 | End: 2024-06-16 | Stop reason: HOSPADM

## 2024-06-14 RX ORDER — CITRIC ACID/SODIUM CITRATE 334-500MG
30 SOLUTION, ORAL ORAL 3 TIMES DAILY
Status: COMPLETED | OUTPATIENT
Start: 2024-06-14 | End: 2024-06-14

## 2024-06-14 RX ADMIN — ATORVASTATIN CALCIUM 40 MG: 40 TABLET, FILM COATED ORAL at 09:07

## 2024-06-14 RX ADMIN — SODIUM CHLORIDE, PRESERVATIVE FREE 10 ML: 5 INJECTION INTRAVENOUS at 09:08

## 2024-06-14 RX ADMIN — HEPARIN SODIUM 5000 UNITS: 5000 INJECTION INTRAVENOUS; SUBCUTANEOUS at 16:41

## 2024-06-14 RX ADMIN — SODIUM BICARBONATE 650 MG: 650 TABLET ORAL at 16:42

## 2024-06-14 RX ADMIN — SODIUM BICARBONATE 650 MG: 650 TABLET ORAL at 11:57

## 2024-06-14 RX ADMIN — AZITHROMYCIN MONOHYDRATE 500 MG: 500 INJECTION, POWDER, LYOPHILIZED, FOR SOLUTION INTRAVENOUS at 05:10

## 2024-06-14 RX ADMIN — METHYLPREDNISOLONE SODIUM SUCCINATE 40 MG: 40 INJECTION INTRAMUSCULAR; INTRAVENOUS at 16:41

## 2024-06-14 RX ADMIN — AMLODIPINE BESYLATE 10 MG: 10 TABLET ORAL at 09:07

## 2024-06-14 RX ADMIN — IPRATROPIUM BROMIDE AND ALBUTEROL SULFATE 1 DOSE: 2.5; .5 SOLUTION RESPIRATORY (INHALATION) at 15:09

## 2024-06-14 RX ADMIN — METOPROLOL TARTRATE 50 MG: 50 TABLET, FILM COATED ORAL at 21:36

## 2024-06-14 RX ADMIN — GUAIFENESIN 600 MG: 600 TABLET, EXTENDED RELEASE ORAL at 21:36

## 2024-06-14 RX ADMIN — SODIUM ZIRCONIUM CYCLOSILICATE 10 G: 10 POWDER, FOR SUSPENSION ORAL at 06:25

## 2024-06-14 RX ADMIN — CEFTRIAXONE 1000 MG: 1 INJECTION, POWDER, FOR SOLUTION INTRAMUSCULAR; INTRAVENOUS at 05:07

## 2024-06-14 RX ADMIN — CLOPIDOGREL BISULFATE 75 MG: 75 TABLET ORAL at 09:08

## 2024-06-14 RX ADMIN — SODIUM BICARBONATE 650 MG: 650 TABLET ORAL at 21:36

## 2024-06-14 RX ADMIN — IPRATROPIUM BROMIDE AND ALBUTEROL SULFATE 1 DOSE: 2.5; .5 SOLUTION RESPIRATORY (INHALATION) at 02:16

## 2024-06-14 RX ADMIN — POLYETHYLENE GLYCOL (3350) 17 G: 17 POWDER, FOR SOLUTION ORAL at 11:57

## 2024-06-14 RX ADMIN — GUAIFENESIN 600 MG: 600 TABLET, EXTENDED RELEASE ORAL at 09:08

## 2024-06-14 RX ADMIN — SODIUM BICARBONATE: 84 INJECTION, SOLUTION INTRAVENOUS at 02:08

## 2024-06-14 RX ADMIN — METOPROLOL TARTRATE 50 MG: 50 TABLET, FILM COATED ORAL at 09:08

## 2024-06-14 RX ADMIN — INSULIN LISPRO 1 UNITS: 100 INJECTION, SOLUTION INTRAVENOUS; SUBCUTANEOUS at 11:57

## 2024-06-14 RX ADMIN — SODIUM CHLORIDE, PRESERVATIVE FREE 10 ML: 5 INJECTION INTRAVENOUS at 21:37

## 2024-06-14 RX ADMIN — IPRATROPIUM BROMIDE AND ALBUTEROL SULFATE 1 DOSE: 2.5; .5 SOLUTION RESPIRATORY (INHALATION) at 19:52

## 2024-06-14 RX ADMIN — HEPARIN SODIUM 5000 UNITS: 5000 INJECTION INTRAVENOUS; SUBCUTANEOUS at 05:05

## 2024-06-14 RX ADMIN — SODIUM CITRATE AND CITRIC ACID MONOHYDRATE 30 ML: 500; 334 SOLUTION ORAL at 16:41

## 2024-06-14 RX ADMIN — SODIUM CITRATE AND CITRIC ACID MONOHYDRATE 30 ML: 500; 334 SOLUTION ORAL at 10:00

## 2024-06-14 RX ADMIN — SODIUM BICARBONATE 650 MG: 650 TABLET ORAL at 09:07

## 2024-06-14 RX ADMIN — METHYLPREDNISOLONE SODIUM SUCCINATE 40 MG: 40 INJECTION INTRAMUSCULAR; INTRAVENOUS at 05:17

## 2024-06-14 RX ADMIN — HEPARIN SODIUM 5000 UNITS: 5000 INJECTION INTRAVENOUS; SUBCUTANEOUS at 21:36

## 2024-06-14 RX ADMIN — SODIUM ZIRCONIUM CYCLOSILICATE 10 G: 10 POWDER, FOR SUSPENSION ORAL at 21:37

## 2024-06-14 RX ADMIN — IPRATROPIUM BROMIDE AND ALBUTEROL SULFATE 1 DOSE: 2.5; .5 SOLUTION RESPIRATORY (INHALATION) at 08:45

## 2024-06-14 RX ADMIN — SODIUM CITRATE AND CITRIC ACID MONOHYDRATE 30 ML: 500; 334 SOLUTION ORAL at 21:38

## 2024-06-14 ASSESSMENT — PULMONARY FUNCTION TESTS: PEFR_L/MIN: 19

## 2024-06-14 NOTE — CONSULTS
Nephrology Service Consultation      [address], [address]  [place] [address]  Phone: [phone]  Office Hours: 8:30AM - 4:30PM  Monday - Friday        MEDICAL DECISION MAKING and Recommendations   -ADAM: cr 5.5 on admission//no HN on renal US  -Solitary right kidney  -CKD5  -Known nephrotic range proteinuria  -Hyperkalemia  -Metabolic acidosis  -Acute resp failure pneumonia  -Elevated troponin  -CAD hx and s/p stent    Suggest:  -Resume lokelma  -Give 1L of bicarb drip then stop  -Start oral bicarb  -If LHC needed then make it clear that this will be his entryway to RRT. If LHC needed then benefits outweigh risks  -Avoid nephrotoxins if possible  -Renally dose abx  -Will follow  -No RRT planned for now, will assess daily  -MOnitor UOP please    Thank you    Patient Active Problem List    Diagnosis Date Noted    Precordial pain     Coronary artery disease involving native coronary artery of native heart without angina pectoris     Mixed hyperlipidemia     Essential hypertension     Hyperpotassemia 03/09/2023    Sepsis ([facility]) 06/13/2024    Chronic kidney disease, stage IV (severe) ([facility]) 02/06/2024    Nephrotic syndrome 02/06/2024    Class 2 severe obesity due to excess calories with serious comorbidity and body mass index (BMI) of 39.0 to 39.9 in adult ([facility]) 06/15/2021    Spinal stenosis of lumbar region 06/15/2021    Constipation 06/15/2021    Acute renal failure (HCC) 09/24/2020    Persistent proteinuria 09/24/2020    Diabetic nephropathy associated with type 2 diabetes mellitus ([facility]) 09/24/2020    Metabolic syndrome 09/24/2020    Renal agenesis 09/24/2020    Erectile disorder due to medical condition in male patient 09/24/2020    Hypertension, secondary 09/24/2020    Family history of coronary artery disease     H/O echocardiogram 04/15/2015    Type 2 diabetes mellitus with circulatory disorder, without long-term current use of insulin ([facility])     History of PTCA     Chest pain 07/04/2012

## 2024-06-14 NOTE — CONSULTS
Subjective:   CHIEF COMPLAINT / HPI:  54 year old male admitted with worsening sob and wheeze. He also with cough and some yellow sputum. He denies any fever or hemoptysis. He has cut down on his smoking but still smokes about half pach daily.      Past Medical History:  Past Medical History:   Diagnosis Date    CAD (coronary artery disease)     H/O MI.    Family history of coronary artery disease     H/O echocardiogram 6/08 6/08 EF>55% TRACE MR, TR    H/O echocardiogram 4/15/15    EF 50-55% Mild left atrial enlargement. Normal LV systolic function. Trace MR and TR. Normal sized abdominal aorta.     History of cardiac catheterization 03/01/2017    Stenting of the LAD    History of cardiovascular stress test 6/06 6/08 10/10 7/12     7/12 LAD territory ISCHEMIA, EF 62%    History of exercise stress test 03/20/2017    treadmill    History of myocardial infarction 4/2006    History of nuclear stress test 01/26/2017    lexiscan-scar,no ischemia,EF52%,apical hdyskinesis    History of PTCA 4/2006    STENT TO RCA    History of PTCA 7/12    STENT TO LAD, RCA    Hyperlipidemia     Hypertension 7/4/2012    Hypertriglyceridemia     Type II or unspecified type diabetes mellitus without mention of complication, not stated as uncontrolled        Past Surgical History:        Procedure Laterality Date    CORONARY ANGIOPLASTY WITH STENT PLACEMENT  2006    HERNIA REPAIR         Current Medications:    Current Facility-Administered Medications: sodium zirconium cyclosilicate (LOKELMA) oral suspension 10 g, 10 g, Oral, BID  sodium bicarbonate 150 mEq in sterile water 1,000 mL infusion, , IntraVENous, Continuous  citric acid-sodium citrate (BICITRA) solution 30 mL, 30 mL, Oral, TID  [START ON 6/15/2024] sodium zirconium cyclosilicate (LOKELMA) oral suspension 10 g, 10 g, Oral, Daily  polyethylene glycol (GLYCOLAX) packet 17 g, 17 g, Oral, Daily  sodium chloride flush 0.9 % injection 5-40 mL, 5-40 mL, IntraVENous, 2 times per

## 2024-06-14 NOTE — CARE COORDINATION
06/14/24 1701   Service Assessment   Patient Orientation Alert and Oriented   Cognition Alert   History Provided By Patient   Primary Caregiver Self   Support Systems Family Members;Friends/Neighbors   PCP Verified by CM Yes   Last Visit to PCP Within last 3 months   Prior Functional Level Independent in ADLs/IADLs   Current Functional Level Assistance with the following:   Can patient return to prior living arrangement Yes   Ability to make needs known: Good   Family able to assist with home care needs: Yes   Would you like for me to discuss the discharge plan with any other family members/significant others, and if so, who? No   Financial Resources Medicaid;Medicare   Social/Functional History   Lives With Alone     CM reviewed chart and discussed in IDR. CM discussed discharge planning with pt. Pt is from home alone. Pt is independent with ADLs. Pt stated that pt plans to go home and has a lot of support from family & friends. No needs ID'd.

## 2024-06-15 LAB
ALBUMIN SERPL-MCNC: 3.8 GM/DL (ref 3.4–5)
ALP BLD-CCNC: 79 IU/L (ref 40–128)
ALT SERPL-CCNC: 16 U/L (ref 10–40)
ANION GAP SERPL CALCULATED.3IONS-SCNC: 17 MMOL/L (ref 7–16)
AST SERPL-CCNC: 37 IU/L (ref 15–37)
BASE EXCESS: 2 (ref 0–3)
BASOPHILS ABSOLUTE: 0 K/CU MM
BASOPHILS RELATIVE PERCENT: 0.1 % (ref 0–1)
BILIRUB SERPL-MCNC: 0.2 MG/DL (ref 0–1)
BUN SERPL-MCNC: 78 MG/DL (ref 6–23)
CALCIUM SERPL-MCNC: 8.7 MG/DL (ref 8.3–10.6)
CHLORIDE BLD-SCNC: 99 MMOL/L (ref 99–110)
CO2: 18 MMOL/L (ref 21–32)
COMMENT: ABNORMAL
CREAT SERPL-MCNC: 5.5 MG/DL (ref 0.9–1.3)
DIFFERENTIAL TYPE: ABNORMAL
EOSINOPHILS ABSOLUTE: 0 K/CU MM
EOSINOPHILS RELATIVE PERCENT: 0 % (ref 0–3)
GFR, ESTIMATED: 12 ML/MIN/1.73M2
GLUCOSE BLD-MCNC: 175 MG/DL (ref 70–99)
GLUCOSE BLD-MCNC: 220 MG/DL (ref 70–99)
GLUCOSE BLD-MCNC: 238 MG/DL (ref 70–99)
GLUCOSE BLD-MCNC: 259 MG/DL (ref 70–99)
GLUCOSE SERPL-MCNC: 206 MG/DL (ref 70–99)
HCO3 VENOUS: 22.5 MMOL/L (ref 22–29)
HCT VFR BLD CALC: 34 % (ref 42–52)
HEMOGLOBIN: 10.1 GM/DL (ref 13.5–18)
IMMATURE NEUTROPHIL %: 0.5 % (ref 0–0.43)
L PNEUMO AG UR QL IA: NEGATIVE
LYMPHOCYTES ABSOLUTE: 0.5 K/CU MM
LYMPHOCYTES RELATIVE PERCENT: 5.5 % (ref 24–44)
MCH RBC QN AUTO: 29.4 PG (ref 27–31)
MCHC RBC AUTO-ENTMCNC: 29.7 % (ref 32–36)
MCV RBC AUTO: 98.8 FL (ref 78–100)
MONOCYTES ABSOLUTE: 0.3 K/CU MM
MONOCYTES RELATIVE PERCENT: 3.1 % (ref 0–4)
NEUTROPHILS ABSOLUTE: 8.5 K/CU MM
NEUTROPHILS RELATIVE PERCENT: 90.8 % (ref 36–66)
NUCLEATED RBC %: 0 %
O2 SAT, VEN: 89.4 % (ref 50–70)
PCO2 VENOUS: 38 MMHG (ref 41–51)
PDW BLD-RTO: 13.8 % (ref 11.7–14.9)
PH VENOUS: 7.38 (ref 7.32–7.43)
PLATELET # BLD: 200 K/CU MM (ref 140–440)
PMV BLD AUTO: 10.7 FL (ref 7.5–11.1)
PO2 VENOUS: 61 MMHG (ref 28–48)
POTASSIUM SERPL-SCNC: 5.1 MMOL/L (ref 3.5–5.1)
RBC # BLD: 3.44 M/CU MM (ref 4.6–6.2)
S PNEUM AG CSF QL: NORMAL
SODIUM BLD-SCNC: 134 MMOL/L (ref 135–145)
TOTAL IMMATURE NEUTOROPHIL: 0.05 K/CU MM
TOTAL NUCLEATED RBC: 0 K/CU MM
TOTAL PROTEIN: 5.8 GM/DL (ref 6.4–8.2)
WBC # BLD: 9.3 K/CU MM (ref 4–10.5)

## 2024-06-15 PROCEDURE — 6360000002 HC RX W HCPCS

## 2024-06-15 PROCEDURE — 2060000000 HC ICU INTERMEDIATE R&B

## 2024-06-15 PROCEDURE — 82805 BLOOD GASES W/O2 SATURATION: CPT

## 2024-06-15 PROCEDURE — 6370000000 HC RX 637 (ALT 250 FOR IP)

## 2024-06-15 PROCEDURE — 36415 COLL VENOUS BLD VENIPUNCTURE: CPT

## 2024-06-15 PROCEDURE — 82962 GLUCOSE BLOOD TEST: CPT

## 2024-06-15 PROCEDURE — 94660 CPAP INITIATION&MGMT: CPT

## 2024-06-15 PROCEDURE — 85025 COMPLETE CBC W/AUTO DIFF WBC: CPT

## 2024-06-15 PROCEDURE — 2700000000 HC OXYGEN THERAPY PER DAY

## 2024-06-15 PROCEDURE — 6370000000 HC RX 637 (ALT 250 FOR IP): Performed by: INTERNAL MEDICINE

## 2024-06-15 PROCEDURE — 94761 N-INVAS EAR/PLS OXIMETRY MLT: CPT

## 2024-06-15 PROCEDURE — 2580000003 HC RX 258

## 2024-06-15 PROCEDURE — 80053 COMPREHEN METABOLIC PANEL: CPT

## 2024-06-15 PROCEDURE — 94640 AIRWAY INHALATION TREATMENT: CPT

## 2024-06-15 PROCEDURE — 6370000000 HC RX 637 (ALT 250 FOR IP): Performed by: HOSPITALIST

## 2024-06-15 RX ORDER — SODIUM BICARBONATE 650 MG/1
1300 TABLET ORAL 2 TIMES DAILY
Status: DISCONTINUED | OUTPATIENT
Start: 2024-06-15 | End: 2024-06-15

## 2024-06-15 RX ORDER — PREDNISONE 10 MG/1
40 TABLET ORAL DAILY
Status: DISCONTINUED | OUTPATIENT
Start: 2024-06-16 | End: 2024-06-16 | Stop reason: HOSPADM

## 2024-06-15 RX ORDER — SODIUM BICARBONATE 650 MG/1
1300 TABLET ORAL 3 TIMES DAILY
Status: DISCONTINUED | OUTPATIENT
Start: 2024-06-15 | End: 2024-06-16 | Stop reason: HOSPADM

## 2024-06-15 RX ORDER — TORSEMIDE 20 MG/1
20 TABLET ORAL DAILY
Status: DISCONTINUED | OUTPATIENT
Start: 2024-06-15 | End: 2024-06-16 | Stop reason: HOSPADM

## 2024-06-15 RX ADMIN — METHYLPREDNISOLONE SODIUM SUCCINATE 40 MG: 40 INJECTION INTRAMUSCULAR; INTRAVENOUS at 05:27

## 2024-06-15 RX ADMIN — CEFTRIAXONE 1000 MG: 1 INJECTION, POWDER, FOR SOLUTION INTRAMUSCULAR; INTRAVENOUS at 05:30

## 2024-06-15 RX ADMIN — HEPARIN SODIUM 5000 UNITS: 5000 INJECTION INTRAVENOUS; SUBCUTANEOUS at 21:08

## 2024-06-15 RX ADMIN — SODIUM BICARBONATE 1300 MG: 650 TABLET ORAL at 09:27

## 2024-06-15 RX ADMIN — HEPARIN SODIUM 5000 UNITS: 5000 INJECTION INTRAVENOUS; SUBCUTANEOUS at 05:27

## 2024-06-15 RX ADMIN — GUAIFENESIN 600 MG: 600 TABLET, EXTENDED RELEASE ORAL at 21:08

## 2024-06-15 RX ADMIN — SODIUM CHLORIDE, PRESERVATIVE FREE 10 ML: 5 INJECTION INTRAVENOUS at 09:26

## 2024-06-15 RX ADMIN — IPRATROPIUM BROMIDE AND ALBUTEROL SULFATE 1 DOSE: 2.5; .5 SOLUTION RESPIRATORY (INHALATION) at 08:06

## 2024-06-15 RX ADMIN — METOPROLOL TARTRATE 50 MG: 50 TABLET, FILM COATED ORAL at 21:08

## 2024-06-15 RX ADMIN — IPRATROPIUM BROMIDE AND ALBUTEROL SULFATE 1 DOSE: 2.5; .5 SOLUTION RESPIRATORY (INHALATION) at 15:20

## 2024-06-15 RX ADMIN — GUAIFENESIN 600 MG: 600 TABLET, EXTENDED RELEASE ORAL at 09:27

## 2024-06-15 RX ADMIN — IPRATROPIUM BROMIDE AND ALBUTEROL SULFATE 1 DOSE: 2.5; .5 SOLUTION RESPIRATORY (INHALATION) at 11:38

## 2024-06-15 RX ADMIN — HEPARIN SODIUM 5000 UNITS: 5000 INJECTION INTRAVENOUS; SUBCUTANEOUS at 13:52

## 2024-06-15 RX ADMIN — AZITHROMYCIN MONOHYDRATE 500 MG: 500 INJECTION, POWDER, LYOPHILIZED, FOR SOLUTION INTRAVENOUS at 05:35

## 2024-06-15 RX ADMIN — TORSEMIDE 20 MG: 20 TABLET ORAL at 09:27

## 2024-06-15 RX ADMIN — SODIUM BICARBONATE 1300 MG: 650 TABLET ORAL at 21:08

## 2024-06-15 RX ADMIN — AMLODIPINE BESYLATE 10 MG: 10 TABLET ORAL at 09:26

## 2024-06-15 RX ADMIN — METOPROLOL TARTRATE 50 MG: 50 TABLET, FILM COATED ORAL at 09:27

## 2024-06-15 RX ADMIN — SODIUM CHLORIDE, PRESERVATIVE FREE 10 ML: 5 INJECTION INTRAVENOUS at 21:09

## 2024-06-15 RX ADMIN — ACETAMINOPHEN 650 MG: 325 TABLET ORAL at 21:08

## 2024-06-15 RX ADMIN — SODIUM ZIRCONIUM CYCLOSILICATE 10 G: 10 POWDER, FOR SUSPENSION ORAL at 09:27

## 2024-06-15 RX ADMIN — SODIUM BICARBONATE 1300 MG: 650 TABLET ORAL at 13:53

## 2024-06-15 RX ADMIN — POLYETHYLENE GLYCOL (3350) 17 G: 17 POWDER, FOR SOLUTION ORAL at 09:27

## 2024-06-15 RX ADMIN — INSULIN LISPRO 2 UNITS: 100 INJECTION, SOLUTION INTRAVENOUS; SUBCUTANEOUS at 16:34

## 2024-06-15 RX ADMIN — ATORVASTATIN CALCIUM 40 MG: 40 TABLET, FILM COATED ORAL at 09:26

## 2024-06-15 RX ADMIN — CLOPIDOGREL BISULFATE 75 MG: 75 TABLET ORAL at 09:27

## 2024-06-15 ASSESSMENT — PAIN DESCRIPTION - LOCATION: LOCATION: HEAD

## 2024-06-15 ASSESSMENT — PAIN SCALES - GENERAL
PAINLEVEL_OUTOF10: 0
PAINLEVEL_OUTOF10: 3
PAINLEVEL_OUTOF10: 0

## 2024-06-15 ASSESSMENT — PAIN SCALES - WONG BAKER: WONGBAKER_NUMERICALRESPONSE: NO HURT

## 2024-06-16 VITALS
TEMPERATURE: 97.8 F | OXYGEN SATURATION: 100 % | BODY MASS INDEX: 36.3 KG/M2 | WEIGHT: 268 LBS | SYSTOLIC BLOOD PRESSURE: 115 MMHG | HEIGHT: 72 IN | DIASTOLIC BLOOD PRESSURE: 95 MMHG | RESPIRATION RATE: 18 BRPM | HEART RATE: 70 BPM

## 2024-06-16 LAB
ALBUMIN SERPL-MCNC: 4 GM/DL (ref 3.4–5)
ALP BLD-CCNC: 79 IU/L (ref 40–128)
ALT SERPL-CCNC: 28 U/L (ref 10–40)
ANION GAP SERPL CALCULATED.3IONS-SCNC: 18 MMOL/L (ref 7–16)
AST SERPL-CCNC: 29 IU/L (ref 15–37)
BILIRUB SERPL-MCNC: 0.3 MG/DL (ref 0–1)
BUN SERPL-MCNC: 86 MG/DL (ref 6–23)
CALCIUM SERPL-MCNC: 8.9 MG/DL (ref 8.3–10.6)
CHLORIDE BLD-SCNC: 96 MMOL/L (ref 99–110)
CO2: 21 MMOL/L (ref 21–32)
CREAT SERPL-MCNC: 5.5 MG/DL (ref 0.9–1.3)
GFR, ESTIMATED: 12 ML/MIN/1.73M2
GLUCOSE BLD-MCNC: 150 MG/DL (ref 70–99)
GLUCOSE SERPL-MCNC: 145 MG/DL (ref 70–99)
HBV CORE AB SERPL QL IA: NEGATIVE
POTASSIUM SERPL-SCNC: 4.8 MMOL/L (ref 3.5–5.1)
REASON FOR REJECTION: NORMAL
REJECTED TEST: NORMAL
SODIUM BLD-SCNC: 135 MMOL/L (ref 135–145)
TOTAL PROTEIN: 6.3 GM/DL (ref 6.4–8.2)

## 2024-06-16 PROCEDURE — 94761 N-INVAS EAR/PLS OXIMETRY MLT: CPT

## 2024-06-16 PROCEDURE — 36415 COLL VENOUS BLD VENIPUNCTURE: CPT

## 2024-06-16 PROCEDURE — 94640 AIRWAY INHALATION TREATMENT: CPT

## 2024-06-16 PROCEDURE — 6370000000 HC RX 637 (ALT 250 FOR IP): Performed by: STUDENT IN AN ORGANIZED HEALTH CARE EDUCATION/TRAINING PROGRAM

## 2024-06-16 PROCEDURE — 2580000003 HC RX 258

## 2024-06-16 PROCEDURE — 6370000000 HC RX 637 (ALT 250 FOR IP): Performed by: INTERNAL MEDICINE

## 2024-06-16 PROCEDURE — 82962 GLUCOSE BLOOD TEST: CPT

## 2024-06-16 PROCEDURE — 85025 COMPLETE CBC W/AUTO DIFF WBC: CPT

## 2024-06-16 PROCEDURE — 6360000002 HC RX W HCPCS

## 2024-06-16 PROCEDURE — 6370000000 HC RX 637 (ALT 250 FOR IP)

## 2024-06-16 PROCEDURE — 80053 COMPREHEN METABOLIC PANEL: CPT

## 2024-06-16 RX ORDER — GUAIFENESIN 600 MG/1
600 TABLET, EXTENDED RELEASE ORAL 2 TIMES DAILY
Qty: 30 TABLET | Refills: 0 | Status: SHIPPED | OUTPATIENT
Start: 2024-06-16

## 2024-06-16 RX ORDER — LEVOFLOXACIN 750 MG/1
750 TABLET, FILM COATED ORAL DAILY
Qty: 7 TABLET | Refills: 0 | Status: SHIPPED | OUTPATIENT
Start: 2024-06-16 | End: 2024-06-23

## 2024-06-16 RX ORDER — PREDNISONE 20 MG/1
40 TABLET ORAL DAILY
Qty: 10 TABLET | Refills: 0 | Status: SHIPPED | OUTPATIENT
Start: 2024-06-16 | End: 2024-06-21

## 2024-06-16 RX ORDER — SODIUM BICARBONATE 650 MG/1
1300 TABLET ORAL 3 TIMES DAILY
Qty: 180 TABLET | Refills: 0 | Status: SHIPPED | OUTPATIENT
Start: 2024-06-16 | End: 2024-07-16

## 2024-06-16 RX ADMIN — IPRATROPIUM BROMIDE AND ALBUTEROL SULFATE 1 DOSE: 2.5; .5 SOLUTION RESPIRATORY (INHALATION) at 07:20

## 2024-06-16 RX ADMIN — CEFTRIAXONE 1000 MG: 1 INJECTION, POWDER, FOR SOLUTION INTRAMUSCULAR; INTRAVENOUS at 06:34

## 2024-06-16 RX ADMIN — TORSEMIDE 20 MG: 20 TABLET ORAL at 08:15

## 2024-06-16 RX ADMIN — ATORVASTATIN CALCIUM 40 MG: 40 TABLET, FILM COATED ORAL at 08:15

## 2024-06-16 RX ADMIN — CLOPIDOGREL BISULFATE 75 MG: 75 TABLET ORAL at 08:15

## 2024-06-16 RX ADMIN — GUAIFENESIN 600 MG: 600 TABLET, EXTENDED RELEASE ORAL at 08:15

## 2024-06-16 RX ADMIN — AZITHROMYCIN MONOHYDRATE 500 MG: 500 INJECTION, POWDER, LYOPHILIZED, FOR SOLUTION INTRAVENOUS at 06:47

## 2024-06-16 RX ADMIN — AMLODIPINE BESYLATE 10 MG: 10 TABLET ORAL at 08:15

## 2024-06-16 RX ADMIN — SODIUM BICARBONATE 1300 MG: 650 TABLET ORAL at 08:14

## 2024-06-16 RX ADMIN — PREDNISONE 40 MG: 10 TABLET ORAL at 08:14

## 2024-06-16 RX ADMIN — METOPROLOL TARTRATE 50 MG: 50 TABLET, FILM COATED ORAL at 08:15

## 2024-06-16 RX ADMIN — SODIUM ZIRCONIUM CYCLOSILICATE 10 G: 10 POWDER, FOR SUSPENSION ORAL at 08:15

## 2024-06-16 RX ADMIN — HEPARIN SODIUM 5000 UNITS: 5000 INJECTION INTRAVENOUS; SUBCUTANEOUS at 06:34

## 2024-06-16 ASSESSMENT — PAIN SCALES - WONG BAKER
WONGBAKER_NUMERICALRESPONSE: NO HURT
WONGBAKER_NUMERICALRESPONSE: NO HURT

## 2024-06-16 ASSESSMENT — PAIN SCALES - GENERAL: PAINLEVEL_OUTOF10: 0

## 2024-06-16 NOTE — PROGRESS NOTES
6/15/2024 10:27 AM  Patient Room #: 2023/2023-A  Patient Name: Landry Morley    (Step 1 Done by RN if possible otherwise call Pulmonary Diagnostics)  Place patient on room air at rest for at least 30 minutes.  If patient falls below 88% before 30 minutes then you can record the level and stop.  Record room air saturation level _87_ %.  If patient is at 88% or below, they will qualify for home oxygen and you can stop.  If level does not fall below 88%, fill in level above. If indicated continue to Step 2.   Signature:___Jen Monteiro__ Date: ___06/15/2024  (Step 2&3 Done by P)  Ambulate patient on room air until saturation falls below 89%.  Record level of room air saturation with ambulation___ %.  Next, place patient back on ___lpm oxygen and ambulate, record level __%.  (Note:  this level must show improvement from room air level done with ambulation.)  If patient’s saturation on room air with ambulation is 88% or below AND patient shows improvement with oxygen during ambulation, they will qualify for home oxygen and you can stop.  If patient does not drop below 89%, then patient should have an overnight oximetry trending on room air to see if level falls below 88%.  Complete level in Step 3 below.    Room air overnight oximetry level 88 % for___  cumulative minutes.  If patient’s room air oxygen level is below <89% for any amount of time they will qualify for nocturnal home oxygen.        (Attach Night Trending Report)    Complete order below: Diagnosis:__COPD__  Home oxygen at:  Length of Need: X Lifetime ? 3 Months     __2_lpm or __%   via  [x] nasal cannula  []mask  [] other         [x]continuous []  with activity  []  Nocturnal   [] Portable Tanks []  Concentrator  [] Conserving Device        Therapist Signature:__Jen Monteiro_____     Date:  __06/15/2024_  Physician Signature:  __Electronically Signed in EMR_    Date:___  Physician Printed Name:  __Jamee Delcid__   NPI # 
    V2.0+  [facility] [place]      Name:  Landry Morley /Age/Sex: 1970  (54 y.o. male)   MRN & CSN:  [id number] & [id number] Encounter Date/Time: 2024 3:51 PM EDT    Location:  -A PCP: Raza Fairchild DO       Hospital Day: 2    Assessment and Plan:   Landry Morley is a 54 y.o. male who presents with shortness of breath    Sepsis  Acute hypoxic respiratory failure secondary to CAP,  specifically  right lower lobe pneumonia  Possible undiagnosed COPD exacerbation  O2 sat 73% on admission, requiring supplemental oxygen subsequently needing BiPAP              Consult pulmonology for BiPAP management              IV abx doxycycline, and Rocephin given in ED, changed to azithromycin and Rocephin IV for empiric coverage               Pending cultures- /blood              IV solumedrol 40 mg q 12hr.                       DuoNebs              Respiratory interventions- IS, supplemental oxygen, pulmonary hygiene             Imaging as follow: Chest x-ray shows right lower lobe pneumonia  - Procalcitonin 0.137.  Rapid COVID-negative.  Rapid flu negative.  Check urine strep and Legionella.     Elevated troponin  --Troponin 51, repeat 99  --Elevation likely demand from multiple factors, could be from hypoxia, CKD, and hypervolemia  - No chest pain on exam  - Cardiology feels elevated troponin is demand related.  Cardiology has signed off.     CAD s/p multivessel stent placement  --Continue metoprolol, Plavix, statin     Hyperlipidemia  - Continue OP statin therapy.    - Last Lipid panel 2023, shows triglyceride 82, cholesterol 113, HDL 33, LDL 64     Hypertension   - Continue home amlodipine, metoprolol  - Monitor BP     Diabetes Mellitus type II  - - Managed on Tresiba,   - Last Hgb A1C-6.3 in 2024,   - SSI  - POCT glucose qACHS  - Hypoglycemia management protocol        Acute ADAM on CKD stage IV  Anion gap metabolic acidosis  Solitary right kidney  Follows with 
   06/14/24 1515   NIV Type   NIV Started/Stopped On   Equipment Type v60   Mode Bilevel   Mask Type Full face mask   Mask Size Medium   Assessment   Pulse 68   Respirations 16   /80   SpO2 94 %   Comfort Level Good   Using Accessory Muscles No   Mask Compliance Good   Skin Assessment Clean, dry, & intact   Breath Sounds   Respiratory Pattern Regular   Settings/Measurements   IPAP 15 cmH20   CPAP/EPAP 5 cmH2O   Vt (Measured) 1135 mL   Rate Ordered 12   FiO2  35 %   I Time/ I Time % 0.9 s   Minute Volume (L/min) 18.5 Liters   Mask Leak (lpm) 12 lpm   Patient's Home Machine No   Alarm Settings   Alarms On Y   Low Pressure (cmH2O) 5 cmH2O   High Pressure (cmH2O) 20 cmH2O   Apnea (secs) 20 secs   RR Low (bpm) 10   RR High (bpm) 40 br/min       
  Patient was seen in hospital for    COPD  .  I am prescribing oxygen because the diagnosis and testing requires the patient to have oxygen in the home.  Conditions will improve or be benefited by oxygen use.  The patient is able to perform good mobility and therefore requires the use of a portable oxygen system for ambulation.    
4 Eyes Skin Assessment     NAME:  Landry Morley  YOB: 1970  MEDICAL RECORD NUMBER:  [id number]    The patient is being assessed for  Admission    I agree that at least one RN has performed a thorough Head to Toe Skin Assessment on the patient. ALL assessment sites listed below have been assessed.      Areas assessed by both nurses:    Head, Face, Ears, Shoulders, Back, Chest, Arms, Elbows, Hands, Sacrum. Buttock, Coccyx, Ischium, Legs. Feet and Heels, and Under Medical Devices         Does the Patient have a Wound? No noted wound(s)       Alex Prevention initiated by RN: No  Wound Care Orders initiated by RN: No    Pressure Injury (Stage 3,4, Unstageable, DTI, NWPT, and Complex wounds) if present, place Wound referral order by RN under : No    New Ostomies, if present place, Ostomy referral order under : No     Nurse 1 eSignature: Electronically signed by Anahy Mata RN on 6/13/24 at 12:33 PM EDT    **SHARE this note so that the co-signing nurse can place an eSignature**    Nurse 2 eSignature: Electronically signed by Shabana Smart RN on 6/13/24 at 12:47 PM EDT  
Discharge instructions reviewed. Questions answered. Belongings gathered and checked with admission list. IV removed. All new medications explained with schedule. Site WNL. Patient discharged to front via wheelchair to meet son.    
Nephrology Progress Note        [address] [facility] [address], [address]  [place] [address]  Phone: [phone]  Office Hours: 8:30AM - 4:30PM  Monday - Friday 6/16/2024 7:50 AM  Subjective:   Admit Date: 6/13/2024  PCP: Raza Fairchild DO  Interval History:   On room air now  No dyspnea    Diet: ADULT DIET; Regular; 4 carb choices (60 gm/meal)      Data:   Scheduled Meds:   torsemide  20 mg Oral Daily    sodium bicarbonate  1,300 mg Oral TID    predniSONE  40 mg Oral Daily    sodium zirconium cyclosilicate  10 g Oral Daily    polyethylene glycol  17 g Oral Daily    sodium chloride flush  5-40 mL IntraVENous 2 times per day    cefTRIAXone (ROCEPHIN) IV  1,000 mg IntraVENous Q24H    azithromycin  500 mg IntraVENous Q24H    heparin (porcine)  5,000 Units SubCUTAneous 3 times per day    ipratropium 0.5 mg-albuterol 2.5 mg  1 Dose Inhalation Q4H WA RT    guaiFENesin  600 mg Oral BID    amLODIPine  10 mg Oral Daily    atorvastatin  40 mg Oral Daily    clopidogrel  75 mg Oral Daily    metoprolol tartrate  50 mg Oral BID    insulin lispro  0-4 Units SubCUTAneous TID WC    insulin lispro  0-4 Units SubCUTAneous Nightly     Continuous Infusions:   sodium chloride      dextrose       PRN Meds:sodium chloride flush, sodium chloride, acetaminophen **OR** acetaminophen, glucose, dextrose bolus **OR** dextrose bolus, glucagon (rDNA), dextrose  I/O last 3 completed shifts:  In: 360 [P.O.:360]  Out: -   No intake/output data recorded.    Intake/Output Summary (Last 24 hours) at 6/16/2024 0750  Last data filed at 6/15/2024 0921  Gross per 24 hour   Intake 240 ml   Output --   Net 240 ml       CBC:   Recent Labs     06/14/24  0156 06/15/24  0237   WBC 13.8* 9.3   HGB 11.0* 10.1*    200       BMP:    Recent Labs     06/14/24  0156 06/15/24  0237 06/16/24  0504   * 134* 135   K 5.2* 5.1 4.8   CL 98* 99 96*   CO2 17* 18* 21   BUN 70* 78* 86*   CREATININE 5.1* 5.5* 5.5*   GLUCOSE 177* 206* 145*   CALCIUM 9.0 8.7 
Nephrology Progress Note        [address] [facility] [address], [address]  [place] [address]  Phone: [phone]  Office Hours: 8:30AM - 4:30PM  Monday - Friday        6/15/2024 8:46 AM  Subjective:   Admit Date: 6/13/2024  PCP: Raza Fairchild DO  Interval History:   On NC  Unclear what his UOP is    Diet: ADULT DIET; Regular; 4 carb choices (60 gm/meal)      Data:   Scheduled Meds:   sodium bicarbonate  1,300 mg Oral BID    torsemide  20 mg Oral Daily    sodium zirconium cyclosilicate  10 g Oral Daily    polyethylene glycol  17 g Oral Daily    sodium chloride flush  5-40 mL IntraVENous 2 times per day    cefTRIAXone (ROCEPHIN) IV  1,000 mg IntraVENous Q24H    azithromycin  500 mg IntraVENous Q24H    heparin (porcine)  5,000 Units SubCUTAneous 3 times per day    ipratropium 0.5 mg-albuterol 2.5 mg  1 Dose Inhalation Q4H WA RT    methylPREDNISolone  40 mg IntraVENous Q12H    guaiFENesin  600 mg Oral BID    amLODIPine  10 mg Oral Daily    atorvastatin  40 mg Oral Daily    clopidogrel  75 mg Oral Daily    metoprolol tartrate  50 mg Oral BID    insulin lispro  0-4 Units SubCUTAneous TID WC    insulin lispro  0-4 Units SubCUTAneous Nightly     Continuous Infusions:   sodium chloride      dextrose       PRN Meds:sodium chloride flush, sodium chloride, acetaminophen **OR** acetaminophen, glucose, dextrose bolus **OR** dextrose bolus, glucagon (rDNA), dextrose  I/O last 3 completed shifts:  In: 2714.1 [P.O.:720; I.V.:1609.2; IV Piggyback:384.9]  Out: -   No intake/output data recorded.    Intake/Output Summary (Last 24 hours) at 6/15/2024 0846  Last data filed at 6/14/2024 2138  Gross per 24 hour   Intake 1355.31 ml   Output --   Net 1355.31 ml       CBC:   Recent Labs     06/13/24  0748 06/14/24  0156 06/15/24  0237   WBC 14.6* 13.8* 9.3   HGB 12.5* 11.0* 10.1*    298 200       BMP:    Recent Labs     06/13/24  0748 06/14/24  0156 06/15/24  0237    132* 134*   K 4.9 5.2* 5.1    98* 99   CO2 15* 17* 
Obtain renal US  Obtain UA and urine electrolytes  Start bicarb drip  Full note to follow    Thank you  
Ok to take patient off of bipap. Patient placed on 2L NC, and transported to 2023. No distress noted. No complications. SP02 >92% on 2L NC.  
Pt off o2 for about 25 minutes and maintaining o2 at 91%. I will continue to monitor.  
Pt was encouraged to use his ISE for lung expansion.  
The pt is not tolerating his BIPAP, he states he feels like he is suffocating on the BIPAP. The Hospitalist was Perfect Served for an order for a repeat VBG since the last VBG was at 07:30. RN is aware of pt not tolerating BIPAP.  
pulmonary      SUBJECTIVE:  he feels better     OBJECTIVE    VITALS:  /89   Pulse 69   Temp 98 °F (36.7 °C) (Oral)   Resp 18   Ht 1.829 m (6' 0.01\")   Wt 123 kg (271 lb 2.7 oz)   SpO2 95%   BMI 36.77 kg/m²   HEAD AND FACE EXAM:  No throat injection, no active exudate,no thrush  NECK EXAM;No JVD, no masses, symmetrical  CHEST EXAM; Expansion equal and symmetrical, no masses  LUNG EXAM; Good breath sounds bilaterally. There are expiratory wheezes both lungs, there are crackles at both lung bases  CARDIOVASCULAR EXAM: Positive S1 and S2, no S3 or S4, no clicks ,no murmurs  RIGHT AND LEFT LOWER EXTRIMITY EXAM: No edema, no swelling, no inflamation  CNS EXAM: Alert and oriented X3          LABS   Lab Results   Component Value Date    WBC 9.3 06/15/2024    HGB 10.1 (L) 06/15/2024    HCT 34.0 (L) 06/15/2024    MCV 98.8 06/15/2024     06/15/2024     Lab Results   Component Value Date    CREATININE 5.5 (H) 06/15/2024    BUN 78 (H) 06/15/2024     (L) 06/15/2024    K 5.1 06/15/2024    CL 99 06/15/2024    CO2 18 (L) 06/15/2024     Lab Results   Component Value Date    INR 0.87 02/28/2017    PROTIME 9.9 02/28/2017          Lab Results   Component Value Date/Time    PHOS 5.4 07/10/2023 09:51 AM    PHOS 3.6 04/24/2023 11:30 AM    PHOS 4.2 03/08/2023 11:12 AM        Recent Labs     06/13/24  1215   PH 7.27*   PO2ART 77*   [id number] 30.0*   O2SAT 91.9*         Wt Readings from Last 3 Encounters:   06/14/24 123 kg (271 lb 2.7 oz)   05/21/24 122 kg (269 lb)   03/21/24 124.7 kg (275 lb)               ASSESMENT  Ac resp failure  Pneumonia  Ac bronchospasm prob copd        PLAN  Full pft as outpt  Bd rx  antibx    6/15/2024  Octavio Wiggins MD, M.D.  
pulmonary      SUBJECTIVE:  he feels good     OBJECTIVE    VITALS:  BP (!) 127/98   Pulse 68   Temp 97.9 °F (36.6 °C) (Oral)   Resp 18   Ht 1.829 m (6' 0.01\")   Wt 121.6 kg (268 lb)   SpO2 95%   BMI 36.34 kg/m²   HEAD AND FACE EXAM:  No throat injection, no active exudate,no thrush  NECK EXAM;No JVD, no masses, symmetrical  CHEST EXAM; Expansion equal and symmetrical, no masses  LUNG EXAM; Good breath sounds bilaterally. There are expiratory wheezes both lungs, there are crackles at both lung bases  CARDIOVASCULAR EXAM: Positive S1 and S2, no S3 or S4, no clicks ,no murmurs  RIGHT AND LEFT LOWER EXTRIMITY EXAM: No edema, no swelling, no inflamation  CNS EXAM: Alert and oriented X3          LABS   Lab Results   Component Value Date    WBC 9.3 06/15/2024    HGB 10.1 (L) 06/15/2024    HCT 34.0 (L) 06/15/2024    MCV 98.8 06/15/2024     06/15/2024     Lab Results   Component Value Date    CREATININE 5.5 (H) 06/16/2024    BUN 86 (H) 06/16/2024     06/16/2024    K 4.8 06/16/2024    CL 96 (L) 06/16/2024    CO2 21 06/16/2024     Lab Results   Component Value Date    INR 0.87 02/28/2017    PROTIME 9.9 02/28/2017          Lab Results   Component Value Date/Time    PHOS 5.4 07/10/2023 09:51 AM    PHOS 3.6 04/24/2023 11:30 AM    PHOS 4.2 03/08/2023 11:12 AM        Recent Labs     06/13/24  1215   PH 7.27*   PO2ART 77*   COL2PCD 30.0*   O2SAT 91.9*         Wt Readings from Last 3 Encounters:   06/16/24 121.6 kg (268 lb)   05/21/24 122 kg (269 lb)   03/21/24 124.7 kg (275 lb)               ASSESMENT  Ac resp failure  Pneumonia  Ac bronchospasm        PLAN  Bd rx  Antibx  Hopefully dc soon    6/16/2024  Octavio Wiggins MD, MDONTE.  
kidney  Follows with   Creatine appears higher than baseline at 5.5 on admission.   Hold torsemide, nephrology consulted  - Nephrology following.  Given IV sodium bicarb.  Starting oral bicarb.  Improved creatinine 5.1.  Lactate WNL.  Urine ketones negative.  Renal US: Unremarkable right kidney.    Hyperkalemia  - Given Lokelma.    Comment: Please note this report has been produced using speech recognition software and may contain errors related to that system including errors in grammar, punctuation, and spelling, as well as words and phrases that may be inappropriate. If there are any questions or concerns please feel free to contact the dictating provider for clarification.      Diet ADULT DIET; Regular; 4 carb choices (60 gm/meal)    DVT Prophylaxis [] Lovenox, [x] Heparin, [] Eliquis, [] Xarelto  [] SCDs     Code Status Full Code       Disposition   Expected Disposition: Home  Estimated Date of Discharge: 6/16  Patient requires continued admission due to hypoxia from pneumonia     Personally reviewed Lab Studies and Imaging       Subjective/Interval history:   Chief Complaint: Shortness of Breath (Patient arrives on 15L NRB)     Patient seen at bedside, on 4L nc oxygen, breathing is much improved    Review of Systems:    Negative unless mentioned above    Objective:     Intake/Output Summary (Last 24 hours) at 6/15/2024 1008  Last data filed at 6/15/2024 0921  Gross per 24 hour   Intake 1585.31 ml   Output --   Net 1585.31 ml        Vitals:   /88   Pulse 97   Temp 97.8 °F (36.6 °C) (Oral)   Resp 26   Ht 1.829 m (6' 0.01\")   Wt 123 kg (271 lb 2.7 oz)   SpO2 96%   BMI 36.77 kg/m²     Physical Exam:   General: NAD, sitting in chair  Eyes: no discharge  HENT: NCAT  Cardiovascular: RRR  Respiratory: on nasal canula, has some lower lobe wheeze  Gastrointestinal: Soft, nondistended, non tender,   Genitourinary: no suprapubic tenderness  Musculoskeletal: some pitting edema in LE, nontender  Skin:

## 2024-06-16 NOTE — PLAN OF CARE
Problem: Discharge Planning  Goal: Discharge to home or other facility with appropriate resources  6/13/2024 1241 by Anahy Mata RN  Outcome: Progressing     Problem: Pain  Goal: Verbalizes/displays adequate comfort level or baseline comfort level  6/13/2024 1241 by Anahy Mata RN  Outcome: Progressing     Problem: Safety - Adult  Goal: Free from fall injury  6/13/2024 2013 by Irais Bishop RN  Outcome: Progressing  6/13/2024 1241 by Anahy Mata RN  Outcome: Progressing     Problem: Respiratory - Adult  Goal: Achieves optimal ventilation and oxygenation  Outcome: Progressing     
  Problem: Discharge Planning  Goal: Discharge to home or other facility with appropriate resources  6/16/2024 0840 by Shabana Smart RN  Outcome: Adequate for Discharge  6/16/2024 0839 by Shabana Smart RN  Outcome: Progressing  6/15/2024 2248 by Dane Yi RN  Outcome: Progressing     Problem: Pain  Goal: Verbalizes/displays adequate comfort level or baseline comfort level  6/16/2024 0840 by Shabana Smart RN  Outcome: Adequate for Discharge  6/16/2024 0839 by Shabana Smart RN  Outcome: Progressing  6/15/2024 2248 by Dane Yi RN  Outcome: Progressing     Problem: Safety - Adult  Goal: Free from fall injury  6/16/2024 0840 by Shabana Smart RN  Outcome: Adequate for Discharge  6/16/2024 0839 by Shabana Smart RN  Outcome: Progressing  6/15/2024 2248 by Dane Yi RN  Outcome: Progressing     Problem: Respiratory - Adult  Goal: Achieves optimal ventilation and oxygenation  6/16/2024 0840 by Shabana Smart RN  Outcome: Adequate for Discharge  6/16/2024 0839 by Shabana Smart RN  Outcome: Progressing  6/15/2024 2248 by Dane Yi RN  Outcome: Progressing     Problem: Neurosensory - Adult  Goal: Achieves stable or improved neurological status  6/16/2024 0840 by Shabana Smart RN  Outcome: Adequate for Discharge  6/16/2024 0839 by Shabana Smart RN  Outcome: Progressing  Goal: Absence of seizures  6/16/2024 0840 by Shabana Smart RN  Outcome: Adequate for Discharge  6/16/2024 0839 by Shabana Smart RN  Outcome: Progressing  Goal: Remains free of injury related to seizures activity  6/16/2024 0840 by Shabana Smart RN  Outcome: Adequate for Discharge  6/16/2024 0839 by Shabana Smart RN  Outcome: Progressing  Goal: Achieves maximal functionality and self care  6/16/2024 0840 by Shabana Smart, RN  Outcome: Adequate for Discharge  6/16/2024 0839 by Shabana Smart RN  Outcome: Progressing     Problem: Cardiovascular - Adult  Goal: Maintains 
  Problem: Discharge Planning  Goal: Discharge to home or other facility with appropriate resources  Outcome: Progressing     Problem: Pain  Goal: Verbalizes/displays adequate comfort level or baseline comfort level  6/15/2024 2248 by Dane Yi RN  Outcome: Progressing  6/15/2024 0924 by Robinson Villaseñor RN  Outcome: Progressing     Problem: Safety - Adult  Goal: Free from fall injury  6/15/2024 2248 by Dane Yi RN  Outcome: Progressing  6/15/2024 0924 by Robinson Villaseñor RN  Outcome: Progressing     Problem: Respiratory - Adult  Goal: Achieves optimal ventilation and oxygenation  6/15/2024 2248 by Dane Yi RN  Outcome: Progressing  6/15/2024 0924 by Robinson Villaseñor RN  Outcome: Progressing     
  Problem: Discharge Planning  Goal: Discharge to home or other facility with appropriate resources  Outcome: Progressing     Problem: Pain  Goal: Verbalizes/displays adequate comfort level or baseline comfort level  Outcome: Progressing     Problem: Pain  Goal: Verbalizes/displays adequate comfort level or baseline comfort level  Outcome: Progressing     Problem: Safety - Adult  Goal: Free from fall injury  6/14/2024 0744 by Anahy Martinez RN  Outcome: Progressing  6/13/2024 2013 by Irais Bishop RN  Outcome: Progressing     Problem: Respiratory - Adult  Goal: Achieves optimal ventilation and oxygenation  6/14/2024 0744 by Anahy Martinez RN  Outcome: Progressing  6/13/2024 2013 by Irais Bishop RN  Outcome: Progressing     Problem: Neurosensory - Adult  Goal: Achieves stable or improved neurological status  Outcome: Progressing  Goal: Absence of seizures  Outcome: Progressing  Goal: Remains free of injury related to seizures activity  Outcome: Progressing  Goal: Achieves maximal functionality and self care  Outcome: Progressing     Problem: Cardiovascular - Adult  Goal: Maintains optimal cardiac output and hemodynamic stability  Outcome: Progressing  Goal: Absence of cardiac dysrhythmias or at baseline  Outcome: Progressing     Problem: Skin/Tissue Integrity - Adult  Goal: Skin integrity remains intact  Outcome: Progressing  Goal: Incisions, wounds, or drain sites healing without S/S of infection  Outcome: Progressing  Goal: Oral mucous membranes remain intact  Outcome: Progressing     Problem: Musculoskeletal - Adult  Goal: Return mobility to safest level of function  Outcome: Progressing  Goal: Return ADL status to a safe level of function  Outcome: Progressing     Problem: Gastrointestinal - Adult  Goal: Minimal or absence of nausea and vomiting  Outcome: Progressing  Goal: Maintains or returns to baseline bowel function  Outcome: Progressing  Goal: Maintains adequate nutritional intake  Outcome: 
  Problem: Safety - Adult  Goal: Free from fall injury  Outcome: Progressing     Problem: Respiratory - Adult  Goal: Achieves optimal ventilation and oxygenation  6/15/2024 0924 by Robinson Villaseñor RN  Outcome: Progressing  6/14/2024 2043 by Irais Bishop RN  Outcome: Progressing     Problem: Infection - Adult  Goal: Absence of infection at discharge  6/14/2024 2043 by Irais Bishop, RN  Outcome: Progressing     
function  Outcome: Progressing  Goal: Maintains adequate nutritional intake  Outcome: Progressing     Problem: Genitourinary - Adult  Goal: Absence of urinary retention  Outcome: Progressing     Problem: Infection - Adult  Goal: Absence of infection at discharge  Outcome: Progressing  Goal: Absence of infection during hospitalization  Outcome: Progressing     Problem: Metabolic/Fluid and Electrolytes - Adult  Goal: Electrolytes maintained within normal limits  Outcome: Progressing  Goal: Hemodynamic stability and optimal renal function maintained  Outcome: Progressing  Goal: Glucose maintained within prescribed range  Outcome: Progressing     Problem: Hematologic - Adult  Goal: Maintains hematologic stability  Outcome: Progressing     
status  6/14/2024 0744 by Anahy Martinez RN  Outcome: Progressing  Flowsheets (Taken 6/14/2024 0700)  Achieves stable or improved neurological status: Assess for and report changes in neurological status  Goal: Absence of seizures  6/14/2024 0744 by Anahy Martinez RN  Outcome: Progressing  Flowsheets (Taken 6/14/2024 0700)  Absence of seizures:   Monitor for seizure activity.  If seizure occurs, document type and location of movements and any associated apnea   If seizure occurs, turn head to side and suction secretions as needed   Support airway/breathing, administer oxygen as needed  Goal: Remains free of injury related to seizures activity  6/14/2024 0744 by Anahy Martinez RN  Outcome: Progressing  Flowsheets (Taken 6/14/2024 0700)  Remains free of injury related to seizure activity: Maintain airway, patient safety  and administer oxygen as ordered  Goal: Achieves maximal functionality and self care  6/14/2024 0744 by Anahy Martinez RN  Outcome: Progressing  Flowsheets (Taken 6/14/2024 0700)  Achieves maximal functionality and self care: Monitor swallowing and airway patency with patient fatigue and changes in neurological status     Problem: Cardiovascular - Adult  Goal: Maintains optimal cardiac output and hemodynamic stability  6/14/2024 0744 by Anahy Martinez RN  Outcome: Progressing  Flowsheets (Taken 6/14/2024 0700)  Maintains optimal cardiac output and hemodynamic stability:   Monitor blood pressure and heart rate   Monitor urine output and notify Licensed Independent Practitioner for values outside of normal range  Goal: Absence of cardiac dysrhythmias or at baseline  6/14/2024 0744 by Anahy Martinez RN  Outcome: Progressing  Flowsheets (Taken 6/14/2024 0700)  Absence of cardiac dysrhythmias or at baseline: Monitor cardiac rate and rhythm     Problem: Skin/Tissue Integrity - Adult  Goal: Skin integrity remains intact  6/14/2024 0744 by Anahy Martinez RN  Outcome: Progressing  Flowsheets

## 2024-06-16 NOTE — DISCHARGE SUMMARY
V2.0  Discharge Summary    Name:  Landry Morley /Age/Sex: 1970 (54 y.o. male)   Admit Date: 2024  Discharge Date: 24    MRN & CSN:  [id number] & [id number] Encounter Date and Time 24 7:10 AM EDT    Attending:  Jamee Delcid MD Discharging Provider: Jamee Delcid MD       Hospital Course:     Brief HPI: Landry Morley is a 54 y.o. male who presented with shortness of breath    Brief Problem Based Course:     Acute hypoxic respiratory failure secondary to CAP,  specifically  right lower lobe pneumonia  Possible undiagnosed COPD exacerbation  O2 sat 73% on admission, requiring supplemental oxygen subsequently needing BiPAP              IV abx doxycycline, and Rocephin given in ED, changed to azithromycin and Rocephin IV for empiric coverage discharged on levaquin              IV solumedrol 40 mg q 12hr-changed to oral daily prednisone                      DuoNebs              Respiratory interventions- IS, supplemental oxygen, pulmonary hygiene             Imaging as follow: Chest x-ray shows right lower lobe pneumonia  - Procalcitonin 0.137.  Rapid COVID-negative.  Rapid flu negative.  Check urine strep and Legionella.     Elevated troponin  --Troponin 51, repeat 99  --Elevation likely demand from multiple factors, could be from hypoxia, CKD, and hypervolemia  - No chest pain on exam  - Cardiology feels elevated troponin is demand related.  Cardiology has signed off.     CAD s/p multivessel stent placement  --Continue metoprolol, Plavix, statin     Hyperlipidemia  - Continue OP statin therapy.    - Last Lipid panel 2023, shows triglyceride 82, cholesterol 113, HDL 33, LDL 64     Hypertension   - Continue home amlodipine, metoprolol  - Monitor BP     Diabetes Mellitus type II  - - Managed on Tresiba,   - Last Hgb A1C-6.3 in 2024,   - SSI  - POCT glucose qACHS  - Hypoglycemia management protocol         Acute ADAM on CKD stage IV  Anion gap metabolic acidosis  Solitary right

## 2024-06-18 LAB
CULTURE: NORMAL
CULTURE: NORMAL
Lab: NORMAL
Lab: NORMAL
SPECIMEN: NORMAL
SPECIMEN: NORMAL

## 2024-06-20 ENCOUNTER — OFFICE VISIT (OUTPATIENT)
Dept: FAMILY MEDICINE CLINIC | Age: 54
End: 2024-06-20

## 2024-06-20 VITALS
DIASTOLIC BLOOD PRESSURE: 74 MMHG | RESPIRATION RATE: 16 BRPM | OXYGEN SATURATION: 97 % | BODY MASS INDEX: 36.84 KG/M2 | WEIGHT: 272 LBS | HEIGHT: 72 IN | HEART RATE: 61 BPM | SYSTOLIC BLOOD PRESSURE: 132 MMHG

## 2024-06-20 DIAGNOSIS — Z09 HOSPITAL DISCHARGE FOLLOW-UP: Primary | ICD-10-CM

## 2024-06-20 DIAGNOSIS — I10 ESSENTIAL HYPERTENSION: ICD-10-CM

## 2024-06-20 DIAGNOSIS — N18.4 CHRONIC KIDNEY DISEASE, STAGE IV (SEVERE) (HCC): ICD-10-CM

## 2024-06-20 DIAGNOSIS — E11.21 DIABETIC NEPHROPATHY ASSOCIATED WITH TYPE 2 DIABETES MELLITUS (HCC): ICD-10-CM

## 2024-06-20 DIAGNOSIS — J18.9 PNEUMONIA OF RIGHT LOWER LOBE DUE TO INFECTIOUS ORGANISM: ICD-10-CM

## 2024-06-20 PROBLEM — A41.9 SEPSIS (HCC): Status: RESOLVED | Noted: 2024-06-13 | Resolved: 2024-06-20

## 2024-06-20 RX ORDER — ASPIRIN 81 MG/1
81 TABLET ORAL DAILY
COMMUNITY

## 2024-06-20 RX ORDER — CHOLECALCIFEROL (VITAMIN D3) 1250 MCG
1 CAPSULE ORAL DAILY
COMMUNITY

## 2024-06-20 NOTE — PROGRESS NOTES
Post-Discharge Transitional Care  Follow Up      Landry Morley   YOB: 1970    Date of Office Visit:  6/20/2024  Date of Hospital Admission: 6/13/24  Date of Hospital Discharge: 6/16/24  Risk of hospital readmission (high >=14%. Medium >=10%) :Readmission Risk Score: 17.8      Care management risk score Rising risk (score 2-5) and Complex Care (Scores >=6): No Risk Score On File     Non face to face  following discharge, date last encounter closed (first attempt may have been earlier): *No documented post hospital discharge outreach found in the last 14 days    Call initiated 2 business days of discharge: *No response recorded in the last 14 days    ASSESSMENT/PLAN:   Hospital discharge follow-up  -     [place] DISCHARGE MEDS RECONCILED W/ CURRENT OUTPATIENT MED LIST  Pneumonia of right lower lobe due to infectious organism  Comments:  stable vitals today and pulse ox up to 97% on room air.  Continue levaquin and prednisone until course complete.   Mucinex PRN for symptom relief  Essential hypertension  Assessment & Plan:  -stable and well controlled  -continue current medications  Diabetic nephropathy associated with type 2 diabetes mellitus (HCC)  Assessment & Plan:   -stable on current medications  -informed patient that his blood sugars may be more elevated than normal currently due to steroid use, but should come back to normal within a week of discontinuing steroids.  -continue current medications for now.   Chronic kidney disease, stage IV (severe) (HCC)  Assessment & Plan:   -reviewed hospital labs and appeared to be at baseline GFR during hospitalization  -continue current meds and avoid nephrotoxic meds.  -follow up with nephrology next week      Medical Decision Making: high complexity  Return if symptoms worsen or fail to improve.           Subjective:   HPI:  Follow up of Hospital problems/diagnosis(es): acurte hypoxic respiratory faiure 2/2 CAP of right lower lobe PNA, elevated troponin,

## 2024-06-20 NOTE — ASSESSMENT & PLAN NOTE
-reviewed hospital labs and appeared to be at baseline GFR during hospitalization  -continue current meds and avoid nephrotoxic meds.  -follow up with nephrology next week

## 2024-06-20 NOTE — ASSESSMENT & PLAN NOTE
-stable on current medications  -informed patient that his blood sugars may be more elevated than normal currently due to steroid use, but should come back to normal within a week of discontinuing steroids.  -continue current medications for now.

## 2024-06-24 ENCOUNTER — OFFICE VISIT (OUTPATIENT)
Dept: FAMILY MEDICINE CLINIC | Age: 54
End: 2024-06-24
Payer: MEDICARE

## 2024-06-24 ENCOUNTER — HOSPITAL ENCOUNTER (OUTPATIENT)
Age: 54
Discharge: HOME | End: 2024-06-24
Payer: MEDICARE

## 2024-06-24 VITALS
HEIGHT: 72 IN | SYSTOLIC BLOOD PRESSURE: 122 MMHG | DIASTOLIC BLOOD PRESSURE: 70 MMHG | BODY MASS INDEX: 35.24 KG/M2 | HEART RATE: 66 BPM | OXYGEN SATURATION: 96 % | WEIGHT: 260.2 LBS

## 2024-06-24 DIAGNOSIS — N18.4 CHRONIC KIDNEY DISEASE, STAGE IV (SEVERE) (HCC): ICD-10-CM

## 2024-06-24 DIAGNOSIS — E11.21 DIABETIC NEPHROPATHY ASSOCIATED WITH TYPE 2 DIABETES MELLITUS (HCC): ICD-10-CM

## 2024-06-24 DIAGNOSIS — F41.9 ANXIETY: ICD-10-CM

## 2024-06-24 DIAGNOSIS — N17.9 ACUTE RENAL FAILURE, UNSPECIFIED ACUTE RENAL FAILURE TYPE (HCC): ICD-10-CM

## 2024-06-24 DIAGNOSIS — N04.9 NEPHROTIC SYNDROME: ICD-10-CM

## 2024-06-24 DIAGNOSIS — K59.00 CONSTIPATION, UNSPECIFIED CONSTIPATION TYPE: ICD-10-CM

## 2024-06-24 DIAGNOSIS — J18.9 PNEUMONIA OF RIGHT LOWER LOBE DUE TO INFECTIOUS ORGANISM: Primary | ICD-10-CM

## 2024-06-24 DIAGNOSIS — J96.01 ACUTE RESPIRATORY FAILURE WITH HYPOXIA (HCC): ICD-10-CM

## 2024-06-24 DIAGNOSIS — Q60.2 RENAL AGENESIS: ICD-10-CM

## 2024-06-24 LAB
ALBUMIN SERPL-MCNC: 3.8 GM/DL (ref 3.4–5)
ANION GAP SERPL CALCULATED.3IONS-SCNC: 17 MMOL/L (ref 7–16)
BUN SERPL-MCNC: 71 MG/DL (ref 6–23)
CALCIUM SERPL-MCNC: 8 MG/DL (ref 8.3–10.6)
CHLORIDE BLD-SCNC: 102 MMOL/L (ref 99–110)
CO2: 23 MMOL/L (ref 21–32)
CREAT SERPL-MCNC: 5.2 MG/DL (ref 0.9–1.3)
CREATININE URINE: 34.5 MG/DL (ref 39–259)
GFR, ESTIMATED: 12 ML/MIN/1.73M2
GLUCOSE SERPL-MCNC: 119 MG/DL (ref 70–99)
PHOSPHORUS: 5.1 MG/DL (ref 2.5–4.9)
POTASSIUM SERPL-SCNC: 3.7 MMOL/L (ref 3.5–5.1)
PROT/CREAT RATIO, UR: 5
SODIUM BLD-SCNC: 142 MMOL/L (ref 135–145)
URINE TOTAL PROTEIN: 172.5 MG/DL

## 2024-06-24 PROCEDURE — 84156 ASSAY OF PROTEIN URINE: CPT

## 2024-06-24 PROCEDURE — 3078F DIAST BP <80 MM HG: CPT | Performed by: STUDENT IN AN ORGANIZED HEALTH CARE EDUCATION/TRAINING PROGRAM

## 2024-06-24 PROCEDURE — 36415 COLL VENOUS BLD VENIPUNCTURE: CPT

## 2024-06-24 PROCEDURE — 99214 OFFICE O/P EST MOD 30 MIN: CPT | Performed by: STUDENT IN AN ORGANIZED HEALTH CARE EDUCATION/TRAINING PROGRAM

## 2024-06-24 PROCEDURE — G8427 DOCREV CUR MEDS BY ELIG CLIN: HCPCS | Performed by: STUDENT IN AN ORGANIZED HEALTH CARE EDUCATION/TRAINING PROGRAM

## 2024-06-24 PROCEDURE — G8417 CALC BMI ABV UP PARAM F/U: HCPCS | Performed by: STUDENT IN AN ORGANIZED HEALTH CARE EDUCATION/TRAINING PROGRAM

## 2024-06-24 PROCEDURE — 3074F SYST BP LT 130 MM HG: CPT | Performed by: STUDENT IN AN ORGANIZED HEALTH CARE EDUCATION/TRAINING PROGRAM

## 2024-06-24 PROCEDURE — 1111F DSCHRG MED/CURRENT MED MERGE: CPT | Performed by: STUDENT IN AN ORGANIZED HEALTH CARE EDUCATION/TRAINING PROGRAM

## 2024-06-24 PROCEDURE — 82570 ASSAY OF URINE CREATININE: CPT

## 2024-06-24 PROCEDURE — 1036F TOBACCO NON-USER: CPT | Performed by: STUDENT IN AN ORGANIZED HEALTH CARE EDUCATION/TRAINING PROGRAM

## 2024-06-24 PROCEDURE — 80069 RENAL FUNCTION PANEL: CPT

## 2024-06-24 PROCEDURE — 3017F COLORECTAL CA SCREEN DOC REV: CPT | Performed by: STUDENT IN AN ORGANIZED HEALTH CARE EDUCATION/TRAINING PROGRAM

## 2024-06-24 RX ORDER — HYDROXYZINE PAMOATE 25 MG/1
25 CAPSULE ORAL EVERY 12 HOURS PRN
Qty: 30 CAPSULE | Refills: 0 | Status: SHIPPED | OUTPATIENT
Start: 2024-06-24 | End: 2024-06-26 | Stop reason: ALTCHOICE

## 2024-06-24 NOTE — PROGRESS NOTES
7/8/2024    Landry Morley    Chief Complaint   Patient presents with    Annual Exam    Anxiety     Says since he's been out of the hospital he has increased anxiety and trouble sleeping, fear of being alone, over thinking, trouble concentrating.        HPI  History was obtained from patient. Accompanied by daughter and grand daughter  Landry is a 54 y.o. male with a PMHx as listed below who presents today for annual exam.     Hypoxic resp failure admitted to [facility]  Potential copd with recent admission on imaging.     Signficnat anxiety due to recent hospitalization.   Anxious about his health problems. Fear of being alone.     Patient has tried doing some deep breathing but patient has been having some issues controlling his anxiety    Patient is having some mild snoring. But no issues with breathing since leaving hospital          1. Pneumonia of right lower lobe due to infectious organism    2. Chronic kidney disease, stage IV (severe) (HCC)    3. Acute respiratory failure with hypoxia (HCC)    4. Anxiety             REVIEW OF SYMPTOMS    Review of Systems   Constitutional:  Negative for chills and fatigue.   HENT:  Negative for congestion and sore throat.    Respiratory:  Negative for shortness of breath and wheezing.    Cardiovascular:  Negative for chest pain and palpitations.   Gastrointestinal:  Negative for abdominal pain and nausea.   Genitourinary:  Negative for frequency and urgency.   Neurological:  Negative for light-headedness.       PAST MEDICAL HISTORY  Past Medical History:   Diagnosis Date    Anxiety associated with depression 6/26/2024    CAD (coronary artery disease)     H/O MI.    Family history of coronary artery disease     H/O echocardiogram 06/2008 6/08 EF>55% TRACE MR, TR    H/O echocardiogram 04/15/2015    EF 50-55% Mild left atrial enlargement. Normal LV systolic function. Trace MR and TR. Normal sized abdominal aorta.     History of cardiac catheterization 03/01/2017    Stenting of the

## 2024-06-26 PROBLEM — F41.8 ANXIETY ASSOCIATED WITH DEPRESSION: Status: ACTIVE | Noted: 2024-06-26

## 2024-07-08 ENCOUNTER — HOSPITAL ENCOUNTER (OUTPATIENT)
Age: 54
Discharge: HOME | End: 2024-07-08
Payer: MEDICARE

## 2024-07-08 DIAGNOSIS — F41.8 ANXIETY ASSOCIATED WITH DEPRESSION: ICD-10-CM

## 2024-07-08 DIAGNOSIS — N18.4 CHRONIC KIDNEY DISEASE, STAGE IV (SEVERE) (HCC): ICD-10-CM

## 2024-07-08 DIAGNOSIS — E87.5 HYPERPOTASSEMIA: ICD-10-CM

## 2024-07-08 DIAGNOSIS — N17.0 ACUTE RENAL FAILURE WITH TUBULAR NECROSIS (HCC): ICD-10-CM

## 2024-07-08 DIAGNOSIS — N04.9 NEPHROTIC SYNDROME: ICD-10-CM

## 2024-07-08 DIAGNOSIS — E11.21 DIABETIC NEPHROPATHY ASSOCIATED WITH TYPE 2 DIABETES MELLITUS (HCC): ICD-10-CM

## 2024-07-08 DIAGNOSIS — Q60.2 RENAL AGENESIS: ICD-10-CM

## 2024-07-08 LAB
ANION GAP SERPL CALCULATED.3IONS-SCNC: 14 MMOL/L (ref 7–16)
BUN SERPL-MCNC: 47 MG/DL (ref 6–23)
CALCIUM SERPL-MCNC: 8.4 MG/DL (ref 8.3–10.6)
CHLORIDE BLD-SCNC: 103 MMOL/L (ref 99–110)
CO2: 22 MMOL/L (ref 21–32)
CREAT SERPL-MCNC: 4.8 MG/DL (ref 0.9–1.3)
GFR, ESTIMATED: 14 ML/MIN/1.73M2
GLUCOSE SERPL-MCNC: 93 MG/DL (ref 70–99)
POTASSIUM SERPL-SCNC: 3.9 MMOL/L (ref 3.5–5.1)
SODIUM BLD-SCNC: 139 MMOL/L (ref 135–145)

## 2024-07-08 PROCEDURE — 80048 BASIC METABOLIC PNL TOTAL CA: CPT

## 2024-07-08 PROCEDURE — 36415 COLL VENOUS BLD VENIPUNCTURE: CPT

## 2024-07-08 ASSESSMENT — ENCOUNTER SYMPTOMS
ABDOMINAL PAIN: 0
SORE THROAT: 0
NAUSEA: 0
SHORTNESS OF BREATH: 0
WHEEZING: 0

## 2024-07-22 ENCOUNTER — HOSPITAL ENCOUNTER (OUTPATIENT)
Dept: GENERAL RADIOLOGY | Age: 54
Discharge: HOME | End: 2024-07-22
Payer: MEDICARE

## 2024-07-22 ENCOUNTER — HOSPITAL ENCOUNTER (OUTPATIENT)
Age: 54
Discharge: HOME | End: 2024-07-22
Payer: MEDICARE

## 2024-07-22 DIAGNOSIS — K59.00 CONSTIPATION, UNSPECIFIED CONSTIPATION TYPE: ICD-10-CM

## 2024-07-22 DIAGNOSIS — N18.4 CHRONIC KIDNEY DISEASE, STAGE IV (SEVERE) (HCC): ICD-10-CM

## 2024-07-22 DIAGNOSIS — Q60.2 RENAL AGENESIS: ICD-10-CM

## 2024-07-22 DIAGNOSIS — E11.21 DIABETIC NEPHROPATHY ASSOCIATED WITH TYPE 2 DIABETES MELLITUS (HCC): ICD-10-CM

## 2024-07-22 DIAGNOSIS — N04.9 NEPHROTIC SYNDROME: ICD-10-CM

## 2024-07-22 DIAGNOSIS — N17.9 ACUTE RENAL FAILURE, UNSPECIFIED ACUTE RENAL FAILURE TYPE (HCC): ICD-10-CM

## 2024-07-22 DIAGNOSIS — J18.9 PNEUMONIA OF RIGHT LOWER LOBE DUE TO INFECTIOUS ORGANISM: ICD-10-CM

## 2024-07-22 LAB
ALBUMIN SERPL-MCNC: 4.3 GM/DL (ref 3.4–5)
ANION GAP SERPL CALCULATED.3IONS-SCNC: 18 MMOL/L (ref 7–16)
BUN SERPL-MCNC: 54 MG/DL (ref 6–23)
CALCIUM SERPL-MCNC: 8.9 MG/DL (ref 8.3–10.6)
CHLORIDE BLD-SCNC: 103 MMOL/L (ref 99–110)
CO2: 19 MMOL/L (ref 21–32)
CREAT SERPL-MCNC: 5.5 MG/DL (ref 0.9–1.3)
GFR, ESTIMATED: 12 ML/MIN/1.73M2
GLUCOSE SERPL-MCNC: 109 MG/DL (ref 70–99)
PHOSPHORUS: 4.9 MG/DL (ref 2.5–4.9)
POTASSIUM SERPL-SCNC: 4.5 MMOL/L (ref 3.5–5.1)
SODIUM BLD-SCNC: 140 MMOL/L (ref 135–145)

## 2024-07-22 PROCEDURE — 71046 X-RAY EXAM CHEST 2 VIEWS: CPT

## 2024-07-22 PROCEDURE — 84156 ASSAY OF PROTEIN URINE: CPT

## 2024-07-22 PROCEDURE — 36415 COLL VENOUS BLD VENIPUNCTURE: CPT

## 2024-07-22 PROCEDURE — 80069 RENAL FUNCTION PANEL: CPT

## 2024-07-22 PROCEDURE — 82570 ASSAY OF URINE CREATININE: CPT

## 2024-07-23 LAB
CREATININE URINE: 67.5 MG/DL (ref 39–259)
PROT/CREAT RATIO, UR: 3.5
URINE TOTAL PROTEIN: 234.3 MG/DL

## 2024-08-08 ENCOUNTER — HOSPITAL ENCOUNTER (OUTPATIENT)
Age: 54
Discharge: HOME | End: 2024-08-08
Payer: MEDICARE

## 2024-08-08 DIAGNOSIS — R80.1 PERSISTENT PROTEINURIA: ICD-10-CM

## 2024-08-08 DIAGNOSIS — E87.5 HYPERPOTASSEMIA: ICD-10-CM

## 2024-08-08 DIAGNOSIS — E11.21 DIABETIC NEPHROPATHY ASSOCIATED WITH TYPE 2 DIABETES MELLITUS (HCC): ICD-10-CM

## 2024-08-08 DIAGNOSIS — N04.9 NEPHROTIC SYNDROME: ICD-10-CM

## 2024-08-08 DIAGNOSIS — I15.9 HYPERTENSION, SECONDARY: ICD-10-CM

## 2024-08-08 DIAGNOSIS — Q60.2 RENAL AGENESIS: ICD-10-CM

## 2024-08-08 DIAGNOSIS — N17.9 ACUTE RENAL FAILURE, UNSPECIFIED ACUTE RENAL FAILURE TYPE (HCC): ICD-10-CM

## 2024-08-08 DIAGNOSIS — N18.4 CHRONIC KIDNEY DISEASE, STAGE IV (SEVERE) (HCC): ICD-10-CM

## 2024-08-08 DIAGNOSIS — K59.00 CONSTIPATION, UNSPECIFIED CONSTIPATION TYPE: ICD-10-CM

## 2024-08-08 LAB
ALBUMIN SERPL-MCNC: 4.3 GM/DL (ref 3.4–5)
ANION GAP SERPL CALCULATED.3IONS-SCNC: 18 MMOL/L (ref 7–16)
BUN SERPL-MCNC: 62 MG/DL (ref 6–23)
CALCIUM SERPL-MCNC: 8.5 MG/DL (ref 8.3–10.6)
CHLORIDE BLD-SCNC: 97 MMOL/L (ref 99–110)
CO2: 22 MMOL/L (ref 21–32)
CREAT SERPL-MCNC: 5.5 MG/DL (ref 0.9–1.3)
CREATININE URINE: 164.2 MG/DL (ref 39–259)
GFR, ESTIMATED: 12 ML/MIN/1.73M2
GLUCOSE SERPL-MCNC: 164 MG/DL (ref 70–99)
PHOSPHORUS: 4.4 MG/DL (ref 2.5–4.9)
POTASSIUM SERPL-SCNC: 4 MMOL/L (ref 3.5–5.1)
PROT/CREAT RATIO, UR: 3.1
SODIUM BLD-SCNC: 137 MMOL/L (ref 135–145)
URINE TOTAL PROTEIN: 508.3 MG/DL

## 2024-08-08 PROCEDURE — 82570 ASSAY OF URINE CREATININE: CPT

## 2024-08-08 PROCEDURE — 36415 COLL VENOUS BLD VENIPUNCTURE: CPT

## 2024-08-08 PROCEDURE — 80069 RENAL FUNCTION PANEL: CPT

## 2024-08-08 PROCEDURE — 84156 ASSAY OF PROTEIN URINE: CPT

## 2024-08-12 LAB
CREATININE URINE: 158.2 MG/DL (ref 39–259)
PROT/CREAT RATIO, UR: 3.3
URINE TOTAL PROTEIN: 526 MG/DL

## 2024-08-27 ENCOUNTER — HOSPITAL ENCOUNTER (OUTPATIENT)
Age: 54
Discharge: HOME | End: 2024-08-27
Payer: MEDICARE

## 2024-08-27 DIAGNOSIS — N04.9 NEPHROTIC SYNDROME: ICD-10-CM

## 2024-08-27 DIAGNOSIS — N18.4 CHRONIC KIDNEY DISEASE, STAGE IV (SEVERE) (HCC): ICD-10-CM

## 2024-08-27 DIAGNOSIS — N17.9 ACUTE RENAL FAILURE, UNSPECIFIED ACUTE RENAL FAILURE TYPE (HCC): ICD-10-CM

## 2024-08-27 DIAGNOSIS — Q60.2 RENAL AGENESIS: ICD-10-CM

## 2024-08-27 DIAGNOSIS — E11.21 DIABETIC NEPHROPATHY ASSOCIATED WITH TYPE 2 DIABETES MELLITUS (HCC): ICD-10-CM

## 2024-08-27 DIAGNOSIS — K59.00 CONSTIPATION, UNSPECIFIED CONSTIPATION TYPE: ICD-10-CM

## 2024-08-27 LAB
ALBUMIN SERPL-MCNC: 4.1 GM/DL (ref 3.4–5)
ANION GAP SERPL CALCULATED.3IONS-SCNC: 15 MMOL/L (ref 7–16)
BUN SERPL-MCNC: 70 MG/DL (ref 6–23)
CALCIUM SERPL-MCNC: 8.5 MG/DL (ref 8.3–10.6)
CHLORIDE BLD-SCNC: 99 MMOL/L (ref 99–110)
CO2: 20 MMOL/L (ref 21–32)
CREAT SERPL-MCNC: 5.7 MG/DL (ref 0.9–1.3)
GFR, ESTIMATED: 11 ML/MIN/1.73M2
GLUCOSE SERPL-MCNC: 99 MG/DL (ref 70–99)
PHOSPHORUS: 5 MG/DL (ref 2.5–4.9)
POTASSIUM SERPL-SCNC: 4.4 MMOL/L (ref 3.5–5.1)
SODIUM BLD-SCNC: 134 MMOL/L (ref 135–145)

## 2024-08-27 PROCEDURE — 82570 ASSAY OF URINE CREATININE: CPT

## 2024-08-27 PROCEDURE — 80069 RENAL FUNCTION PANEL: CPT

## 2024-08-27 PROCEDURE — 84156 ASSAY OF PROTEIN URINE: CPT

## 2024-08-27 PROCEDURE — 36415 COLL VENOUS BLD VENIPUNCTURE: CPT

## 2024-08-28 LAB
CREATININE URINE: 219.5 MG/DL (ref 39–259)
PROT/CREAT RATIO, UR: 2.3
URINE TOTAL PROTEIN: 502.5 MG/DL

## 2024-08-29 ENCOUNTER — OFFICE VISIT (OUTPATIENT)
Dept: FAMILY MEDICINE CLINIC | Age: 54
End: 2024-08-29

## 2024-08-29 VITALS
HEIGHT: 72 IN | OXYGEN SATURATION: 98 % | BODY MASS INDEX: 31.06 KG/M2 | WEIGHT: 229.3 LBS | DIASTOLIC BLOOD PRESSURE: 64 MMHG | SYSTOLIC BLOOD PRESSURE: 116 MMHG | HEART RATE: 51 BPM

## 2024-08-29 DIAGNOSIS — N18.4 CHRONIC KIDNEY DISEASE, STAGE IV (SEVERE) (HCC): ICD-10-CM

## 2024-08-29 DIAGNOSIS — H05.012: ICD-10-CM

## 2024-08-29 DIAGNOSIS — E11.59 TYPE 2 DIABETES MELLITUS WITH OTHER CIRCULATORY COMPLICATION, WITHOUT LONG-TERM CURRENT USE OF INSULIN (HCC): Primary | ICD-10-CM

## 2024-08-29 DIAGNOSIS — Z12.11 COLON CANCER SCREENING: ICD-10-CM

## 2024-08-29 DIAGNOSIS — I15.9 HYPERTENSION, SECONDARY: ICD-10-CM

## 2024-08-29 DIAGNOSIS — I10 ESSENTIAL HYPERTENSION: ICD-10-CM

## 2024-08-29 DIAGNOSIS — Z23 ENCOUNTER FOR IMMUNIZATION: ICD-10-CM

## 2024-08-29 DIAGNOSIS — I25.10 CORONARY ARTERY DISEASE INVOLVING NATIVE CORONARY ARTERY OF NATIVE HEART WITHOUT ANGINA PECTORIS: ICD-10-CM

## 2024-08-29 LAB — HBA1C MFR BLD: 5.9 %

## 2024-08-29 RX ORDER — INSULIN DEGLUDEC 200 U/ML
4 INJECTION, SOLUTION SUBCUTANEOUS NIGHTLY
Qty: 15 ML | Refills: 1
Start: 2024-08-29 | End: 2025-02-25

## 2024-08-29 RX ORDER — ATORVASTATIN CALCIUM 40 MG/1
40 TABLET, FILM COATED ORAL DAILY
Qty: 90 TABLET | Refills: 1 | Status: SHIPPED | OUTPATIENT
Start: 2024-08-29

## 2024-08-29 RX ORDER — CLOPIDOGREL BISULFATE 75 MG/1
75 TABLET ORAL DAILY
Qty: 90 TABLET | Refills: 3 | Status: SHIPPED | OUTPATIENT
Start: 2024-08-29 | End: 2025-08-24

## 2024-08-29 NOTE — PROGRESS NOTES
9/8/2024    Landry Morley    Chief Complaint   Patient presents with    Follow-up     Type 2 dm        HPI  History was obtained from patient.  Landry is a 54 y.o. male with a PMHx as listed below who presents today for follow up on chronic conditions.     Plan PFT in September. Breathing has improved with weight loss.       1. Type 2 diabetes mellitus with other circulatory complication, without long-term current use of insulin (HCC)    2. Coronary artery disease involving native coronary artery of native heart without angina pectoris    3. Essential hypertension    4. Hypertension, secondary    5. Abscess of periorbital region, left    6. Encounter for immunization    7. Chronic kidney disease, stage IV (severe) (HCC)    8. Colon cancer screening             REVIEW OF SYMPTOMS    Review of Systems   Constitutional:  Negative for chills and fatigue.   HENT:  Negative for congestion and sore throat.    Respiratory:  Negative for shortness of breath and wheezing.    Cardiovascular:  Negative for chest pain and palpitations.   Gastrointestinal:  Negative for abdominal pain and nausea.   Genitourinary:  Negative for frequency and urgency.   Neurological:  Negative for light-headedness.       PAST MEDICAL HISTORY  Past Medical History:   Diagnosis Date    Anxiety associated with depression 6/26/2024    CAD (coronary artery disease)     H/O MI.    Family history of coronary artery disease     H/O echocardiogram 06/2008 6/08 EF>55% TRACE MR, TR    H/O echocardiogram 04/15/2015    EF 50-55% Mild left atrial enlargement. Normal LV systolic function. Trace MR and TR. Normal sized abdominal aorta.     History of cardiac catheterization 03/01/2017    Stenting of the LAD    History of cardiovascular stress test 06/06/2006 7/12 LAD territory ISCHEMIA, EF 62%    History of exercise stress test 03/20/2017    treadmill    History of myocardial infarction 04/2006    History of nuclear stress test 01/26/2017    lexiscan-scar,no

## 2024-09-03 ENCOUNTER — TELEPHONE (OUTPATIENT)
Dept: BARIATRICS/WEIGHT MGMT | Age: 54
End: 2024-09-03

## 2024-09-10 ENCOUNTER — TELEPHONE (OUTPATIENT)
Dept: SURGERY | Age: 54
End: 2024-09-10

## 2024-09-11 ENCOUNTER — TELEPHONE (OUTPATIENT)
Dept: GASTROENTEROLOGY | Age: 54
End: 2024-09-11

## 2024-09-16 ENCOUNTER — HOSPITAL ENCOUNTER (OUTPATIENT)
Age: 54
Discharge: HOME | End: 2024-09-16
Payer: MEDICARE

## 2024-09-16 DIAGNOSIS — N18.4 CHRONIC KIDNEY DISEASE, STAGE IV (SEVERE) (HCC): ICD-10-CM

## 2024-09-16 DIAGNOSIS — E11.21 DIABETIC NEPHROPATHY ASSOCIATED WITH TYPE 2 DIABETES MELLITUS (HCC): ICD-10-CM

## 2024-09-16 DIAGNOSIS — N17.9 ACUTE RENAL FAILURE, UNSPECIFIED ACUTE RENAL FAILURE TYPE (HCC): ICD-10-CM

## 2024-09-16 DIAGNOSIS — N04.9 NEPHROTIC SYNDROME: ICD-10-CM

## 2024-09-16 DIAGNOSIS — K59.00 CONSTIPATION, UNSPECIFIED CONSTIPATION TYPE: ICD-10-CM

## 2024-09-16 DIAGNOSIS — Q60.2 RENAL AGENESIS: ICD-10-CM

## 2024-09-16 LAB
ALBUMIN: 4.3 G/DL (ref 3.4–5)
ANION GAP SERPL CALCULATED.3IONS-SCNC: 16 MMOL/L (ref 9–17)
BUN SERPL-MCNC: 68 MG/DL (ref 7–20)
CALCIUM SERPL-MCNC: 8.8 MG/DL (ref 8.3–10.6)
CHLORIDE SERPL-SCNC: 100 MMOL/L (ref 99–110)
CO2 SERPL-SCNC: 21 MMOL/L (ref 21–32)
CREAT SERPL-MCNC: 5.4 MG/DL (ref 0.9–1.3)
GFR, ESTIMATED: 11 ML/MIN/1.73M2
GLUCOSE SERPL-MCNC: 89 MG/DL (ref 74–99)
PHOSPHATE SERPL-MCNC: 4.5 MG/DL (ref 2.5–4.9)
POTASSIUM SERPL-SCNC: 4.6 MMOL/L (ref 3.5–5.1)
SODIUM SERPL-SCNC: 137 MMOL/L (ref 136–145)

## 2024-09-16 PROCEDURE — 36415 COLL VENOUS BLD VENIPUNCTURE: CPT

## 2024-09-16 PROCEDURE — 82570 ASSAY OF URINE CREATININE: CPT

## 2024-09-16 PROCEDURE — 80069 RENAL FUNCTION PANEL: CPT

## 2024-09-16 PROCEDURE — 84156 ASSAY OF PROTEIN URINE: CPT

## 2024-09-17 LAB
CREAT UR-MCNC: 65 MG/DL (ref 39–259)
TOTAL PROTEIN, URINE: 11 MG/DL
URINE TOTAL PROTEIN CREATININE RATIO: 0.17 (ref 0–0.2)

## 2024-09-19 ENCOUNTER — TELEPHONE (OUTPATIENT)
Dept: CARDIOLOGY CLINIC | Age: 54
End: 2024-09-19

## 2024-09-19 NOTE — TELEPHONE ENCOUNTER
Cardiologist: Dr. aYn  Surgeon:   [facility]  Surgery: Pending  Surgery/Procedure should be done in the hospital setting    _____ yes    _____  no    Anesthesia: General  Date: Pending  Fax# [phone]  # [phone]    Last OV 1/16/2018 w/Farrah Omalley in the hospital- 6/13/2024      Last EKG- 6/13/2024    Stress Test- 3/21/2017  Good Exercise capacity    Hypertensive BP response to exercise.    ETT non diagnostic as THR is not achieved    No Ischemic changes on EKG.    Increase Diovan dose to 320 mg daily.     Echo- 2015      Cath- 3/1/2017  Three vessel CAD as described but culprit lesion is mid LAD   occlusion   Previously deployed stents are patent   Normal LV systolic function, LVEF is 55 %   Successful stenting of LAD with excellent results.   Patient tolerated the procedure well.   No immediate complications.   Angio seal used for groin site hemostasis      Plavix    Aspirin

## 2024-09-23 ENCOUNTER — TELEPHONE (OUTPATIENT)
Dept: CARDIOLOGY CLINIC | Age: 54
End: 2024-09-23

## 2024-09-23 PROBLEM — N18.5 CHRONIC KIDNEY DISEASE, STAGE V (HCC): Status: ACTIVE | Noted: 2024-02-06

## 2024-09-24 ENCOUNTER — HOSPITAL ENCOUNTER (OUTPATIENT)
Dept: PULMONOLOGY | Age: 54
Discharge: HOME | End: 2024-09-24
Attending: STUDENT IN AN ORGANIZED HEALTH CARE EDUCATION/TRAINING PROGRAM
Payer: MEDICARE

## 2024-09-24 DIAGNOSIS — J96.01 ACUTE RESPIRATORY FAILURE WITH HYPOXIA: ICD-10-CM

## 2024-09-24 LAB
DISTANCE WALKED: 1140 FT
DLCO %PRED: 69 %
DLCO PRED: NORMAL
DLCO/VA %PRED: NORMAL
DLCO/VA PRED: NORMAL
DLCO/VA: NORMAL
DLCO: NORMAL
EXPIRATORY TIME-POST: NORMAL
EXPIRATORY TIME: NORMAL
FEF 25-75 %CHNG: 52
FEF 25-75 POST %PRED: 94 %
FEF 25-75% %PRED-PRE: 61 L/SEC
FEF 25-75% PRED: NORMAL
FEF 25-75-POST: NORMAL
FEF 25-75-PRE: NORMAL
FEV1 %PRED-POST: 88 %
FEV1 %PRED-PRE: 77 %
FEV1 PRED: NORMAL
FEV1-POST: NORMAL
FEV1-PRE: NORMAL
FEV1/FVC %PRED-POST: 100 %
FEV1/FVC %PRED-PRE: 88 %
FEV1/FVC PRED: NORMAL
FEV1/FVC-POST: NORMAL
FEV1/FVC-PRE: NORMAL
FVC %PRED-POST: 87 L
FVC %PRED-PRE: 88 %
FVC PRED: NORMAL
FVC-POST: NORMAL
FVC-PRE: NORMAL
GAW %PRED: NORMAL
GAW PRED: NORMAL
GAW: NORMAL
IC PRE %PRED: NORMAL
IC PRED: NORMAL
IC: NORMAL
MEP: NORMAL
MIP: NORMAL
MVV %PRED-PRE: NORMAL
MVV PRED: NORMAL
MVV-PRE: NORMAL
PEF %PRED-POST: NORMAL
PEF %PRED-PRE: NORMAL
PEF PRED: NORMAL
PEF%CHNG: NORMAL
PEF-POST: NORMAL
PEF-PRE: NORMAL
RAW %PRED: NORMAL
RAW PRED: NORMAL
RAW: NORMAL
RV PRE %PRED: NORMAL
RV PRED: NORMAL
RV: NORMAL
SPO2: NORMAL %
SVC %PRED: 89 %
SVC PRED: NORMAL
SVC: NORMAL
TLC PRE %PRED: 98 %
TLC PRED: NORMAL
TLC: NORMAL
VA %PRED: NORMAL
VA PRED: NORMAL
VA: NORMAL
VTG %PRED: NORMAL
VTG PRED: NORMAL
VTG: NORMAL

## 2024-09-24 PROCEDURE — 94060 EVALUATION OF WHEEZING: CPT

## 2024-09-24 PROCEDURE — 94729 DIFFUSING CAPACITY: CPT

## 2024-09-24 PROCEDURE — 94618 PULMONARY STRESS TESTING: CPT

## 2024-09-24 PROCEDURE — 94727 GAS DIL/WSHOT DETER LNG VOL: CPT

## 2024-09-24 PROCEDURE — 94726 PLETHYSMOGRAPHY LUNG VOLUMES: CPT

## 2024-09-24 ASSESSMENT — PULMONARY FUNCTION TESTS
FEV1/FVC_PERCENT_PREDICTED_POST: 100
FVC_PERCENT_PREDICTED_POST: 87
FVC_PERCENT_PREDICTED_PRE: 88
FEV1_PERCENT_PREDICTED_POST: 88
FEV1/FVC_PERCENT_PREDICTED_PRE: 88
FEV1_PERCENT_PREDICTED_PRE: 77

## 2024-09-26 ENCOUNTER — TELEPHONE (OUTPATIENT)
Dept: CARDIOLOGY CLINIC | Age: 54
End: 2024-09-26

## 2024-10-07 ENCOUNTER — OFFICE VISIT (OUTPATIENT)
Dept: GASTROENTEROLOGY | Age: 54
End: 2024-10-07

## 2024-10-07 ENCOUNTER — HOSPITAL ENCOUNTER (OUTPATIENT)
Age: 54
Discharge: HOME | End: 2024-10-07
Payer: MEDICARE

## 2024-10-07 VITALS
HEART RATE: 44 BPM | DIASTOLIC BLOOD PRESSURE: 58 MMHG | WEIGHT: 223.2 LBS | RESPIRATION RATE: 15 BRPM | HEIGHT: 72 IN | SYSTOLIC BLOOD PRESSURE: 120 MMHG | BODY MASS INDEX: 30.23 KG/M2 | OXYGEN SATURATION: 98 %

## 2024-10-07 DIAGNOSIS — Z12.11 ENCOUNTER FOR SCREENING COLONOSCOPY: Primary | ICD-10-CM

## 2024-10-07 LAB
ALBUMIN SERPL-MCNC: 4.4 G/DL (ref 3.4–5)
ALBUMIN/GLOB SERPL: 2.5 {RATIO} (ref 1.1–2.2)
ALP SERPL-CCNC: 99 U/L (ref 40–129)
ALT SERPL-CCNC: 24 U/L (ref 10–40)
ANION GAP SERPL CALCULATED.3IONS-SCNC: 17 MMOL/L (ref 9–17)
AST SERPL-CCNC: 22 U/L (ref 15–37)
BILIRUB SERPL-MCNC: 0.4 MG/DL (ref 0–1)
BUN SERPL-MCNC: 67 MG/DL (ref 7–20)
CALCIUM SERPL-MCNC: 8.9 MG/DL (ref 8.3–10.6)
CHLORIDE SERPL-SCNC: 103 MMOL/L (ref 99–110)
CO2 SERPL-SCNC: 18 MMOL/L (ref 21–32)
CREAT SERPL-MCNC: 5 MG/DL (ref 0.9–1.3)
CREAT UR-MCNC: 110 MG/DL (ref 39–259)
GFR, ESTIMATED: 12 ML/MIN/1.73M2
GLUCOSE SERPL-MCNC: 106 MG/DL (ref 74–99)
PHOSPHATE SERPL-MCNC: 4.6 MG/DL (ref 2.5–4.9)
POTASSIUM SERPL-SCNC: 4.5 MMOL/L (ref 3.5–5.1)
PROT SERPL-MCNC: 6.1 G/DL (ref 6.4–8.2)
PTH-INTACT SERPL-MCNC: 274 PG/ML (ref 14–72)
SODIUM SERPL-SCNC: 137 MMOL/L (ref 136–145)
TOTAL PROTEIN, URINE: 300 MG/DL
URATE SERPL-MCNC: 9 MG/DL (ref 3.5–7.2)
URINE TOTAL PROTEIN CREATININE RATIO: 2.73 (ref 0–0.2)

## 2024-10-07 PROCEDURE — 36415 COLL VENOUS BLD VENIPUNCTURE: CPT

## 2024-10-07 PROCEDURE — 84550 ASSAY OF BLOOD/URIC ACID: CPT

## 2024-10-07 PROCEDURE — 80053 COMPREHEN METABOLIC PANEL: CPT

## 2024-10-07 PROCEDURE — 82570 ASSAY OF URINE CREATININE: CPT

## 2024-10-07 PROCEDURE — NBSRV NON-BILLABLE SERVICE: Performed by: NURSE PRACTITIONER

## 2024-10-07 PROCEDURE — 84156 ASSAY OF PROTEIN URINE: CPT

## 2024-10-07 PROCEDURE — 84100 ASSAY OF PHOSPHORUS: CPT

## 2024-10-07 PROCEDURE — 83970 ASSAY OF PARATHORMONE: CPT

## 2024-10-07 RX ORDER — POLYETHYLENE GLYCOL 3350, SODIUM SULFATE ANHYDROUS, SODIUM BICARBONATE, SODIUM CHLORIDE, POTASSIUM CHLORIDE 236; 22.74; 6.74; 5.86; 2.97 G/4L; G/4L; G/4L; G/4L; G/4L
4 POWDER, FOR SOLUTION ORAL ONCE
Qty: 4000 ML | Refills: 0 | Status: SHIPPED | OUTPATIENT
Start: 2024-10-07 | End: 2024-10-07

## 2024-10-07 ASSESSMENT — ENCOUNTER SYMPTOMS
NAUSEA: 0
BACK PAIN: 1
COUGH: 0
BLOOD IN STOOL: 0
SHORTNESS OF BREATH: 0
DIARRHEA: 0
EYE DISCHARGE: 0
EYE PAIN: 0
ABDOMINAL PAIN: 0
VOMITING: 0
CONSTIPATION: 0
COLOR CHANGE: 0

## 2024-10-07 NOTE — PROGRESS NOTES
reports that he does not use drugs.    Family history:  His family history includes Arthritis in his maternal grandmother; Cancer in his maternal grandfather and sister; Heart Disease in his father; High Blood Pressure in his mother; High Cholesterol in his mother.    Objective    Vitals:    10/07/24 1104   BP: (!) 120/58   Pulse: (!) 44   Resp: 15   SpO2: 98%        Physical exam    Physical Exam  Constitutional:       General: He is not in acute distress.     Appearance: Normal appearance. He is well-developed. He is obese. He is not ill-appearing, toxic-appearing or diaphoretic.   HENT:      Head: Normocephalic and atraumatic.      Nose: Nose normal.      Mouth/Throat:      Mouth: Mucous membranes are moist.   Cardiovascular:      Rate and Rhythm: Normal rate and regular rhythm.      Pulses: Normal pulses.      Heart sounds: Normal heart sounds. No murmur heard.     No gallop.   Pulmonary:      Effort: Pulmonary effort is normal. No respiratory distress.      Breath sounds: Normal breath sounds. No stridor. No wheezing or rhonchi.   Abdominal:      General: Bowel sounds are normal. There is no distension.      Palpations: Abdomen is soft. There is no mass.      Tenderness: There is no abdominal tenderness.      Hernia: No hernia is present.   Musculoskeletal:         General: Normal range of motion.      Cervical back: Neck supple.   Skin:     General: Skin is warm and dry.   Neurological:      Mental Status: He is alert and oriented to person, place, and time.   Psychiatric:         Mood and Affect: Mood normal.         Hospital Outpatient Visit on 09/24/2024   Component Date Value Ref Range Status    FVC %Pred-Pre 09/24/2024 88  % Final    FVC %Pred-Post 09/24/2024 87  L Final    FEV1 %Pred-Pre 09/24/2024 77  % Final    FEV1 %Pred-Post 09/24/2024 88  % Final    FEV1/FVC %Pred-Pre 09/24/2024 88  % Final    FEV1/FVC %Pred-Post 09/24/2024 100  % Final    FEF 25-75% %Pred-Pre 09/24/2024 61  L/sec Final    FEF 25-75

## 2024-10-16 ENCOUNTER — TELEPHONE (OUTPATIENT)
Dept: CARDIOLOGY CLINIC | Age: 54
End: 2024-10-16

## 2024-10-16 ENCOUNTER — OFFICE VISIT (OUTPATIENT)
Dept: CARDIOLOGY CLINIC | Age: 54
End: 2024-10-16
Payer: MEDICARE

## 2024-10-16 VITALS
DIASTOLIC BLOOD PRESSURE: 62 MMHG | HEIGHT: 72 IN | BODY MASS INDEX: 29.8 KG/M2 | SYSTOLIC BLOOD PRESSURE: 118 MMHG | HEART RATE: 42 BPM | WEIGHT: 220 LBS

## 2024-10-16 DIAGNOSIS — N18.5 CHRONIC KIDNEY DISEASE, STAGE V (HCC): ICD-10-CM

## 2024-10-16 DIAGNOSIS — I25.10 CORONARY ARTERY DISEASE INVOLVING NATIVE CORONARY ARTERY OF NATIVE HEART WITHOUT ANGINA PECTORIS: ICD-10-CM

## 2024-10-16 DIAGNOSIS — Z98.61 HISTORY OF PTCA: ICD-10-CM

## 2024-10-16 DIAGNOSIS — Z01.810 PRE-OPERATIVE CARDIOVASCULAR EXAMINATION: Primary | ICD-10-CM

## 2024-10-16 DIAGNOSIS — I10 ESSENTIAL HYPERTENSION: ICD-10-CM

## 2024-10-16 DIAGNOSIS — E11.59 TYPE 2 DIABETES MELLITUS WITH OTHER CIRCULATORY COMPLICATION, WITHOUT LONG-TERM CURRENT USE OF INSULIN (HCC): ICD-10-CM

## 2024-10-16 DIAGNOSIS — I25.10 CORONARY ARTERY DISEASE INVOLVING NATIVE CORONARY ARTERY OF NATIVE HEART WITHOUT ANGINA PECTORIS: Primary | ICD-10-CM

## 2024-10-16 DIAGNOSIS — E78.2 MIXED HYPERLIPIDEMIA: ICD-10-CM

## 2024-10-16 DIAGNOSIS — I25.10 CAD (CORONARY ARTERY DISEASE): ICD-10-CM

## 2024-10-16 DIAGNOSIS — I25.118 CORONARY ARTERY DISEASE OF NATIVE ARTERY OF NATIVE HEART WITH STABLE ANGINA PECTORIS (HCC): ICD-10-CM

## 2024-10-16 PROCEDURE — 3017F COLORECTAL CA SCREEN DOC REV: CPT | Performed by: INTERNAL MEDICINE

## 2024-10-16 PROCEDURE — G8484 FLU IMMUNIZE NO ADMIN: HCPCS | Performed by: INTERNAL MEDICINE

## 2024-10-16 PROCEDURE — 2022F DILAT RTA XM EVC RTNOPTHY: CPT | Performed by: INTERNAL MEDICINE

## 2024-10-16 PROCEDURE — 99204 OFFICE O/P NEW MOD 45 MIN: CPT | Performed by: INTERNAL MEDICINE

## 2024-10-16 PROCEDURE — G8417 CALC BMI ABV UP PARAM F/U: HCPCS | Performed by: INTERNAL MEDICINE

## 2024-10-16 PROCEDURE — 93000 ELECTROCARDIOGRAM COMPLETE: CPT | Performed by: INTERNAL MEDICINE

## 2024-10-16 PROCEDURE — 3044F HG A1C LEVEL LT 7.0%: CPT | Performed by: INTERNAL MEDICINE

## 2024-10-16 PROCEDURE — 3078F DIAST BP <80 MM HG: CPT | Performed by: INTERNAL MEDICINE

## 2024-10-16 PROCEDURE — 1036F TOBACCO NON-USER: CPT | Performed by: INTERNAL MEDICINE

## 2024-10-16 PROCEDURE — G8427 DOCREV CUR MEDS BY ELIG CLIN: HCPCS | Performed by: INTERNAL MEDICINE

## 2024-10-16 PROCEDURE — 3074F SYST BP LT 130 MM HG: CPT | Performed by: INTERNAL MEDICINE

## 2024-10-16 RX ORDER — METOPROLOL TARTRATE 25 MG/1
25 TABLET, FILM COATED ORAL 2 TIMES DAILY
Qty: 180 TABLET | Refills: 3 | Status: SHIPPED | OUTPATIENT
Start: 2024-10-16 | End: 2025-10-11

## 2024-10-16 RX ORDER — MAG HYDROX/ALUMINUM HYD/SIMETH 400-400-40
SUSPENSION, ORAL (FINAL DOSE FORM) ORAL
COMMUNITY

## 2024-10-16 NOTE — TELEPHONE ENCOUNTER
[facility]     Dr. Bandar Hernandezed     LEFT HEART CATHETERIZATION WITH POSSIBLE PERCUTANEOUS CORONARY INTERVENTION      Patient Name: Landry Morley   : 1970  MRN# [id number]    Date of Procedure: 10/21/2024 Time: 6:45 AM  Arrival Time: 5:45 AM    The catheterization and angiogram are usually outpatient procedures, however if stenting is needed you may need to stay overnight. You will need to arrive at the hospital two hours before the procedure.  You will go to registration in the main lobby.  You will need to arrange for someone to drive you home.      HOSPITAL:  [facility])      X   If you have received orders for blood work and or a chest x-ray, please have         them done on assigned date at [facility],           [facility], or [facility].     X Please do not have anything by mouth after midnight prior to or 8 hours before   the procedure.    X You may take your medications with a sip of water in the morning of your               procedure or take them with you to the hospital                    X If you are taking DEMADEX )Torsemide)   please do not take it the morning of your procedure.      X If you take insulin,  you will need to take ½ the dose on the morning of the procedure or the night before.  Do not take regular insulin dose the morning of the procedure.    X If you take Viagra (Sildenafil) or Cialis (Tadalafil) you will need to hold it for 3 days before your procedure.

## 2024-10-16 NOTE — TELEPHONE ENCOUNTER
Patient given instructions over telephone on 10/16/2024.  Procedure is scheduled for 10/21/2024 @ 6:45 AM, w/arrival @ 5:45 AM, @ [facility]. Medication/Education letter gone over with patient. Procedure and risks were explained. Patient was notified that surgery date or time could be changed due to an emergency. Questions answered and patient voices understanding.

## 2024-10-16 NOTE — PROGRESS NOTES
CARDIAC CONSULT NOTE       Landry  54 y.o.  male    Chief Complaint   Patient presents with    New Patient     Pt denies any cardiac sx screened for. Pt is here for cardiac evaluation prior to kidney transplant.       Referring physician:  Raza Fairchild DO     Primary care physician:  Raza Fairchild DO    History of Present Illness:     Landry is a 54 y.o. male referred for Pre-op evaluation & Cardiac Catheterization prior to possible kidney transplant surgery.  Patient has known history of coronary artery disease that he used to see me in the past had multiple percutaneous interventions.  He was last seen in 2018.  Patient has been noncompliant he did not see me since 2018.   All the same he denies any cardiac symptoms at this time no complaints of chest pain, shortness of breath, palpitations and syncope etc.He is Diabetic     has a past medical history of Anxiety associated with depression, CAD (coronary artery disease), Family history of coronary artery disease, H/O echocardiogram, H/O echocardiogram, History of cardiac catheterization, History of cardiovascular stress test, History of exercise stress test, History of myocardial infarction, History of nuclear stress test, History of PTCA, History of PTCA, Hyperlipidemia, Hypertension, Hypertriglyceridemia, Sepsis (HCC), and Type II or unspecified type diabetes mellitus without mention of complication, not stated as uncontrolled.     History of PTCA 4/2006     STENT TO RCA    History of PTCA 7/12     STENT TO LAD, RCA      History of cardiac catheterization 03/01/2017     Stenting of the LAD      has a past surgical history that includes hernia repair and Coronary angioplasty with stent (2006).     reports that he quit smoking about 7 years ago. His smoking use included cigarettes. He started smoking about 34 years ago. He has a 27 pack-year smoking history. He has been exposed to tobacco smoke. He has never used

## 2024-10-16 NOTE — PATIENT INSTRUCTIONS
medical problems & all Labs + testing. This includes chart prep even prior to the vosit. Various goals are discussed and multiple questions answered.Relevant concelling performed.     Office follow up  for test results.    I spent about 30 min. of time in review of the available data, chart Prep., interviewing patient, obtaining history, performing physical exam, going through decision making analysis for assessment & plans of management on this patient.    Office Visit for test results.     Bandar Yan MD, 10/16/2024 10:44 AM

## 2024-10-22 ENCOUNTER — TELEPHONE (OUTPATIENT)
Dept: GASTROENTEROLOGY | Age: 54
End: 2024-10-22

## 2024-10-23 ENCOUNTER — HOSPITAL ENCOUNTER (OUTPATIENT)
Age: 54
Discharge: HOME | End: 2024-10-23
Payer: MEDICARE

## 2024-10-23 LAB — PTH-INTACT SERPL-MCNC: 311 PG/ML (ref 14–72)

## 2024-10-23 PROCEDURE — 83970 ASSAY OF PARATHORMONE: CPT

## 2024-10-23 PROCEDURE — 36415 COLL VENOUS BLD VENIPUNCTURE: CPT

## 2024-10-24 ENCOUNTER — TELEPHONE (OUTPATIENT)
Dept: CARDIOLOGY CLINIC | Age: 54
End: 2024-10-24

## 2024-10-24 NOTE — TELEPHONE ENCOUNTER
Pt called to inquire about rescheduling missed LHC. Pt states he was advised by Nephrology office to not have LHC procedure until after next Nephrology appointment d/t risk of further kidney injury. Pt was under the impression that Nephrology office was calling [facility] to cancel LHC.Per note in chart patient was advised to call [facility] and cancel procedure. Pt can not reschedule procedure until after 11/14/2024 appointment with nephrologist.

## 2024-11-06 ENCOUNTER — OFFICE VISIT (OUTPATIENT)
Dept: CARDIOLOGY CLINIC | Age: 54
End: 2024-11-06

## 2024-11-06 VITALS
HEIGHT: 72 IN | SYSTOLIC BLOOD PRESSURE: 118 MMHG | BODY MASS INDEX: 29.93 KG/M2 | HEART RATE: 49 BPM | WEIGHT: 221 LBS | OXYGEN SATURATION: 99 % | DIASTOLIC BLOOD PRESSURE: 80 MMHG

## 2024-11-06 DIAGNOSIS — I25.10 CORONARY ARTERY DISEASE INVOLVING NATIVE CORONARY ARTERY OF NATIVE HEART WITHOUT ANGINA PECTORIS: Primary | ICD-10-CM

## 2024-11-06 DIAGNOSIS — I10 ESSENTIAL HYPERTENSION: ICD-10-CM

## 2024-11-06 DIAGNOSIS — Z98.61 HISTORY OF PTCA: ICD-10-CM

## 2024-11-06 DIAGNOSIS — Z82.49 FAMILY HISTORY OF CORONARY ARTERY DISEASE: ICD-10-CM

## 2024-11-06 DIAGNOSIS — E78.2 MIXED HYPERLIPIDEMIA: ICD-10-CM

## 2024-11-06 DIAGNOSIS — E11.21 DIABETIC NEPHROPATHY ASSOCIATED WITH TYPE 2 DIABETES MELLITUS (HCC): ICD-10-CM

## 2024-11-06 DIAGNOSIS — N18.5 CHRONIC KIDNEY DISEASE, STAGE V (HCC): ICD-10-CM

## 2024-11-06 DIAGNOSIS — E11.59 TYPE 2 DIABETES MELLITUS WITH OTHER CIRCULATORY COMPLICATION, WITHOUT LONG-TERM CURRENT USE OF INSULIN (HCC): ICD-10-CM

## 2024-11-06 DIAGNOSIS — Z01.810 PRE-OPERATIVE CARDIOVASCULAR EXAMINATION: ICD-10-CM

## 2024-11-06 NOTE — PATIENT INSTRUCTIONS
Please be informed that if you contact our office outside of normal business hours the physician on call cannot help with any scheduling or rescheduling issues, procedure instruction questions or any type of medication issue.    We advise you for any urgent/emergency that you go to the nearest emergency room!    PLEASE CALL OUR OFFICE DURING NORMAL BUSINESS HOURS    Monday - Friday   8 am to 5 pm    [place]: [phone]    [place]: [phone]    [place]:  [phone]  Thank you for allowing us to care for you today!   We want to ensure we can follow your treatment plan and we strive to give you the best outcomes and experience possible.   If you ever have a life threatening emergency and call 911 - for an ambulance (EMS)   Our providers can only care for you at:   [facility] or [facility].   Even if you have someone take you or you drive yourself we can only care for you in a [facility] facility. Our providers are not setup at the other healthcare locations!   **It is YOUR responsibilty to bring medication bottles and/or updated medication list to EACH APPOINTMENT. This will allow us to better serve you and all your healthcare needs**  We are committed to providing you the best care possible.    If you receive a survey after visiting one of our offices, please take time to share your experience concerning your physician office visit.  These surveys are confidential and no health information about you is shared.    We are eager to improve for you and we are counting on your feedback to help make that happen.  CORONARY ARTERY DISEASE:Yes  clinically stable. Patient is on optimal medical regimen ( see medication list above )  Patient is currently  asymptomatic from CAD.   -changes in  treatment:   no. Patient is being treated with ASA, Plavix & Metoprolol.  Counseled regarding regular exercise & its benefits.  -Testing ordered:  yes, Left heart with coronary angiography but no Left

## 2024-11-06 NOTE — PROGRESS NOTES
mellitis:yes,   BS under good control yes,   Hgb A1c avilable yes, 5.6    TESTS ORDERED: Patient did not have echocardiogram done     PREVIOUSLY ORDERED TESTS REVIEWED & DISCUSSED WITH THE PATIENT:     I personally reviewed & interpreted, all previously ordered tests as copied above. Latest Labs are pulled in to the note with dates.   Labs, specially in Reference to Lipid profile, Cardiac testing in the form of Echo ( dated: ), stress tests ( dated: ) & other relevant cardiac testing reviewed with patient & recommendations made based on assessment of the results.    Discussed role of Cardiac risk factors & effects + treatment of co morbidities with patient & advised accordingly.     MEDICATIONS: List of medications patient is currently taking is reviewed in detail with the patient & family member present. Discussed any side effects or problems taking the medication.     Recommend Continue present management & medications as listed.     AFFIRMATION: I reviewed patient's history, previous & current medical problems & all Labs + testing. This includes chart prep even prior to the vosit. Various goals are discussed and multiple questions answered.Relevant concelling performed.     Office follow up in 3 months.

## 2024-11-12 ENCOUNTER — HOSPITAL ENCOUNTER (OUTPATIENT)
Age: 54
Discharge: HOME | End: 2024-11-12
Payer: MEDICARE

## 2024-11-12 LAB
ALBUMIN SERPL-MCNC: 4.1 G/DL (ref 3.4–5)
ALBUMIN/GLOB SERPL: 2.2 {RATIO} (ref 1.1–2.2)
ALP SERPL-CCNC: 84 U/L (ref 40–129)
ALT SERPL-CCNC: 29 U/L (ref 10–40)
ANION GAP SERPL CALCULATED.3IONS-SCNC: 17 MMOL/L (ref 9–17)
AST SERPL-CCNC: 20 U/L (ref 15–37)
BILIRUB SERPL-MCNC: 0.4 MG/DL (ref 0–1)
BUN SERPL-MCNC: 66 MG/DL (ref 7–20)
CALCIUM SERPL-MCNC: 8.7 MG/DL (ref 8.3–10.6)
CHLORIDE SERPL-SCNC: 102 MMOL/L (ref 99–110)
CO2 SERPL-SCNC: 18 MMOL/L (ref 21–32)
CREAT SERPL-MCNC: 5.2 MG/DL (ref 0.9–1.3)
CREAT UR-MCNC: 128 MG/DL (ref 39–259)
GFR, ESTIMATED: 12 ML/MIN/1.73M2
GLUCOSE SERPL-MCNC: 108 MG/DL (ref 74–99)
PHOSPHATE SERPL-MCNC: 4.8 MG/DL (ref 2.5–4.9)
POTASSIUM SERPL-SCNC: 4.5 MMOL/L (ref 3.5–5.1)
PROT SERPL-MCNC: 6 G/DL (ref 6.4–8.2)
PTH-INTACT SERPL-MCNC: 316 PG/ML (ref 14–72)
SODIUM SERPL-SCNC: 137 MMOL/L (ref 136–145)
TOTAL PROTEIN, URINE: 393 MG/DL
URATE SERPL-MCNC: 9 MG/DL (ref 3.5–7.2)
URINE TOTAL PROTEIN CREATININE RATIO: 3.07 (ref 0–0.2)

## 2024-11-12 PROCEDURE — 84156 ASSAY OF PROTEIN URINE: CPT

## 2024-11-12 PROCEDURE — 84100 ASSAY OF PHOSPHORUS: CPT

## 2024-11-12 PROCEDURE — 84550 ASSAY OF BLOOD/URIC ACID: CPT

## 2024-11-12 PROCEDURE — 80053 COMPREHEN METABOLIC PANEL: CPT

## 2024-11-12 PROCEDURE — 83970 ASSAY OF PARATHORMONE: CPT

## 2024-11-12 PROCEDURE — 36415 COLL VENOUS BLD VENIPUNCTURE: CPT

## 2024-11-12 PROCEDURE — 82570 ASSAY OF URINE CREATININE: CPT

## 2024-11-15 ENCOUNTER — TELEPHONE (OUTPATIENT)
Dept: SURGERY | Age: 54
End: 2024-11-15

## 2024-11-15 PROBLEM — Z01.810 PRE-OPERATIVE CARDIOVASCULAR EXAMINATION: Status: RESOLVED | Noted: 2024-10-16 | Resolved: 2024-11-15

## 2024-11-19 ENCOUNTER — APPOINTMENT (OUTPATIENT)
Dept: NON INVASIVE DIAGNOSTICS | Age: 54
DRG: 280 | End: 2024-11-19
Payer: MEDICARE

## 2024-11-19 ENCOUNTER — APPOINTMENT (OUTPATIENT)
Dept: GENERAL RADIOLOGY | Age: 54
DRG: 280 | End: 2024-11-19
Payer: MEDICARE

## 2024-11-19 ENCOUNTER — HOSPITAL ENCOUNTER (INPATIENT)
Age: 54
LOS: 8 days | Discharge: HOME | DRG: 280 | End: 2024-11-27
Attending: EMERGENCY MEDICINE | Admitting: INTERNAL MEDICINE
Payer: MEDICARE

## 2024-11-19 DIAGNOSIS — R06.02 SHORTNESS OF BREATH: ICD-10-CM

## 2024-11-19 DIAGNOSIS — I21.4 NSTEMI (NON-ST ELEVATED MYOCARDIAL INFARCTION) (HCC): ICD-10-CM

## 2024-11-19 DIAGNOSIS — J96.01 SEPSIS WITH ACUTE HYPOXIC RESPIRATORY FAILURE WITHOUT SEPTIC SHOCK, DUE TO UNSPECIFIED ORGANISM (HCC): ICD-10-CM

## 2024-11-19 DIAGNOSIS — J18.9 PNEUMONIA OF RIGHT LOWER LOBE DUE TO INFECTIOUS ORGANISM: Primary | ICD-10-CM

## 2024-11-19 DIAGNOSIS — R65.20 SEPSIS WITH ACUTE HYPOXIC RESPIRATORY FAILURE WITHOUT SEPTIC SHOCK, DUE TO UNSPECIFIED ORGANISM (HCC): ICD-10-CM

## 2024-11-19 DIAGNOSIS — A41.9 SEPSIS WITH ACUTE HYPOXIC RESPIRATORY FAILURE WITHOUT SEPTIC SHOCK, DUE TO UNSPECIFIED ORGANISM (HCC): ICD-10-CM

## 2024-11-19 DIAGNOSIS — I50.9 ACUTE CONGESTIVE HEART FAILURE, UNSPECIFIED HEART FAILURE TYPE (HCC): ICD-10-CM

## 2024-11-19 DIAGNOSIS — I34.0 NONRHEUMATIC MITRAL VALVE REGURGITATION: ICD-10-CM

## 2024-11-19 PROBLEM — J96.00 ACUTE RESPIRATORY FAILURE: Status: ACTIVE | Noted: 2024-11-19

## 2024-11-19 LAB
ALBUMIN SERPL-MCNC: 4.1 G/DL (ref 3.4–5)
ALBUMIN/GLOB SERPL: 2.2 {RATIO} (ref 1.1–2.2)
ALP SERPL-CCNC: 101 U/L (ref 40–129)
ALT SERPL-CCNC: 20 U/L (ref 10–40)
ANION GAP SERPL CALCULATED.3IONS-SCNC: 15 MMOL/L (ref 9–17)
ANTI-XA UNFRAC HEPARIN: 0.14 IU/L
ARTERIAL PATENCY WRIST A: ABNORMAL
ARTERIAL PATENCY WRIST A: ABNORMAL
ARTERIAL PATENCY WRIST A: YES
AST SERPL-CCNC: 27 U/L (ref 15–37)
BASOPHILS # BLD: 0.04 K/UL
BASOPHILS NFR BLD: 0 % (ref 0–1)
BDY SITE: ABNORMAL
BILIRUB SERPL-MCNC: 0.3 MG/DL (ref 0–1)
BNP SERPL-MCNC: ABNORMAL PG/ML (ref 0–125)
BODY TEMPERATURE: 37
BUN SERPL-MCNC: 70 MG/DL (ref 7–20)
CALCIUM SERPL-MCNC: 8.7 MG/DL (ref 8.3–10.6)
CHLORIDE SERPL-SCNC: 105 MMOL/L (ref 99–110)
CO2 SERPL-SCNC: 18 MMOL/L (ref 21–32)
COHGB MFR BLD: 0 % (ref 0.5–1.5)
COHGB MFR BLD: 0.4 % (ref 0.5–1.5)
COHGB MFR BLD: 0.6 % (ref 0.5–1.5)
CREAT SERPL-MCNC: 5.6 MG/DL (ref 0.9–1.3)
ECHO AO ROOT DIAM: 3 CM
ECHO AO ROOT INDEX: 1.35 CM/M2
ECHO AV AREA PEAK VELOCITY: 1.8 CM2
ECHO AV AREA VTI: 1.8 CM2
ECHO AV AREA/BSA PEAK VELOCITY: 0.8 CM2/M2
ECHO AV AREA/BSA VTI: 0.8 CM2/M2
ECHO AV MEAN GRADIENT: 5 MMHG
ECHO AV MEAN VELOCITY: 1.1 M/S
ECHO AV PEAK GRADIENT: 9 MMHG
ECHO AV PEAK VELOCITY: 1.5 M/S
ECHO AV VELOCITY RATIO: 0.53
ECHO AV VTI: 33.6 CM
ECHO BSA: 2.26 M2
ECHO BSA: 2.26 M2
ECHO EST RA PRESSURE: 8 MMHG
ECHO IVC PROX: 2.2 CM
ECHO LA AREA 4C: 38 CM2
ECHO LA DIAMETER INDEX: 2.48 CM/M2
ECHO LA DIAMETER: 5.5 CM
ECHO LA MAJOR AXIS: 8.3 CM
ECHO LA TO AORTIC ROOT RATIO: 1.83
ECHO LA VOL MOD A4C: 142 ML (ref 18–58)
ECHO LA VOLUME INDEX MOD A4C: 64 ML/M2 (ref 16–34)
ECHO LV EDV A4C: 200 ML
ECHO LV EDV INDEX A4C: 90 ML/M2
ECHO LV EF PHYSICIAN: 25 %
ECHO LV EJECTION FRACTION A4C: 22 %
ECHO LV ESV A4C: 156 ML
ECHO LV ESV INDEX A4C: 70 ML/M2
ECHO LV FRACTIONAL SHORTENING: 15 % (ref 28–44)
ECHO LV INTERNAL DIMENSION DIASTOLE INDEX: 2.97 CM/M2
ECHO LV INTERNAL DIMENSION DIASTOLIC: 6.6 CM (ref 4.2–5.9)
ECHO LV INTERNAL DIMENSION SYSTOLIC INDEX: 2.52 CM/M2
ECHO LV INTERNAL DIMENSION SYSTOLIC: 5.6 CM
ECHO LV IVSD: 1 CM (ref 0.6–1)
ECHO LV MASS 2D: 272.4 G (ref 88–224)
ECHO LV MASS INDEX 2D: 122.7 G/M2 (ref 49–115)
ECHO LV POSTERIOR WALL DIASTOLIC: 0.9 CM (ref 0.6–1)
ECHO LV RELATIVE WALL THICKNESS RATIO: 0.27
ECHO LVOT AREA: 3.5 CM2
ECHO LVOT AV VTI INDEX: 0.53
ECHO LVOT DIAM: 2.1 CM
ECHO LVOT MEAN GRADIENT: 1 MMHG
ECHO LVOT PEAK GRADIENT: 3 MMHG
ECHO LVOT PEAK VELOCITY: 0.8 M/S
ECHO LVOT STROKE VOLUME INDEX: 27.6 ML/M2
ECHO LVOT SV: 61.3 ML
ECHO LVOT VTI: 17.7 CM
ECHO MV A VELOCITY: 0.38 M/S
ECHO MV E DECELERATION TIME (DT): 183 MS
ECHO MV E VELOCITY: 1 M/S
ECHO MV E/A RATIO: 2.63
ECHO MV REGURGITANT ALIASING (NYQUIST) VELOCITY: 38 CM/S
ECHO MV REGURGITANT PEAK GRADIENT: 96 MMHG
ECHO MV REGURGITANT PEAK VELOCITY: 4.9 M/S
ECHO MV REGURGITANT RADIUS PISA: 0.9 CM
ECHO MV REGURGITANT VTIA: 156 CM
ECHO RIGHT VENTRICULAR SYSTOLIC PRESSURE (RVSP): 43 MMHG
ECHO RV MID DIMENSION: 2.4 CM
ECHO TV REGURGITANT MAX VELOCITY: 2.97 M/S
ECHO TV REGURGITANT PEAK GRADIENT: 35 MMHG
EKG ATRIAL RATE: 72 BPM
EKG DIAGNOSIS: NORMAL
EKG P AXIS: 107 DEGREES
EKG P-R INTERVAL: 220 MS
EKG Q-T INTERVAL: 430 MS
EKG QRS DURATION: 96 MS
EKG QTC CALCULATION (BAZETT): 470 MS
EKG R AXIS: -4 DEGREES
EKG T AXIS: 76 DEGREES
EKG VENTRICULAR RATE: 72 BPM
EOSINOPHIL # BLD: 0 K/UL
EOSINOPHILS RELATIVE PERCENT: 0 % (ref 0–3)
ERYTHROCYTE [DISTWIDTH] IN BLOOD BY AUTOMATED COUNT: 13.2 % (ref 11.7–14.9)
ERYTHROCYTE [DISTWIDTH] IN BLOOD BY AUTOMATED COUNT: 13.3 % (ref 11.7–14.9)
EST. AVERAGE GLUCOSE BLD GHB EST-MCNC: 121 MG/DL
GAS FLOW.O2 O2 DELIVERY SYS: ABNORMAL L/MIN
GFR, ESTIMATED: 11 ML/MIN/1.73M2
GLUCOSE SERPL-MCNC: 217 MG/DL (ref 74–99)
HBA1C MFR BLD: 5.8 % (ref 4.2–6.3)
HCO3 ARTERIAL: 17.7 MMOL/L (ref 21–28)
HCO3 VENOUS: 19 MMOL/L (ref 22–29)
HCO3 VENOUS: 20 MMOL/L (ref 22–29)
HCT VFR BLD AUTO: 32.4 % (ref 42–52)
HCT VFR BLD AUTO: 37.9 % (ref 42–52)
HGB BLD-MCNC: 11 G/DL (ref 13.5–18)
HGB BLD-MCNC: 11.7 G/DL (ref 13.5–18)
IMM GRANULOCYTES # BLD AUTO: 0.07 K/UL
IMM GRANULOCYTES NFR BLD: 1 %
INR PPP: 1.1
LACTATE BLDV-SCNC: 1 MMOL/L (ref 0.4–2)
LACTATE BLDV-SCNC: 1.8 MMOL/L (ref 0.4–2)
LYMPHOCYTES NFR BLD: 1 K/UL
LYMPHOCYTES RELATIVE PERCENT: 7 % (ref 24–44)
MCH RBC QN AUTO: 29.8 PG (ref 27–31)
MCH RBC QN AUTO: 30.1 PG (ref 27–31)
MCHC RBC AUTO-ENTMCNC: 30.9 G/DL (ref 32–36)
MCHC RBC AUTO-ENTMCNC: 34 G/DL (ref 32–36)
MCV RBC AUTO: 88.8 FL (ref 78–100)
MCV RBC AUTO: 96.7 FL (ref 78–100)
METHEMOGLOBIN: 0.4 % (ref 0.5–1.5)
METHEMOGLOBIN: 0.4 % (ref 0.5–1.5)
METHEMOGLOBIN: 0.6 % (ref 0.5–1.5)
MONOCYTES NFR BLD: 0.55 K/UL
MONOCYTES NFR BLD: 4 % (ref 0–4)
NEGATIVE BASE EXCESS, ART: 6.5 MMOL/L (ref 0–3)
NEGATIVE BASE EXCESS, VEN: 6.6 MMOL/L (ref 0–3)
NEGATIVE BASE EXCESS, VEN: 8 MMOL/L (ref 0–3)
NEUTROPHILS NFR BLD: 89 % (ref 36–66)
NEUTS SEG NFR BLD: 12.88 K/UL
OXYHGB MFR BLD: 48.2 %
OXYHGB MFR BLD: 76 %
OXYHGB MFR BLD: 97.4 %
PARTIAL THROMBOPLASTIN TIME: 27 SEC (ref 25.1–37.1)
PCO2 ARTERIAL: 31.1 MMHG (ref 35–48)
PCO2 VENOUS: 37.9 MM HG (ref 38–54)
PCO2 VENOUS: 51.2 MM HG (ref 38–54)
PH ARTERIAL: 7.37 (ref 7.35–7.45)
PH VENOUS: 7.21 (ref 7.32–7.43)
PH VENOUS: 7.32 (ref 7.32–7.43)
PLATELET # BLD AUTO: 190 K/UL (ref 140–440)
PLATELET # BLD AUTO: 190 K/UL (ref 140–440)
PMV BLD AUTO: 11.3 FL (ref 7.5–11.1)
PMV BLD AUTO: 12.9 FL (ref 7.5–11.1)
PO2 ARTERIAL: 117.9 MMHG (ref 83–108)
PO2 VENOUS: 31.2 MM HG (ref 23–48)
PO2 VENOUS: 43.3 MM HG (ref 23–48)
POTASSIUM SERPL-SCNC: 5.5 MMOL/L (ref 3.5–5.1)
PROCALCITONIN SERPL-MCNC: 0.13 NG/ML
PROT SERPL-MCNC: 6 G/DL (ref 6.4–8.2)
PROTHROMBIN TIME: 14.5 SEC (ref 11.7–14.5)
RBC # BLD AUTO: 3.65 M/UL (ref 4.6–6.2)
RBC # BLD AUTO: 3.92 M/UL (ref 4.6–6.2)
SODIUM SERPL-SCNC: 138 MMOL/L (ref 136–145)
TROPONIN I SERPL HS-MCNC: 193 NG/L (ref 0–22)
TROPONIN I SERPL HS-MCNC: 419 NG/L (ref 0–22)
TROPONIN I SERPL HS-MCNC: 98 NG/L (ref 0–22)
WBC OTHER # BLD: 14.5 K/UL (ref 4–10.5)
WBC OTHER # BLD: 8.5 K/UL (ref 4–10.5)

## 2024-11-19 PROCEDURE — APPNB15 APP NON BILLABLE TIME 0-15 MINS

## 2024-11-19 PROCEDURE — 93306 TTE W/DOPPLER COMPLETE: CPT | Performed by: INTERNAL MEDICINE

## 2024-11-19 PROCEDURE — C8929 TTE W OR WO FOL WCON,DOPPLER: HCPCS

## 2024-11-19 PROCEDURE — 87040 BLOOD CULTURE FOR BACTERIA: CPT

## 2024-11-19 PROCEDURE — 93010 ELECTROCARDIOGRAM REPORT: CPT | Performed by: INTERNAL MEDICINE

## 2024-11-19 PROCEDURE — 93454 CORONARY ARTERY ANGIO S&I: CPT | Performed by: INTERNAL MEDICINE

## 2024-11-19 PROCEDURE — 6360000002 HC RX W HCPCS: Performed by: EMERGENCY MEDICINE

## 2024-11-19 PROCEDURE — 83605 ASSAY OF LACTIC ACID: CPT

## 2024-11-19 PROCEDURE — 93005 ELECTROCARDIOGRAM TRACING: CPT | Performed by: EMERGENCY MEDICINE

## 2024-11-19 PROCEDURE — 94761 N-INVAS EAR/PLS OXIMETRY MLT: CPT

## 2024-11-19 PROCEDURE — 5A09357 ASSISTANCE WITH RESPIRATORY VENTILATION, LESS THAN 24 CONSECUTIVE HOURS, CONTINUOUS POSITIVE AIRWAY PRESSURE: ICD-10-PCS | Performed by: INTERNAL MEDICINE

## 2024-11-19 PROCEDURE — 85730 THROMBOPLASTIN TIME PARTIAL: CPT

## 2024-11-19 PROCEDURE — 85025 COMPLETE CBC W/AUTO DIFF WBC: CPT

## 2024-11-19 PROCEDURE — 7100000010 HC PHASE II RECOVERY - FIRST 15 MIN: Performed by: INTERNAL MEDICINE

## 2024-11-19 PROCEDURE — 85610 PROTHROMBIN TIME: CPT

## 2024-11-19 PROCEDURE — 6360000002 HC RX W HCPCS: Performed by: NURSE PRACTITIONER

## 2024-11-19 PROCEDURE — 83036 HEMOGLOBIN GLYCOSYLATED A1C: CPT

## 2024-11-19 PROCEDURE — 2580000003 HC RX 258: Performed by: NURSE PRACTITIONER

## 2024-11-19 PROCEDURE — 80053 COMPREHEN METABOLIC PANEL: CPT

## 2024-11-19 PROCEDURE — 85027 COMPLETE CBC AUTOMATED: CPT

## 2024-11-19 PROCEDURE — 6360000004 HC RX CONTRAST MEDICATION: Performed by: INTERNAL MEDICINE

## 2024-11-19 PROCEDURE — 6360000002 HC RX W HCPCS: Performed by: INTERNAL MEDICINE

## 2024-11-19 PROCEDURE — 93306 TTE W/DOPPLER COMPLETE: CPT

## 2024-11-19 PROCEDURE — 36415 COLL VENOUS BLD VENIPUNCTURE: CPT

## 2024-11-19 PROCEDURE — C1894 INTRO/SHEATH, NON-LASER: HCPCS | Performed by: INTERNAL MEDICINE

## 2024-11-19 PROCEDURE — 96374 THER/PROPH/DIAG INJ IV PUSH: CPT

## 2024-11-19 PROCEDURE — 94660 CPAP INITIATION&MGMT: CPT

## 2024-11-19 PROCEDURE — 6370000000 HC RX 637 (ALT 250 FOR IP): Performed by: NURSE PRACTITIONER

## 2024-11-19 PROCEDURE — 6370000000 HC RX 637 (ALT 250 FOR IP): Performed by: INTERNAL MEDICINE

## 2024-11-19 PROCEDURE — 84484 ASSAY OF TROPONIN QUANT: CPT

## 2024-11-19 PROCEDURE — 71045 X-RAY EXAM CHEST 1 VIEW: CPT

## 2024-11-19 PROCEDURE — 2709999900 HC NON-CHARGEABLE SUPPLY: Performed by: INTERNAL MEDICINE

## 2024-11-19 PROCEDURE — 99223 1ST HOSP IP/OBS HIGH 75: CPT | Performed by: INTERNAL MEDICINE

## 2024-11-19 PROCEDURE — 2580000003 HC RX 258: Performed by: EMERGENCY MEDICINE

## 2024-11-19 PROCEDURE — 84145 PROCALCITONIN (PCT): CPT

## 2024-11-19 PROCEDURE — 82805 BLOOD GASES W/O2 SATURATION: CPT

## 2024-11-19 PROCEDURE — 85520 HEPARIN ASSAY: CPT

## 2024-11-19 PROCEDURE — 99291 CRITICAL CARE FIRST HOUR: CPT

## 2024-11-19 PROCEDURE — 2700000000 HC OXYGEN THERAPY PER DAY

## 2024-11-19 PROCEDURE — 2500000003 HC RX 250 WO HCPCS: Performed by: INTERNAL MEDICINE

## 2024-11-19 PROCEDURE — 2140000000 HC CCU INTERMEDIATE R&B

## 2024-11-19 PROCEDURE — 2500000003 HC RX 250 WO HCPCS: Performed by: NURSE PRACTITIONER

## 2024-11-19 PROCEDURE — 83880 ASSAY OF NATRIURETIC PEPTIDE: CPT

## 2024-11-19 PROCEDURE — B2111ZZ FLUOROSCOPY OF MULTIPLE CORONARY ARTERIES USING LOW OSMOLAR CONTRAST: ICD-10-PCS | Performed by: INTERNAL MEDICINE

## 2024-11-19 PROCEDURE — 2580000003 HC RX 258: Performed by: INTERNAL MEDICINE

## 2024-11-19 PROCEDURE — 4A023N7 MEASUREMENT OF CARDIAC SAMPLING AND PRESSURE, LEFT HEART, PERCUTANEOUS APPROACH: ICD-10-PCS | Performed by: INTERNAL MEDICINE

## 2024-11-19 PROCEDURE — C1769 GUIDE WIRE: HCPCS | Performed by: INTERNAL MEDICINE

## 2024-11-19 RX ORDER — SODIUM BICARBONATE 650 MG/1
1300 TABLET ORAL 3 TIMES DAILY
Status: DISCONTINUED | OUTPATIENT
Start: 2024-11-19 | End: 2024-11-20

## 2024-11-19 RX ORDER — SODIUM CHLORIDE 0.9 % (FLUSH) 0.9 %
5-40 SYRINGE (ML) INJECTION EVERY 12 HOURS SCHEDULED
Status: DISCONTINUED | OUTPATIENT
Start: 2024-11-19 | End: 2024-11-27 | Stop reason: HOSPADM

## 2024-11-19 RX ORDER — SODIUM CHLORIDE 9 MG/ML
INJECTION, SOLUTION INTRAVENOUS CONTINUOUS PRN
Status: COMPLETED | OUTPATIENT
Start: 2024-11-19 | End: 2024-11-19

## 2024-11-19 RX ORDER — AMLODIPINE BESYLATE 10 MG/1
10 TABLET ORAL DAILY
Status: DISCONTINUED | OUTPATIENT
Start: 2024-11-19 | End: 2024-11-20

## 2024-11-19 RX ORDER — ACETAMINOPHEN 325 MG/1
650 TABLET ORAL EVERY 4 HOURS PRN
Status: DISCONTINUED | OUTPATIENT
Start: 2024-11-19 | End: 2024-11-27 | Stop reason: HOSPADM

## 2024-11-19 RX ORDER — SODIUM BICARBONATE 650 MG/1
650 TABLET ORAL 3 TIMES DAILY
Status: DISCONTINUED | OUTPATIENT
Start: 2024-11-19 | End: 2024-11-19

## 2024-11-19 RX ORDER — METOPROLOL TARTRATE 25 MG/1
25 TABLET, FILM COATED ORAL 2 TIMES DAILY
Status: DISCONTINUED | OUTPATIENT
Start: 2024-11-19 | End: 2024-11-20

## 2024-11-19 RX ORDER — ACETAMINOPHEN 650 MG/1
650 SUPPOSITORY RECTAL EVERY 6 HOURS PRN
Status: DISCONTINUED | OUTPATIENT
Start: 2024-11-19 | End: 2024-11-27 | Stop reason: HOSPADM

## 2024-11-19 RX ORDER — ACETAMINOPHEN 325 MG/1
650 TABLET ORAL EVERY 6 HOURS PRN
Status: DISCONTINUED | OUTPATIENT
Start: 2024-11-19 | End: 2024-11-20 | Stop reason: SDUPTHER

## 2024-11-19 RX ORDER — ASPIRIN 81 MG/1
81 TABLET, CHEWABLE ORAL DAILY
Status: DISCONTINUED | OUTPATIENT
Start: 2024-11-19 | End: 2024-11-27 | Stop reason: HOSPADM

## 2024-11-19 RX ORDER — 0.9 % SODIUM CHLORIDE 0.9 %
1000 INTRAVENOUS SOLUTION INTRAVENOUS ONCE
Status: DISCONTINUED | OUTPATIENT
Start: 2024-11-19 | End: 2024-11-19

## 2024-11-19 RX ORDER — CLOPIDOGREL BISULFATE 75 MG/1
75 TABLET ORAL DAILY
Status: DISCONTINUED | OUTPATIENT
Start: 2024-11-19 | End: 2024-11-27

## 2024-11-19 RX ORDER — SODIUM CHLORIDE 9 MG/ML
INJECTION, SOLUTION INTRAVENOUS PRN
Status: DISCONTINUED | OUTPATIENT
Start: 2024-11-19 | End: 2024-11-27 | Stop reason: HOSPADM

## 2024-11-19 RX ORDER — ONDANSETRON 2 MG/ML
4 INJECTION INTRAMUSCULAR; INTRAVENOUS EVERY 6 HOURS PRN
Status: DISCONTINUED | OUTPATIENT
Start: 2024-11-19 | End: 2024-11-27 | Stop reason: HOSPADM

## 2024-11-19 RX ORDER — ENOXAPARIN SODIUM 100 MG/ML
30 INJECTION SUBCUTANEOUS DAILY
Status: DISCONTINUED | OUTPATIENT
Start: 2024-11-19 | End: 2024-11-19

## 2024-11-19 RX ORDER — FUROSEMIDE 10 MG/ML
40 INJECTION INTRAMUSCULAR; INTRAVENOUS ONCE
Status: COMPLETED | OUTPATIENT
Start: 2024-11-19 | End: 2024-11-19

## 2024-11-19 RX ORDER — ONDANSETRON 4 MG/1
4 TABLET, ORALLY DISINTEGRATING ORAL EVERY 8 HOURS PRN
Status: DISCONTINUED | OUTPATIENT
Start: 2024-11-19 | End: 2024-11-27 | Stop reason: HOSPADM

## 2024-11-19 RX ORDER — POLYETHYLENE GLYCOL 3350 17 G
2 POWDER IN PACKET (EA) ORAL
Status: DISCONTINUED | OUTPATIENT
Start: 2024-11-19 | End: 2024-11-27 | Stop reason: HOSPADM

## 2024-11-19 RX ORDER — TRAZODONE HYDROCHLORIDE 50 MG/1
50 TABLET, FILM COATED ORAL NIGHTLY PRN
Status: DISCONTINUED | OUTPATIENT
Start: 2024-11-19 | End: 2024-11-27 | Stop reason: HOSPADM

## 2024-11-19 RX ORDER — HEPARIN SODIUM 1000 [USP'U]/ML
4000 INJECTION, SOLUTION INTRAVENOUS; SUBCUTANEOUS ONCE
Status: COMPLETED | OUTPATIENT
Start: 2024-11-19 | End: 2024-11-19

## 2024-11-19 RX ORDER — HEPARIN SODIUM 10000 [USP'U]/100ML
5-30 INJECTION, SOLUTION INTRAVENOUS CONTINUOUS
Status: DISCONTINUED | OUTPATIENT
Start: 2024-11-19 | End: 2024-11-27

## 2024-11-19 RX ORDER — SODIUM CHLORIDE 0.9 % (FLUSH) 0.9 %
10 SYRINGE (ML) INJECTION PRN
Status: DISCONTINUED | OUTPATIENT
Start: 2024-11-19 | End: 2024-11-27 | Stop reason: HOSPADM

## 2024-11-19 RX ORDER — FUROSEMIDE 10 MG/ML
80 INJECTION INTRAMUSCULAR; INTRAVENOUS EVERY 6 HOURS
Status: DISCONTINUED | OUTPATIENT
Start: 2024-11-19 | End: 2024-11-20

## 2024-11-19 RX ORDER — HEPARIN SODIUM 200 [USP'U]/100ML
INJECTION, SOLUTION INTRAVENOUS PRN
Status: DISCONTINUED | OUTPATIENT
Start: 2024-11-19 | End: 2024-11-19 | Stop reason: HOSPADM

## 2024-11-19 RX ORDER — POLYETHYLENE GLYCOL 3350 17 G/17G
17 POWDER, FOR SOLUTION ORAL DAILY PRN
Status: DISCONTINUED | OUTPATIENT
Start: 2024-11-19 | End: 2024-11-27 | Stop reason: HOSPADM

## 2024-11-19 RX ORDER — HEPARIN SODIUM 1000 [USP'U]/ML
4000 INJECTION, SOLUTION INTRAVENOUS; SUBCUTANEOUS PRN
Status: DISCONTINUED | OUTPATIENT
Start: 2024-11-19 | End: 2024-11-27

## 2024-11-19 RX ORDER — SODIUM CHLORIDE 0.9 % (FLUSH) 0.9 %
5-40 SYRINGE (ML) INJECTION PRN
Status: DISCONTINUED | OUTPATIENT
Start: 2024-11-19 | End: 2024-11-27 | Stop reason: HOSPADM

## 2024-11-19 RX ORDER — ATORVASTATIN CALCIUM 40 MG/1
40 TABLET, FILM COATED ORAL NIGHTLY
Status: DISCONTINUED | OUTPATIENT
Start: 2024-11-19 | End: 2024-11-27 | Stop reason: HOSPADM

## 2024-11-19 RX ORDER — HEPARIN SODIUM 1000 [USP'U]/ML
2000 INJECTION, SOLUTION INTRAVENOUS; SUBCUTANEOUS PRN
Status: DISCONTINUED | OUTPATIENT
Start: 2024-11-19 | End: 2024-11-27

## 2024-11-19 RX ORDER — ATORVASTATIN CALCIUM 40 MG/1
40 TABLET, FILM COATED ORAL DAILY
Status: DISCONTINUED | OUTPATIENT
Start: 2024-11-19 | End: 2024-11-19

## 2024-11-19 RX ADMIN — SODIUM ZIRCONIUM CYCLOSILICATE 10 G: 10 POWDER, FOR SUSPENSION ORAL at 21:44

## 2024-11-19 RX ADMIN — SULFUR HEXAFLUORIDE 2 ML: 60.7; .19; .19 INJECTION, POWDER, LYOPHILIZED, FOR SUSPENSION INTRAVENOUS; INTRAVESICAL at 13:12

## 2024-11-19 RX ADMIN — CLOPIDOGREL BISULFATE 75 MG: 75 TABLET ORAL at 13:23

## 2024-11-19 RX ADMIN — TRAZODONE HYDROCHLORIDE 50 MG: 50 TABLET ORAL at 21:42

## 2024-11-19 RX ADMIN — SODIUM BICARBONATE 1300 MG: 650 TABLET ORAL at 21:42

## 2024-11-19 RX ADMIN — SODIUM ZIRCONIUM CYCLOSILICATE 10 G: 10 POWDER, FOR SUSPENSION ORAL at 18:21

## 2024-11-19 RX ADMIN — FUROSEMIDE 80 MG: 10 INJECTION, SOLUTION INTRAMUSCULAR; INTRAVENOUS at 18:21

## 2024-11-19 RX ADMIN — FUROSEMIDE 80 MG: 10 INJECTION, SOLUTION INTRAMUSCULAR; INTRAVENOUS at 21:42

## 2024-11-19 RX ADMIN — ATORVASTATIN CALCIUM 40 MG: 40 TABLET, FILM COATED ORAL at 21:42

## 2024-11-19 RX ADMIN — SODIUM CHLORIDE, PRESERVATIVE FREE 10 ML: 5 INJECTION INTRAVENOUS at 21:43

## 2024-11-19 RX ADMIN — HEPARIN SODIUM 2000 UNITS: 1000 INJECTION INTRAVENOUS; SUBCUTANEOUS at 21:55

## 2024-11-19 RX ADMIN — AMLODIPINE BESYLATE 10 MG: 10 TABLET ORAL at 14:49

## 2024-11-19 RX ADMIN — AZITHROMYCIN MONOHYDRATE 500 MG: 500 INJECTION, POWDER, LYOPHILIZED, FOR SOLUTION INTRAVENOUS at 09:59

## 2024-11-19 RX ADMIN — SODIUM BICARBONATE 1300 MG: 650 TABLET ORAL at 14:49

## 2024-11-19 RX ADMIN — SODIUM CHLORIDE 1000 ML: 9 INJECTION, SOLUTION INTRAVENOUS at 08:23

## 2024-11-19 RX ADMIN — METOPROLOL TARTRATE 25 MG: 25 TABLET, FILM COATED ORAL at 14:49

## 2024-11-19 RX ADMIN — FUROSEMIDE 80 MG: 10 INJECTION, SOLUTION INTRAMUSCULAR; INTRAVENOUS at 14:49

## 2024-11-19 RX ADMIN — SODIUM CHLORIDE, PRESERVATIVE FREE 10 ML: 5 INJECTION INTRAVENOUS at 14:51

## 2024-11-19 RX ADMIN — SODIUM ZIRCONIUM CYCLOSILICATE 10 G: 10 POWDER, FOR SUSPENSION ORAL at 14:49

## 2024-11-19 RX ADMIN — HEPARIN SODIUM 9 UNITS/KG/HR: 10000 INJECTION, SOLUTION INTRAVENOUS at 15:02

## 2024-11-19 RX ADMIN — HEPARIN SODIUM 4000 UNITS: 1000 INJECTION INTRAVENOUS; SUBCUTANEOUS at 14:56

## 2024-11-19 RX ADMIN — ASPIRIN 81 MG: 81 TABLET, CHEWABLE ORAL at 14:49

## 2024-11-19 RX ADMIN — FUROSEMIDE 40 MG: 10 INJECTION, SOLUTION INTRAMUSCULAR; INTRAVENOUS at 10:04

## 2024-11-19 RX ADMIN — WATER 1000 MG: 1 INJECTION INTRAMUSCULAR; INTRAVENOUS; SUBCUTANEOUS at 08:50

## 2024-11-19 RX ADMIN — METOPROLOL TARTRATE 25 MG: 25 TABLET, FILM COATED ORAL at 21:42

## 2024-11-19 ASSESSMENT — PAIN SCALES - GENERAL
PAINLEVEL_OUTOF10: 0

## 2024-11-19 ASSESSMENT — LIFESTYLE VARIABLES
HOW MANY STANDARD DRINKS CONTAINING ALCOHOL DO YOU HAVE ON A TYPICAL DAY: PATIENT DOES NOT DRINK
HOW OFTEN DO YOU HAVE A DRINK CONTAINING ALCOHOL: NEVER

## 2024-11-19 ASSESSMENT — PAIN - FUNCTIONAL ASSESSMENT: PAIN_FUNCTIONAL_ASSESSMENT: 0-10

## 2024-11-19 NOTE — H&P
V2.0  History and Physical      Name:  Landry Morley /Age/Sex: 1970  (54 y.o. male)   MRN & CSN:  [id number] & [id number] Encounter Date/Time: 2024 9:16 AM EST   Location:  [facility] [place] [address] PCP: Raza Fairchild DO       Hospital Day: 1    Assessment and Plan:   Landry Morley is a 54 y.o. male with a pmh of hypertension, cad sp 5 stents, ckd stage IV not on hemodialysis who presents with Acute respiratory failure    Hospital Problems             Last Modified POA    * (Principal) Acute respiratory failure 2024 Yes     Acute respiratory failure   -most likely fluids overload due to CKD or CHF  -currently on bipap  -chest xray showed A large right lower lung infiltrate is seen, extending to the diaphragm. Cannot exclude a small right pleural effusion.  -BNP 24,000  -admit patient to medical surgical floor with tele  -lasix 40 mg iv once  -nephrology consult and cardiology consult, appreciate their recommendations    CKD stage IV  Metabolic acidosis  hyperkalemia  -pt discussed with Marky turner about PD after [place]  -creatine 5.6   -VBG: PH 7.2 and bicarbonate 20  -continue home bicarbonate sodium, [place]  -nephrology consult    Elevated troponin  -troponin 98  -possible due to CKD or CHF, but ACS is not able to be excluded due to patient's presentation and history of CAD  -serial troponin  -echocardiogram  -cardiology consult    History of CAD sp 5 stents  Hypertension  -continue amlodipine, asa, plavix, metorprolol and statin    Type 2 diabetes  -update hemoglobin A1c  -insulin sliding scale        Disposition:   Current Living situation: home  Expected Disposition: home  Estimated D/C: 2-3 days    Diet No diet orders on file   DVT Prophylaxis [x] Lovenox, []  Heparin, [] SCDs, [] Ambulation,  [] Eliquis, [] Xarelto   Code Status Prior   Surrogate Decision Maker/ POA      History from:     patient, family member - daughter    History of Present Illness:     Chief Complaint:shortness of

## 2024-11-19 NOTE — ED PROVIDER NOTES
range)   sodium chloride 0.9 % bolus 1,000 mL (1,000 mLs IntraVENous New Bag 11/19/24 0823)       Independent Imaging Interpretation by me: Chest x-ray with right lower lobe opacities without large pneumothorax per my read    Chronic conditions affecting care: CKD, CAD    Discussion with Other Profesionals : Admitting Team (hospitalist)    Social Determinants : None    Records Reviewed : Inpatient Notes including most recent inpatient hospitalization in June of this year    Disposition Considerations (tests considered but not done, Shared Decision Making, Pt Expectation of Test or Tx.):   Patient to be admitted.  Questions sought and answered with the patient and daughter. They voice understanding and agree with plan.     I am the Primary Clinician of Record.    Is this patient to be included in the SEP-1 Core Measure due to severe sepsis or septic shock?   Yes     SEP-1 CORE MEASURE DATA      Sepsis Criteria   Severe Sepsis Criteria   Septic Shock Criteria     Must be confirmed or suspected to move forward with diagnosis of sepsis.    Must meet 2:    [] Temperature > 100.9 F (38.3 C)        or < 96.8 F (36 C)  [] HR > 90  [x] RR > 20  [x] WBC > 12 or < 4 or 10% bands      AND:      [x] Infection Confirmed or        Suspected.     Must meet 1:    [] Lactate > 2       or   [x] Signs of Organ Dysfunction:    - SBP < 90 or MAP < 65  - Altered mental status  - Creatinine > 2 or increased from      baseline  - Urine Output < 0.5 ml/kg/hr  - Bilirubin > 2  - INR > 1.5 (not anticoagulated)  - Platelets < 100,000  - Acute Respiratory Failure as     evidenced by new need for NIPPV     or mechanical ventilation      [] No criteria met for Severe Sepsis.   Must meet 1:    [] Lactate > 4        or   [] SBP < 90 or MAP < 65 for at        least two readings in the first        hour after fluid bolus        administration      [] Vasopressors initiated (if hypotension persists after fluid resuscitation)        [x] No criteria met

## 2024-11-19 NOTE — CARE COORDINATION
.CM has reviewed pt's chart for needs. CM screening shows that pt has PCP, insurance and is independent PTA. If any d/c needs arise please contact FRANK.  ELLI     11/19/24 8849   Service Assessment   Patient Orientation Alert and Oriented   Cognition Alert   History Provided By Medical Record   Primary Caregiver Self   Support Systems Children   Patient's Healthcare Decision Maker is:   (SELF)   Prior Functional Level Independent in ADLs/IADLs   Current Functional Level Independent in ADLs/IADLs   Can patient return to prior living arrangement Yes   Ability to make needs known: Good   Financial Resources Medicare;Medicaid   Community Resources None   Social/Functional History   Lives With Family   ADL Assistance Independent   Ambulation Assistance Independent   Transfer Assistance Independent

## 2024-11-20 ENCOUNTER — APPOINTMENT (OUTPATIENT)
Dept: ULTRASOUND IMAGING | Age: 54
DRG: 280 | End: 2024-11-20
Payer: MEDICARE

## 2024-11-20 ENCOUNTER — APPOINTMENT (OUTPATIENT)
Dept: INTERVENTIONAL RADIOLOGY/VASCULAR | Age: 54
DRG: 280 | End: 2024-11-20
Payer: MEDICARE

## 2024-11-20 ENCOUNTER — APPOINTMENT (OUTPATIENT)
Dept: CT IMAGING | Age: 54
DRG: 280 | End: 2024-11-20
Payer: MEDICARE

## 2024-11-20 LAB
ANION GAP SERPL CALCULATED.3IONS-SCNC: 15 MMOL/L (ref 9–17)
ANION GAP SERPL CALCULATED.3IONS-SCNC: 21 MMOL/L (ref 9–17)
ANTI-XA UNFRAC HEPARIN: 0.11 IU/L
ANTI-XA UNFRAC HEPARIN: 0.16 IU/L
ANTI-XA UNFRAC HEPARIN: 0.17 IU/L
BUN SERPL-MCNC: 70 MG/DL (ref 7–20)
BUN SERPL-MCNC: 72 MG/DL (ref 7–20)
CALCIUM SERPL-MCNC: 8.4 MG/DL (ref 8.3–10.6)
CALCIUM SERPL-MCNC: 8.8 MG/DL (ref 8.3–10.6)
CHLORIDE SERPL-SCNC: 100 MMOL/L (ref 99–110)
CHLORIDE SERPL-SCNC: 99 MMOL/L (ref 99–110)
CO2 SERPL-SCNC: 18 MMOL/L (ref 21–32)
CO2 SERPL-SCNC: 24 MMOL/L (ref 21–32)
CREAT SERPL-MCNC: 5.5 MG/DL (ref 0.9–1.3)
CREAT SERPL-MCNC: 5.7 MG/DL (ref 0.9–1.3)
EKG ATRIAL RATE: 58 BPM
EKG DIAGNOSIS: NORMAL
EKG P AXIS: 21 DEGREES
EKG P-R INTERVAL: 218 MS
EKG Q-T INTERVAL: 458 MS
EKG QRS DURATION: 98 MS
EKG QTC CALCULATION (BAZETT): 449 MS
EKG R AXIS: 2 DEGREES
EKG T AXIS: 110 DEGREES
EKG VENTRICULAR RATE: 58 BPM
GFR, ESTIMATED: 11 ML/MIN/1.73M2
GFR, ESTIMATED: 11 ML/MIN/1.73M2
GLUCOSE BLD-MCNC: 114 MG/DL (ref 74–99)
GLUCOSE BLD-MCNC: 98 MG/DL (ref 74–99)
GLUCOSE SERPL-MCNC: 105 MG/DL (ref 74–99)
GLUCOSE SERPL-MCNC: 116 MG/DL (ref 74–99)
HBV CORE AB SER QL: NONREACTIVE
HBV SURFACE AB SERPL IA-ACNC: 3.5 MIU/ML
HBV SURFACE AG SERPL QL IA: NONREACTIVE
POTASSIUM SERPL-SCNC: 3.6 MMOL/L (ref 3.5–5.1)
POTASSIUM SERPL-SCNC: 3.8 MMOL/L (ref 3.5–5.1)
SODIUM SERPL-SCNC: 138 MMOL/L (ref 136–145)
SODIUM SERPL-SCNC: 138 MMOL/L (ref 136–145)

## 2024-11-20 PROCEDURE — 82947 ASSAY GLUCOSE BLOOD QUANT: CPT

## 2024-11-20 PROCEDURE — 06H033Z INSERTION OF INFUSION DEVICE INTO INFERIOR VENA CAVA, PERCUTANEOUS APPROACH: ICD-10-PCS | Performed by: INTERNAL MEDICINE

## 2024-11-20 PROCEDURE — 2700000000 HC OXYGEN THERAPY PER DAY

## 2024-11-20 PROCEDURE — 71250 CT THORAX DX C-: CPT

## 2024-11-20 PROCEDURE — APPNB15 APP NON BILLABLE TIME 0-15 MINS

## 2024-11-20 PROCEDURE — 2580000003 HC RX 258: Performed by: INTERNAL MEDICINE

## 2024-11-20 PROCEDURE — 99233 SBSQ HOSP IP/OBS HIGH 50: CPT | Performed by: INTERNAL MEDICINE

## 2024-11-20 PROCEDURE — 2500000003 HC RX 250 WO HCPCS: Performed by: STUDENT IN AN ORGANIZED HEALTH CARE EDUCATION/TRAINING PROGRAM

## 2024-11-20 PROCEDURE — 6360000002 HC RX W HCPCS: Performed by: INTERNAL MEDICINE

## 2024-11-20 PROCEDURE — 80048 BASIC METABOLIC PNL TOTAL CA: CPT

## 2024-11-20 PROCEDURE — 2140000000 HC CCU INTERMEDIATE R&B

## 2024-11-20 PROCEDURE — 93970 EXTREMITY STUDY: CPT

## 2024-11-20 PROCEDURE — 85520 HEPARIN ASSAY: CPT

## 2024-11-20 PROCEDURE — 6370000000 HC RX 637 (ALT 250 FOR IP)

## 2024-11-20 PROCEDURE — 99223 1ST HOSP IP/OBS HIGH 75: CPT | Performed by: THORACIC SURGERY (CARDIOTHORACIC VASCULAR SURGERY)

## 2024-11-20 PROCEDURE — 77001 FLUOROGUIDE FOR VEIN DEVICE: CPT

## 2024-11-20 PROCEDURE — 6360000002 HC RX W HCPCS: Performed by: NURSE PRACTITIONER

## 2024-11-20 PROCEDURE — 93880 EXTRACRANIAL BILAT STUDY: CPT

## 2024-11-20 PROCEDURE — 87340 HEPATITIS B SURFACE AG IA: CPT

## 2024-11-20 PROCEDURE — 90935 HEMODIALYSIS ONE EVALUATION: CPT

## 2024-11-20 PROCEDURE — 36556 INSERT NON-TUNNEL CV CATH: CPT | Performed by: STUDENT IN AN ORGANIZED HEALTH CARE EDUCATION/TRAINING PROGRAM

## 2024-11-20 PROCEDURE — 93010 ELECTROCARDIOGRAM REPORT: CPT | Performed by: INTERNAL MEDICINE

## 2024-11-20 PROCEDURE — 76937 US GUIDE VASCULAR ACCESS: CPT

## 2024-11-20 PROCEDURE — 94761 N-INVAS EAR/PLS OXIMETRY MLT: CPT

## 2024-11-20 PROCEDURE — 86704 HEP B CORE ANTIBODY TOTAL: CPT

## 2024-11-20 PROCEDURE — C1894 INTRO/SHEATH, NON-LASER: HCPCS

## 2024-11-20 PROCEDURE — 6360000002 HC RX W HCPCS

## 2024-11-20 PROCEDURE — 2500000003 HC RX 250 WO HCPCS

## 2024-11-20 PROCEDURE — 94010 BREATHING CAPACITY TEST: CPT

## 2024-11-20 PROCEDURE — 36556 INSERT NON-TUNNEL CV CATH: CPT

## 2024-11-20 PROCEDURE — 36415 COLL VENOUS BLD VENIPUNCTURE: CPT

## 2024-11-20 PROCEDURE — 2580000003 HC RX 258: Performed by: NURSE PRACTITIONER

## 2024-11-20 PROCEDURE — 94660 CPAP INITIATION&MGMT: CPT

## 2024-11-20 PROCEDURE — 6370000000 HC RX 637 (ALT 250 FOR IP): Performed by: NURSE PRACTITIONER

## 2024-11-20 PROCEDURE — 2500000003 HC RX 250 WO HCPCS: Performed by: NURSE PRACTITIONER

## 2024-11-20 PROCEDURE — 86317 IMMUNOASSAY INFECTIOUS AGENT: CPT

## 2024-11-20 RX ORDER — FUROSEMIDE 10 MG/ML
80 INJECTION INTRAMUSCULAR; INTRAVENOUS 2 TIMES DAILY
Status: DISCONTINUED | OUTPATIENT
Start: 2024-11-20 | End: 2024-11-20

## 2024-11-20 RX ORDER — ISOSORBIDE MONONITRATE 30 MG/1
30 TABLET, EXTENDED RELEASE ORAL DAILY
Status: DISCONTINUED | OUTPATIENT
Start: 2024-11-20 | End: 2024-11-27 | Stop reason: HOSPADM

## 2024-11-20 RX ORDER — LIDOCAINE HYDROCHLORIDE 10 MG/ML
INJECTION, SOLUTION EPIDURAL; INFILTRATION; INTRACAUDAL; PERINEURAL PRN
Status: COMPLETED | OUTPATIENT
Start: 2024-11-20 | End: 2024-11-20

## 2024-11-20 RX ORDER — METOPROLOL SUCCINATE 25 MG/1
25 TABLET, EXTENDED RELEASE ORAL DAILY
Status: DISCONTINUED | OUTPATIENT
Start: 2024-11-20 | End: 2024-11-27 | Stop reason: HOSPADM

## 2024-11-20 RX ORDER — DEXTROSE MONOHYDRATE 100 MG/ML
INJECTION, SOLUTION INTRAVENOUS CONTINUOUS PRN
Status: DISCONTINUED | OUTPATIENT
Start: 2024-11-20 | End: 2024-11-27 | Stop reason: HOSPADM

## 2024-11-20 RX ORDER — INSULIN LISPRO 100 [IU]/ML
0-4 INJECTION, SOLUTION INTRAVENOUS; SUBCUTANEOUS
Status: DISCONTINUED | OUTPATIENT
Start: 2024-11-20 | End: 2024-11-27 | Stop reason: HOSPADM

## 2024-11-20 RX ORDER — HYDRALAZINE HYDROCHLORIDE 25 MG/1
25 TABLET, FILM COATED ORAL EVERY 8 HOURS SCHEDULED
Status: DISCONTINUED | OUTPATIENT
Start: 2024-11-20 | End: 2024-11-27 | Stop reason: HOSPADM

## 2024-11-20 RX ORDER — GLUCAGON 1 MG/ML
1 KIT INJECTION PRN
Status: DISCONTINUED | OUTPATIENT
Start: 2024-11-20 | End: 2024-11-27 | Stop reason: HOSPADM

## 2024-11-20 RX ORDER — FUROSEMIDE 10 MG/ML
80 INJECTION INTRAMUSCULAR; INTRAVENOUS EVERY 6 HOURS
Status: DISCONTINUED | OUTPATIENT
Start: 2024-11-20 | End: 2024-11-21

## 2024-11-20 RX ADMIN — FUROSEMIDE 80 MG: 10 INJECTION, SOLUTION INTRAMUSCULAR; INTRAVENOUS at 20:35

## 2024-11-20 RX ADMIN — WATER 1000 MG: 1 INJECTION INTRAMUSCULAR; INTRAVENOUS; SUBCUTANEOUS at 13:09

## 2024-11-20 RX ADMIN — HEPARIN SODIUM 2000 UNITS: 1000 INJECTION INTRAVENOUS; SUBCUTANEOUS at 17:28

## 2024-11-20 RX ADMIN — ASPIRIN 81 MG: 81 TABLET, CHEWABLE ORAL at 15:35

## 2024-11-20 RX ADMIN — HEPARIN SODIUM 2000 UNITS: 1000 INJECTION INTRAVENOUS; SUBCUTANEOUS at 22:35

## 2024-11-20 RX ADMIN — HYDRALAZINE HYDROCHLORIDE 25 MG: 25 TABLET ORAL at 20:35

## 2024-11-20 RX ADMIN — SODIUM CHLORIDE, PRESERVATIVE FREE 10 ML: 5 INJECTION INTRAVENOUS at 21:23

## 2024-11-20 RX ADMIN — AZITHROMYCIN MONOHYDRATE 500 MG: 500 INJECTION, POWDER, LYOPHILIZED, FOR SOLUTION INTRAVENOUS at 13:10

## 2024-11-20 RX ADMIN — HYDRALAZINE HYDROCHLORIDE 25 MG: 25 TABLET ORAL at 15:35

## 2024-11-20 RX ADMIN — ISOSORBIDE MONONITRATE 30 MG: 30 TABLET, EXTENDED RELEASE ORAL at 15:35

## 2024-11-20 RX ADMIN — FUROSEMIDE 80 MG: 10 INJECTION, SOLUTION INTRAMUSCULAR; INTRAVENOUS at 05:25

## 2024-11-20 RX ADMIN — METOPROLOL SUCCINATE 25 MG: 25 TABLET, EXTENDED RELEASE ORAL at 15:35

## 2024-11-20 RX ADMIN — FUROSEMIDE 80 MG: 10 INJECTION, SOLUTION INTRAMUSCULAR; INTRAVENOUS at 15:35

## 2024-11-20 RX ADMIN — TRAZODONE HYDROCHLORIDE 50 MG: 50 TABLET ORAL at 20:39

## 2024-11-20 RX ADMIN — HEPARIN SODIUM 13 UNITS/KG/HR: 10000 INJECTION, SOLUTION INTRAVENOUS at 19:35

## 2024-11-20 RX ADMIN — SODIUM CHLORIDE, PRESERVATIVE FREE 10 ML: 5 INJECTION INTRAVENOUS at 20:37

## 2024-11-20 RX ADMIN — ATORVASTATIN CALCIUM 40 MG: 40 TABLET, FILM COATED ORAL at 20:35

## 2024-11-20 RX ADMIN — LIDOCAINE HYDROCHLORIDE 4 ML: 10 INJECTION, SOLUTION EPIDURAL; INFILTRATION; INTRACAUDAL; PERINEURAL at 11:20

## 2024-11-20 ASSESSMENT — PAIN SCALES - GENERAL: PAINLEVEL_OUTOF10: 0

## 2024-11-20 NOTE — PRE SEDATION
DO   vitamin D (CHOLECALCIFEROL) 25 MCG (1000 UT) TABS tablet Take 1 tablet by mouth daily OTC    Provider, MD Sonya   guaiFENesin (MUCINEX) 600 MG extended release tablet Take 1 tablet by mouth 2 times daily  Patient not taking: Reported on 11/6/2024 6/16/24   Jamee Delcid MD   polyethylene glycol (MIRALAX MIX-IN PAX) 17 g packet Take 1 packet by mouth daily as needed for Constipation 5/21/24   Adrian Negro MD   Insulin Pen Needle (TRUEPLUS 5-BEVEL PEN NEEDLES) 31G X 5 MM MISC Use one needle two times daily as directed on package. 3/25/24   Yudelka Davis, REBECCA - CNP   Lancets Thin MISC 1 actuation by Does not apply route daily 2/2/23 11/6/24  Raza Fairchild DO   Blood Glucose Monitoring Suppl (TRUE METRIX AIR GLUCOSE METER) w/Device KIT 1 actuation by Does not apply route daily 2/2/23 11/6/24  Raza Fairchild, DO   Insulin Pen Needle (PEN NEEDLES 3/16\") 31G X 5 MM MISC 1 each by Does not apply route daily Use one needle two times daily as directed on package. 9/22/22   Raza Fairchild, DO   Insulin Pen Needle (PEN NEEDLES 3/16\") 31G X 5 MM MISC 1 each by Does not apply route daily To be used 4 times daily as directed on package 6/15/21 11/6/24  Raza Fairchild DO     Patient is a good candidate for a non-tunneled HD catheter with local anaesthesia.    Electronically signed by Burt Romero MD on 11/20/2024 at 3:47 PM

## 2024-11-20 NOTE — BRIEF OP NOTE
Brief Postoperative Note      Patient: Landry Morley  YOB: 1970  MRN: [id number]    Date of Procedure: 11/20/2024    Successful placement of a non-tunneled HD catheter.  Tolerated well.    Electronically signed by Burt Romero MD on 11/20/2024 at 3:48 PM   [Follow-up Visit ___] : a follow-up visit  for [unfilled] [Formal Caregiver] : formal caregiver

## 2024-11-20 NOTE — OR NURSING
Dr Romero aware of when the heparin drip was turned off and the pt lab values.  Ok to proceed with the procedure.

## 2024-11-20 NOTE — OR NURSING
Pt had a temporary hd dialysis catheter placed by Dr Romero.  Per Dr Romero ok to use temp hd cath immediately.  Pt is being transported to [facility], Michelle PANIAGUA has been updated on this.

## 2024-11-20 NOTE — PLAN OF CARE
Problem: Chronic Conditions and Co-morbidities  Goal: Patient's chronic conditions and co-morbidity symptoms are monitored and maintained or improved  11/20/2024 1010 by Michelle Colbert RN  Outcome: Progressing  Flowsheets (Taken 11/20/2024 0800)  Care Plan - Patient's Chronic Conditions and Co-Morbidity Symptoms are Monitored and Maintained or Improved:   Monitor and assess patient's chronic conditions and comorbid symptoms for stability, deterioration, or improvement   Collaborate with multidisciplinary team to address chronic and comorbid conditions and prevent exacerbation or deterioration   Update acute care plan with appropriate goals if chronic or comorbid symptoms are exacerbated and prevent overall improvement and discharge  11/20/2024 0638 by Rubens Chowdhury RN  Outcome: Progressing     Problem: Discharge Planning  Goal: Discharge to home or other facility with appropriate resources  11/20/2024 1010 by Michelle Colbert RN  Outcome: Progressing  Flowsheets (Taken 11/20/2024 0800)  Discharge to home or other facility with appropriate resources:   Identify barriers to discharge with patient and caregiver   Arrange for needed discharge resources and transportation as appropriate   Identify discharge learning needs (meds, wound care, etc)   Arrange for interpreters to assist at discharge as needed   Refer to discharge planning if patient needs post-hospital services based on physician order or complex needs related to functional status, cognitive ability or social support system  11/20/2024 0638 by Rubens Chowdhury RN  Outcome: Progressing     Problem: Pain  Goal: Verbalizes/displays adequate comfort level or baseline comfort level  11/20/2024 1010 by Michelle Colbert RN  Outcome: Progressing  11/20/2024 0638 by Rubens Chowdhury, RN  Outcome: Progressing     Problem: Safety - Adult  Goal: Free from fall injury  11/20/2024 1010 by Michelle Colbert RN  Outcome: Progressing  11/20/2024 0638 by Rubens Chowdhury,

## 2024-11-20 NOTE — ED NOTES
0921 perfect serve message sent to Dr Calvo on in patient consult from hospitalist.    0921 Dr Calvo acknowledged perfect serve message. Added to treatment team.  
0977 perfect serve message sent to Dr Sommer on in patient consult from hospitalist    0948 Dr Sommer acknowledged perfect serve message. Added to treatment team.  
Attempts to call and notify floor SBAR was in/ pt arriving to transport pt were unsuccessful d/t multiple unanswered phone calls to unit from unit clerk as well as this nurse.  
Difficulty obtaining cultures x2 nurses. IV placed to left wrist. PICC team consult placed for second line and cultures.   
ED TO INPATIENT SBAR HANDOFF    Patient Name: Landry Morley   :  1970  54 y.o.   Preferred Name  Landry  Family/Caregiver Present yes   Restraints no   C-SSRS: Risk of Suicide: No Risk  Sitter no   Sepsis Risk Score        Situation  Chief Complaint   Patient presents with    Respiratory Distress     Brief Description of Patient's Condition: Arrive with EMS from home where he was found at 78% room air. Patient placed on cpap by EMS, at 95% on arrival to ED. Placed on Bipap on ED arrival, respiratory rate and effort improved.   Mental Status: alert and oriented  Arrived from: home    Imaging:   XR CHEST PORTABLE   Final Result      A large right lower lung infiltrate is seen, extending to the diaphragm.   Cannot exclude a small right pleural effusion.   No left pleural effusion is noted.   No pneumothorax is seen.      The cardiomediastinal silhouette is stable, without evidence of cardiomegaly.      Findings are concerning for the presence of pneumonitis.      Electronically signed by Dane Solano MD        Abnormal labs:   Abnormal Labs Reviewed   CBC WITH AUTO DIFFERENTIAL - Abnormal; Notable for the following components:       Result Value    WBC 14.5 (*)     RBC 3.92 (*)     Hemoglobin 11.7 (*)     Hematocrit 37.9 (*)     MCHC 30.9 (*)     MPV 11.3 (*)     Neutrophils % 89 (*)     Lymphocytes % 7 (*)     Immature Granulocytes % 1 (*)     All other components within normal limits   COMPREHENSIVE METABOLIC PANEL - Abnormal; Notable for the following components:    Potassium 5.5 (*)     CO2 18 (*)     Glucose 217 (*)     BUN 70 (*)     Creatinine 5.6 (*)     Est, Glom Filt Rate 11 (*)     Total Protein 6.0 (*)     All other components within normal limits   TROPONIN - Abnormal; Notable for the following components:    Troponin, High Sensitivity 98 (*)     All other components within normal limits   BRAIN NATRIURETIC PEPTIDE - Abnormal; Notable for the following components:    NT Pro-BNP 24,022 (*)     All 
PICC team at bedside to obtain cultures and place line.   
Pt noted to have increased trop elevated from 95ng/L. No phone call received from lab. Notified Dr. Diggs. Requested new EKG. EKG obtained and provided to provider  
Pt transported to unit w/o issues. Informed 3 nurses on unit while all at bedside of issue w/ PCR testing and pt's need for new specimens to be sent d/t rejection of first specimen as well as pt having an increase in trop by 95. Voiced understanding.   
Received phone call from respiratory that pt's PCR was unable to be ran d/t pt's specimen being sent in an  tube. Respiratory at bedside att to transport pt.   
taking: Reported on 11/6/2024 6/16/24   Jamee Delcid MD   polyethylene glycol (MIRALAX MIX-IN PAX) 17 g packet Take 1 packet by mouth daily as needed for Constipation 5/21/24   Adrian Negro MD   Insulin Pen Needle (TRUEPLUS 5-BEVEL PEN NEEDLES) 31G X 5 MM MISC Use one needle two times daily as directed on package. 3/25/24   Yudelka Davis APRN - CNP   Lancets Thin MISC 1 actuation by Does not apply route daily 2/2/23 11/6/24  Raza Fairchild, DO   Blood Glucose Monitoring Suppl (TRUE METRIX AIR GLUCOSE METER) w/Device KIT 1 actuation by Does not apply route daily 2/2/23 11/6/24  Raza Fairchild, DO   Insulin Pen Needle (PEN NEEDLES 3/16\") 31G X 5 MM MISC 1 each by Does not apply route daily Use one needle two times daily as directed on package. 9/22/22   Raza Fairchild, DO   Insulin Pen Needle (PEN NEEDLES 3/16\") 31G X 5 MM MISC 1 each by Does not apply route daily To be used 4 times daily as directed on package 6/15/21 11/6/24  Raza Fairchild DO     Comments:  Medication list reviewed with patient/family and insurance claims verified.  Patient reports he took a nitro this am, no other medications taken prior to ER visit.  Patient reports he is taking no insulin.    To my knowledge the above medication history is accurate as of 11/19/2024 8:31 AM.   Bridgett Christina CPhT   11/19/2024 8:31 AM

## 2024-11-21 LAB
ANION GAP SERPL CALCULATED.3IONS-SCNC: 13 MMOL/L (ref 9–17)
ANTI-XA UNFRAC HEPARIN: 0.45 IU/L
ANTI-XA UNFRAC HEPARIN: 0.48 IU/L
BUN SERPL-MCNC: 56 MG/DL (ref 7–20)
CALCIUM SERPL-MCNC: 9 MG/DL (ref 8.3–10.6)
CHLORIDE SERPL-SCNC: 101 MMOL/L (ref 99–110)
CO2 SERPL-SCNC: 26 MMOL/L (ref 21–32)
CREAT SERPL-MCNC: 4.8 MG/DL (ref 0.9–1.3)
ERYTHROCYTE [DISTWIDTH] IN BLOOD BY AUTOMATED COUNT: 13.2 % (ref 11.7–14.9)
GFR, ESTIMATED: 13 ML/MIN/1.73M2
GLUCOSE BLD-MCNC: 108 MG/DL (ref 74–99)
GLUCOSE BLD-MCNC: 115 MG/DL (ref 74–99)
GLUCOSE BLD-MCNC: 183 MG/DL (ref 74–99)
GLUCOSE SERPL-MCNC: 98 MG/DL (ref 74–99)
HCT VFR BLD AUTO: 28.8 % (ref 42–52)
HGB BLD-MCNC: 9.8 G/DL (ref 13.5–18)
MCH RBC QN AUTO: 30 PG (ref 27–31)
MCHC RBC AUTO-ENTMCNC: 34 G/DL (ref 32–36)
MCV RBC AUTO: 88.1 FL (ref 78–100)
PLATELET # BLD AUTO: 148 K/UL (ref 140–440)
PMV BLD AUTO: 11.6 FL (ref 7.5–11.1)
POTASSIUM SERPL-SCNC: 3.2 MMOL/L (ref 3.5–5.1)
RBC # BLD AUTO: 3.27 M/UL (ref 4.6–6.2)
SODIUM SERPL-SCNC: 139 MMOL/L (ref 136–145)
WBC OTHER # BLD: 5.2 K/UL (ref 4–10.5)

## 2024-11-21 PROCEDURE — 36415 COLL VENOUS BLD VENIPUNCTURE: CPT

## 2024-11-21 PROCEDURE — 6370000000 HC RX 637 (ALT 250 FOR IP): Performed by: NURSE PRACTITIONER

## 2024-11-21 PROCEDURE — 99233 SBSQ HOSP IP/OBS HIGH 50: CPT | Performed by: INTERNAL MEDICINE

## 2024-11-21 PROCEDURE — 94761 N-INVAS EAR/PLS OXIMETRY MLT: CPT

## 2024-11-21 PROCEDURE — 2140000000 HC CCU INTERMEDIATE R&B

## 2024-11-21 PROCEDURE — 85520 HEPARIN ASSAY: CPT

## 2024-11-21 PROCEDURE — 80048 BASIC METABOLIC PNL TOTAL CA: CPT

## 2024-11-21 PROCEDURE — 6360000002 HC RX W HCPCS: Performed by: INTERNAL MEDICINE

## 2024-11-21 PROCEDURE — 82947 ASSAY GLUCOSE BLOOD QUANT: CPT

## 2024-11-21 PROCEDURE — 85027 COMPLETE CBC AUTOMATED: CPT

## 2024-11-21 PROCEDURE — 2580000003 HC RX 258: Performed by: INTERNAL MEDICINE

## 2024-11-21 PROCEDURE — 90935 HEMODIALYSIS ONE EVALUATION: CPT

## 2024-11-21 PROCEDURE — 2580000003 HC RX 258: Performed by: NURSE PRACTITIONER

## 2024-11-21 PROCEDURE — 6370000000 HC RX 637 (ALT 250 FOR IP)

## 2024-11-21 RX ORDER — FUROSEMIDE 10 MG/ML
80 INJECTION INTRAMUSCULAR; INTRAVENOUS 2 TIMES DAILY
Status: DISCONTINUED | OUTPATIENT
Start: 2024-11-21 | End: 2024-11-26

## 2024-11-21 RX ADMIN — WATER 1000 MG: 1 INJECTION INTRAMUSCULAR; INTRAVENOUS; SUBCUTANEOUS at 11:43

## 2024-11-21 RX ADMIN — POLYETHYLENE GLYCOL (3350) 17 G: 17 POWDER, FOR SOLUTION ORAL at 11:43

## 2024-11-21 RX ADMIN — INSULIN LISPRO 1 UNITS: 100 INJECTION, SOLUTION INTRAVENOUS; SUBCUTANEOUS at 16:13

## 2024-11-21 RX ADMIN — FUROSEMIDE 80 MG: 10 INJECTION, SOLUTION INTRAMUSCULAR; INTRAVENOUS at 16:12

## 2024-11-21 RX ADMIN — AZITHROMYCIN MONOHYDRATE 500 MG: 500 INJECTION, POWDER, LYOPHILIZED, FOR SOLUTION INTRAVENOUS at 11:53

## 2024-11-21 RX ADMIN — SODIUM CHLORIDE, PRESERVATIVE FREE 10 ML: 5 INJECTION INTRAVENOUS at 21:02

## 2024-11-21 RX ADMIN — METOPROLOL SUCCINATE 25 MG: 25 TABLET, EXTENDED RELEASE ORAL at 11:43

## 2024-11-21 RX ADMIN — FUROSEMIDE 80 MG: 10 INJECTION, SOLUTION INTRAMUSCULAR; INTRAVENOUS at 03:39

## 2024-11-21 RX ADMIN — SODIUM CHLORIDE, PRESERVATIVE FREE 10 ML: 5 INJECTION INTRAVENOUS at 11:44

## 2024-11-21 RX ADMIN — HYDRALAZINE HYDROCHLORIDE 25 MG: 25 TABLET ORAL at 23:08

## 2024-11-21 RX ADMIN — FUROSEMIDE 80 MG: 10 INJECTION, SOLUTION INTRAMUSCULAR; INTRAVENOUS at 11:47

## 2024-11-21 RX ADMIN — ATORVASTATIN CALCIUM 40 MG: 40 TABLET, FILM COATED ORAL at 21:02

## 2024-11-21 RX ADMIN — ASPIRIN 81 MG: 81 TABLET, CHEWABLE ORAL at 11:44

## 2024-11-21 RX ADMIN — TRAZODONE HYDROCHLORIDE 50 MG: 50 TABLET ORAL at 23:08

## 2024-11-21 RX ADMIN — ISOSORBIDE MONONITRATE 30 MG: 30 TABLET, EXTENDED RELEASE ORAL at 11:43

## 2024-11-21 RX ADMIN — HYDRALAZINE HYDROCHLORIDE 25 MG: 25 TABLET ORAL at 05:43

## 2024-11-21 ASSESSMENT — PAIN SCALES - GENERAL: PAINLEVEL_OUTOF10: 0

## 2024-11-21 NOTE — CARE COORDINATION
Chart reviewed. CM met with the patient at bedside. His daughter and brother is also at bedside. The patient lives at home independently with his daughter and her . He says he drives, has a PCP and insurance. He tells this CM that his kidneys were failing before admission but he was hoping to get a \"few more months out of them\". He said they were talking about peritoneal dialysis at home prior to this hospital stay. He confirms his first HD treatment was 11/20/24 and that he has lot a lot of water weight. He said he has been urinating a lot.     The patient says he is getting a CABG next week and will remain in the hospital for that. He would like to do home peritoneal dialysis if possible.  He will likely need HD at discharge. Cm will follow.

## 2024-11-21 NOTE — PLAN OF CARE
Problem: Chronic Conditions and Co-morbidities  Goal: Patient's chronic conditions and co-morbidity symptoms are monitored and maintained or improved  11/20/2024 2305 by Rubens Chowdhury RN  Outcome: Progressing  11/20/2024 1010 by Michelle Colbert RN  Outcome: Progressing  Flowsheets (Taken 11/20/2024 0800)  Care Plan - Patient's Chronic Conditions and Co-Morbidity Symptoms are Monitored and Maintained or Improved:   Monitor and assess patient's chronic conditions and comorbid symptoms for stability, deterioration, or improvement   Collaborate with multidisciplinary team to address chronic and comorbid conditions and prevent exacerbation or deterioration   Update acute care plan with appropriate goals if chronic or comorbid symptoms are exacerbated and prevent overall improvement and discharge     Problem: Discharge Planning  Goal: Discharge to home or other facility with appropriate resources  11/20/2024 2305 by Rubens Chowdhury RN  Outcome: Progressing  11/20/2024 1010 by Michelle Colbert RN  Outcome: Progressing  Flowsheets (Taken 11/20/2024 0800)  Discharge to home or other facility with appropriate resources:   Identify barriers to discharge with patient and caregiver   Arrange for needed discharge resources and transportation as appropriate   Identify discharge learning needs (meds, wound care, etc)   Arrange for interpreters to assist at discharge as needed   Refer to discharge planning if patient needs post-hospital services based on physician order or complex needs related to functional status, cognitive ability or social support system

## 2024-11-22 LAB
ANION GAP SERPL CALCULATED.3IONS-SCNC: 14 MMOL/L (ref 9–17)
ANTI-XA UNFRAC HEPARIN: 0.36 IU/L
BUN SERPL-MCNC: 41 MG/DL (ref 7–20)
CALCIUM SERPL-MCNC: 9.2 MG/DL (ref 8.3–10.6)
CHLORIDE SERPL-SCNC: 97 MMOL/L (ref 99–110)
CO2 SERPL-SCNC: 26 MMOL/L (ref 21–32)
CREAT SERPL-MCNC: 4.2 MG/DL (ref 0.9–1.3)
GFR, ESTIMATED: 15 ML/MIN/1.73M2
GLUCOSE BLD-MCNC: 106 MG/DL (ref 74–99)
GLUCOSE BLD-MCNC: 107 MG/DL (ref 74–99)
GLUCOSE BLD-MCNC: 153 MG/DL (ref 74–99)
GLUCOSE BLD-MCNC: 171 MG/DL (ref 74–99)
GLUCOSE SERPL-MCNC: 107 MG/DL (ref 74–99)
POTASSIUM SERPL-SCNC: 3.8 MMOL/L (ref 3.5–5.1)
SODIUM SERPL-SCNC: 137 MMOL/L (ref 136–145)

## 2024-11-22 PROCEDURE — 6370000000 HC RX 637 (ALT 250 FOR IP)

## 2024-11-22 PROCEDURE — 94150 VITAL CAPACITY TEST: CPT

## 2024-11-22 PROCEDURE — 2580000003 HC RX 258: Performed by: NURSE PRACTITIONER

## 2024-11-22 PROCEDURE — 99233 SBSQ HOSP IP/OBS HIGH 50: CPT | Performed by: INTERNAL MEDICINE

## 2024-11-22 PROCEDURE — 6360000002 HC RX W HCPCS: Performed by: INTERNAL MEDICINE

## 2024-11-22 PROCEDURE — 6370000000 HC RX 637 (ALT 250 FOR IP): Performed by: NURSE PRACTITIONER

## 2024-11-22 PROCEDURE — 2500000003 HC RX 250 WO HCPCS: Performed by: NURSE PRACTITIONER

## 2024-11-22 PROCEDURE — 2580000003 HC RX 258: Performed by: INTERNAL MEDICINE

## 2024-11-22 PROCEDURE — 94761 N-INVAS EAR/PLS OXIMETRY MLT: CPT

## 2024-11-22 PROCEDURE — 2140000000 HC CCU INTERMEDIATE R&B

## 2024-11-22 PROCEDURE — 94664 DEMO&/EVAL PT USE INHALER: CPT

## 2024-11-22 PROCEDURE — APPNB15 APP NON BILLABLE TIME 0-15 MINS

## 2024-11-22 PROCEDURE — 85520 HEPARIN ASSAY: CPT

## 2024-11-22 PROCEDURE — 82947 ASSAY GLUCOSE BLOOD QUANT: CPT

## 2024-11-22 PROCEDURE — 80048 BASIC METABOLIC PNL TOTAL CA: CPT

## 2024-11-22 PROCEDURE — 2580000003 HC RX 258

## 2024-11-22 RX ORDER — SODIUM CHLORIDE 9 MG/ML
INJECTION, SOLUTION INTRAMUSCULAR; INTRAVENOUS; SUBCUTANEOUS
Status: COMPLETED
Start: 2024-11-22 | End: 2024-11-22

## 2024-11-22 RX ADMIN — HEPARIN SODIUM 15 UNITS/KG/HR: 10000 INJECTION, SOLUTION INTRAVENOUS at 23:54

## 2024-11-22 RX ADMIN — HYDRALAZINE HYDROCHLORIDE 25 MG: 25 TABLET ORAL at 06:24

## 2024-11-22 RX ADMIN — SODIUM CHLORIDE, PRESERVATIVE FREE 10 ML: 5 INJECTION INTRAVENOUS at 09:15

## 2024-11-22 RX ADMIN — HEPARIN SODIUM 15 UNITS/KG/HR: 10000 INJECTION, SOLUTION INTRAVENOUS at 06:54

## 2024-11-22 RX ADMIN — FUROSEMIDE 80 MG: 10 INJECTION, SOLUTION INTRAMUSCULAR; INTRAVENOUS at 17:57

## 2024-11-22 RX ADMIN — ISOSORBIDE MONONITRATE 30 MG: 30 TABLET, EXTENDED RELEASE ORAL at 09:12

## 2024-11-22 RX ADMIN — ATORVASTATIN CALCIUM 40 MG: 40 TABLET, FILM COATED ORAL at 21:43

## 2024-11-22 RX ADMIN — TRAZODONE HYDROCHLORIDE 50 MG: 50 TABLET ORAL at 21:43

## 2024-11-22 RX ADMIN — AZITHROMYCIN MONOHYDRATE 500 MG: 500 INJECTION, POWDER, LYOPHILIZED, FOR SOLUTION INTRAVENOUS at 09:51

## 2024-11-22 RX ADMIN — SODIUM CHLORIDE, PRESERVATIVE FREE 10 ML: 5 INJECTION INTRAVENOUS at 10:27

## 2024-11-22 RX ADMIN — METOPROLOL SUCCINATE 25 MG: 25 TABLET, EXTENDED RELEASE ORAL at 09:11

## 2024-11-22 RX ADMIN — FUROSEMIDE 80 MG: 10 INJECTION, SOLUTION INTRAMUSCULAR; INTRAVENOUS at 09:14

## 2024-11-22 RX ADMIN — ASPIRIN 81 MG: 81 TABLET, CHEWABLE ORAL at 09:11

## 2024-11-22 RX ADMIN — HYDRALAZINE HYDROCHLORIDE 25 MG: 25 TABLET ORAL at 21:43

## 2024-11-22 RX ADMIN — WATER 1000 MG: 1 INJECTION INTRAMUSCULAR; INTRAVENOUS; SUBCUTANEOUS at 09:26

## 2024-11-22 RX ADMIN — HYDRALAZINE HYDROCHLORIDE 25 MG: 25 TABLET ORAL at 14:18

## 2024-11-22 RX ADMIN — SODIUM CHLORIDE, PRESERVATIVE FREE 10 ML: 5 INJECTION INTRAVENOUS at 10:26

## 2024-11-22 ASSESSMENT — PAIN SCALES - GENERAL: PAINLEVEL_OUTOF10: 0

## 2024-11-22 ASSESSMENT — PAIN SCALES - WONG BAKER
WONGBAKER_NUMERICALRESPONSE: HURTS A LITTLE BIT

## 2024-11-22 NOTE — CARE COORDINATION
Cm reviewed chart. FRANK sent a message to Dr. oSmmer to see if CM should start the process for HD as an outpatient. CM is following.     1417  CM received a message back from Dr. Sommer. She requests that CM start HD referral. Frank sent a referral to [facility] with conf #[id number].   Cm uploaded all current documents except the TB test.  CM requested [facility] because it is close to the patient's home. Cm requested MWF morning time per family and patient request. Cm is following.

## 2024-11-23 LAB
ANION GAP SERPL CALCULATED.3IONS-SCNC: 15 MMOL/L (ref 9–17)
ANTI-XA UNFRAC HEPARIN: 0.38 IU/L
BUN SERPL-MCNC: 49 MG/DL (ref 7–20)
CALCIUM SERPL-MCNC: 9.7 MG/DL (ref 8.3–10.6)
CHLORIDE SERPL-SCNC: 96 MMOL/L (ref 99–110)
CO2 SERPL-SCNC: 24 MMOL/L (ref 21–32)
CREAT SERPL-MCNC: 4.7 MG/DL (ref 0.9–1.3)
ERYTHROCYTE [DISTWIDTH] IN BLOOD BY AUTOMATED COUNT: 13.2 % (ref 11.7–14.9)
GFR, ESTIMATED: 13 ML/MIN/1.73M2
GLUCOSE BLD-MCNC: 101 MG/DL (ref 74–99)
GLUCOSE BLD-MCNC: 103 MG/DL (ref 74–99)
GLUCOSE BLD-MCNC: 136 MG/DL (ref 74–99)
GLUCOSE BLD-MCNC: 157 MG/DL (ref 74–99)
GLUCOSE SERPL-MCNC: 113 MG/DL (ref 74–99)
HCT VFR BLD AUTO: 31.4 % (ref 42–52)
HGB BLD-MCNC: 10.5 G/DL (ref 13.5–18)
MCH RBC QN AUTO: 30.3 PG (ref 27–31)
MCHC RBC AUTO-ENTMCNC: 33.4 G/DL (ref 32–36)
MCV RBC AUTO: 90.8 FL (ref 78–100)
PLATELET # BLD AUTO: 128 K/UL (ref 140–440)
PMV BLD AUTO: 12 FL (ref 7.5–11.1)
POTASSIUM SERPL-SCNC: 3.9 MMOL/L (ref 3.5–5.1)
RBC # BLD AUTO: 3.46 M/UL (ref 4.6–6.2)
SODIUM SERPL-SCNC: 135 MMOL/L (ref 136–145)
TROPONIN I SERPL HS-MCNC: 2725 NG/L (ref 0–22)
WBC OTHER # BLD: 4.8 K/UL (ref 4–10.5)

## 2024-11-23 PROCEDURE — 6370000000 HC RX 637 (ALT 250 FOR IP): Performed by: NURSE PRACTITIONER

## 2024-11-23 PROCEDURE — 2500000003 HC RX 250 WO HCPCS: Performed by: NURSE PRACTITIONER

## 2024-11-23 PROCEDURE — 2580000003 HC RX 258: Performed by: INTERNAL MEDICINE

## 2024-11-23 PROCEDURE — 94150 VITAL CAPACITY TEST: CPT

## 2024-11-23 PROCEDURE — 90935 HEMODIALYSIS ONE EVALUATION: CPT

## 2024-11-23 PROCEDURE — 2580000003 HC RX 258: Performed by: NURSE PRACTITIONER

## 2024-11-23 PROCEDURE — 84484 ASSAY OF TROPONIN QUANT: CPT

## 2024-11-23 PROCEDURE — 6370000000 HC RX 637 (ALT 250 FOR IP)

## 2024-11-23 PROCEDURE — 85520 HEPARIN ASSAY: CPT

## 2024-11-23 PROCEDURE — 2140000000 HC CCU INTERMEDIATE R&B

## 2024-11-23 PROCEDURE — 6360000002 HC RX W HCPCS: Performed by: INTERNAL MEDICINE

## 2024-11-23 PROCEDURE — 99232 SBSQ HOSP IP/OBS MODERATE 35: CPT | Performed by: INTERNAL MEDICINE

## 2024-11-23 PROCEDURE — 80048 BASIC METABOLIC PNL TOTAL CA: CPT

## 2024-11-23 PROCEDURE — 82947 ASSAY GLUCOSE BLOOD QUANT: CPT

## 2024-11-23 PROCEDURE — 85027 COMPLETE CBC AUTOMATED: CPT

## 2024-11-23 RX ADMIN — METOPROLOL SUCCINATE 25 MG: 25 TABLET, EXTENDED RELEASE ORAL at 11:14

## 2024-11-23 RX ADMIN — FUROSEMIDE 80 MG: 10 INJECTION, SOLUTION INTRAMUSCULAR; INTRAVENOUS at 15:20

## 2024-11-23 RX ADMIN — HYDRALAZINE HYDROCHLORIDE 25 MG: 25 TABLET ORAL at 14:04

## 2024-11-23 RX ADMIN — HYDRALAZINE HYDROCHLORIDE 25 MG: 25 TABLET ORAL at 06:21

## 2024-11-23 RX ADMIN — TRAZODONE HYDROCHLORIDE 50 MG: 50 TABLET ORAL at 21:44

## 2024-11-23 RX ADMIN — ATORVASTATIN CALCIUM 40 MG: 40 TABLET, FILM COATED ORAL at 21:38

## 2024-11-23 RX ADMIN — HEPARIN SODIUM 15 UNITS/KG/HR: 10000 INJECTION, SOLUTION INTRAVENOUS at 18:40

## 2024-11-23 RX ADMIN — ISOSORBIDE MONONITRATE 30 MG: 30 TABLET, EXTENDED RELEASE ORAL at 11:14

## 2024-11-23 RX ADMIN — SODIUM CHLORIDE, PRESERVATIVE FREE 10 ML: 5 INJECTION INTRAVENOUS at 21:34

## 2024-11-23 RX ADMIN — POLYETHYLENE GLYCOL (3350) 17 G: 17 POWDER, FOR SOLUTION ORAL at 22:34

## 2024-11-23 RX ADMIN — HYDRALAZINE HYDROCHLORIDE 25 MG: 25 TABLET ORAL at 21:38

## 2024-11-23 RX ADMIN — EPOETIN ALFA-EPBX 5000 UNITS: 3000 INJECTION, SOLUTION INTRAVENOUS; SUBCUTANEOUS at 10:03

## 2024-11-23 RX ADMIN — ASPIRIN 81 MG: 81 TABLET, CHEWABLE ORAL at 11:14

## 2024-11-23 NOTE — PLAN OF CARE
Problem: Chronic Conditions and Co-morbidities  Goal: Patient's chronic conditions and co-morbidity symptoms are monitored and maintained or improved  11/23/2024 0333 by Danish Holbrook, RN  Outcome: Progressing  Flowsheets (Taken 11/23/2024 0333)  Care Plan - Patient's Chronic Conditions and Co-Morbidity Symptoms are Monitored and Maintained or Improved:   Monitor and assess patient's chronic conditions and comorbid symptoms for stability, deterioration, or improvement   Collaborate with multidisciplinary team to address chronic and comorbid conditions and prevent exacerbation or deterioration  11/22/2024 1531 by Deandra Mata, RN  Outcome: Progressing     Problem: Discharge Planning  Goal: Discharge to home or other facility with appropriate resources  11/23/2024 0333 by Danish Holbrook, RN  Outcome: Progressing  Flowsheets (Taken 11/23/2024 0333)  Discharge to home or other facility with appropriate resources:   Refer to discharge planning if patient needs post-hospital services based on physician order or complex needs related to functional status, cognitive ability or social support system   Arrange for interpreters to assist at discharge as needed   Identify discharge learning needs (meds, wound care, etc)  11/22/2024 1531 by Deandra Mata, RN  Outcome: Progressing     Problem: Pain  Goal: Verbalizes/displays adequate comfort level or baseline comfort level  11/22/2024 1531 by Deandra Mata RN  Outcome: Progressing     Problem: Safety - Adult  Goal: Free from fall injury  11/23/2024 0333 by Danish Holbrook, RN  Outcome: Progressing  Flowsheets (Taken 11/23/2024 0333)  Free From Fall Injury:   Instruct family/caregiver on patient safety   Based on caregiver fall risk screen, instruct family/caregiver to ask for assistance with transferring infant if caregiver noted to have fall risk factors  11/22/2024 1531 by Deandra Mata, RN  Outcome: Progressing

## 2024-11-24 LAB
ANION GAP SERPL CALCULATED.3IONS-SCNC: 12 MMOL/L (ref 9–17)
ANTI-XA UNFRAC HEPARIN: 0.25 IU/L
ANTI-XA UNFRAC HEPARIN: 0.32 IU/L
ANTI-XA UNFRAC HEPARIN: 0.39 IU/L
BUN SERPL-MCNC: 39 MG/DL (ref 7–20)
CALCIUM SERPL-MCNC: 9.7 MG/DL (ref 8.3–10.6)
CHLORIDE SERPL-SCNC: 99 MMOL/L (ref 99–110)
CO2 SERPL-SCNC: 27 MMOL/L (ref 21–32)
CREAT SERPL-MCNC: 4.3 MG/DL (ref 0.9–1.3)
GFR, ESTIMATED: 14 ML/MIN/1.73M2
GLUCOSE BLD-MCNC: 105 MG/DL (ref 74–99)
GLUCOSE BLD-MCNC: 108 MG/DL (ref 74–99)
GLUCOSE BLD-MCNC: 130 MG/DL (ref 74–99)
GLUCOSE BLD-MCNC: 96 MG/DL (ref 74–99)
GLUCOSE SERPL-MCNC: 100 MG/DL (ref 74–99)
MICROORGANISM SPEC CULT: NORMAL
MICROORGANISM SPEC CULT: NORMAL
POTASSIUM SERPL-SCNC: 4.5 MMOL/L (ref 3.5–5.1)
SERVICE CMNT-IMP: NORMAL
SERVICE CMNT-IMP: NORMAL
SODIUM SERPL-SCNC: 137 MMOL/L (ref 136–145)
SPECIMEN DESCRIPTION: NORMAL
SPECIMEN DESCRIPTION: NORMAL

## 2024-11-24 PROCEDURE — 6360000002 HC RX W HCPCS: Performed by: NURSE PRACTITIONER

## 2024-11-24 PROCEDURE — 2140000000 HC CCU INTERMEDIATE R&B

## 2024-11-24 PROCEDURE — 6360000002 HC RX W HCPCS: Performed by: INTERNAL MEDICINE

## 2024-11-24 PROCEDURE — 6370000000 HC RX 637 (ALT 250 FOR IP)

## 2024-11-24 PROCEDURE — 6370000000 HC RX 637 (ALT 250 FOR IP): Performed by: NURSE PRACTITIONER

## 2024-11-24 PROCEDURE — 36415 COLL VENOUS BLD VENIPUNCTURE: CPT

## 2024-11-24 PROCEDURE — 85520 HEPARIN ASSAY: CPT

## 2024-11-24 PROCEDURE — 82947 ASSAY GLUCOSE BLOOD QUANT: CPT

## 2024-11-24 PROCEDURE — 94761 N-INVAS EAR/PLS OXIMETRY MLT: CPT

## 2024-11-24 PROCEDURE — 2500000003 HC RX 250 WO HCPCS: Performed by: NURSE PRACTITIONER

## 2024-11-24 PROCEDURE — 80048 BASIC METABOLIC PNL TOTAL CA: CPT

## 2024-11-24 PROCEDURE — 99232 SBSQ HOSP IP/OBS MODERATE 35: CPT | Performed by: INTERNAL MEDICINE

## 2024-11-24 PROCEDURE — 94150 VITAL CAPACITY TEST: CPT

## 2024-11-24 PROCEDURE — 2580000003 HC RX 258: Performed by: NURSE PRACTITIONER

## 2024-11-24 PROCEDURE — 2580000003 HC RX 258: Performed by: INTERNAL MEDICINE

## 2024-11-24 RX ADMIN — HEPARIN SODIUM 2000 UNITS: 1000 INJECTION INTRAVENOUS; SUBCUTANEOUS at 06:41

## 2024-11-24 RX ADMIN — POLYETHYLENE GLYCOL (3350) 17 G: 17 POWDER, FOR SOLUTION ORAL at 20:54

## 2024-11-24 RX ADMIN — SODIUM CHLORIDE, PRESERVATIVE FREE 5 ML: 5 INJECTION INTRAVENOUS at 08:42

## 2024-11-24 RX ADMIN — HEPARIN SODIUM 17 UNITS/KG/HR: 10000 INJECTION, SOLUTION INTRAVENOUS at 11:49

## 2024-11-24 RX ADMIN — ASPIRIN 81 MG: 81 TABLET, CHEWABLE ORAL at 08:40

## 2024-11-24 RX ADMIN — ISOSORBIDE MONONITRATE 30 MG: 30 TABLET, EXTENDED RELEASE ORAL at 08:40

## 2024-11-24 RX ADMIN — METOPROLOL SUCCINATE 25 MG: 25 TABLET, EXTENDED RELEASE ORAL at 08:40

## 2024-11-24 RX ADMIN — TRAZODONE HYDROCHLORIDE 50 MG: 50 TABLET ORAL at 20:54

## 2024-11-24 RX ADMIN — HYDRALAZINE HYDROCHLORIDE 25 MG: 25 TABLET ORAL at 20:54

## 2024-11-24 RX ADMIN — FUROSEMIDE 80 MG: 10 INJECTION, SOLUTION INTRAMUSCULAR; INTRAVENOUS at 17:12

## 2024-11-24 RX ADMIN — HYDRALAZINE HYDROCHLORIDE 25 MG: 25 TABLET ORAL at 05:55

## 2024-11-24 RX ADMIN — FUROSEMIDE 80 MG: 10 INJECTION, SOLUTION INTRAMUSCULAR; INTRAVENOUS at 08:39

## 2024-11-24 RX ADMIN — SODIUM CHLORIDE, PRESERVATIVE FREE 10 ML: 5 INJECTION INTRAVENOUS at 08:41

## 2024-11-24 RX ADMIN — ATORVASTATIN CALCIUM 40 MG: 40 TABLET, FILM COATED ORAL at 20:54

## 2024-11-24 RX ADMIN — HYDRALAZINE HYDROCHLORIDE 25 MG: 25 TABLET ORAL at 14:17

## 2024-11-25 ENCOUNTER — APPOINTMENT (OUTPATIENT)
Dept: NON INVASIVE DIAGNOSTICS | Age: 54
DRG: 280 | End: 2024-11-25
Payer: MEDICARE

## 2024-11-25 ENCOUNTER — APPOINTMENT (OUTPATIENT)
Dept: INTERVENTIONAL RADIOLOGY/VASCULAR | Age: 54
DRG: 280 | End: 2024-11-25
Payer: MEDICARE

## 2024-11-25 LAB
ANION GAP SERPL CALCULATED.3IONS-SCNC: 14 MMOL/L (ref 9–17)
ANTI-XA UNFRAC HEPARIN: 0.38 IU/L
ANTI-XA UNFRAC HEPARIN: <0.1 IU/L
BUN SERPL-MCNC: 46 MG/DL (ref 7–20)
CALCIUM SERPL-MCNC: 9.8 MG/DL (ref 8.3–10.6)
CHLORIDE SERPL-SCNC: 96 MMOL/L (ref 99–110)
CO2 SERPL-SCNC: 26 MMOL/L (ref 21–32)
CREAT SERPL-MCNC: 5 MG/DL (ref 0.9–1.3)
ERYTHROCYTE [DISTWIDTH] IN BLOOD BY AUTOMATED COUNT: 13.4 % (ref 11.7–14.9)
GFR, ESTIMATED: 12 ML/MIN/1.73M2
GLUCOSE BLD-MCNC: 107 MG/DL (ref 74–99)
GLUCOSE BLD-MCNC: 107 MG/DL (ref 74–99)
GLUCOSE BLD-MCNC: 132 MG/DL (ref 74–99)
GLUCOSE SERPL-MCNC: 101 MG/DL (ref 74–99)
HCT VFR BLD AUTO: 32.7 % (ref 42–52)
HGB BLD-MCNC: 10.7 G/DL (ref 13.5–18)
INR PPP: 1.1
MCH RBC QN AUTO: 30.1 PG (ref 27–31)
MCHC RBC AUTO-ENTMCNC: 32.7 G/DL (ref 32–36)
MCV RBC AUTO: 91.9 FL (ref 78–100)
PARTIAL THROMBOPLASTIN TIME: 64.2 SEC (ref 25.1–37.1)
PLATELET, FLUORESCENCE: 112 K/UL (ref 140–440)
PMV BLD AUTO: 11.9 FL (ref 7.5–11.1)
POTASSIUM SERPL-SCNC: 4.7 MMOL/L (ref 3.5–5.1)
PROTHROMBIN TIME: 13.9 SEC (ref 11.7–14.5)
RBC # BLD AUTO: 3.56 M/UL (ref 4.6–6.2)
SODIUM SERPL-SCNC: 135 MMOL/L (ref 136–145)
WBC OTHER # BLD: 5.9 K/UL (ref 4–10.5)

## 2024-11-25 PROCEDURE — 6360000002 HC RX W HCPCS: Performed by: NURSE PRACTITIONER

## 2024-11-25 PROCEDURE — 94150 VITAL CAPACITY TEST: CPT

## 2024-11-25 PROCEDURE — 02H633Z INSERTION OF INFUSION DEVICE INTO RIGHT ATRIUM, PERCUTANEOUS APPROACH: ICD-10-PCS | Performed by: INTERNAL MEDICINE

## 2024-11-25 PROCEDURE — 0JH63XZ INSERTION OF TUNNELED VASCULAR ACCESS DEVICE INTO CHEST SUBCUTANEOUS TISSUE AND FASCIA, PERCUTANEOUS APPROACH: ICD-10-PCS | Performed by: INTERNAL MEDICINE

## 2024-11-25 PROCEDURE — 80048 BASIC METABOLIC PNL TOTAL CA: CPT

## 2024-11-25 PROCEDURE — 77001 FLUOROGUIDE FOR VEIN DEVICE: CPT | Performed by: RADIOLOGY

## 2024-11-25 PROCEDURE — 85027 COMPLETE CBC AUTOMATED: CPT

## 2024-11-25 PROCEDURE — 93308 TTE F-UP OR LMTD: CPT

## 2024-11-25 PROCEDURE — 2140000000 HC CCU INTERMEDIATE R&B

## 2024-11-25 PROCEDURE — 94761 N-INVAS EAR/PLS OXIMETRY MLT: CPT

## 2024-11-25 PROCEDURE — 85610 PROTHROMBIN TIME: CPT

## 2024-11-25 PROCEDURE — 99232 SBSQ HOSP IP/OBS MODERATE 35: CPT | Performed by: INTERNAL MEDICINE

## 2024-11-25 PROCEDURE — 6360000002 HC RX W HCPCS: Performed by: RADIOLOGY

## 2024-11-25 PROCEDURE — 6360000002 HC RX W HCPCS: Performed by: INTERNAL MEDICINE

## 2024-11-25 PROCEDURE — 2500000003 HC RX 250 WO HCPCS: Performed by: NURSE PRACTITIONER

## 2024-11-25 PROCEDURE — 99152 MOD SED SAME PHYS/QHP 5/>YRS: CPT | Performed by: RADIOLOGY

## 2024-11-25 PROCEDURE — 85730 THROMBOPLASTIN TIME PARTIAL: CPT

## 2024-11-25 PROCEDURE — 85520 HEPARIN ASSAY: CPT

## 2024-11-25 PROCEDURE — 2580000003 HC RX 258: Performed by: INTERNAL MEDICINE

## 2024-11-25 PROCEDURE — 6370000000 HC RX 637 (ALT 250 FOR IP)

## 2024-11-25 PROCEDURE — 82947 ASSAY GLUCOSE BLOOD QUANT: CPT

## 2024-11-25 PROCEDURE — 02PYX3Z REMOVAL OF INFUSION DEVICE FROM GREAT VESSEL, EXTERNAL APPROACH: ICD-10-PCS | Performed by: INTERNAL MEDICINE

## 2024-11-25 PROCEDURE — 2709999900 IR TUNNELED CVC PLACE WO SQ PORT/PUMP > 5 YEARS

## 2024-11-25 PROCEDURE — 36558 INSERT TUNNELED CV CATH: CPT | Performed by: RADIOLOGY

## 2024-11-25 PROCEDURE — 76937 US GUIDE VASCULAR ACCESS: CPT | Performed by: RADIOLOGY

## 2024-11-25 PROCEDURE — 6360000002 HC RX W HCPCS

## 2024-11-25 PROCEDURE — 6370000000 HC RX 637 (ALT 250 FOR IP): Performed by: NURSE PRACTITIONER

## 2024-11-25 RX ORDER — DIPHENHYDRAMINE HYDROCHLORIDE 50 MG/ML
INJECTION INTRAMUSCULAR; INTRAVENOUS PRN
Status: COMPLETED | OUTPATIENT
Start: 2024-11-25 | End: 2024-11-25

## 2024-11-25 RX ORDER — FENTANYL CITRATE 50 UG/ML
INJECTION, SOLUTION INTRAMUSCULAR; INTRAVENOUS PRN
Status: COMPLETED | OUTPATIENT
Start: 2024-11-25 | End: 2024-11-25

## 2024-11-25 RX ADMIN — FENTANYL CITRATE 50 MCG: 50 INJECTION, SOLUTION INTRAMUSCULAR; INTRAVENOUS at 14:54

## 2024-11-25 RX ADMIN — SODIUM CHLORIDE, PRESERVATIVE FREE 10 ML: 5 INJECTION INTRAVENOUS at 20:34

## 2024-11-25 RX ADMIN — ATORVASTATIN CALCIUM 40 MG: 40 TABLET, FILM COATED ORAL at 20:39

## 2024-11-25 RX ADMIN — HYDRALAZINE HYDROCHLORIDE 25 MG: 25 TABLET ORAL at 05:34

## 2024-11-25 RX ADMIN — HEPARIN SODIUM 17 UNITS/KG/HR: 10000 INJECTION, SOLUTION INTRAVENOUS at 02:58

## 2024-11-25 RX ADMIN — ISOSORBIDE MONONITRATE 30 MG: 30 TABLET, EXTENDED RELEASE ORAL at 17:43

## 2024-11-25 RX ADMIN — DIPHENHYDRAMINE HYDROCHLORIDE 50 MG: 50 INJECTION, SOLUTION INTRAMUSCULAR; INTRAVENOUS at 14:40

## 2024-11-25 RX ADMIN — HEPARIN SODIUM 4000 UNITS: 1000 INJECTION INTRAVENOUS; SUBCUTANEOUS at 17:45

## 2024-11-25 RX ADMIN — HYDRALAZINE HYDROCHLORIDE 25 MG: 25 TABLET ORAL at 20:39

## 2024-11-25 RX ADMIN — FUROSEMIDE 80 MG: 10 INJECTION, SOLUTION INTRAMUSCULAR; INTRAVENOUS at 17:43

## 2024-11-25 RX ADMIN — FENTANYL CITRATE 50 MCG: 50 INJECTION, SOLUTION INTRAMUSCULAR; INTRAVENOUS at 14:39

## 2024-11-25 RX ADMIN — TRAZODONE HYDROCHLORIDE 50 MG: 50 TABLET ORAL at 20:44

## 2024-11-25 RX ADMIN — METOPROLOL SUCCINATE 25 MG: 25 TABLET, EXTENDED RELEASE ORAL at 17:43

## 2024-11-25 ASSESSMENT — PAIN SCALES - GENERAL: PAINLEVEL_OUTOF10: 3

## 2024-11-25 NOTE — BRIEF OP NOTE
Department of Interventional Radiology Post-Procedure Note      Date: 11/25/2024     Interventional Radiologist: Dennys    Procedure Performed:  Tunneled dialysis catheter    H&P Status: H&P was reviewed, the patient was examined and no change has occurred in patient's condition since H&P was completed.    Pre-procedure Diagnosis:  needs longterm HD    Post-procedure Diagnosis:  Same    Sedation:  none    Contrast used:  none    Estimated blood loss:  Minimal    Preliminary Findings:  Successful    Complications:  none    Full Report to Follow.    Electronically signed by Aileen Noyola MD on 11/25/2024 at 6:37 PM

## 2024-11-25 NOTE — CONSULTS
Consult completed. Nexiva 20g 1.75\" peripheral IV initiated into right forearm x 1 attempt using ultrasound guided technique. Brisk blood return, flushes without resistance.  Blood cultures collected upon initiation of PIV.  Ultrasound guidance for blood cultures via LAC collected. Patient tolerated well. Primary nurse notified.     Consult the Vascular Access Team for questions, concerns, or change in patient's condition.   
Nephrology Service Consultation      [address] [facility], [address]  [place] [address]  Phone: [phone]  Office Hours: 8:30AM - 4:30PM  Monday - Friday        MEDICAL DECISION MAKING and Recommendations   -CKD5 now transitioning to ESRD  -Hyperkalemia  -Metabolic acidosis  -Hypervolemia  -NSTEMI with multivessel dz per [facility] on 11/19/24    Suggest:  -Discussed HD initiation,  -IR consult for temp HD cath  -Continue lasix 80mg iv bid  -Stop oral bicarb since HD is being initiated    Thank you            Patient Active Problem List    Diagnosis Date Noted    Precordial pain     Coronary artery disease involving native coronary artery of native heart without angina pectoris     Mixed hyperlipidemia     Essential hypertension     Hyperpotassemia 03/09/2023    Acute respiratory failure 11/19/2024    NSTEMI (non-ST elevated myocardial infarction) (HCC) 11/19/2024    Pneumonia of right lower lobe due to infectious organism 11/19/2024    CAD (coronary artery disease) 10/16/2024    Anxiety associated with depression 06/26/2024    Chronic kidney disease, stage V (HCC) 02/06/2024    Nephrotic syndrome 02/06/2024    Class 2 severe obesity due to excess calories with serious comorbidity and body mass index (BMI) of 39.0 to 39.9 in adult 06/15/2021    Spinal stenosis of lumbar region 06/15/2021    Constipation 06/15/2021    Acute renal failure (HCC) 09/24/2020    Persistent proteinuria 09/24/2020    Diabetic nephropathy associated with type 2 diabetes mellitus (HCC) 09/24/2020    Metabolic syndrome 09/24/2020    Renal agenesis 09/24/2020    Erectile disorder due to medical condition in male patient 09/24/2020    Hypertension, secondary 09/24/2020    Family history of coronary artery disease     H/O echocardiogram 04/15/2015    Type 2 diabetes mellitus with circulatory disorder, without long-term current use of insulin (HCC)     Chest pain 07/04/2012    History of myocardial infarction 04/01/2006    History of PTCA 
History   Problem Relation Age of Onset    High Blood Pressure Mother     High Cholesterol Mother     Heart Disease Father     Cancer Sister     Arthritis Maternal Grandmother     Cancer Maternal Grandfather        Allergies:  No Known Allergies    Medications:  No current facility-administered medications on file prior to encounter.     Current Outpatient Medications on File Prior to Encounter   Medication Sig Dispense Refill    traZODone (DESYREL) 50 MG tablet Take 1 tablet by mouth nightly as needed for Sleep 30 tablet 5    sodium zirconium cyclosilicate (LOKELMA) 10 g PACK oral suspension Take 1 packet by mouth daily 30 packet 5    sodium bicarbonate 650 MG tablet Take 1 tablet by mouth 3 times daily (Patient taking differently: Take 1 tablet by mouth 3 times daily 11/19/24 Patient reports he typically only takes this twice daily.) 90 tablet 3    amLODIPine (NORVASC) 10 MG tablet Take 1 tablet by mouth daily 90 tablet 3    atorvastatin (LIPITOR) 40 MG tablet Take 1 tablet by mouth daily 90 tablet 1    Omega-3 Krill Oil 300 MG CAPS Take by mouth      metoprolol tartrate (LOPRESSOR) 25 MG tablet Take 1 tablet by mouth 2 times daily 180 tablet 3    clopidogrel (PLAVIX) 75 MG tablet Take 1 tablet by mouth daily 90 tablet 3    aspirin 81 MG EC tablet Take 1 tablet by mouth daily      Insulin Degludec (TRESIBA FLEXTOUCH) 200 UNIT/ML SOPN Inject 4 Units into the skin at bedtime (Patient not taking: Reported on 11/19/2024) 15 mL 1    vitamin D (CHOLECALCIFEROL) 25 MCG (1000 UT) TABS tablet Take 1 tablet by mouth daily OTC      guaiFENesin (MUCINEX) 600 MG extended release tablet Take 1 tablet by mouth 2 times daily (Patient not taking: Reported on 11/6/2024) 30 tablet 0    polyethylene glycol (MIRALAX MIX-IN PAX) 17 g packet Take 1 packet by mouth daily as needed for Constipation 527 g 5    Insulin Pen Needle (TRUEPLUS 5-BEVEL PEN NEEDLES) 31G X 5 MM MISC Use one needle two times daily as directed on package. 100 each 5 
intact, conjunctiva clear  Ear: normal external ear, no discharge, hearing intact  Nose:  no drainage noted  Mouth: mucous membranes moist  Neck: supple, no carotid bruits  Lungs: Bilateral equal air entry, clear to ausculation, no wheezing, rales or rhonchi, normal effort  Cardiovascular: normal rate, regular rhythm, no murmur, gallop, rub.  Abdomen: Soft, nontender, nondistended, normal bowel sounds  Neurologic: There are no new focal motor or sensory deficits, normal muscle tone and bulk, no abnormal sensation, normal speech  Skin: No gross lesions, rashes, bruising or bleeding on exposed skin area  Extremities:  peripheral pulses palpable, no pedal edema or calf pain with palpation  Psych: normal affect    Investigations:      Laboratory Testing:  Recent Results (from the past 24 hour(s))   POCT Glucose    Collection Time: 11/25/24 11:07 AM   Result Value Ref Range    POC Glucose 107 (H) 74 - 99 mg/dL   Heparin Anti-Xa    Collection Time: 11/25/24  4:28 PM   Result Value Ref Range    Anti-XA Unfrac Heparin <0.10 IU/L   POCT Glucose    Collection Time: 11/25/24  8:31 PM   Result Value Ref Range    POC Glucose 132 (H) 74 - 99 mg/dL   Heparin Anti-Xa    Collection Time: 11/26/24  3:38 AM   Result Value Ref Range    Anti-XA Unfrac Heparin 0.53 IU/L   Basic Metabolic Panel    Collection Time: 11/26/24  3:38 AM   Result Value Ref Range    Sodium 135 (L) 136 - 145 mmol/L    Potassium 4.8 3.5 - 5.1 mmol/L    Chloride 97 (L) 99 - 110 mmol/L    CO2 25 21 - 32 mmol/L    Anion Gap 13 9 - 17 mmol/L    Glucose 97 74 - 99 mg/dL    BUN 43 (H) 7 - 20 mg/dL    Creatinine 5.0 (H) 0.9 - 1.3 mg/dL    Est, Glom Filt Rate 12 (L) >60 mL/min/1.73m2    Calcium 9.2 8.3 - 10.6 mg/dL   POCT Glucose    Collection Time: 11/26/24  6:31 AM   Result Value Ref Range    POC Glucose 100 (H) 74 - 99 mg/dL   Heparin Anti-Xa    Collection Time: 11/26/24  8:02 AM   Result Value Ref Range    Anti-XA Unfrac Heparin 0.56 IU/L         Left Cardiac 
the electronic medical record, and the Heart Team MD and NPs are not responsible for these. Any errors will be corrected upon verification with documented reports.

## 2024-11-26 ENCOUNTER — APPOINTMENT (OUTPATIENT)
Dept: NON INVASIVE DIAGNOSTICS | Age: 54
DRG: 280 | End: 2024-11-26
Payer: MEDICARE

## 2024-11-26 LAB
ANION GAP SERPL CALCULATED.3IONS-SCNC: 13 MMOL/L (ref 9–17)
ANTI-XA UNFRAC HEPARIN: 0.35 IU/L
ANTI-XA UNFRAC HEPARIN: 0.53 IU/L
ANTI-XA UNFRAC HEPARIN: 0.56 IU/L
ANTI-XA UNFRAC HEPARIN: 0.59 IU/L
BUN SERPL-MCNC: 43 MG/DL (ref 7–20)
CALCIUM SERPL-MCNC: 9.2 MG/DL (ref 8.3–10.6)
CHLORIDE SERPL-SCNC: 97 MMOL/L (ref 99–110)
CO2 SERPL-SCNC: 25 MMOL/L (ref 21–32)
CREAT SERPL-MCNC: 5 MG/DL (ref 0.9–1.3)
ECHO BSA: 2.17 M2
ECHO LA DIAMETER INDEX: 2.51 CM/M2
ECHO LA DIAMETER: 5.4 CM
ECHO LV EDV A4C: 177 ML
ECHO LV EDV INDEX A4C: 82 ML/M2
ECHO LV EF PHYSICIAN: 40 %
ECHO LV EJECTION FRACTION A4C: 41 %
ECHO LV ESV A4C: 104 ML
ECHO LV ESV INDEX A4C: 48 ML/M2
ECHO LV FRACTIONAL SHORTENING: 18 % (ref 28–44)
ECHO LV INTERNAL DIMENSION DIASTOLE INDEX: 2.84 CM/M2
ECHO LV INTERNAL DIMENSION DIASTOLIC: 6.1 CM (ref 4.2–5.9)
ECHO LV INTERNAL DIMENSION SYSTOLIC INDEX: 2.33 CM/M2
ECHO LV INTERNAL DIMENSION SYSTOLIC: 5 CM
ECHO LV IVSD: 1 CM (ref 0.6–1)
ECHO LV MASS 2D: 287.5 G (ref 88–224)
ECHO LV MASS INDEX 2D: 133.7 G/M2 (ref 49–115)
ECHO LV POSTERIOR WALL DIASTOLIC: 1.2 CM (ref 0.6–1)
ECHO LV RELATIVE WALL THICKNESS RATIO: 0.39
GFR, ESTIMATED: 12 ML/MIN/1.73M2
GLUCOSE BLD-MCNC: 100 MG/DL (ref 74–99)
GLUCOSE BLD-MCNC: 135 MG/DL (ref 74–99)
GLUCOSE SERPL-MCNC: 97 MG/DL (ref 74–99)
POTASSIUM SERPL-SCNC: 4.8 MMOL/L (ref 3.5–5.1)
SODIUM SERPL-SCNC: 135 MMOL/L (ref 136–145)

## 2024-11-26 PROCEDURE — 85520 HEPARIN ASSAY: CPT

## 2024-11-26 PROCEDURE — 94150 VITAL CAPACITY TEST: CPT

## 2024-11-26 PROCEDURE — 93325 DOPPLER ECHO COLOR FLOW MAPG: CPT | Performed by: INTERNAL MEDICINE

## 2024-11-26 PROCEDURE — 77001 FLUOROGUIDE FOR VEIN DEVICE: CPT

## 2024-11-26 PROCEDURE — 93308 TTE F-UP OR LMTD: CPT | Performed by: INTERNAL MEDICINE

## 2024-11-26 PROCEDURE — 6370000000 HC RX 637 (ALT 250 FOR IP)

## 2024-11-26 PROCEDURE — 2140000000 HC CCU INTERMEDIATE R&B

## 2024-11-26 PROCEDURE — 6370000000 HC RX 637 (ALT 250 FOR IP): Performed by: NURSE PRACTITIONER

## 2024-11-26 PROCEDURE — 80048 BASIC METABOLIC PNL TOTAL CA: CPT

## 2024-11-26 PROCEDURE — 2580000003 HC RX 258: Performed by: INTERNAL MEDICINE

## 2024-11-26 PROCEDURE — 5A1D70Z PERFORMANCE OF URINARY FILTRATION, INTERMITTENT, LESS THAN 6 HOURS PER DAY: ICD-10-PCS | Performed by: INTERNAL MEDICINE

## 2024-11-26 PROCEDURE — 6370000000 HC RX 637 (ALT 250 FOR IP): Performed by: INTERNAL MEDICINE

## 2024-11-26 PROCEDURE — 2500000003 HC RX 250 WO HCPCS: Performed by: NURSE PRACTITIONER

## 2024-11-26 PROCEDURE — 93321 DOPPLER ECHO F-UP/LMTD STD: CPT | Performed by: INTERNAL MEDICINE

## 2024-11-26 PROCEDURE — 36558 INSERT TUNNELED CV CATH: CPT

## 2024-11-26 PROCEDURE — 82947 ASSAY GLUCOSE BLOOD QUANT: CPT

## 2024-11-26 PROCEDURE — B24BZZ4 ULTRASONOGRAPHY OF HEART WITH AORTA, TRANSESOPHAGEAL: ICD-10-PCS | Performed by: INTERNAL MEDICINE

## 2024-11-26 PROCEDURE — 2580000003 HC RX 258: Performed by: NURSE PRACTITIONER

## 2024-11-26 PROCEDURE — APPNB15 APP NON BILLABLE TIME 0-15 MINS

## 2024-11-26 PROCEDURE — 94761 N-INVAS EAR/PLS OXIMETRY MLT: CPT

## 2024-11-26 PROCEDURE — 36415 COLL VENOUS BLD VENIPUNCTURE: CPT

## 2024-11-26 RX ORDER — FUROSEMIDE 40 MG/1
80 TABLET ORAL 2 TIMES DAILY
Status: DISCONTINUED | OUTPATIENT
Start: 2024-11-26 | End: 2024-11-27 | Stop reason: HOSPADM

## 2024-11-26 RX ADMIN — HYDRALAZINE HYDROCHLORIDE 25 MG: 25 TABLET ORAL at 13:41

## 2024-11-26 RX ADMIN — FUROSEMIDE 80 MG: 40 TABLET ORAL at 08:36

## 2024-11-26 RX ADMIN — TRAZODONE HYDROCHLORIDE 50 MG: 50 TABLET ORAL at 20:08

## 2024-11-26 RX ADMIN — METOPROLOL SUCCINATE 25 MG: 25 TABLET, EXTENDED RELEASE ORAL at 08:36

## 2024-11-26 RX ADMIN — SODIUM CHLORIDE, PRESERVATIVE FREE 10 ML: 5 INJECTION INTRAVENOUS at 20:09

## 2024-11-26 RX ADMIN — HYDRALAZINE HYDROCHLORIDE 25 MG: 25 TABLET ORAL at 06:29

## 2024-11-26 RX ADMIN — HEPARIN SODIUM 17 UNITS/KG/HR: 10000 INJECTION, SOLUTION INTRAVENOUS at 16:54

## 2024-11-26 RX ADMIN — HYDRALAZINE HYDROCHLORIDE 25 MG: 25 TABLET ORAL at 20:06

## 2024-11-26 RX ADMIN — SODIUM CHLORIDE, PRESERVATIVE FREE 20 ML: 5 INJECTION INTRAVENOUS at 21:37

## 2024-11-26 RX ADMIN — FUROSEMIDE 80 MG: 40 TABLET ORAL at 16:25

## 2024-11-26 RX ADMIN — ACETAMINOPHEN 650 MG: 325 TABLET ORAL at 00:09

## 2024-11-26 RX ADMIN — ATORVASTATIN CALCIUM 40 MG: 40 TABLET, FILM COATED ORAL at 20:05

## 2024-11-26 RX ADMIN — ISOSORBIDE MONONITRATE 30 MG: 30 TABLET, EXTENDED RELEASE ORAL at 13:41

## 2024-11-26 RX ADMIN — ASPIRIN 81 MG: 81 TABLET, CHEWABLE ORAL at 08:35

## 2024-11-26 ASSESSMENT — PAIN SCALES - GENERAL
PAINLEVEL_OUTOF10: 3
PAINLEVEL_OUTOF10: 5
PAINLEVEL_OUTOF10: 0

## 2024-11-26 ASSESSMENT — PAIN SCALES - WONG BAKER
WONGBAKER_NUMERICALRESPONSE: NO HURT

## 2024-11-26 ASSESSMENT — PAIN DESCRIPTION - ORIENTATION: ORIENTATION: RIGHT

## 2024-11-26 ASSESSMENT — PAIN DESCRIPTION - LOCATION: LOCATION: NECK

## 2024-11-26 ASSESSMENT — PAIN DESCRIPTION - DESCRIPTORS: DESCRIPTORS: ACHING;TIGHTNESS

## 2024-11-26 ASSESSMENT — PAIN DESCRIPTION - PAIN TYPE: TYPE: SURGICAL PAIN;ACUTE PAIN

## 2024-11-26 ASSESSMENT — PAIN DESCRIPTION - DIRECTION: RADIATING_TOWARDS: UPPER NECK

## 2024-11-26 ASSESSMENT — PAIN DESCRIPTION - FREQUENCY: FREQUENCY: CONTINUOUS

## 2024-11-26 ASSESSMENT — PAIN - FUNCTIONAL ASSESSMENT: PAIN_FUNCTIONAL_ASSESSMENT: ACTIVITIES ARE NOT PREVENTED

## 2024-11-26 ASSESSMENT — PAIN DESCRIPTION - ONSET: ONSET: ON-GOING

## 2024-11-26 NOTE — CARE COORDINATION
Chart reviewed. First HD treatment was 11/20/24 and he had a HD cath placed 11/25. Patient is continuing diuresis. The CABG will be done as an outpatient.    1516  Cm checked with [facility] and the patient needs a TB test. CM uploaded a chest x-ray to see if that will cover it. Cm updated the doctor.     [facility] are seeing if a chest x-ray will be acceptable. [facility] does not have MWF available. The do have [facility] available 2nd shift at 10:30am. They are confirming this chair time and will get back to this CM.     CM is following.

## 2024-11-26 NOTE — PLAN OF CARE
Problem: Chronic Conditions and Co-morbidities  Goal: Patient's chronic conditions and co-morbidity symptoms are monitored and maintained or improved  11/25/2024 2157 by Merle Bryson RN  Outcome: Progressing  Flowsheets (Taken 11/25/2024 2030)  Care Plan - Patient's Chronic Conditions and Co-Morbidity Symptoms are Monitored and Maintained or Improved: Monitor and assess patient's chronic conditions and comorbid symptoms for stability, deterioration, or improvement  11/25/2024 1658 by Cleo Luque RN  Outcome: Progressing  11/25/2024 1657 by Cleo Luque RN  Outcome: Progressing     Problem: Pain  Goal: Verbalizes/displays adequate comfort level or baseline comfort level  11/25/2024 2157 by Merle Bryson RN  Outcome: Progressing  Flowsheets (Taken 11/25/2024 2030)  Verbalizes/displays adequate comfort level or baseline comfort level:   Encourage patient to monitor pain and request assistance   Assess pain using appropriate pain scale   Administer analgesics based on type and severity of pain and evaluate response   Implement non-pharmacological measures as appropriate and evaluate response  11/25/2024 1658 by Cleo Luque RN  Outcome: Progressing  11/25/2024 1657 by Cleo Luque RN  Outcome: Progressing     Problem: Safety - Adult  Goal: Free from fall injury  11/25/2024 2157 by Merle Bryson RN  Outcome: Progressing  11/25/2024 1658 by Cleo Luque RN  Outcome: Progressing  11/25/2024 1657 by Cleo Luque RN  Outcome: Progressing

## 2024-11-27 ENCOUNTER — APPOINTMENT (OUTPATIENT)
Dept: NON INVASIVE DIAGNOSTICS | Age: 54
DRG: 280 | End: 2024-11-27
Payer: MEDICARE

## 2024-11-27 VITALS
SYSTOLIC BLOOD PRESSURE: 154 MMHG | HEART RATE: 75 BPM | BODY MASS INDEX: 26.9 KG/M2 | DIASTOLIC BLOOD PRESSURE: 100 MMHG | RESPIRATION RATE: 16 BRPM | WEIGHT: 203 LBS | OXYGEN SATURATION: 100 % | TEMPERATURE: 98.2 F | HEIGHT: 73 IN

## 2024-11-27 LAB
ANION GAP SERPL CALCULATED.3IONS-SCNC: 18 MMOL/L (ref 9–17)
ANTI-XA UNFRAC HEPARIN: 0.47 IU/L
ANTI-XA UNFRAC HEPARIN: 0.63 IU/L
BUN SERPL-MCNC: 62 MG/DL (ref 7–20)
CALCIUM SERPL-MCNC: 9 MG/DL (ref 8.3–10.6)
CHLORIDE SERPL-SCNC: 94 MMOL/L (ref 99–110)
CO2 SERPL-SCNC: 22 MMOL/L (ref 21–32)
CREAT SERPL-MCNC: 5.7 MG/DL (ref 0.9–1.3)
ECHO BSA: 2.18 M2
ERYTHROCYTE [DISTWIDTH] IN BLOOD BY AUTOMATED COUNT: 13.5 % (ref 11.7–14.9)
GFR, ESTIMATED: 11 ML/MIN/1.73M2
GLUCOSE BLD-MCNC: 100 MG/DL (ref 74–99)
GLUCOSE BLD-MCNC: 103 MG/DL (ref 74–99)
GLUCOSE SERPL-MCNC: 100 MG/DL (ref 74–99)
HCT VFR BLD AUTO: 31.3 % (ref 42–52)
HGB BLD-MCNC: 10.1 G/DL (ref 13.5–18)
MCH RBC QN AUTO: 30.1 PG (ref 27–31)
MCHC RBC AUTO-ENTMCNC: 32.3 G/DL (ref 32–36)
MCV RBC AUTO: 93.4 FL (ref 78–100)
PLATELET # BLD AUTO: 124 K/UL (ref 140–440)
PMV BLD AUTO: 12 FL (ref 7.5–11.1)
POTASSIUM SERPL-SCNC: 5 MMOL/L (ref 3.5–5.1)
RBC # BLD AUTO: 3.35 M/UL (ref 4.6–6.2)
SODIUM SERPL-SCNC: 135 MMOL/L (ref 136–145)
WBC OTHER # BLD: 5.7 K/UL (ref 4–10.5)

## 2024-11-27 PROCEDURE — 6360000002 HC RX W HCPCS: Performed by: INTERNAL MEDICINE

## 2024-11-27 PROCEDURE — 7100000011 HC PHASE II RECOVERY - ADDTL 15 MIN: Performed by: INTERNAL MEDICINE

## 2024-11-27 PROCEDURE — 36415 COLL VENOUS BLD VENIPUNCTURE: CPT

## 2024-11-27 PROCEDURE — 6370000000 HC RX 637 (ALT 250 FOR IP): Performed by: STUDENT IN AN ORGANIZED HEALTH CARE EDUCATION/TRAINING PROGRAM

## 2024-11-27 PROCEDURE — APPNB15 APP NON BILLABLE TIME 0-15 MINS

## 2024-11-27 PROCEDURE — 80048 BASIC METABOLIC PNL TOTAL CA: CPT

## 2024-11-27 PROCEDURE — 6370000000 HC RX 637 (ALT 250 FOR IP)

## 2024-11-27 PROCEDURE — 93312 ECHO TRANSESOPHAGEAL: CPT

## 2024-11-27 PROCEDURE — 90935 HEMODIALYSIS ONE EVALUATION: CPT

## 2024-11-27 PROCEDURE — 2580000003 HC RX 258: Performed by: INTERNAL MEDICINE

## 2024-11-27 PROCEDURE — 6370000000 HC RX 637 (ALT 250 FOR IP): Performed by: INTERNAL MEDICINE

## 2024-11-27 PROCEDURE — 94761 N-INVAS EAR/PLS OXIMETRY MLT: CPT

## 2024-11-27 PROCEDURE — 85520 HEPARIN ASSAY: CPT

## 2024-11-27 PROCEDURE — 82947 ASSAY GLUCOSE BLOOD QUANT: CPT

## 2024-11-27 PROCEDURE — 6370000000 HC RX 637 (ALT 250 FOR IP): Performed by: NURSE PRACTITIONER

## 2024-11-27 PROCEDURE — 7100000010 HC PHASE II RECOVERY - FIRST 15 MIN: Performed by: INTERNAL MEDICINE

## 2024-11-27 PROCEDURE — 85027 COMPLETE CBC AUTOMATED: CPT

## 2024-11-27 PROCEDURE — 2580000003 HC RX 258: Performed by: NURSE PRACTITIONER

## 2024-11-27 PROCEDURE — 99152 MOD SED SAME PHYS/QHP 5/>YRS: CPT | Performed by: INTERNAL MEDICINE

## 2024-11-27 RX ORDER — CLOPIDOGREL BISULFATE 75 MG/1
75 TABLET ORAL DAILY
Status: DISCONTINUED | OUTPATIENT
Start: 2024-11-27 | End: 2024-11-27 | Stop reason: HOSPADM

## 2024-11-27 RX ORDER — METOPROLOL SUCCINATE 25 MG/1
25 TABLET, EXTENDED RELEASE ORAL DAILY
Qty: 30 TABLET | Refills: 1 | Status: SHIPPED | OUTPATIENT
Start: 2024-11-28

## 2024-11-27 RX ORDER — HYDRALAZINE HYDROCHLORIDE 25 MG/1
25 TABLET, FILM COATED ORAL EVERY 8 HOURS SCHEDULED
Qty: 90 TABLET | Refills: 1 | Status: SHIPPED | OUTPATIENT
Start: 2024-11-27

## 2024-11-27 RX ORDER — LIDOCAINE HYDROCHLORIDE 20 MG/ML
SOLUTION OROPHARYNGEAL PRN
Status: COMPLETED | OUTPATIENT
Start: 2024-11-27 | End: 2024-11-27

## 2024-11-27 RX ORDER — ISOSORBIDE MONONITRATE 30 MG/1
30 TABLET, EXTENDED RELEASE ORAL DAILY
Qty: 30 TABLET | Refills: 1 | Status: SHIPPED | OUTPATIENT
Start: 2024-11-28

## 2024-11-27 RX ORDER — FUROSEMIDE 80 MG/1
80 TABLET ORAL 2 TIMES DAILY
Qty: 60 TABLET | Refills: 1 | Status: SHIPPED | OUTPATIENT
Start: 2024-11-27

## 2024-11-27 RX ORDER — MIDAZOLAM HYDROCHLORIDE 1 MG/ML
INJECTION, SOLUTION INTRAMUSCULAR; INTRAVENOUS PRN
Status: COMPLETED | OUTPATIENT
Start: 2024-11-27 | End: 2024-11-27

## 2024-11-27 RX ORDER — FENTANYL CITRATE 50 UG/ML
INJECTION, SOLUTION INTRAMUSCULAR; INTRAVENOUS PRN
Status: COMPLETED | OUTPATIENT
Start: 2024-11-27 | End: 2024-11-27

## 2024-11-27 RX ADMIN — HYDRALAZINE HYDROCHLORIDE 25 MG: 25 TABLET ORAL at 14:36

## 2024-11-27 RX ADMIN — MIDAZOLAM 2 MG: 1 INJECTION INTRAMUSCULAR; INTRAVENOUS at 12:00

## 2024-11-27 RX ADMIN — HYDRALAZINE HYDROCHLORIDE 25 MG: 25 TABLET ORAL at 05:28

## 2024-11-27 RX ADMIN — ISOSORBIDE MONONITRATE 30 MG: 30 TABLET, EXTENDED RELEASE ORAL at 13:10

## 2024-11-27 RX ADMIN — SODIUM CHLORIDE, PRESERVATIVE FREE 10 ML: 5 INJECTION INTRAVENOUS at 13:11

## 2024-11-27 RX ADMIN — MIDAZOLAM 2 MG: 1 INJECTION INTRAMUSCULAR; INTRAVENOUS at 11:56

## 2024-11-27 RX ADMIN — FENTANYL CITRATE 50 MCG: 50 INJECTION, SOLUTION INTRAMUSCULAR; INTRAVENOUS at 11:56

## 2024-11-27 RX ADMIN — CLOPIDOGREL 75 MG: 75 TABLET, FILM COATED ORAL at 16:43

## 2024-11-27 RX ADMIN — METOPROLOL SUCCINATE 25 MG: 25 TABLET, EXTENDED RELEASE ORAL at 13:10

## 2024-11-27 RX ADMIN — FUROSEMIDE 80 MG: 40 TABLET ORAL at 13:10

## 2024-11-27 RX ADMIN — LIDOCAINE HYDROCHLORIDE 15 ML: 20 SOLUTION ORAL at 11:51

## 2024-11-27 RX ADMIN — SODIUM CHLORIDE, PRESERVATIVE FREE 10 ML: 5 INJECTION INTRAVENOUS at 13:12

## 2024-11-27 RX ADMIN — FENTANYL CITRATE 50 MCG: 50 INJECTION, SOLUTION INTRAMUSCULAR; INTRAVENOUS at 12:00

## 2024-11-27 RX ADMIN — ASPIRIN 81 MG: 81 TABLET, CHEWABLE ORAL at 13:10

## 2024-11-27 ASSESSMENT — PAIN SCALES - WONG BAKER
WONGBAKER_NUMERICALRESPONSE: NO HURT
WONGBAKER_NUMERICALRESPONSE: NO HURT

## 2024-11-27 ASSESSMENT — PAIN SCALES - GENERAL
PAINLEVEL_OUTOF10: 0
PAINLEVEL_OUTOF10: 0

## 2024-11-27 NOTE — DISCHARGE INSTRUCTIONS
As discussed please go to CT surgeons office on your scheduled appointment day as listed above to discuss your surgery. Please start taking new medications as prescribed.     Please go to dialysis session on November 30 Saturday 10:00AM  [facility] as arranged by nephrology.  They will instruct you on further sessions after that.  Please call Dr. Sommer in the office if you have any other concerns about dialysis.      [facility]  [address] [facility] [address]   Phone: [phone]

## 2024-11-27 NOTE — CARE COORDINATION
Chart reviewed.    FRANK received a call from [facility] that patient has been accepted on [facility] time at 6am starting December 3rd. CM updated Dr. Sommer and updated team.  He will need photo ID and insurance cards and most recent medication list.       1412  FRANK received a call from Dr. Sommer who says the patient can start dialysis Saturday at 10:00am.     Cm called [facility] and confirmed chair time [facility] and they said if the doctor said that the time is confirmed then it was set up.     Cm updated the patient on plan of care and added [facility] information to the AVS.

## 2024-11-27 NOTE — H&P (VIEW-ONLY)
Cardiothoracic Surgery  IN-PATIENT SERVICE   [facility]    Daily Note            Date:   11/27/2024  Patient name:  Landry Morley  Date of admission:  11/19/2024  6:56 AM  MRN:   [id number]  Account:  [id number]  YOB: 1970  PCP:    Raza Fairchild DO  Room:   [address] [place] [address]  Code Status:    Prior    Physician Requesting Consult: No att. providers found    Reason for Consult:  CAD, severe MR    Chief Complaint:     SOB    History of Present Illness:     Landry Morley is a 54 y.o. male with pmh of hypertension, type 2 diabetes, cad sp 5 stents, CKD stage IV who presents with shortness of breath. Pt woke up at night due to severe shortness of breath since 3 days ago. The episode usually lasted 10-15 minutes. However the symptoms didn't go away last night. So patient called ambulance to  ER. Pt was found hypoxia with o2 sat 70% in room air. Then patient received bipap. Pt's chest xray showed large right lower lung infiltration. His BNP level elevated at 24,000.    We were consulted for surgical evaluation    Patient states that he is feeling better and is back to his baseline after diuresis.    Past Medical History:     Past Medical History:   Diagnosis Date    Anxiety associated with depression 06/26/2024    CAD (coronary artery disease)     H/O MI.    CKD (chronic kidney disease), stage V ([facility])     born with one kidney    Family history of coronary artery disease     H/O echocardiogram 06/2008 6/08 EF>55% TRACE MR, TR    H/O echocardiogram 04/15/2015    EF 50-55% Mild left atrial enlargement. Normal LV systolic function. Trace MR and TR. Normal sized abdominal aorta.     Hemodialysis patient (HCC)     Tuesday, Thursday and Saturday    History of cardiac catheterization 03/01/2017    Stenting of the LAD    History of cardiovascular stress test 06/06/2006 7/12 LAD territory ISCHEMIA, EF 62%    History of exercise stress test 03/20/2017    treadmill     tablet Take 1 tablet by mouth daily 10/24/24  Yes Adrian Negro MD   Omega-3 Krill Oil 300 MG CAPS Take by mouth   Yes ProviderSonya MD   clopidogrel (PLAVIX) 75 MG tablet Take 1 tablet by mouth daily 8/29/24 8/24/25 Yes Raza Fairchild DO   aspirin 81 MG EC tablet Take 1 tablet by mouth daily   Yes ProviderSonya MD   mupirocin (BACTROBAN) 2 % ointment Apply intranasally daily for 5 days prior to surgery 12/6/24   Jannette Cope PA-C   vitamin D (CHOLECALCIFEROL) 25 MCG (1000 UT) TABS tablet Take 1 tablet by mouth daily OTC    ProviderSonya MD   polyethylene glycol (MIRALAX MIX-IN PAX) 17 g packet Take 1 packet by mouth daily as needed for Constipation 5/21/24   Adrian Negro MD   Insulin Pen Needle (TRUEPLUS 5-BEVEL PEN NEEDLES) 31G X 5 MM MISC Use one needle two times daily as directed on package.  Patient not taking: Reported on 12/3/2024 3/25/24   Yudelka Davis APRN - CNP   Lancets Thin MISC 1 actuation by Does not apply route daily 2/2/23 12/3/24  Raza Fairchild DO   Blood Glucose Monitoring Suppl (TRUE METRIX AIR GLUCOSE METER) w/Device KIT 1 actuation by Does not apply route daily 2/2/23 12/3/24  Raza Fairchild, DO   Insulin Pen Needle (PEN NEEDLES 3/16\") 31G X 5 MM MISC 1 each by Does not apply route daily Use one needle two times daily as directed on package.  Patient not taking: Reported on 12/3/2024 9/22/22   Raza Fairchild,    Insulin Pen Needle (PEN NEEDLES 3/16\") 31G X 5 MM MISC 1 each by Does not apply route daily To be used 4 times daily as directed on package  Patient not taking: Reported on 12/3/2024 6/15/21 12/3/24  Raza Fairchild DO        Allergies:     Patient has no known allergies.    Social History:     Tobacco:    reports that he quit smoking about 8 years ago. His smoking use included cigarettes. He started smoking about 35 years ago. He has a 27 pack-year smoking history. He has been exposed to tobacco smoke. He has never used smokeless

## 2024-11-27 NOTE — PROGRESS NOTES
Cardiology Progress Note     Admit Date:  11/19/2024    Consult reason/ Seen today for :       Subjective and  Overnight Events : Still short of breath cardiac cath revealed significant native vessel disease  LAD is 99% stenosis circumflex is 99% stenosis ramus and RCA is also diseased      Chief complain on admission : 54 y.o.year old who is admitted for  Chief Complaint   Patient presents with    Respiratory Distress      Assessment / Plan:  Acute decompensated heart failure with volume overload and renal failure with elevated troponin level /NSTEMI cardiac cath revealed multivessel coronary artery disease  Echo showed ischemic cardiomyopathy ejection fraction of 35 to 30% with severe mitral regurgitation  Discussed with CT surgery plan for CABG with MVR  CHF: Acute Systolic/ Diastolic decompensated heart failure. Appears to be volume overloaded . Plan IV  diuresis. We can try IV Lasix 40 mg BID. And  titrate diuretics accordingly.   Volume status diuresis as per nephrology start dobutamine  Consider pleural tap for pleural effusion  HTN: stable, continue present medications continue amlodipine 10 mg  DVT prophylaxis if no contraindication  6.   Dyslipidemia: continue statins   Discussed with primary team, hospitalist service, bedside nursing staff and family  Past medical history:    has a past medical history of Anxiety associated with depression, CAD (coronary artery disease), CKD (chronic kidney disease), stage V (HCC), Family history of coronary artery disease, H/O echocardiogram, H/O echocardiogram, History of cardiac catheterization, History of cardiovascular stress test, History of exercise stress test, History of myocardial infarction, History of nuclear stress test, History of PTCA, History of PTCA, Hyperlipidemia, Hypertension, Hypertriglyceridemia, Sepsis (HCC), and Type II or unspecified type diabetes mellitus without 
                                                                               Cardiology Progress Note     Admit Date:  11/19/2024    Consult reason/ Seen today for :       Subjective and  Overnight Events : Still short of breath cardiac cath revealed significant native vessel disease  LAD is 99% stenosis circumflex is 99% stenosis ramus and RCA is also diseased  He got dialysis for fluid removal      Chief complain on admission : 54 y.o.year old who is admitted for  Chief Complaint   Patient presents with    Respiratory Distress      Assessment / Plan:  Acute decompensated heart failure with volume overload and renal failure with elevated troponin level /NSTEMI cardiac cath revealed multivessel coronary artery disease  Echo showed ischemic cardiomyopathy ejection fraction of 35 to 30% with severe mitral regurgitation  Discussed with CT surgery plan for CABG with MVR  CHF: Acute Systolic/ Diastolic decompensated heart failure. Appears to be volume overloaded . Plan IV  diuresis.  Getting IV Lasix 80 mg every 8 started dialysis  Volume status diuresis as per nephrology start dobutamine  Consider pleural tap for pleural effusion  HTN: stable, continue present medications continue amlodipine 10 mg  DVT prophylaxis if no contraindication  6.   Dyslipidemia: continue statins   Discussed with primary team, hospitalist service, bedside nursing staff and family  Past medical history:    has a past medical history of Anxiety associated with depression, CAD (coronary artery disease), CKD (chronic kidney disease), stage V (HCC), Family history of coronary artery disease, H/O echocardiogram, H/O echocardiogram, History of cardiac catheterization, History of cardiovascular stress test, History of exercise stress test, History of myocardial infarction, History of nuclear stress test, History of PTCA, History of PTCA, Hyperlipidemia, Hypertension, Hypertriglyceridemia, Sepsis (HCC), and Type II or unspecified type diabetes 
                                             [place] [address]  Phone: [phone]    Fax [phone]                  Luli Sam MD, [facility]       Bandar Yan MD, [facility]  Farrah Capellan MD, [facility]    MD Alex Wisdom MD Tariq Rizvi, MD Bilal Alam, MD Dr. Waseem Sajjad MD Melissa Kellis, APRNICOLAS Baker, APRNICOLAS Byers, APRNICOLAS Charles, APRN  Burt Chavira PA-C    CARDIOLOGY  NOTE      Name:  Landry Morley /Age/Sex: 1970  (54 y.o. male)   MRN & CSN:  [id number] & [id number] Admission Date/Time: 2024  6:56 AM   Location:  [address] [place] [address] PCP: Raza Fairchild DO       Hospital Day: 9    - Cardiology consult is for:  NSTEMI      ASSESSMENT/ PLAN:  NSTEMI  Multivessel coronary artery disease  -Chest pain-free at this time  -Left heart catheterization yesterday revealing severe disease noted in LCx, ramus, LAD, and PLV  -CT surgery consulted for evaluation for CABG + MVR.    -Repeat echocardiogram showing only moderate MR.  -ROHIT today actually showing mild mitral regurgitation.  -Continue aspirin, Plavix, Lipitor 40 mg nightly  Ischemic cardiomyopathy with reduced ejection fraction  HFrEF  Moderate-severe mitral regurgitation with left atrial dilation  -Volume overload has significantly improved.  -Echo initially showing EF 25-30% with wall motion abnormalities  -Repeat echocardiogram showing improvement in EF to 40-45% but still mid and apical septal walls hypokinetic  -Will defer diuresis to nephrology.  Currently on p.o. Lasix 80 mg twice daily  -GDMT: Continue Toprol-XL 25 mg daily, Imdur 30 mg daily and hydralazine 25 mg 3 times daily.  Unable to tolerate ACE/ARB/Arni or Aldactone at this time.  Hypertension  -Blood pressure stable  -Discontinue Norvasc to make room for guideline directed medical therapy  End-stage renal disease  -Nephrology following.  -Patient was undergoing a workup for 
    V2.0    [facility] Progress Note      Name:  Landry Morley /Age/Sex: 1970  (54 y.o. male)   MRN & CSN:  [id number] & [id number] Encounter Date/Time: 2024 11:13 AM EST   Location:  [address] [place] [address]A PCP: Raza Fairchild DO     Attending:Alonso Rebolledo MD       Hospital Day: 8    Assessment and Recommendations   Landry Morley is a 54 y.o. male  who presents with Acute respiratory failure      Plan:     NSTEMI, multivessel CAD  -Started on heparin drip.  CT surgery consulted.  Repeat echocardiogram obtained.  -Continue aspirin and statin.  Hold Plavix for now.  -Discussed with CT surgery this morning.  He needs a ROHIT for surgical planning prior to discharge.  They have intend on CABG with MVR as outpatient.  -Once ROHIT is done they recommended transitioning from heparin drip to Plavix.    ESRD  -Improved with dialysis.  Lasix now transition to oral.  Had tunneled catheter placed.  -Social work working on setting him up for dialysis.    Acute respiratory failure  -Resolved.    Type 2 diabetes mellitus  -Continue sliding scale insulin.    Hypertension  -Medications adjusted as per cardiology recommendations currently on Toprol, Imdur and hydralazine.      Diet Diet NPO   DVT Prophylaxis [] Lovenox, [x]  Heparin, [] SCDs, [] Ambulation,  [] Eliquis, [] Xarelto  [] Coumadin   Code Status Limited   Disposition From: Home  Expected Disposition: Home  Estimated Date of Discharge: 24-hour patient requires continued admission due to pending ROHIT, dialysis arrangements   Surrogate Decision Maker/ Karen Pratt (Child)        Personally reviewed Lab Studies and Imaging     Discussed with CT surgery who recommended ROHIT.  Please see full details in my conversation as above and plan.    Drugs that require monitoring for toxicity include heparin drip and the method of monitoring was CBC        Subjective:     Chief Complaint:   Chief Complaint   Patient presents with    Respiratory Distress       Feels okay.  
    V2.0  [facility] [place]      Name:  Landry Morley /Age/Sex: 1970  (54 y.o. male)   MRN & CSN:  [id number] & [id number] Encounter Date/Time: 2024 11:47 AM EST    Location:  [address] [place] [address]-A PCP: Raza Fairchild DO       Hospital Day: 4    Assessment and Plan:   Landry Morley is a 54 y.o. male with pmh of pertension, CAD sp 5 stents, CKD stage V who who presents with Acute respiratory failure      Plan:  Non-STEMI  Ischemic cardiomyopathy  Acute decompensated heart failure with volume overload  -Appreciate cardiology input: Cardiac cath yesterday indicated multivessel coronary artery disease, discussed with CT surgery, plan for CABG with MVR, continue heparin drip and IV diuretics  -Echocardiogram showed ischemic cardiomyopathy injection fraction of 35 to 30% with severe mitral regurgitation  -Continue aspirin, statin, Plavix     CKD stage V  Hyperkalemia  Metabolic acidosis  Hypervolemia  -Appreciate nephrology input: Now transitioning to end-stage renal disease, IR inserted temporary HD cath,  initiated hemodialysis today  -Continue hemodialysis per nephrology  -Continue Lasix 80 Mg every 8 hour     Acute respiratory failure due to the above reason   -in room air today  -no shortness of breath     Type 2 diabetes  -Update hemoglobin A1c 5.8  -Insulin sliding scale     Hypertension  -Appreciate cardiology input: Start on Toprol 25 Mg daily, Imdur 30 Mg daily and hydralazine 25 Mg 3 times daily, discontinue amlodipine  -Monitor BP       Diet ADULT DIET; Regular; No Added Salt (3-4 gm); 1500 ml   DVT Prophylaxis [] Lovenox, [x]  Heparin, [] SCDs, [] Ambulation,  [] Eliquis, [] Xarelto  [] Coumadin   Code Status Limited   Disposition From: Home  Expected Disposition: Home  Estimated Date of Discharge: 4 to 5 days  Patient requires continued admission due to medical condition   Surrogate Decision Maker/ POA      Subjective:     Chief Complaint: Respiratory Distress       Landry Morley is a 
  Cardiothoracic Surgery  IN-PATIENT SERVICE   [facility]    Daily Note            Date:   11/26/2024  Patient name:  Landry Morley  Date of admission:  11/19/2024  6:56 AM  MRN:   [id number]  Account:  [id number]  YOB: 1970  PCP:    Raza Fairchild DO  Room:   [address] [place] [address]  Code Status:    Limited    Physician Requesting Consult: Alonso Rebolledo MD    Reason for Consult:  CAD, severe MR    Chief Complaint:     SOB    History of Present Illness:     Landry Morley is a 54 y.o. male with pmh of hypertension, type 2 diabetes, cad sp 5 stents, CKD stage IV who presents with shortness of breath. Pt woke up at night due to severe shortness of breath since 3 days ago. The episode usually lasted 10-15 minutes. However the symptoms didn't go away last night. So patient called ambulance to  ER. Pt was found hypoxia with o2 sat 70% in room air. Then patient received bipap. Pt's chest xray showed large right lower lung infiltration. His BNP level elevated at 24,000.    We were consulted for surgical evaluation    Patient states that he is feeling better and is back to his baseline after diuresis.    Past Medical History:     Past Medical History:   Diagnosis Date    Anxiety associated with depression 06/26/2024    CAD (coronary artery disease)     H/O MI.    CKD (chronic kidney disease), stage V ([facility])     Family history of coronary artery disease     H/O echocardiogram 06/2008 6/08 EF>55% TRACE MR, TR    H/O echocardiogram 04/15/2015    EF 50-55% Mild left atrial enlargement. Normal LV systolic function. Trace MR and TR. Normal sized abdominal aorta.     History of cardiac catheterization 03/01/2017    Stenting of the LAD    History of cardiovascular stress test 06/06/2006 7/12 LAD territory ISCHEMIA, EF 62%    History of exercise stress test 03/20/2017    treadmill    History of myocardial infarction 04/2006    History of nuclear stress test 01/26/2017    
  Cardiothoracic Surgery  IN-PATIENT SERVICE   [facility]    Daily Note            Date:   11/24/2024  Patient name:  Landry Morley  Date of admission:  11/19/2024  6:56 AM  MRN:   [id number]  Account:  [id number]  YOB: 1970  PCP:    Raza Fairchild DO  Room:   [address] [place] [address]  Code Status:    Limited    Physician Requesting Consult: Gregory Hand MD    Reason for Consult:  CAD, severe MR    Chief Complaint:     SOB    History of Present Illness:     Landry Morley is a 54 y.o. male with pmh of hypertension, type 2 diabetes, cad sp 5 stents, CKD stage IV who presents with shortness of breath. Pt woke up at night due to severe shortness of breath since 3 days ago. The episode usually lasted 10-15 minutes. However the symptoms didn't go away last night. So patient called ambulance to  ER. Pt was found hypoxia with o2 sat 70% in room air. Then patient received bipap. Pt's chest xray showed large right lower lung infiltration. His BNP level elevated at 24,000.    We were consulted for surgical evaluation    Patient states that he is feeling better and is back to his baseline after diuresis.    Past Medical History:     Past Medical History:   Diagnosis Date    Anxiety associated with depression 06/26/2024    CAD (coronary artery disease)     H/O MI.    CKD (chronic kidney disease), stage V ([facility])     Family history of coronary artery disease     H/O echocardiogram 06/2008 6/08 EF>55% TRACE MR, TR    H/O echocardiogram 04/15/2015    EF 50-55% Mild left atrial enlargement. Normal LV systolic function. Trace MR and TR. Normal sized abdominal aorta.     History of cardiac catheterization 03/01/2017    Stenting of the LAD    History of cardiovascular stress test 06/06/2006 7/12 LAD territory ISCHEMIA, EF 62%    History of exercise stress test 03/20/2017    treadmill    History of myocardial infarction 04/2006    History of nuclear stress test 01/26/2017    
 Nephrology  Dialysis Note        [address]  [place] [address]  Phone: [phone]  Office Hours: 8:30AM - 4:30PM  Monday - Friday          PROCEDURE:  Patient seen during hemodialysis      PHYSICIAN:  RACHEL      INDICATION:  End-stage renal disease      RX:  See dialysis flowsheet for specifics on access, blood flow rate, dialysate baths, duration of dialysis, anticoagulation and other technical information.      COMMENTS:  SEEN ON HD    ON ROOM AIR    ANXIOUS ABOUT EVERYTHING HAPPENING    SINCE THERE IS NO PLAN FOR HEART SURGERY THIS ADMISSION, I WILL PLAN A TUNNELLED HD CATH PLACEMENT ON MONDAY OF NEXT WEEK AND ONCE THERE IS A HD CHAIR FOR HIM AT [facility] [place] [facility], HE CAN BE DC'ED PER RENAL STDPT    Electronically signed by Leonor Sommer DO on 11/23/2024 at 8:55 AM    [facility]  MD ANGELICA PEREZ DO  [address] [facility],  [address] [place] [address]  PHONE: [phone]  FAX: [phone]   
4 Eyes Skin Assessment     NAME:  Landry Morley  YOB: 1970  MEDICAL RECORD NUMBER:  [id number]    The patient is being assessed for  Admission    I agree that at least one RN has performed a thorough Head to Toe Skin Assessment on the patient. ALL assessment sites listed below have been assessed.      Areas assessed by both nurses:    Head, Face, Ears, Shoulders, Back, Chest, Arms, Elbows, Hands, Sacrum. Buttock, Coccyx, Ischium, Legs. Feet and Heels, and Under Medical Devices         Does the Patient have a Wound? No noted wound(s)       Alex Prevention initiated by RN: No  Wound Care Orders initiated by RN: No    Pressure Injury (Stage 3,4, Unstageable, DTI, NWPT, and Complex wounds) if present, place Wound referral order by RN under : No    New Ostomies, if present place, Ostomy referral order under : No     Nurse 1 eSignature: Electronically signed by Michelle Colbert RN on 11/19/24 at 11:07 AM EST    **SHARE this note so that the co-signing nurse can place an eSignature**    Nurse 2 eSignature: {Esignature:[id number]}   
Attempted Bedside Spirometry. Pt not in room.   
Attempted Bedside Spirometry. Pt not in room.    
Bedside Spirometry    FVC               Actual   3.13  ---   65  %Predicted  FEV1             Actual  2.46   ---  64   %Predicted  FEV1/FVC     Actual  78.6   ---  101   %Predicted  FEF 25-75     Actual   2.25  ---  57   %Predicted      MACHINE INTERPRETATION:    Possible Moderate Restriction                
Benefits of LHC outweight risks, troponin level 400 from 100  I have discussed this with Dr Negro, his original nephrologist  Will consult Dr Ayon FOR PLAcement of a PD cath  
Cardiology Note    Admit Date:  11/19/2024    Admission diagnosis / Complaint :   CAD    Subjective:  Mr. Morley is resting in bed.       Assessment and Plan:    Assessment/Plan:  NSTEMI  Multivessel coronary artery disease  -Left heart catheterization yesterday revealing severe disease noted in LCx, ramus, LAD, and PLV  -CT surgery consulted for evaluation for CABG + MVR.  Recommending outpatient intervention due to high STS score. Patient is anxious about diagnoses  -Continue aspirin, Lipitor 40 mg nightly and heparin drip. Today is day 5 of heparin drip  -Holding Plavix  Ischemic cardiomyopathy with reduced ejection fraction  HFrEF  Severe mitral regurgitation with left atrial dilation  -Volume overload has significantly improved.  -Echo showing EF 25-30% with wall motion abnormalities  -Will defer diuresis to nephrology.  Currently on IV Lasix 80 mg twice daily  -GDMT: Continue Toprol-XL 25 mg daily, Imdur 30 mg daily and hydralazine 25 mg 3 times daily.  Unable to tolerate ACE/ARB/Arni or Aldactone at this time.  Hypertension  -Blood pressure stable  Continue apresoline, imdur, toprol xl  End-stage renal disease  -Nephrology following. Tunnel catheter placed         Objective:   BP (!) 105/57   Pulse 61   Temp 97.3 °F (36.3 °C) (Oral)   Resp 19   Ht 1.829 m (6')   Wt 93.4 kg (206 lb)   SpO2 98%   BMI 27.94 kg/m²     Intake/Output Summary (Last 24 hours) at 11/24/2024 1300  Last data filed at 11/24/2024 0037  Gross per 24 hour   Intake 480 ml   Output 1050 ml   Net -570 ml       TELEMETRY: Sinus    has a past medical history of Anxiety associated with depression, CAD (coronary artery disease), CKD (chronic kidney disease), stage V ([facility]), Family history of coronary artery disease, H/O echocardiogram, H/O echocardiogram, History of cardiac catheterization, History of cardiovascular stress test, History of exercise stress test, History of myocardial infarction, History of nuclear stress test, History of 
Cardiology Progress Note    Cardiology consulted for NSTEMI    Subjective/Overnight Events:  Discussed care with patient this morning.  Patient was undergoing CAT scan as well as heading to IR.    Patient's access site is clean and dry with any obvious signs of bleeding, hematoma or infection.    Assessment/Plan:  NSTEMI  Multivessel coronary artery disease  -Chest pain-free at this time  -Left heart catheterization yesterday revealing severe disease noted in LCx, ramus, LAD, and PLV  -CT surgery consulted for evaluation for CABG + MVR  -Continue aspirin, Lipitor 40 mg nightly and heparin drip  -Holding Plavix  Ischemic cardiomyopathy with reduced ejection fraction  HFrEF  Severe mitral regurgitation with left atrial dilation  -Volume overload has improved.  -Patient scheduled to undergo thoracentesis today however no significant effusion noted  -Echo showing EF 25-30% with wall motion abnormalities  -Will defer diuresis to nephrology.  Currently on IV Lasix 80 mg twice daily  -GDMT: Start on Toprol-XL 25 mg daily.  Due to renal failure will initiate on Imdur 30 mg daily and hydralazine 25 mg 3 times daily  Hypertension  -Blood pressure stable  -Discontinue Norvasc to make room for guideline directed medical therapy  End-stage renal disease  -Nephrology following.  -Patient was undergoing a workup for possible peritoneal dialysis as outpatient  -Planning for temporary hemodialysis catheter placement.        Echo 11/19/2024    Image quality is good. Contrast used: Lumason.    Left Ventricle: Severely reduced left ventricular systolic function with a visually estimated EF of 25 - 30%. Left ventricle is dilated. Mildly increased wall thickness. Apical septal wall segments and apical cap are akinetic with global hypokinesis. Diastolic dysfunction present with normal LV EF.    Left Atrium: Left atrium is severely dilated.    Mitral Valve: Severe regurgitation. MR regurgitant volume: 62ml. MR ERO: 0.40cm2.    Tricuspid 
Cardiology Progress Note    Cardiology consulted for NSTEMI    Subjective/Overnight Events:  Patient feeling well overall today.  No chest pain or shortness of breath    Assessment/Plan:  NSTEMI  Multivessel coronary artery disease  -Chest pain-free at this time  -Left heart catheterization yesterday revealing severe disease noted in LCx, ramus, LAD, and PLV  -CT surgery consulted for evaluation for CABG + MVR.    -Repeat echocardiogram showing only moderate MR.  -CTS requesting ROHIT.  Will perform tomorrow.  N.p.o. after midnight  -Continue aspirin, Lipitor 40 mg nightly and heparin drip  -Holding Plavix  Ischemic cardiomyopathy with reduced ejection fraction  HFrEF  Moderate-severe mitral regurgitation with left atrial dilation  -Volume overload has significantly improved.  -Echo initially showing EF 25-30% with wall motion abnormalities  -Repeat echocardiogram showing improvement in EF to 40-45% but still mid and apical septal walls hypokinetic  -Will defer diuresis to nephrology.  Currently on IV Lasix 80 mg twice daily  -GDMT: Continue Toprol-XL 25 mg daily, Imdur 30 mg daily and hydralazine 25 mg 3 times daily.  Unable to tolerate ACE/ARB/Arni or Aldactone at this time.  Hypertension  -Blood pressure stable  -Discontinue Norvasc to make room for guideline directed medical therapy  End-stage renal disease  -Nephrology following.  -Patient was undergoing a workup for possible peritoneal dialysis as outpatient  -Undergoing HD       Echo 11/25/2024    Left Ventricle: The EF by visual approximation is 40 - 45%. Mid and apical septal walls are hypokinetic.    Mitral Valve: Moderate regurgitation.    Pulmonic Valve: Trace regurgitation.    Pericardium: No pericardial effusion.    Image quality is adequate.    Burt Chavira PA-C 11/26/24 5:33 PM        
Cardiology Progress Note    Cardiology consulted for NSTEMI    Subjective/Overnight Events:  Patient feeling well overall today.  No chest pain or shortness of breath    Assessment/Plan:  NSTEMI  Multivessel coronary artery disease  -Chest pain-free at this time  -Left heart catheterization yesterday revealing severe disease noted in LCx, ramus, LAD, and PLV  -CT surgery consulted for evaluation for CABG + MVR.  Recommending outpatient intervention due to high STS score  -Continue aspirin, Lipitor 40 mg nightly and heparin drip  -Holding Plavix  Ischemic cardiomyopathy with reduced ejection fraction  HFrEF  Severe mitral regurgitation with left atrial dilation  -Volume overload has significantly improved.  -Echo showing EF 25-30% with wall motion abnormalities  -Will defer diuresis to nephrology.  Currently on IV Lasix 80 mg twice daily  -GDMT: Continue Toprol-XL 25 mg daily, Imdur 30 mg daily and hydralazine 25 mg 3 times daily.  Unable to tolerate ACE/ARB/Arni or Aldactone at this time.  Hypertension  -Blood pressure stable  -Discontinue Norvasc to make room for guideline directed medical therapy  End-stage renal disease  -Nephrology following.  -Patient was undergoing a workup for possible peritoneal dialysis as outpatient  -Undergoing HD tomorrow        Echo 11/19/2024    Image quality is good. Contrast used: Lumason.    Left Ventricle: Severely reduced left ventricular systolic function with a visually estimated EF of 25 - 30%. Left ventricle is dilated. Mildly increased wall thickness. Apical septal wall segments and apical cap are akinetic with global hypokinesis. Diastolic dysfunction present with normal LV EF.    Left Atrium: Left atrium is severely dilated.    Mitral Valve: Severe regurgitation. MR regurgitant volume: 62ml. MR ERO: 0.40cm2.    Tricuspid Valve: Mild regurgitation. The estimated RVSP is 43 mmHg.    IVC/SVC: IVC diameter is greater than 21 mm and decreases greater than 50% during inspiration; 
Comprehensive Nutrition Assessment    Type and Reason for Visit:  Initial, LOS (LOS Day 8)    Nutrition Recommendations/Plan:   Continue NPO and advance post op/as krystina.  Once diet is advanced, please continue to document all pt po intakes in chart.   Will monitor GI status, wt, po intakes, lytes, labs, and POC while in hospital.   Will follow pt as a moderate nutrition risk at this time.     Malnutrition Assessment:  Malnutrition Status:  Insufficient data (11/27/24 1119)    Context:  Acute Illness     Findings of the 6 clinical characteristics of malnutrition:  Energy Intake:  Unable to assess  Weight Loss:  Unable to assess     Body Fat Loss:  Unable to assess     Muscle Mass Loss:  Unable to assess    Fluid Accumulation:  Unable to assess     Strength:  Not Performed    Nutrition Assessment:    Pt admitted with acute respiratory failure possiblty r/t fluid overload from CHF or CKD per H&P. Pt has a pmh o9f hypertension, cad sp 5 stents, ckd stage IV not on hemodialysis. Pt currently on LOS day 8. Pt has had fluid restrictions, potassium restricitions, and FAVIOLA diet order's throughout stay to support. Pt currently NPO to have HD cathetor placed. Pt possibly going on PD after [place] per H&P. Pt may need education on a renal diet and fluids however, PD may provide some flexibility in diet if pt adhears to treatment and the dietary recomendations. Pt will have a team consisting of a nephrologist, PD nurse, renal dietitian, and  to support if PD is chosen for pt. Pt has lost a signficant amount of wt upon chart review, however, some of the wt fluctuations may be r/t CHF and CKD and poor dietary choices. Upon review of po intakes, they seem to be good since admission.Will follow pt as a moderate nutrition risk at this time.    Nutrition Related Findings:    lipitor, bipap, lasix, humalog, toprol, NaCL,apresoline, Na 135, BUN 62, Crt 5.7, GFR 11, RBC 3.35, Hemoglobin 11, POCT glucose 100-135 Wound 
Dialysis nurse to draw anti-xa to resume heparin gtt in 2 hours.  Cleo Luque RN   3:10 PM  11/25/2024    
Dr Noyola reviewed IR case and approves.  Dr Noyola states heparin drip turned off 2 hours before procedure and 2 hours after the procedure.  Per the pt nurse Jamee PANIAGUA the pt ate breakfast.  Updated the pt nurse Jamee PANIAGUA  
Educate and instruct patient on use of IS. 3000.  
Educated patient on need for left heart catheterization.  Procedure was explained in detail as well as risks and benefits.  Patient voiced understanding and was agreeable to undergoing procedure.  Consent was obtained.     Plan for left heart catheterization after discussing care with nephrology    Burt Chavira PA-C 11/19/24 12:30 PM     
HIS NEXT HD TREATMENT WILL BE SATURDAY 11/30/24  HE NEEDS TO GET THERE AT 10:00AM  PADDY ON [facility] MGR INFORMED    THANK YOU  
Heparin gtt placed on hold per IR for pt to get thora and HD cath this am.   
IR consult noted for thoracentesis. Pt. is on Heparin gtt, just received Plavix.  Please hold Plavix for now- we can put Heparin gtt on hold in am 2 hrs prior to procedure if ok with CT surg. IR will call in am with a time. Called and spoke with Michelle PANIAGUA.  
Met with patient and sister. Introduced myself as the Heart failure education R.N. Patient states he will be cabg on Monday. He  has had dialysis also.      Admitting diagnosis- acute respiratory failure   Cardiologist-  Farrah   Heart Failure Education Nurse consulted- yes   Ejection fraction 25-30 % as of 11/19/2024  Pro BNP-  24,022 on 11/19/24  Hospital follow up appt-  pending discharge  Cardiac rehabilitation referral- discussed   ICD information- not discussed   Smoking Cessation information and referral-N/A   Pneumonia vaccine-  up to date  PCP- Gurinder  Patient has a digital scale? Has a scale   Transportation- has transportation     Able to obtain medications without difficulty?- Yes- Patient denies difficulty in obtaining or taking medications. Advised not to stop taking a medication without notifying provider.    Heart failure specific Medications-  Entresto, Metoprolol,  Lasix      Reviewed Heart failure patient education book with patient. Heart failure education included: Type of heart failure, How it is diagnosed, causes, symptoms, medications, diet, daily weights, exercise and fluid control. Recommendation for Mediterranean or DASH diet made.  Questions answered.      Patient's heart failure medications reviewed and information given on each.     Reviewed the Stop Light Handout with patient and instructed when to call his provider.   Patient was engaged and attentive during education session.    The following handouts were reviewed with patient and patient was given a copy of the following : Heart Failure education booklet, the 'Stop Light' Handout,  a link to the American [employer] Association's Healthier Living with Heart Failure Interactive workbook and a list of heart failure related education available on the hospital TV and how to access it.         1 hour of education provided.    
Nephrology Progress Note        [address] [facility], [address] [place] [address]  Phone: [phone]  Office Hours: 8:30AM - 4:30PM  Monday - Friday 11/25/2024 7:08 AM  Subjective:   Admit Date: 11/19/2024  PCP: Raza Fairchild DO  Interval History:   On room air      Diet: ADULT DIET; Regular; No Added Salt (3-4 gm); 1500 ml      Data:   Scheduled Meds:   epoetin sydnee-epbx  5,000 Units IntraVENous Once per day on Tuesday Thursday Saturday    furosemide  80 mg IntraVENous BID    insulin lispro  0-4 Units SubCUTAneous 4x Daily AC & HS    metoprolol succinate  25 mg Oral Daily    isosorbide mononitrate  30 mg Oral Daily    hydrALAZINE  25 mg Oral 3 times per day    aspirin  81 mg Oral Daily    [Held by provider] clopidogrel  75 mg Oral Daily    sodium chloride flush  5-40 mL IntraVENous 2 times per day    sodium chloride flush  5-40 mL IntraVENous 2 times per day    atorvastatin  40 mg Oral Nightly     Continuous Infusions:   dextrose      sodium chloride      heparin (PORCINE) Infusion 17 Units/kg/hr (11/25/24 0641)    sodium chloride       PRN Meds:glucose, dextrose bolus **OR** dextrose bolus, glucagon (rDNA), dextrose, traZODone, sodium chloride flush, sodium chloride, ondansetron **OR** ondansetron, polyethylene glycol, nicotine polacrilex, [DISCONTINUED] acetaminophen **OR** acetaminophen, heparin (porcine), heparin (porcine), sodium chloride flush, sodium chloride, acetaminophen  I/O last 3 completed shifts:  In: 240 [P.O.:240]  Out: 1600 [Urine:1600]  No intake/output data recorded.    Intake/Output Summary (Last 24 hours) at 11/25/2024 0708  Last data filed at 11/25/2024 0301  Gross per 24 hour   Intake 240 ml   Output 1200 ml   Net -960 ml       CBC:   Recent Labs     11/23/24 0520 11/25/24  0531   WBC 4.8 5.9   HGB 10.5* 10.7*   *  --        BMP:    Recent Labs     11/23/24 0520 11/24/24  0521 11/25/24 0531   * 137 135*   K 3.9 4.5 4.7   CL 96* 99 96*   CO2 24 27 26 
Nephrology Progress Note        [address] [facility], [address] [place] [address]  Phone: [phone]  Office Hours: 8:30AM - 4:30PM  Monday - Friday 11/26/2024 7:18 AM  Subjective:   Admit Date: 11/19/2024  PCP: Raza Fairchild DO  Interval History:   On room air  Eating breakfast    Diet: ADULT DIET; Regular; No Added Salt (3-4 gm); 1500 ml      Data:   Scheduled Meds:   furosemide  80 mg Oral BID    insulin lispro  0-4 Units SubCUTAneous 4x Daily AC & HS    metoprolol succinate  25 mg Oral Daily    isosorbide mononitrate  30 mg Oral Daily    hydrALAZINE  25 mg Oral 3 times per day    aspirin  81 mg Oral Daily    [Held by provider] clopidogrel  75 mg Oral Daily    sodium chloride flush  5-40 mL IntraVENous 2 times per day    sodium chloride flush  5-40 mL IntraVENous 2 times per day    atorvastatin  40 mg Oral Nightly     Continuous Infusions:   dextrose      sodium chloride      heparin (PORCINE) Infusion 17 Units/kg/hr (11/26/24 0430)    sodium chloride       PRN Meds:glucose, dextrose bolus **OR** dextrose bolus, glucagon (rDNA), dextrose, traZODone, sodium chloride flush, sodium chloride, ondansetron **OR** ondansetron, polyethylene glycol, nicotine polacrilex, [DISCONTINUED] acetaminophen **OR** acetaminophen, heparin (porcine), heparin (porcine), sodium chloride flush, sodium chloride, acetaminophen  I/O last 3 completed shifts:  In: 710 [P.O.:200; I.V.:10]  Out: 3875 [Urine:2700]  No intake/output data recorded.    Intake/Output Summary (Last 24 hours) at 11/26/2024 0718  Last data filed at 11/25/2024 2034  Gross per 24 hour   Intake 710 ml   Output 2675 ml   Net -1965 ml       CBC:   Recent Labs     11/25/24 0531   WBC 5.9   HGB 10.7*       BMP:    Recent Labs     11/24/24 0521 11/25/24 0531 11/26/24  0338    135* 135*   K 4.5 4.7 4.8   CL 99 96* 97*   CO2 27 26 25   BUN 39* 46* 43*   CREATININE 4.3* 5.0* 5.0*   GLUCOSE 100* 101* 97   CALCIUM 9.7 9.8 9.2     Hepatic: No results 
Nephrology Progress Note        [address] [facility], [address] [place] [address]  Phone: [phone]  Office Hours: 8:30AM - 4:30PM  Monday - Friday 11/21/2024 7:29 AM  Subjective:   Admit Date: 11/19/2024  PCP: Raza Fairchild DO  Interval History:   On room air this morning  Daughter at bedside  Made 4L urine with iv lasix      Diet: ADULT DIET; Regular; No Added Salt (3-4 gm); 1500 ml      Data:   Scheduled Meds:   insulin lispro  0-4 Units SubCUTAneous 4x Daily AC & HS    metoprolol succinate  25 mg Oral Daily    isosorbide mononitrate  30 mg Oral Daily    hydrALAZINE  25 mg Oral 3 times per day    furosemide  80 mg IntraVENous Q6H    aspirin  81 mg Oral Daily    [Held by provider] clopidogrel  75 mg Oral Daily    sodium chloride flush  5-40 mL IntraVENous 2 times per day    azithromycin  500 mg IntraVENous Q24H    cefTRIAXone (ROCEPHIN) IV  1,000 mg IntraVENous Q24H    sodium chloride flush  5-40 mL IntraVENous 2 times per day    atorvastatin  40 mg Oral Nightly     Continuous Infusions:   dextrose      sodium chloride      heparin (PORCINE) Infusion 15 Units/kg/hr (11/20/24 5337)    sodium chloride       PRN Meds:glucose, dextrose bolus **OR** dextrose bolus, glucagon (rDNA), dextrose, traZODone, sodium chloride flush, sodium chloride, ondansetron **OR** ondansetron, polyethylene glycol, nicotine polacrilex, [DISCONTINUED] acetaminophen **OR** acetaminophen, heparin (porcine), heparin (porcine), sodium chloride flush, sodium chloride, acetaminophen  I/O last 3 completed shifts:  In: 3960.5 [P.O.:1450; I.V.:1760.4; IV Piggyback:250.1]  Out: 7050 [Urine:6050]  No intake/output data recorded.    Intake/Output Summary (Last 24 hours) at 11/21/2024 0729  Last data filed at 11/21/2024 0620  Gross per 24 hour   Intake 1435.08 ml   Output 5100 ml   Net -3664.92 ml       CBC:   Recent Labs     11/19/24  0750 11/19/24  1226 11/21/24  0321   WBC 14.5* 8.5 5.2   HGB 11.7* 11.0* 9.8*    190 148 
Nephrology Progress Note        [address] [facility], [address]  [place] [address]  Phone: [phone]  Office Hours: 8:30AM - 4:30PM  Monday - Friday 11/24/2024 7:41 AM  Subjective:   Admit Date: 11/19/2024  PCP: Raza Fairchild DO  Interval History:   On room air  Doing ok  Had a leg cramp last night    Diet: ADULT DIET; Regular; No Added Salt (3-4 gm); 1500 ml      Data:   Scheduled Meds:   epoetin sydnee-epbx  5,000 Units IntraVENous Once per day on Tuesday Thursday Saturday    furosemide  80 mg IntraVENous BID    insulin lispro  0-4 Units SubCUTAneous 4x Daily AC & HS    metoprolol succinate  25 mg Oral Daily    isosorbide mononitrate  30 mg Oral Daily    hydrALAZINE  25 mg Oral 3 times per day    aspirin  81 mg Oral Daily    [Held by provider] clopidogrel  75 mg Oral Daily    sodium chloride flush  5-40 mL IntraVENous 2 times per day    sodium chloride flush  5-40 mL IntraVENous 2 times per day    atorvastatin  40 mg Oral Nightly     Continuous Infusions:   dextrose      sodium chloride      heparin (PORCINE) Infusion 17 Units/kg/hr (11/24/24 0641)    sodium chloride       PRN Meds:glucose, dextrose bolus **OR** dextrose bolus, glucagon (rDNA), dextrose, traZODone, sodium chloride flush, sodium chloride, ondansetron **OR** ondansetron, polyethylene glycol, nicotine polacrilex, [DISCONTINUED] acetaminophen **OR** acetaminophen, heparin (porcine), heparin (porcine), sodium chloride flush, sodium chloride, acetaminophen  I/O last 3 completed shifts:  In: 980 [P.O.:480]  Out: 5750 [Urine:3250]  No intake/output data recorded.    Intake/Output Summary (Last 24 hours) at 11/24/2024 0741  Last data filed at 11/24/2024 0037  Gross per 24 hour   Intake 980 ml   Output 3550 ml   Net -2570 ml       CBC:   Recent Labs     11/23/24  0520   WBC 4.8   HGB 10.5*   *       BMP:    Recent Labs     11/22/24  0505 11/23/24  0520 11/24/24  0521    135* 137   K 3.8 3.9 4.5   CL 97* 96* 99   CO2 26 24 
Patient Name: Landry Morley  Patient : 1970  MRN: [id number]     Acct: [id number]  Date of Admission: 2024  Room/Bed: 3103/3103-A  Code Status:  Limited  Allergies: No Known Allergies  Diagnosis:    Patient Active Problem List   Diagnosis    Chest pain    Type 2 diabetes mellitus with circulatory disorder, without long-term current use of insulin (HCC)    History of myocardial infarction    History of PTCA    H/O echocardiogram    Precordial pain    Coronary artery disease involving native coronary artery of native heart without angina pectoris    Mixed hyperlipidemia    Essential hypertension    Family history of coronary artery disease    Acute renal failure (HCC)    Persistent proteinuria    Diabetic nephropathy associated with type 2 diabetes mellitus (HCC)    Metabolic syndrome    Renal agenesis    Erectile disorder due to medical condition in male patient    Hypertension, secondary    Class 2 severe obesity due to excess calories with serious comorbidity and body mass index (BMI) of 39.0 to 39.9 in adult    Spinal stenosis of lumbar region    Constipation    Hyperpotassemia    Chronic kidney disease, stage V (HCC)    Nephrotic syndrome    Anxiety associated with depression    CAD (coronary artery disease)    Acute respiratory failure    NSTEMI (non-ST elevated myocardial infarction) ([facility])    Pneumonia of right lower lobe due to infectious organism         Treatment:  Hemodilaysis 2:1  Priority: Routine  Location: Acute Room    Diabetic: Yes  NPO: No  Isolation Precautions: Dialysis     Consent for Treatment Verified: Yes  Blood Consent Verified: Not Applicable     Safety Verified: Identify (I), Consent (C), Equipment (E), HepB Status (B), Orders Complete (O), Access Verified (A), and Timeliness (T)  Time out performed prior to access at 0735 hours.    Report Received from Primary RN at 0700 hours.  Primary RN (First Initial, Last Name, Title): kellie owen   Incapacitated Nurse Education 
Patient Name: Landry Morley  Patient : 1970  MRN: [id number]     Acct: [id number]  Date of Admission: 2024  Room/Bed: 3103/3103-A  Code Status:  Limited  Allergies: No Known Allergies  Diagnosis:    Patient Active Problem List   Diagnosis    Chest pain    Type 2 diabetes mellitus with circulatory disorder, without long-term current use of insulin (HCC)    History of myocardial infarction    History of PTCA    H/O echocardiogram    Precordial pain    Coronary artery disease involving native coronary artery of native heart without angina pectoris    Mixed hyperlipidemia    Essential hypertension    Family history of coronary artery disease    Acute renal failure (HCC)    Persistent proteinuria    Diabetic nephropathy associated with type 2 diabetes mellitus (HCC)    Metabolic syndrome    Renal agenesis    Erectile disorder due to medical condition in male patient    Hypertension, secondary    Class 2 severe obesity due to excess calories with serious comorbidity and body mass index (BMI) of 39.0 to 39.9 in adult    Spinal stenosis of lumbar region    Constipation    Hyperpotassemia    Chronic kidney disease, stage V (HCC)    Nephrotic syndrome    Anxiety associated with depression    CAD (coronary artery disease)    Acute respiratory failure    NSTEMI (non-ST elevated myocardial infarction) ([facility])    Pneumonia of right lower lobe due to infectious organism         Treatment:  Hemodilaysis 2:1  Priority: Routine  Location: Acute Room    Diabetic: Yes  NPO: No  Isolation Precautions: Dialysis     Consent for Treatment Verified: Yes  Blood Consent Verified: Not Applicable     Safety Verified: Identify (I), Consent (C), Equipment (E), HepB Status (B), Orders Complete (O), Access Verified (A), and Timeliness (T)  Time out performed prior to access at 0800 hours.    Report Received from Primary RN at 0720 hours.  Primary RN (First Initial, Last Name, Title): salvador owen  Incapacitated Nurse Education 
Patient was having a couple quadgems PVC's earlier. Tele stripe printed and in chart. NP Naegele was notified, and recommended close observation. Patient is asymptomatic, denies chest pain, dizziness, and palpitations. He is also noted to have episodes of apneas, for 0.20secs at rest.   Blood pressure has been stable and has a good urine output.   He is having minor nose bleed especially when he blew his nose.  Heparin gtt at 15unit/kg/hr infusing.  0400: Anti-Xa therapeutic.  Family at bedside and all safety precautions in place.  
Per Jo Ann Meehan, NP pt ok for sips w meds  
Pt done with HD cath placement, now in dialysis. This RN restarted heparin gtt @ 11 units/kg/hr.   
Pt returned from dialysis at this time  
Pt to dialysis at this time  
Reported VBG to Dr Diggs  
START IV LASIX    START ORAL BICARB    MEASURE UOP PLEASE  
Spoke with Michelle PANIAGUA. She will put Heparin gtt on hold now so IR can place Temp HD cath and right thoracentesis.   
Spoke with pharmacy about resuming heparin gtt. Anti-xa came back as <0.10. Pharmacy said to give the bolus and resume at rate the drip was running before being turned off. Pt was given 4000 unit bolus and restarted at 17 units/kg/hr. Pt's weight has also decreased since starting heparin. Pharmacy pulled up policy and stated the adjustments are to be made from initial weight.   Cleo Luque RN   6:15 PM  11/25/2024    
TRANSFER - OUT REPORT:    Verbal report given to Cleo on Landry Morley being transferred to HD prior to returning to [facility] routine post-op       Report consisted of patient's Situation, Background, Assessment and   Recommendations(SBAR).     Information from the following report(s) Nurse Handoff Report was reviewed with the receiving nurse.    Opportunity for questions and clarification was provided.      Patient transported with:   Registered Nurse    
TRANSFER - OUT REPORT:    Verbal report given to Michelle PANIAGUA on Landry Morley being transferred to ultrasound for routine progression of patient care       Report consisted of patient's Situation, Background, Assessment and   Recommendations(SBAR).     Information from the following report(s) Nurse Handoff Report was reviewed with the receiving nurse.    Opportunity for questions and clarification was provided.      Patient transported with:   transportation    
Updated SHIRLEY English regarding IR consult for thoracentesis.  Dr. Romero used ultrasound imaging and there was no noted pleural effusion.  IR consult completed.  Updated pt's bedside ARCELIA Martinez as well.    
  Vitals: /73   Pulse 54   Temp 98 °F (36.7 °C) (Oral)   Resp 15   Ht 1.829 m (6')   Wt 92.1 kg (203 lb)   SpO2 100%   BMI 27.53 kg/m²   General appearance: alert and cooperative with exam, in no acute distress  HEENT: normocephalic, atraumatic,   Neck: supple, trachea midline  Lungs:  breathing comfortably on room air  Heart:: regular rate and rhythm,   Extremities: extremities atraumatic, no cyanosis or edema  Neurologic: alert, oriented, follows commands, interactive    MEDICAL DECISION MAKING   -ESRD: first HD on 11/20/24///tunnelled HD cath placed on 11/25/24  -Hyperkalemia: resolved  -Metabolic acidosis  -Hypervolemia  -NSTEMI with multivessel dz per [facility] on 11/19/24: unclear if having CABG      Suggest:  -HD planned today  -Ok to dc pre renal stdpt as long as outpt HD chair is set up                       Electronically signed by Leonor Sommer DO on 11/27/2024 at 7:50 AM    [facility]  MD ANGELICA PEREZ DO  [address] [facility],  [address]  [place] [address]  PHONE: [phone]  FAX: [phone]       
10   Ht 1.829 m (6')   Wt 92.5 kg (204 lb)   SpO2 95%   BMI 27.67 kg/m²     Intake/Output Summary (Last 24 hours) at 11/25/2024 1902  Last data filed at 11/25/2024 1851  Gross per 24 hour   Intake 500 ml   Output 3875 ml   Net -3375 ml       TELEMETRY:      Physical Exam:  Constitutional:  Well developed, Well nourished, No acute distress, Non-toxic appearance.   HENT:  Normocephalic, Atraumatic, Bilateral external ears normal, Oropharynx moist, No oral exudates, Nose normal. Neck- Normal range of motion, No tenderness, Supple, No stridor.   Eyes:  PERRL, EOMI, Conjunctiva normal, No discharge.   Respiratory:  Normal breath sounds, No respiratory distress, No wheezing, No chest tenderness.,no use of accessory muscles, diaphragm movement is normal  Cardiovascular: (PMI) apex non displaced,no lifts no thrills, no s3,no s4, Normal heart rate, Normal rhythm, No murmurs, No rubs, No gallops. Carotid arteries pulse and amplitude are normal no bruit, no abdominal bruit noted ( normal abdominal aorta ausculation), femoral arteries pulse and amplitude are normal no bruit, pedal pulses are normal  GI:  Bowel sounds normal, Soft, No tenderness, No masses, No pulsatile masses.   : External genitalia appear normal, No masses or lesions. No discharge. No CVA tenderness.   Musculoskeletal:  Intact distal pulses, No edema, No tenderness, No cyanosis, No clubbing. Good range of motion in all major joints. No tenderness to palpation or major deformities noted. Back- No tenderness.   Integument:  Warm, Dry, No erythema, No rash.   Lymphatic:  No lymphadenopathy noted.   Neurologic:  Alert & oriented x 3, Normal motor function, Normal sensory function, No focal deficits noted.   Psychiatric:  Affect normal, Judgment normal, Mood normal.     Medications:    furosemide  80 mg IntraVENous BID    insulin lispro  0-4 Units SubCUTAneous 4x Daily AC & HS    metoprolol succinate  25 mg Oral Daily    isosorbide mononitrate  30 mg Oral 
8.5 5.2   HGB 11.7* 11.0* 9.8*    190 148       BMP:    Recent Labs     11/20/24  1305 11/21/24  0321 11/22/24  0505    139 137   K 3.6 3.2* 3.8   CL 99 101 97*   CO2 24 26 26   BUN 70* 56* 41*   CREATININE 5.5* 4.8* 4.2*   GLUCOSE 116* 98 107*   CALCIUM 8.8 9.0 9.2     Hepatic:   Recent Labs     11/19/24  0750   AST 27   ALT 20   BILITOT 0.3   ALKPHOS 101           Objective:   Vitals: /70   Pulse 59   Temp 97.6 °F (36.4 °C) (Oral)   Resp 13   Ht 1.829 m (6')   Wt 95 kg (209 lb 7 oz)   SpO2 96%   BMI 28.40 kg/m²   General appearance: alert and cooperative with exam, in no acute distress  HEENT: normocephalic, atraumatic,   Neck: supple, trachea midline  Lungs: breathing comfortably on room air  Heart:: regular rate and rhythm,   Abdomen: soft, non-tender; non distended,   Extremities: extremities atraumatic, no cyanosis or edema  Neurologic: alert, oriented, follows commands, interactive    MEDICAL DECISION MAKING     -ESRD: first HD on 11/20/24  -Hyperkalemia: resolved  -Metabolic acidosis  -Hypervolemia  -NSTEMI with multivessel dz per [facility] on 11/19/24     Suggest:  -HD planned on Saturday  -Continue IV lasix  -Explained that the drop in creat is due to HD and not due to his kidneys functioning better    Thank you                     Electronically signed by Leonor Sommer DO on 11/22/2024 at 7:32 AM    [facility]  MD ANGELICA PEREZ DO  [address] [facility],  [address]  [place] [address]  PHONE: [phone]  FAX: [phone]       
continued admission due to discharge planning   Surrogate Decision Maker/ POA      Subjective:     Chief Complaint: Respiratory Distress       Landry Morley is a 54 y.o. male is evaluated in hemodialysis room.  Patient has no complaint today.  However patient is wondering the schedule for CABG.         Review of Systems:    Review of Systems   All other systems reviewed and are negative.          Objective:     Intake/Output Summary (Last 24 hours) at 11/23/2024 1512  Last data filed at 11/23/2024 1358  Gross per 24 hour   Intake 980 ml   Output 5350 ml   Net -4370 ml        Vitals:   Vitals:    11/23/24 1404   BP: 110/79   Pulse:    Resp:    Temp:    SpO2:        Physical Exam:     General: NAD  Eyes: EOMI  ENT: neck supple  Cardiovascular: Regular rate.  Respiratory: Clear to auscultation  Gastrointestinal: Soft, non tender  Genitourinary: no suprapubic tenderness  Musculoskeletal: No edema  Skin: warm, dry  Neuro: Alert.  Psych: Mood appropriate.     Medications:   Medications:    epoetin sydnee-epbx  5,000 Units IntraVENous Once per day on Tuesday Thursday Saturday    furosemide  80 mg IntraVENous BID    insulin lispro  0-4 Units SubCUTAneous 4x Daily AC & HS    metoprolol succinate  25 mg Oral Daily    isosorbide mononitrate  30 mg Oral Daily    hydrALAZINE  25 mg Oral 3 times per day    aspirin  81 mg Oral Daily    [Held by provider] clopidogrel  75 mg Oral Daily    sodium chloride flush  5-40 mL IntraVENous 2 times per day    sodium chloride flush  5-40 mL IntraVENous 2 times per day    atorvastatin  40 mg Oral Nightly      Infusions:    dextrose      sodium chloride      heparin (PORCINE) Infusion 15 Units/kg/hr (11/23/24 0720)    sodium chloride       PRN Meds: glucose, 4 tablet, PRN  dextrose bolus, 125 mL, PRN   Or  dextrose bolus, 250 mL, PRN  glucagon (rDNA), 1 mg, PRN  dextrose, , Continuous PRN  traZODone, 50 mg, Nightly PRN  sodium chloride flush, 10 mL, PRN  sodium chloride, , PRN  ondansetron, 4 mg, 
                                                             Recent Labs     11/20/24  0628 11/20/24  1305 11/21/24  0321    138 139   K 3.8 3.6 3.2*    99 101   CO2 18* 24 26   BUN 72* 70* 56*   CREATININE 5.7* 5.5* 4.8*   GLUCOSE 105* 116* 98     IV Drips and Rate/Dose   dextrose      sodium chloride      heparin (PORCINE) Infusion 15 Units/kg/hr (11/21/24 1054)    sodium chloride        Safety - Before each treatment:   Dialysis Machine No.: [id number]  RO Machine Number: [id number]  Dialyzer Lot No.: [id number]  RO Machine Log Sheet Completed: Yes  Machine Alarm Self Test: Completed;Passed (11/21/24 0705)     Air Foam Detector: Tested, Proper Function, pH Reading  Extracorporeal Circuit Tested for Integrity: Yes  Machine Conductivity: 14  Manual Conductivity: 14  Machine Ph: 7.2  Bicarbonate Concentrate Lot No.: [id number]  Acid Concentrate Lot No.: [id number]  Manual Ph: 7.2  Bleach Test (Neg): Yes  Bath Temperature: 96.8 °F (36 °C)  Tubing Lot#: [id number]  Conductivity Meter Serial #: [id number]  All Connections Secure?: Yes  Venous Parameters Set?: Yes  Arterial Parameters Set?: Yes  Saline Line Double Clamped?: Yes  Air Foam Detector Engaged?: Yes  Machine Functioning Alarm Free? Yes  Prime Given: 200ml    Chlorine Testing - Before each treatment and every 4 hours:   Treatment  Time On: 0756  Time Off: 1100  Treatment Goal: 3hrs  Weight - Scale: 95.3 kg (210 lb) (11/21/24 0600)  1st check: less than 0.1 ppm at: 0715 hours  2nd check: less than 0.1 ppm at: 0950 hours  3rd check: Not Applicable  (if greater than 0.1 ppm, then check every 30 minutes from secondary)    Access Flows and Pressures  Patient Vitals for the past 8 hrs:   Blood Flow Rate (ml/min) Ultrafiltration Rate (ml/hr) Ultrafiltration Removed (ml) Arterial Pressure (mmHg) Venous Pressure (mmHg) TMP DFR Comments Access Visible   11/21/24 0756 250 ml/min 830 ml/hr 0 ml -40 mmHg 60 70 500 tx started Yes   11/21/24 0815 250 ml/min 830 ml/hr 376 ml -60 mmHg 
(rDNA), 1 mg, PRN  dextrose, , Continuous PRN  traZODone, 50 mg, Nightly PRN  sodium chloride flush, 10 mL, PRN  sodium chloride, , PRN  ondansetron, 4 mg, Q8H PRN   Or  ondansetron, 4 mg, Q6H PRN  polyethylene glycol, 17 g, Daily PRN  nicotine polacrilex, 2 mg, Q1H PRN  acetaminophen, 650 mg, Q6H PRN  heparin (porcine), 4,000 Units, PRN  heparin (porcine), 2,000 Units, PRN  sodium chloride flush, 5-40 mL, PRN  sodium chloride, , PRN  acetaminophen, 650 mg, Q4H PRN        Labs      Recent Results (from the past 24 hour(s))   POCT Glucose    Collection Time: 11/23/24  4:09 PM   Result Value Ref Range    POC Glucose 157 (H) 74 - 99 mg/dL   POCT Glucose    Collection Time: 11/23/24  9:25 PM   Result Value Ref Range    POC Glucose 136 (H) 74 - 99 mg/dL   Basic Metabolic Panel    Collection Time: 11/24/24  5:21 AM   Result Value Ref Range    Sodium 137 136 - 145 mmol/L    Potassium 4.5 3.5 - 5.1 mmol/L    Chloride 99 99 - 110 mmol/L    CO2 27 21 - 32 mmol/L    Anion Gap 12 9 - 17 mmol/L    Glucose 100 (H) 74 - 99 mg/dL    BUN 39 (H) 7 - 20 mg/dL    Creatinine 4.3 (H) 0.9 - 1.3 mg/dL    Est, Glom Filt Rate 14 (L) >60 mL/min/1.73m2    Calcium 9.7 8.3 - 10.6 mg/dL   Anti-Xa, Unfractionated Heparin    Collection Time: 11/24/24  5:21 AM   Result Value Ref Range    Anti-XA Unfrac Heparin 0.25 IU/L   POCT Glucose    Collection Time: 11/24/24  6:32 AM   Result Value Ref Range    POC Glucose 96 74 - 99 mg/dL   POCT Glucose    Collection Time: 11/24/24 11:02 AM   Result Value Ref Range    POC Glucose 105 (H) 74 - 99 mg/dL   Heparin Anti-Xa    Collection Time: 11/24/24  1:20 PM   Result Value Ref Range    Anti-XA Unfrac Heparin 0.39 IU/L        Imaging/Diagnostics Last 24 Hours   No results found.    Electronically signed by REBECCA Copeland CNP on 11/24/2024 at 2:00 PM   
41* 49*   CREATININE 4.8* 4.2* 4.7*     LIVER PROFILE: No results for input(s): \"AST\", \"ALT\", \"LIPASE\", \"AMYLASE\", \"BILIDIR\", \"BILITOT\", \"ALKPHOS\" in the last 72 hours.    Invalid input(s): \"ALB\"  PT/INR: No results for input(s): \"PROTIME\", \"INR\" in the last 72 hours.  APTT: No results for input(s): \"APTT\" in the last 72 hours.  BNP:  No results for input(s): \"BNP\" in the last 72 hours.  TROPONIN: @TROPONINI:3@          REBECCA Trivedi - CNP, REBECCA-CNP 11/23/2024 9:47 AM      
BID    insulin lispro  0-4 Units SubCUTAneous 4x Daily AC & HS    metoprolol succinate  25 mg Oral Daily    isosorbide mononitrate  30 mg Oral Daily    hydrALAZINE  25 mg Oral 3 times per day    aspirin  81 mg Oral Daily    [Held by provider] clopidogrel  75 mg Oral Daily    sodium chloride flush  5-40 mL IntraVENous 2 times per day    sodium chloride flush  5-40 mL IntraVENous 2 times per day    atorvastatin  40 mg Oral Nightly      dextrose      sodium chloride      heparin (PORCINE) Infusion 17 Units/kg/hr (11/24/24 1405)    sodium chloride       glucose, dextrose bolus **OR** dextrose bolus, glucagon (rDNA), dextrose, traZODone, sodium chloride flush, sodium chloride, ondansetron **OR** ondansetron, polyethylene glycol, nicotine polacrilex, [DISCONTINUED] acetaminophen **OR** acetaminophen, heparin (porcine), heparin (porcine), sodium chloride flush, sodium chloride, acetaminophen    Lab Data:  CBC:   Recent Labs     11/23/24  0520   WBC 4.8   HGB 10.5*   HCT 31.4*   MCV 90.8   *     BMP:   Recent Labs     11/22/24  0505 11/23/24  0520 11/24/24  0521    135* 137   K 3.8 3.9 4.5   CL 97* 96* 99   CO2 26 24 27   BUN 41* 49* 39*   CREATININE 4.2* 4.7* 4.3*     LIVER PROFILE: No results for input(s): \"AST\", \"ALT\", \"LIPASE\", \"AMYLASE\", \"BILIDIR\", \"BILITOT\", \"ALKPHOS\" in the last 72 hours.    Invalid input(s): \"ALB\"  PT/INR: No results for input(s): \"PROTIME\", \"INR\" in the last 72 hours.  APTT: No results for input(s): \"APTT\" in the last 72 hours.  BNP:  No results for input(s): \"BNP\" in the last 72 hours.  TROPONIN: @TROPONINI:3@      Assessment:  54 y.o.year old who is admitted for          Plan:  CAD: With multivessel disease for CT surgery to decide for bypass time continue aspirin  continue statins, betablockers  Renal failure on dialysis  Dyslipidemia: continue statins  HTN: stable, continue lopressor,   DM: stable: continuensulin  Health maintenance: exerise and diet  All labs, medications and 
Continuous PRN  traZODone, 50 mg, Nightly PRN  sodium chloride flush, 10 mL, PRN  sodium chloride, , PRN  ondansetron, 4 mg, Q8H PRN   Or  ondansetron, 4 mg, Q6H PRN  polyethylene glycol, 17 g, Daily PRN  nicotine polacrilex, 2 mg, Q1H PRN  acetaminophen, 650 mg, Q6H PRN  heparin (porcine), 4,000 Units, PRN  heparin (porcine), 2,000 Units, PRN  sodium chloride flush, 5-40 mL, PRN  sodium chloride, , PRN  acetaminophen, 650 mg, Q4H PRN        Labs      Recent Results (from the past 24 hour(s))   POCT Glucose    Collection Time: 11/24/24  4:11 PM   Result Value Ref Range    POC Glucose 108 (H) 74 - 99 mg/dL   Heparin Anti-Xa    Collection Time: 11/24/24  6:35 PM   Result Value Ref Range    Anti-XA Unfrac Heparin 0.32 IU/L   POCT Glucose    Collection Time: 11/24/24  8:45 PM   Result Value Ref Range    POC Glucose 130 (H) 74 - 99 mg/dL   CBC    Collection Time: 11/25/24  5:31 AM   Result Value Ref Range    WBC 5.9 4.0 - 10.5 k/uL    RBC 3.56 (L) 4.60 - 6.20 m/uL    Hemoglobin 10.7 (L) 13.5 - 18.0 g/dL    Hematocrit 32.7 (L) 42.0 - 52.0 %    MCV 91.9 78.0 - 100.0 fL    MCH 30.1 27.0 - 31.0 pg    MCHC 32.7 32.0 - 36.0 g/dL    RDW 13.4 11.7 - 14.9 %    MPV 11.9 (H) 7.5 - 11.1 fL    Platelet, Fluorescence 112 (L) 140 - 440 k/uL   Basic Metabolic Panel    Collection Time: 11/25/24  5:31 AM   Result Value Ref Range    Sodium 135 (L) 136 - 145 mmol/L    Potassium 4.7 3.5 - 5.1 mmol/L    Chloride 96 (L) 99 - 110 mmol/L    CO2 26 21 - 32 mmol/L    Anion Gap 14 9 - 17 mmol/L    Glucose 101 (H) 74 - 99 mg/dL    BUN 46 (H) 7 - 20 mg/dL    Creatinine 5.0 (H) 0.9 - 1.3 mg/dL    Est, Glom Filt Rate 12 (L) >60 mL/min/1.73m2    Calcium 9.8 8.3 - 10.6 mg/dL   Heparin Anti-Xa    Collection Time: 11/25/24  5:31 AM   Result Value Ref Range    Anti-XA Unfrac Heparin 0.38 IU/L   POCT Glucose    Collection Time: 11/25/24  6:43 AM   Result Value Ref Range    POC Glucose 107 (H) 74 - 99 mg/dL   Echo (TTE) limited (PRN contrast/bubble/strain/3D) 
Unlabored None (Room air) Diminished Pink Dry;Warm Soft Active Right lower extremity;Left lower extremity Trace Trace     Labs  Recent Labs     11/23/24  0520 11/25/24  0531   WBC 4.8 5.9   HGB 10.5* 10.7*   HCT 31.4* 32.7*   *  --                                                                   Recent Labs     11/23/24  0520 11/24/24  0521 11/25/24  0531   * 137 135*   K 3.9 4.5 4.7   CL 96* 99 96*   CO2 24 27 26   BUN 49* 39* 46*   CREATININE 4.7* 4.3* 5.0*   GLUCOSE 113* 100* 101*     IV Drips and Rate/Dose   dextrose      sodium chloride      heparin (PORCINE) Infusion Stopped (11/25/24 0935)    sodium chloride        Safety - Before each treatment:   Dialysis Machine No.: [id number]   Machine Number: [id number]  Dialyzer Lot No.: [id number]  RO Machine Log Sheet Completed: Yes  Machine Alarm Self Test: Completed;Passed (11/25/24 1257)     Air Foam Detector: Tested, Proper Function, pH Reading  Extracorporeal Circuit Tested for Integrity: Yes  Machine Conductivity: 14  Manual Conductivity: 14  Machine Ph: 7.2  Bicarbonate Concentrate Lot No.: [id number]  Acid Concentrate Lot No.: [id number]  Manual Ph: 7.2  Bleach Test (Neg): Yes  Bath Temperature: 96.8 °F (36 °C)  Tubing Lot#: [id number]  Conductivity Meter Serial #: [id number]  All Connections Secure?: Yes  Venous Parameters Set?: Yes  Arterial Parameters Set?: Yes  Saline Line Double Clamped?: Yes  Air Foam Detector Engaged?: Yes  Machine Functioning Alarm Free? Yes  Prime Given: 200ml    Chlorine Testing - Before each treatment and every 4 hours:   Treatment  Time On: 1529  Time Off: 1719  Treatment Goal: 3hr 2L  Weight - Scale: 92.5 kg (204 lb) (11/25/24 1159)  1st check: less than 0.1 ppm at: 1430 hours  2nd check: less than 0.1 ppm at: not applicable  3rd check: Not Applicable  (if greater than 0.1 ppm, then check every 30 minutes from secondary)    Access Flows and Pressures  Patient Vitals for the past 8 hrs:   Blood Flow Rate (ml/min) Ultrafiltration Rate 
mouth daily   Yes ProviderSonya MD   Insulin Degludec (TRESIBA FLEXTOUCH) 200 UNIT/ML SOPN Inject 4 Units into the skin at bedtime  Patient not taking: Reported on 11/19/2024 8/29/24 2/25/25  Raza Fairchild DO   vitamin D (CHOLECALCIFEROL) 25 MCG (1000 UT) TABS tablet Take 1 tablet by mouth daily OTC    ProviderSonya MD   guaiFENesin (MUCINEX) 600 MG extended release tablet Take 1 tablet by mouth 2 times daily  Patient not taking: Reported on 11/6/2024 6/16/24   Jamee Delcid MD   polyethylene glycol (MIRALAX MIX-IN PAX) 17 g packet Take 1 packet by mouth daily as needed for Constipation 5/21/24   Adrian Negro MD   Insulin Pen Needle (TRUEPLUS 5-BEVEL PEN NEEDLES) 31G X 5 MM MISC Use one needle two times daily as directed on package. 3/25/24   Yudelka Davis APRN - CNP   Lancets Thin MISC 1 actuation by Does not apply route daily 2/2/23 11/6/24  Raza Fairchild DO   Blood Glucose Monitoring Suppl (TRUE METRIX AIR GLUCOSE METER) w/Device KIT 1 actuation by Does not apply route daily 2/2/23 11/6/24  Raza Fairchild DO   Insulin Pen Needle (PEN NEEDLES 3/16\") 31G X 5 MM MISC 1 each by Does not apply route daily Use one needle two times daily as directed on package. 9/22/22   Raza Fairchild DO   Insulin Pen Needle (PEN NEEDLES 3/16\") 31G X 5 MM MISC 1 each by Does not apply route daily To be used 4 times daily as directed on package 6/15/21 11/6/24  Raza Fairchild DO        Allergies:     Patient has no known allergies.    Social History:     Tobacco:    reports that he quit smoking about 7 years ago. His smoking use included cigarettes. He started smoking about 35 years ago. He has a 27 pack-year smoking history. He has been exposed to tobacco smoke. He has never used smokeless tobacco.  Alcohol:      reports current alcohol use of about 1.0 standard drink of alcohol per week.  Drug Use:  reports no history of drug use.    Family History:     Family History   Problem Relation Age of Onset    High 
   Arthritis Maternal Grandmother     Cancer Maternal Grandfather        Review of Systems:     Positive and Negative as described in HPI.    CONSTITUTIONAL:  negative for fevers, chills, sweats, fatigue, weight loss  HEENT:  negative for vision, hearing changes, runny nose, throat pain  RESPIRATORY:  negative for shortness of breath, cough, congestion, wheezing.  CARDIOVASCULAR:  negative for chest pain, palpitations.  GASTROINTESTINAL:  negative for nausea, vomiting, diarrhea, constipation, change in bowel habits, abdominal pain   GENITOURINARY:  negative for difficulty of urination, burning with urination, frequency   INTEGUMENT:  negative for rash, skin lesions, easy bruising   HEMATOLOGIC/LYMPHATIC:  negative for swelling/edema   ALLERGIC/IMMUNOLOGIC:  negative for urticaria , itching  ENDOCRINE:  negative increase in drinking, increase in urination, hot or cold intolerance  MUSCULOSKELETAL:  negative joint pains, muscle aches, swelling of joints  NEUROLOGICAL:  negative for headaches, dizziness, lightheadedness, numbness, pain, tingling extremities  BEHAVIOR/PSYCH:  negative for depression, anxiety    Physical Exam:     /70   Pulse 63   Temp 97.3 °F (36.3 °C) (Oral)   Resp 11   Ht 1.829 m (6')   Wt 93.7 kg (206 lb 9.1 oz)   SpO2 96%   BMI 28.02 kg/m²   Temp (24hrs), Av.5 °F (36.4 °C), Min:97.3 °F (36.3 °C), Max:97.9 °F (36.6 °C)      Recent Labs     24  1132 24  1547 24  0628   POCGLU 115* 183* 108* 107*       Intake/Output Summary (Last 24 hours) at 2024 1132  Last data filed at 2024 0600  Gross per 24 hour   Intake --   Output 2595 ml   Net -2595 ml       General Appearance:  alert, well appearing, and in no acute distress  Mental status: oriented to person, place, and time with normal affect  Head:  normocephalic, atraumatic.  Eye: no icterus, redness, pupils equal and reactive, extraocular eye movements intact, conjunctiva clear  Ear: normal 
   Sodium 138 136 - 145 mmol/L    Potassium 3.6 3.5 - 5.1 mmol/L    Chloride 99 99 - 110 mmol/L    CO2 24 21 - 32 mmol/L    Anion Gap 15 9 - 17 mmol/L    Glucose 116 (H) 74 - 99 mg/dL    BUN 70 (H) 7 - 20 mg/dL    Creatinine 5.5 (H) 0.9 - 1.3 mg/dL    Est, Glom Filt Rate 11 (L) >60 mL/min/1.73m2    Calcium 8.8 8.3 - 10.6 mg/dL   POCT Glucose    Collection Time: 11/20/24  3:32 PM   Result Value Ref Range    POC Glucose 98 74 - 99 mg/dL        Imaging/Diagnostics Last 24 Hours   Vascular duplex carotid bilateral    Result Date: 11/20/2024  EXAM: VAS DUP CAROTID BILATERAL INDICATION: Pre-op carotid evaluation TECHNIQUE: Multiple sonographic images are performed of bilateral carotids with grayscale, color, Doppler and spectral Doppler. COMPARISON: None. CAROTID DOPPLER:                                       PSV=Peak Systolic Velocity                                       EDV=End Diastolic Velocity                                       SVR=Systolic Velocity Ratio CCA        RIGHT 68 cm/sec LEFT 94 cm/sec    CCA EDV cm/s <40       RIGHT 21 cm/sec LEFT 18 cm/sec      ICA  PSV cm/s <130      RIGHT 60 cm/sec LEFT 48 cm/sec      ICA EDV cm/s <40        RIGHT 21 cm/sec  LEFT 18 cm/sec      ICA/CCA Ratio<1.8}            RIGHT 1.0 cm/sec LEFT 0.5 cm/sec      ECA PSV cm/s <130      RIGHT 108 cm/sec  LEFT 99 cm/sec      VERT PSV cm/s <130   RIGHT 35 cm/sec    LEFT 51 cm/sec       FINDINGS: Carotids: Right: Plaque visualized on the right. There is no hemodynamically significant stenosis by peak systolic velocity measurement. Left:    No significant plaque there is no hemodynamically significant stenosis by peak systolic velocity measurement. Vertebrals: The vertebral arteries are patent bilaterally with normal antegrade flow.     No hemodynamically significant stenosis. NASCET method was utilized while determining significance of stenosis on ultrasound of the carotids.                           CRITERIA  (ICA STENOSIS) 
hours.     ECHO : (interpreted by myself)  echocardiogram     Assessment:  54 y.o.year old who is admitted for  Chief Complaint   Patient presents with    Respiratory Distress    , active issues as noted below:  Impression:  Principal Problem:    Acute respiratory failure  Active Problems:    Coronary artery disease involving native coronary artery of native heart without angina pectoris    Essential hypertension    History of PTCA    CAD (coronary artery disease)    NSTEMI (non-ST elevated myocardial infarction) (HCC)    Pneumonia of right lower lobe due to infectious organism  Resolved Problems:    * No resolved hospital problems. *        Medical Decision Making:  The following items were considered in medical decision making:  Discussion of patient care with other providers  Reviewed clinical lab tests if any  Reviewed radiology tests if any  Reviewed other diagnostic tests/interventions  Independent review of radiologic images if any       Estimated time spent for medical decision-making encompassing complexity of the case, history taking, medication review, physical examination, communication with family, RN, , discussion with primary team , and ancillary staff members to provide accurate care for the patient      All labs, medications and tests reviewed by myself , continue all other medications of all above medical condition listed as is except for changes mentioned above.    Thank you very much for consult , please call with questions.    Sayed Korey Calvo MD, MD 11/21/2024 1:25 PM     The above note is prepared with the intention to serve as communication with trained medical care practitioners only. Some of the information is provided by secondary sources as well as from our patient and/or family member(s) recollection, thus inaccuracies may occur. In addition, other professionals integrate data into the electronic medical record, and the [facility] Team MD and NPs are not responsible for these. 
maintenance: exerise and diet  All labs, medications and tests reviewed, continue all other medications of all above medical condition listed as is.      Marek Ashton MD, MD 11/23/2024 5:59 PM      
60 -- --   11/20/24 1500 (!) 128/90 -- 65 -- --   11/20/24 1503 133/76 97.2 °F (36.2 °C) 65 16 96 %     Post-Dialysis  Arterial Catheter Locking Solution:  NS  Venous Catheter Locking Solution:  NS  Post-Treatment Procedures: Blood returned, Catheter Capped, clamped with Saline x2 ports  Machine Disinfection Process: Acid/Vinegar Clean, Heat Disinfect, Exterior Machine Disinfection  Rinseback Volume (ml): 300 ml  Blood Volume Processed (Liters): 29.8 L  Dialyzer Clearance: Lightly streaked  Duration of Treatment (minutes): 120 minutes     Hemodialysis Intake (ml): 500 ml  Hemodialysis Output (ml): 1000 ml     Tolerated Treatment: Good  Patient Response to Treatment: well          Provider Notification        Handoff complete and report given to Primary RN at 1506 hours.  Primary RN (First Initial, Last Name, Title):  ALBERTINA Colbert RN     Education  Person Educated: Patient   Knowledge Base: Substantial  Barriers to Learning?: None  Preferred method of Learning: Oral  Topic(s): Access Care, Signs and Symptoms of Infection, and Procedural   Teaching Tools: Explanation   Response to Education: Verbalized Understanding     Electronically signed by Tatiana Stevenson RN on 11/20/2024 at 3:07 PM  
1.4    mm     RIGHT LOWER EXTREMITY Venous measurements as detailed above. LEFT LOWER EXTREMITY :  Venous measurements as detailed above. Electronically signed by Leslee Shelby    Vascular duplex carotid bilateral    Result Date: 11/20/2024  EXAM: VAS DUP CAROTID BILATERAL INDICATION: Pre-op carotid evaluation TECHNIQUE: Multiple sonographic images are performed of bilateral carotids with grayscale, color, Doppler and spectral Doppler. COMPARISON: None. CAROTID DOPPLER:                                       PSV=Peak Systolic Velocity                                       EDV=End Diastolic Velocity                                       SVR=Systolic Velocity Ratio CCA        RIGHT 68 cm/sec LEFT 94 cm/sec    CCA          RIGHT 21 cm/sec LEFT 18 cm/sec      ICA        RIGHT 60 cm/sec LEFT 48 cm/sec      ICA          RIGHT 21 cm/sec  LEFT 18 cm/sec      ICA/CCA Ratio<1.8}            RIGHT 1.0 cm/sec LEFT 0.5 cm/sec      ECA       RIGHT 108 cm/sec  LEFT 99 cm/sec      VERT    RIGHT 35 cm/sec    LEFT 51 cm/sec       FINDINGS: Carotids: Right: Plaque visualized on the right. There is no hemodynamically significant stenosis by peak systolic velocity measurement. Left:    No significant plaque there is no hemodynamically significant stenosis by peak systolic velocity measurement. Vertebrals: The vertebral arteries are patent bilaterally with normal antegrade flow.     No hemodynamically significant stenosis. NASCET method was utilized while determining significance of stenosis on ultrasound of the carotids.                           CRITERIA  (ICA STENOSIS) -------------------------------------------------------------------------------- -----------   DIAMETER           PSV                          EDV              PSV                          %                    CM/SCC                 CM/SEC         ICA/CCA         -------------------------------------------------------------------------------- -----------   0--49        
DM, depressed LVEF, non compliant patient  Recommendations: CT sx consult and optimize medciations and CHF with dialysis       Electronically signed by REBECCA Copeland CNP on 11/21/2024 at 2:39 PM   
No pericardial effusion.   Extracardiac Large right pleural effusion.       CT Chest:  Completed but not read by radiology    Vein Mapping:   US OF THE LOWER EXTREMITY VENOUS BILATERAL  MAPPING     HISTORY:  Venous mapping for dialysis access.      COMPARISON:  None.     TECHNIQUE:  Venous Mapping: Multiple sonographic gray scale images are performed of the  extremity venous system with measurements of the Basilic and Cephalic veins.     FINDINGS:     Right  LOWER EXTREMITY VENOUS  GSV Vein:  Junction:        3.9 mm  Proximal:          2.9 mm   Mid:                  1.9 mm     Distal:               1.9 mm     Knee:                 1.4 mm   GSV Prox AK    1.0 mm   GSV Mid  AK :     1.0 mm   GSV Distal  AK :  1.0  mm        LEFT LOWER EXTREMITY VENOUS  GSV Vein:  Junction:          6.3 mm  Proximal:           4.2    mm  Mid:                    1.9   mm    Distal:                2.1    mm    Knee:                  2.3    mm  GSV Prox AK     1.9    mm  GSV Mid  AK :      1.9    mm  GSV Distal  AK :   1.4    mm        IMPRESSION:     RIGHT LOWER EXTREMITY Venous measurements as detailed above.      LEFT LOWER EXTREMITY :  Venous measurements as detailed above.    Carotid Duplex:   CAROTID DOPPLER:                                        PSV=Peak Systolic Velocity                                        EDV=End Diastolic Velocity                                        SVR=Systolic Velocity Ratio     CCA        RIGHT 68 cm/sec LEFT 94 cm/sec      CCA          RIGHT 21 cm/sec LEFT 18 cm/sec           ICA        RIGHT 60 cm/sec LEFT 48 cm/sec        ICA          RIGHT 21 cm/sec  LEFT 18 cm/sec        ICA/CCA Ratio<1.8}            RIGHT 1.0 cm/sec LEFT 0.5 cm/sec           ECA       RIGHT 108 cm/sec  LEFT 99 cm/sec        VERT     RIGHT 35 cm/sec    LEFT 51 cm/sec           FINDINGS:      Carotids:  Right: Plaque visualized on the right. There is no hemodynamically significant  stenosis by peak systolic velocity 
70%, Stable Angina   Valve Disease: Severe MR, Mild TR   Prev. Cardiac Interv: Previous PCI: At this facility, > 6 hours         U/O:  4100/24hrs      Assessment :      CAD  Severe MR  Acute decompensated heart failure with volume overload   CKD stage IV  Successful placement of a non-tunneled HD catheter by IR          Plan:       Aggressive diuresis per cardiology   -repeat echo in process to better assess surgical timing         Dr. Kapadia discussed the plan with patient and family      Yumi Vega PA-C 11/25/24 9:40 AM      New Consults 8:00AM-4:30PM: Call Office, 4:30PM to 8:00AM Surgeon on-call    CVICU or other units patient follow up: Gabriel author of this note 8:00AM-4:30PM    CVICU patient or urgent follow up: 4:30PM to 8:00AM Call or Page Surgeon on-call     Step-down patient follow up: 4:30PM to 8:00AM Page or secure chat PA on-call

## 2024-11-28 NOTE — DISCHARGE SUMMARY
vein under real-time sonographic guidance. The 0.018 inch guidewire was able to be advanced through the needle into the vein. A 4 Gabonese micro introducer sheath with the stiffening cannula was advanced over the guidewire into the vein. A stiff angled tip Glidewire was placed through the micro introducer sheath such that the tip lay within the inferior vena cava. A subcutaneous skin tunnel of appropriate length was then created for a 14.5 Gabonese 23 cm palindrome dialysis catheter. The catheter was brought through the subcutaneous skin tunnel to the venotomy site. Serial dilatation was then performed over the stiff angled tip Glidewire to accommodate a 16 Gabonese peel-away introducer sheath. The catheter was placed through the peel-away introducer sheath such that the tip lay overlying the mid right atrium. There was excellent aspiration and flushing from each of the 2 lumina. The catheter was sutured in place including a pursestring suture at the catheter exit site. The pre-existing right internal jugular vein temporary dialysis catheter was removed and hemostasis was achieved with manual compression. The patient tolerated the entire procedure well without immediate complications. The patient received 50 mg of Benadryl and 100 mcg of fentanyl intravenously during the procedure. Sedation was provided by No Monroe RN. RADIATION DOSE: 0.8 minutes of fluoroscopic time was utilized. AK 31 mGy. CONTRAST: None FINDINGS: 2 sonographic images were obtained which reveal that the right internal jugular vein is widely patent, normally compressible, and without thrombus with a pre-existing right internal jugular vein temporary dialysis catheter intraluminal as well as the needle tip intraluminal.. Fluoroscopic imaging confirms that the subcutaneously tunneled dialysis catheter tip is overlying the mid right atrium.     1. Successful ultrasound and fluoroscopically guided placement of a right internal jugular vein

## 2024-11-29 ENCOUNTER — CARE COORDINATION (OUTPATIENT)
Dept: CARE COORDINATION | Age: 54
End: 2024-11-29

## 2024-11-29 NOTE — CARE COORDINATION
Care Transitions Note    Initial Call - Call within 2 business days of discharge: Yes    Attempted to reach patient for transitions of care follow up. Unable to reach patient.    Outreach Attempts:   HIPAA compliant voicemail left for patient.     Patient: Landry Morley    Patient : 1970   MRN: <[id number]>    Reason for Admission: PNA  Discharge Date: 24  RURS: Readmission Risk Score: 18.7    Last Discharge Facility       Date Complaint Diagnosis Description Type Department Provider    24 Respiratory Distress Pneumonia of right lower lobe due to infectious organism ... ED to Hosp-Admission (Discharged) (ADMITTED) [facility] 3N Alonso Rebolledo MD; Cristhian Diggs...            Was this an external facility discharge? No    Follow Up Appointment:   Patient has hospital follow up appointment scheduled within 14 days of discharge.    Future Appointments         Provider Specialty Dept Phone    12/3/2024 1:30 PM Shayan Kapadia MD Cardiothoracic Surgery [phone]    2024 11:30 AM [facility] AWV LPN Family Medicine [phone]    2024 9:00 AM Adrian Negro MD Nephrology [phone]    2025 11:00 AM Bandar Yan MD Cardiology [phone]            Plan for follow-up on next business day.      Trixie Fitzpatrick

## 2024-12-01 ENCOUNTER — APPOINTMENT (OUTPATIENT)
Dept: GENERAL RADIOLOGY | Age: 54
End: 2024-12-01
Payer: MEDICARE

## 2024-12-01 ENCOUNTER — HOSPITAL ENCOUNTER (EMERGENCY)
Age: 54
Discharge: HOME | End: 2024-12-01
Payer: MEDICARE

## 2024-12-01 VITALS
OXYGEN SATURATION: 99 % | DIASTOLIC BLOOD PRESSURE: 63 MMHG | TEMPERATURE: 97.7 F | RESPIRATION RATE: 18 BRPM | SYSTOLIC BLOOD PRESSURE: 117 MMHG | HEART RATE: 63 BPM

## 2024-12-01 DIAGNOSIS — T82.898A PROBLEM WITH DIALYSIS ACCESS, INITIAL ENCOUNTER (HCC): Primary | ICD-10-CM

## 2024-12-01 PROCEDURE — 71045 X-RAY EXAM CHEST 1 VIEW: CPT

## 2024-12-01 PROCEDURE — 99283 EMERGENCY DEPT VISIT LOW MDM: CPT

## 2024-12-01 ASSESSMENT — PAIN - FUNCTIONAL ASSESSMENT
PAIN_FUNCTIONAL_ASSESSMENT: NONE - DENIES PAIN
PAIN_FUNCTIONAL_ASSESSMENT: 0-10

## 2024-12-01 ASSESSMENT — PAIN SCALES - GENERAL: PAINLEVEL_OUTOF10: 0

## 2024-12-01 NOTE — ED PROVIDER NOTES
2.0 - 4.0                      70-89                     >230                         >100               >4.0                      90% EDV :  > 140 cm/sec: near occlusion Electronically signed by Leslee Shelby    Echo (TTE) complete (PRN contrast/bubble/strain/3D)    Result Date: 11/19/2024    Image quality is good. Contrast used: Lumason.   Left Ventricle: Severely reduced left ventricular systolic function with a visually estimated EF of 25 - 30%. Left ventricle is dilated. Mildly increased wall thickness. Apical septal wall segments and apical cap are akinetic with global hypokinesis. Diastolic dysfunction present with normal LV EF.   Left Atrium: Left atrium is severely dilated.   Mitral Valve: Severe regurgitation. MR regurgitant volume: 62ml. MR ERO: 0.40cm2.   Tricuspid Valve: Mild regurgitation. The estimated RVSP is 43 mmHg.   IVC/SVC: IVC diameter is greater than 21 mm and decreases greater than 50% during inspiration; therefore the estimated right atrial pressure is intermediate (~8 mmHg).   Extracardiac: Large right pleural effusion.   Pericardium: No pericardial effusion.     Cardiac procedure    Addendum Date: 11/19/2024    Impression: Severe multivessel disease, DM, depressed LVEF, non compliant patient  Recommendations: CT sx consult and optimize medciations and CHF with dialysis    Result Date: 11/19/2024  Impression: Severe multivessel disease, DM, depressed LVEF, non compliant patient  Recommendations: CT sx consult and optimize medciations and CHF with dialysis     XR CHEST PORTABLE    Result Date: 11/19/2024  Chest X-ray INDICATION:  resp distress, diffuse crackles COMPARISON: Chest x-ray of 7/22/2024 TECHNIQUE: AP/PA view of the chest was obtained.     A large right lower lung infiltrate is seen, extending to the diaphragm. Cannot exclude a small right pleural effusion. No left pleural effusion is noted. No pneumothorax is seen. The cardiomediastinal silhouette is

## 2024-12-01 NOTE — CONSULTS
Consult completed. Pt reports he awoke this morning with Tunneled VasCath in R upper chest bleeding under the dressing. On assessment, a moderate sized blood clot about 3cm in diameter has coagulated under dressing around insertion site without any active bleeding currently. Clot removed following sterile procedures and site/line scrubbed thoroughly with CHG and then sterile dressing with SecurePortIV at insertion site, SkinPrep, and CHG gel DSSG applied. Extra CHG gel DSSGS, Shower Covers, and wipes provided to pt/family. pCXR reviewed and ED Provider and Primary RN notified pt is ready for D/C. No other c/o or needs noted or reported.

## 2024-12-02 ENCOUNTER — CARE COORDINATION (OUTPATIENT)
Dept: CASE MANAGEMENT | Age: 54
End: 2024-12-02

## 2024-12-02 NOTE — CARE COORDINATION
Care Transitions Note    Initial Call - Call within 2 business days of discharge: Yes    Attempted to reach patient for transitions of care follow up. Unable to reach patient.    Outreach Attempts:   HIPAA compliant voicemail left for patient.     Patient: Landry Morley    Patient : 1970   MRN: [id number]    Reason for Admission: CKD, SOB  Discharge Date: 24  RURS: Readmission Risk Score: 18.7    Last Discharge Facility       Date Complaint Diagnosis Description Type Department Provider    24 Vascular Access Problem Problem with dialysis access, initial encounter (HCC) ED (DISCHARGE) [facility] ED             Future Appointments         Provider Specialty Dept Phone    12/3/2024 1:30 PM Shayan Kapadia MD Cardiothoracic Surgery [phone]    2024 11:30 AM [facility] LPN Family Medicine [phone]    2024 9:00 AM Adrian Negro MD Nephrology [phone]    2025 11:00 AM Bandar Yan MD Cardiology [phone]            Ambar Brewer

## 2024-12-03 ENCOUNTER — OFFICE VISIT (OUTPATIENT)
Dept: CARDIOTHORACIC SURGERY | Age: 54
End: 2024-12-03
Payer: MEDICARE

## 2024-12-03 VITALS
HEIGHT: 73 IN | HEART RATE: 68 BPM | WEIGHT: 206.5 LBS | DIASTOLIC BLOOD PRESSURE: 58 MMHG | SYSTOLIC BLOOD PRESSURE: 112 MMHG | OXYGEN SATURATION: 98 % | BODY MASS INDEX: 27.37 KG/M2

## 2024-12-03 DIAGNOSIS — R06.00 DYSPNEA, UNSPECIFIED TYPE: ICD-10-CM

## 2024-12-03 DIAGNOSIS — I99.8 OTHER DISORDER OF CIRCULATORY SYSTEM: ICD-10-CM

## 2024-12-03 DIAGNOSIS — I25.10 CORONARY ARTERY DISEASE INVOLVING NATIVE CORONARY ARTERY OF NATIVE HEART WITHOUT ANGINA PECTORIS: Primary | ICD-10-CM

## 2024-12-03 DIAGNOSIS — Z13.1 ENCOUNTER FOR SCREENING FOR DIABETES MELLITUS: ICD-10-CM

## 2024-12-03 PROCEDURE — 3017F COLORECTAL CA SCREEN DOC REV: CPT | Performed by: THORACIC SURGERY (CARDIOTHORACIC VASCULAR SURGERY)

## 2024-12-03 PROCEDURE — 3078F DIAST BP <80 MM HG: CPT | Performed by: THORACIC SURGERY (CARDIOTHORACIC VASCULAR SURGERY)

## 2024-12-03 PROCEDURE — 1036F TOBACCO NON-USER: CPT | Performed by: THORACIC SURGERY (CARDIOTHORACIC VASCULAR SURGERY)

## 2024-12-03 PROCEDURE — G8427 DOCREV CUR MEDS BY ELIG CLIN: HCPCS | Performed by: THORACIC SURGERY (CARDIOTHORACIC VASCULAR SURGERY)

## 2024-12-03 PROCEDURE — G8484 FLU IMMUNIZE NO ADMIN: HCPCS | Performed by: THORACIC SURGERY (CARDIOTHORACIC VASCULAR SURGERY)

## 2024-12-03 PROCEDURE — 3074F SYST BP LT 130 MM HG: CPT | Performed by: THORACIC SURGERY (CARDIOTHORACIC VASCULAR SURGERY)

## 2024-12-03 PROCEDURE — G8417 CALC BMI ABV UP PARAM F/U: HCPCS | Performed by: THORACIC SURGERY (CARDIOTHORACIC VASCULAR SURGERY)

## 2024-12-03 PROCEDURE — 1111F DSCHRG MED/CURRENT MED MERGE: CPT | Performed by: THORACIC SURGERY (CARDIOTHORACIC VASCULAR SURGERY)

## 2024-12-03 PROCEDURE — 99205 OFFICE O/P NEW HI 60 MIN: CPT | Performed by: THORACIC SURGERY (CARDIOTHORACIC VASCULAR SURGERY)

## 2024-12-03 NOTE — PROGRESS NOTES
external ear, no discharge, hearing intact  Nose:  no drainage noted  Mouth: mucous membranes moist  Neck: supple, no carotid bruits  Lungs: Bilateral equal air entry, clear to ausculation, no wheezing, rales or rhonchi, normal effort  Cardiovascular: normal rate, regular rhythm, no murmur, gallop, rub.  Abdomen: Soft, nontender, nondistended, normal bowel sounds  Neurologic: There are no new focal motor or sensory deficits, normal muscle tone and bulk, no abnormal sensation, normal speech  Skin: No gross lesions, rashes, bruising or bleeding on exposed skin area  Extremities:  peripheral pulses palpable, no pedal edema or calf pain with palpation  Psych: normal affect    Investigations:      Laboratory Testing:  No results found for this or any previous visit (from the past 24 hour(s)).        Left Cardiac Cath:  Coronary anatomy:   The left main coronary artery has no significant disease.     Left anterior descending artery has 99% stenosis prior to mid LAD stent    Circumflex artery has no 99% proximal segment stenosis    Large ramus has 85% proximal segment stenosis    The right coronary artery is a dominant vessel with 95% PLV stenosis, mid RCA 50% stenosis     Echocardiogram:  Left Ventricle Severely reduced left ventricular systolic function with a visually estimated EF of 25 - 30%. Left ventricle is dilated. Mildly increased wall thickness. Apical septal wall segments and apical cap are akinetic with global hypokinesis. Diastolic dysfunction present with normal LV EF.   Left Atrium Left atrium is severely dilated.   Interatrial Septum No interatrial shunt visualized with color Doppler.   Right Ventricle Right ventricle size is normal.   Right Atrium Right atrium size is normal.   Aortic Valve Valve structure is normal. No regurgitation.   Mitral Valve Severe regurgitation. MR regurgitant volume: 62ml. MR ERO: 0.40cm2.   Tricuspid Valve Mild regurgitation. The estimated RVSP is 43 mmHg.   Aorta Normal sized

## 2024-12-04 ENCOUNTER — TELEPHONE (OUTPATIENT)
Dept: CARDIOTHORACIC SURGERY | Age: 54
End: 2024-12-04

## 2024-12-04 PROBLEM — I25.10 CORONARY ATHEROSCLEROSIS OF NATIVE CORONARY ARTERY: Status: ACTIVE | Noted: 2024-12-04

## 2024-12-04 NOTE — TELEPHONE ENCOUNTER
Thank you for your online prior authorization/notification submission.  The prior authorization/notification reference number is: [id number].  The prior authorization/notification case information was transmitted on 12/04/2024 at 09:23 AM CST . Please print this page for your records.  The reference number confirms receipt of your request. Refer to it for future inquiries. Coverage and payment of services are governed by the member’s benefit plan and, if applicable, the provider’s health plan participation.  Please note that if you wish to cancel these services at any time, or if you have any questions, please contact us by calling the number on the back of the member's ID card. Thank you.  Notification is not a verification, guarantee of benefits, or clinical determination. Payment of services is based on your participation agreement with us and the enrollee's benefit plan at the time services are provided.  A Notification may be considered late if not submitted within one business day after the date of admission or submitted per your participation agreement. Please reference your agreement for further information in this regard.

## 2024-12-04 NOTE — TELEPHONE ENCOUNTER
Called and left a message on pt vm, advised PAT is scheduled for 12/6/24 at 8:00am and surgery is scheduled for 12/11/24 5:45am arrival, surgery t 7:45am, asked that he please call back to confirm his info was received

## 2024-12-04 NOTE — TELEPHONE ENCOUNTER
Medication Letter discussed with patient.     Plan:   CABG only: Scheduled on 12/11/2024 at 0745 with a 0545 arrival time  PAT: Scheduled on 12/6 at 0800  Bactroban: Sent    Cardiothoracic and Vascular Surgery    Pre-Operative Open Heart Patient Medication Letter    Surgeon: Dr. Kapadia  Procedure: CABG  Date of Surgery: 12/11/2024    In order to optimize the perioperative management and outcomes of surgery, please adhere to the following recommendations:    NEW medications to start before surgery:  Bactroban nasal ointment starting on 12/7/2024 until morning of surgery  Chlorhexidine(Hibiclens)    Medications to STOP 2 weeks prior to surgery  Herbal medications and vitamin supplements: Fish Oil & Vitamin D    LAST DOSE of these medications will be on 12/4/2024 - 7 days prior to surgery  NSAIDS- excluding aspirin    LAST DOSE of these medications will be on 12/6/2024 - 5 days prior to surgery  Clopidogrel(Plavix)    LAST DOSE of these medications will be on 12/9/2024 - 2 days prior to surgery  Trazodone(Desyrel)    LAST DOSE of these medications will be on 12/10/2024 - day before surgery  Furosemide(Lasix)  Atorvastatin(Lipitor)  Polyethylene Glycol(Miralax)    Medications to continue taking including in the morning on the day of surgery (12/11/2024):  Aspirin 81mg  Hydralazine(Apresoline)  Isosorbide Mononitrate(Imdur)  Metoprolol Succinate(Toprol XL)  Sodium Zirconium Cyclosilicate(Lokelma)        Patient given instructions over telephone on 12/4/2024. Surgery is scheduled for 12/11/2024 @ 0745, w/arrival @ 0545, @ [facility]. Medication/Education Letter gone over with patient. Questions answered, Patient voiced understanding.     Patient was notified that surgery date or time could be changed due to an emergency. Patient voiced understanding.

## 2024-12-05 VITALS — BODY MASS INDEX: 27.9 KG/M2 | HEIGHT: 72 IN | WEIGHT: 206 LBS

## 2024-12-05 RX ORDER — MUPIROCIN 20 MG/G
OINTMENT TOPICAL
Qty: 15 G | Refills: 0 | Status: SHIPPED | OUTPATIENT
Start: 2024-12-06

## 2024-12-05 RX ORDER — SODIUM CHLORIDE 0.9 % (FLUSH) 0.9 %
5-40 SYRINGE (ML) INJECTION EVERY 12 HOURS SCHEDULED
OUTPATIENT
Start: 2024-12-05

## 2024-12-05 RX ORDER — MUPIROCIN 20 MG/G
OINTMENT TOPICAL ONCE
OUTPATIENT
Start: 2024-12-05 | End: 2024-12-05

## 2024-12-05 RX ORDER — SODIUM CHLORIDE 0.9 % (FLUSH) 0.9 %
5-40 SYRINGE (ML) INJECTION PRN
OUTPATIENT
Start: 2024-12-05

## 2024-12-05 RX ORDER — SODIUM CHLORIDE 9 MG/ML
INJECTION, SOLUTION INTRAVENOUS PRN
OUTPATIENT
Start: 2024-12-05

## 2024-12-05 RX ORDER — CHLORHEXIDINE GLUCONATE 40 MG/ML
SOLUTION TOPICAL ONCE
OUTPATIENT
Start: 2024-12-05 | End: 2024-12-05

## 2024-12-05 RX ORDER — CHLORHEXIDINE GLUCONATE ORAL RINSE 1.2 MG/ML
15 SOLUTION DENTAL ONCE
OUTPATIENT
Start: 2024-12-05 | End: 2024-12-05

## 2024-12-05 NOTE — TELEPHONE ENCOUNTER
Approved #[id number] valid 12/11/24-12/12/24    [id number] [facility] 12/11/2024-12/12/2024 Anticipated Admission Covered/Approved

## 2024-12-05 NOTE — PROGRESS NOTES
Reminded of pre-testing on 12/6/24 between 2090-9789.  Bring insurance card and picture ID. Check in at the information desk in the lobby upon your arrival. You can eat and take morning medications. Messaged Jannette WATSON for pre testing orders, states they will be placed later.     Surgery @ [facility] on 12/11/24 you will be called 12/10/24 with times    NOTHING TO EAT OR DRINK AFTER MIDNIGHT DAY OF SURGERY    1. Enter thru the hospital main entrance on day of surgery, check in at the Information Desk. If you arrive prior to 6:00am, enter thru the ER entrance.    2. Follow the directions as prescribed by the doctor for your procedure and medications.         Morning of surgery take: Follow CT surgery instructions         Stop vitamins, supplements and NSAIDS:  NOW    3. Check with your Doctor regarding stopping blood thinners and follow their instructions.    4. Do not smoke, vape or use chewing tobacco morning of surgery. Do not drink any alcoholic beverages 24 hours prior to surgery.       This includes NA Beer. No street drugs 7 days prior to surgery.    5. If you have dentures, contacts of glasses they will be removed before going to the OR; please bring a case.    6. Please bring picture ID, insurance card, paperwork from the doctor’s office (H & P, Consent, & card for implantable devices).    7. Take a shower with an antibacterial soap the night before surgery and the morning of surgery. Do not put anything on your skin      After your morning shower.    8. You will need a responsible adult to drive you home and check on you after surgery.

## 2024-12-06 ENCOUNTER — HOSPITAL ENCOUNTER (OUTPATIENT)
Age: 54
Discharge: HOME | End: 2024-12-08
Payer: MEDICARE

## 2024-12-06 ENCOUNTER — HOSPITAL ENCOUNTER (OUTPATIENT)
Dept: PREADMISSION TESTING | Age: 54
Discharge: HOME | End: 2024-12-10
Payer: MEDICARE

## 2024-12-06 DIAGNOSIS — I25.10 CORONARY ARTERY DISEASE INVOLVING NATIVE CORONARY ARTERY OF NATIVE HEART WITHOUT ANGINA PECTORIS: ICD-10-CM

## 2024-12-06 DIAGNOSIS — R06.00 DYSPNEA, UNSPECIFIED TYPE: ICD-10-CM

## 2024-12-06 DIAGNOSIS — I99.8 OTHER DISORDER OF CIRCULATORY SYSTEM: ICD-10-CM

## 2024-12-06 LAB
ABO + RH BLD: NORMAL
ALBUMIN SERPL-MCNC: 4.4 G/DL (ref 3.4–5)
ALBUMIN/GLOB SERPL: 1.9 {RATIO} (ref 1.1–2.2)
ALP SERPL-CCNC: 71 U/L (ref 40–129)
ALT SERPL-CCNC: 23 U/L (ref 10–40)
ANION GAP SERPL CALCULATED.3IONS-SCNC: 16 MMOL/L (ref 9–17)
AST SERPL-CCNC: 23 U/L (ref 15–37)
BASOPHILS # BLD: 0.05 K/UL
BASOPHILS NFR BLD: 1 % (ref 0–1)
BILIRUB SERPL-MCNC: 0.4 MG/DL (ref 0–1)
BILIRUB UR QL STRIP: NEGATIVE
BLOOD BANK COMMENT: NORMAL
BLOOD BANK SAMPLE EXPIRATION: NORMAL
BLOOD GROUP ANTIBODIES SERPL: NEGATIVE
BUN SERPL-MCNC: 46 MG/DL (ref 7–20)
CALCIUM SERPL-MCNC: 9 MG/DL (ref 8.3–10.6)
CHLORIDE SERPL-SCNC: 97 MMOL/L (ref 99–110)
CLARITY UR: CLEAR
CO2 SERPL-SCNC: 25 MMOL/L (ref 21–32)
COLOR UR: YELLOW
CREAT SERPL-MCNC: 4.6 MG/DL (ref 0.9–1.3)
EOSINOPHIL # BLD: 0 K/UL
EOSINOPHILS RELATIVE PERCENT: 0 % (ref 0–3)
EPI CELLS #/AREA URNS HPF: <1 /HPF
ERYTHROCYTE [DISTWIDTH] IN BLOOD BY AUTOMATED COUNT: 13.5 % (ref 11.7–14.9)
GFR, ESTIMATED: 13 ML/MIN/1.73M2
GLUCOSE SERPL-MCNC: 136 MG/DL (ref 74–99)
GLUCOSE UR STRIP-MCNC: NEGATIVE MG/DL
HCT VFR BLD AUTO: 34.2 % (ref 42–52)
HGB BLD-MCNC: 11.4 G/DL (ref 13.5–18)
HGB UR QL STRIP.AUTO: NEGATIVE
IMM GRANULOCYTES # BLD AUTO: 0.01 K/UL
IMM GRANULOCYTES NFR BLD: 0 %
INR PPP: 1
KETONES UR STRIP-MCNC: NEGATIVE MG/DL
LEUKOCYTE ESTERASE UR QL STRIP: NEGATIVE
LYMPHOCYTES NFR BLD: 1.14 K/UL
LYMPHOCYTES RELATIVE PERCENT: 21 % (ref 24–44)
MAGNESIUM SERPL-MCNC: 2.1 MG/DL (ref 1.8–2.4)
MCH RBC QN AUTO: 30 PG (ref 27–31)
MCHC RBC AUTO-ENTMCNC: 33.3 G/DL (ref 32–36)
MCV RBC AUTO: 90 FL (ref 78–100)
MONOCYTES NFR BLD: 0.47 K/UL
MONOCYTES NFR BLD: 9 % (ref 0–4)
MRSA, DNA, NASAL: NOT DETECTED
MUCOUS THREADS URNS QL MICRO: ABNORMAL
NEUTROPHILS NFR BLD: 70 % (ref 36–66)
NEUTS SEG NFR BLD: 3.87 K/UL
NITRITE UR QL STRIP: NEGATIVE
PARTIAL THROMBOPLASTIN TIME: 29.8 SEC (ref 25.1–37.1)
PH UR STRIP: 6.5 [PH] (ref 5–8)
PLATELET # BLD AUTO: 222 K/UL (ref 140–440)
PMV BLD AUTO: 11.5 FL (ref 7.5–11.1)
POTASSIUM SERPL-SCNC: 3.7 MMOL/L (ref 3.5–5.1)
PROT SERPL-MCNC: 6.8 G/DL (ref 6.4–8.2)
PROT UR STRIP-MCNC: >=300 MG/DL
PROTHROMBIN TIME: 13.8 SEC (ref 11.7–14.5)
RBC # BLD AUTO: 3.8 M/UL (ref 4.6–6.2)
RBC #/AREA URNS HPF: <1 /HPF (ref 0–2)
SODIUM SERPL-SCNC: 138 MMOL/L (ref 136–145)
SP GR UR STRIP: 1.02 (ref 1–1.03)
SPECIMEN DESCRIPTION: NORMAL
TRICHOMONAS #/AREA URNS HPF: ABNORMAL /[HPF]
TSH SERPL DL<=0.05 MIU/L-ACNC: 2.18 UIU/ML (ref 0.27–4.2)
UROBILINOGEN UR STRIP-ACNC: 0.2 EU/DL (ref 0–1)
WBC #/AREA URNS HPF: 2 /HPF (ref 0–5)
WBC OTHER # BLD: 5.5 K/UL (ref 4–10.5)

## 2024-12-06 PROCEDURE — 83735 ASSAY OF MAGNESIUM: CPT

## 2024-12-06 PROCEDURE — 84443 ASSAY THYROID STIM HORMONE: CPT

## 2024-12-06 PROCEDURE — 93005 ELECTROCARDIOGRAM TRACING: CPT

## 2024-12-06 PROCEDURE — 80053 COMPREHEN METABOLIC PANEL: CPT

## 2024-12-06 PROCEDURE — 85610 PROTHROMBIN TIME: CPT

## 2024-12-06 PROCEDURE — 87641 MR-STAPH DNA AMP PROBE: CPT

## 2024-12-06 PROCEDURE — 86901 BLOOD TYPING SEROLOGIC RH(D): CPT

## 2024-12-06 PROCEDURE — 86920 COMPATIBILITY TEST SPIN: CPT

## 2024-12-06 PROCEDURE — 85730 THROMBOPLASTIN TIME PARTIAL: CPT

## 2024-12-06 PROCEDURE — 86850 RBC ANTIBODY SCREEN: CPT

## 2024-12-06 PROCEDURE — 81001 URINALYSIS AUTO W/SCOPE: CPT

## 2024-12-06 PROCEDURE — 36415 COLL VENOUS BLD VENIPUNCTURE: CPT

## 2024-12-06 PROCEDURE — 85025 COMPLETE CBC W/AUTO DIFF WBC: CPT

## 2024-12-06 PROCEDURE — 86900 BLOOD TYPING SEROLOGIC ABO: CPT

## 2024-12-08 LAB
EKG ATRIAL RATE: 60 BPM
EKG DIAGNOSIS: NORMAL
EKG P AXIS: 33 DEGREES
EKG P-R INTERVAL: 202 MS
EKG Q-T INTERVAL: 448 MS
EKG QRS DURATION: 104 MS
EKG QTC CALCULATION (BAZETT): 448 MS
EKG R AXIS: -7 DEGREES
EKG T AXIS: 35 DEGREES
EKG VENTRICULAR RATE: 60 BPM

## 2024-12-08 PROCEDURE — 93010 ELECTROCARDIOGRAM REPORT: CPT | Performed by: INTERNAL MEDICINE

## 2024-12-10 ENCOUNTER — ANESTHESIA EVENT (OUTPATIENT)
Dept: OPERATING ROOM | Age: 54
End: 2024-12-10
Payer: MEDICARE

## 2024-12-10 RX ORDER — DEXMEDETOMIDINE HYDROCHLORIDE 4 UG/ML
.1-1.5 INJECTION, SOLUTION INTRAVENOUS
Status: COMPLETED | OUTPATIENT
Start: 2024-12-11 | End: 2024-12-11

## 2024-12-10 RX ORDER — PHENYLEPHRINE HCL IN 0.9% NACL 50MG/250ML
10-300 PLASTIC BAG, INJECTION (ML) INTRAVENOUS
Status: COMPLETED | OUTPATIENT
Start: 2024-12-11 | End: 2024-12-11

## 2024-12-10 NOTE — ANESTHESIA PRE PROCEDURE
90 tablet 3   • aspirin 81 MG EC tablet Take 1 tablet by mouth daily     • vitamin D (CHOLECALCIFEROL) 25 MCG (1000 UT) TABS tablet Take 1 tablet by mouth daily OTC     • polyethylene glycol (MIRALAX MIX-IN PAX) 17 g packet Take 1 packet by mouth daily as needed for Constipation 527 g 5   • Insulin Pen Needle (TRUEPLUS 5-BEVEL PEN NEEDLES) 31G X 5 MM MISC Use one needle two times daily as directed on package. (Patient not taking: Reported on 12/3/2024) 100 each 5   • Lancets Thin MISC 1 actuation by Does not apply route daily 200 each 1   • Blood Glucose Monitoring Suppl (TRUE METRIX AIR GLUCOSE METER) w/Device KIT 1 actuation by Does not apply route daily 1 kit 1   • Insulin Pen Needle (PEN NEEDLES 3/16\") 31G X 5 MM MISC 1 each by Does not apply route daily Use one needle two times daily as directed on package. (Patient not taking: Reported on 12/3/2024) 500 each 1   • Insulin Pen Needle (PEN NEEDLES 3/16\") 31G X 5 MM MISC 1 each by Does not apply route daily To be used 4 times daily as directed on package (Patient not taking: Reported on 12/3/2024) 500 each 2       Allergies:  No Known Allergies    Problem List:    Patient Active Problem List   Diagnosis Code   • Chest pain R07.9   • Type 2 diabetes mellitus with circulatory disorder, without long-term current use of insulin ([facility]) E11.59   • History of myocardial infarction I25.2   • History of PTCA Z98.61   • H/O echocardiogram Z92.89   • Precordial pain R07.2   • Coronary artery disease involving native coronary artery of native heart without angina pectoris I25.10   • Mixed hyperlipidemia E78.2   • Essential hypertension I10   • Family history of coronary artery disease Z82.49   • Acute renal failure (HCC) N17.9   • Persistent proteinuria R80.1   • Diabetic nephropathy associated with type 2 diabetes mellitus ([facility]) E11.21   • Metabolic syndrome E88.810   • Renal agenesis Q60.2   • Erectile disorder due to medical condition in male patient N52.1   •

## 2024-12-10 NOTE — PROGRESS NOTES
Notified patient surgery @ [facility] on 12/11/24 @ 0745, arrival 0545. NPO status and morning medications reviewed. Understanding verbalized.

## 2024-12-11 ENCOUNTER — APPOINTMENT (OUTPATIENT)
Dept: GENERAL RADIOLOGY | Age: 54
DRG: 235 | End: 2024-12-11
Attending: THORACIC SURGERY (CARDIOTHORACIC VASCULAR SURGERY)
Payer: MEDICARE

## 2024-12-11 ENCOUNTER — ANESTHESIA (OUTPATIENT)
Dept: OPERATING ROOM | Age: 54
End: 2024-12-11
Payer: MEDICARE

## 2024-12-11 ENCOUNTER — HOSPITAL ENCOUNTER (INPATIENT)
Age: 54
LOS: 6 days | Discharge: HOME HEALTH | DRG: 235 | End: 2024-12-17
Attending: THORACIC SURGERY (CARDIOTHORACIC VASCULAR SURGERY) | Admitting: THORACIC SURGERY (CARDIOTHORACIC VASCULAR SURGERY)
Payer: MEDICARE

## 2024-12-11 ENCOUNTER — HOSPITAL ENCOUNTER (OUTPATIENT)
Age: 54
Setting detail: SPECIMEN
Discharge: HOME | End: 2024-12-11
Attending: THORACIC SURGERY (CARDIOTHORACIC VASCULAR SURGERY)
Payer: MEDICARE

## 2024-12-11 DIAGNOSIS — R07.9 CHEST PAIN, UNSPECIFIED TYPE: Primary | ICD-10-CM

## 2024-12-11 DIAGNOSIS — Z03.89 CORONARY ARTERY DISEASE (CAD) EXCLUDED: ICD-10-CM

## 2024-12-11 DIAGNOSIS — I25.10 ATHEROSCLEROSIS OF NATIVE CORONARY ARTERY OF NATIVE HEART WITHOUT ANGINA PECTORIS: ICD-10-CM

## 2024-12-11 LAB
ANION GAP SERPL CALCULATED.3IONS-SCNC: 10 MMOL/L (ref 9–17)
ANION GAP SERPL CALCULATED.3IONS-SCNC: 14 MMOL/L (ref 9–17)
ANION GAP SERPL CALCULATED.3IONS-SCNC: 17 MMOL/L (ref 9–17)
ARTERIAL PATENCY WRIST A: NORMAL
BASOPHILS # BLD: 0.04 K/UL
BASOPHILS NFR BLD: 1 % (ref 0–1)
BUN BLD-MCNC: 40 MG/DL (ref 7–20)
BUN BLD-MCNC: 40 MG/DL (ref 7–20)
BUN BLD-MCNC: 42 MG/DL (ref 7–20)
BUN BLD-MCNC: 42 MG/DL (ref 7–20)
BUN BLD-MCNC: 43 MG/DL (ref 7–20)
BUN BLD-MCNC: 44 MG/DL (ref 7–20)
BUN BLD-MCNC: 45 MG/DL (ref 7–20)
BUN BLD-MCNC: 48 MG/DL (ref 7–20)
BUN SERPL-MCNC: 44 MG/DL (ref 7–20)
BUN SERPL-MCNC: 46 MG/DL (ref 7–20)
BUN SERPL-MCNC: 48 MG/DL (ref 7–20)
CA-I BLD-SCNC: 1.02 MMOL/L (ref 1.15–1.33)
CA-I BLD-SCNC: 1.05 MMOL/L (ref 1.15–1.33)
CA-I BLD-SCNC: 1.06 MMOL/L (ref 1.15–1.33)
CA-I BLD-SCNC: 1.09 MMOL/L (ref 1.15–1.33)
CA-I BLD-SCNC: 1.11 MMOL/L (ref 1.15–1.33)
CA-I BLD-SCNC: 1.13 MMOL/L (ref 1.15–1.33)
CA-I BLD-SCNC: 1.15 MMOL/L (ref 1.15–1.33)
CA-I BLD-SCNC: 1.17 MMOL/L (ref 1.15–1.33)
CA-I BLD-SCNC: 1.18 MMOL/L (ref 1.15–1.33)
CA-I BLD-SCNC: 1.19 MMOL/L (ref 1.15–1.33)
CA-I BLD-SCNC: 1.2 MMOL/L (ref 1.15–1.33)
CA-I BLD-SCNC: 1.21 MMOL/L (ref 1.15–1.33)
CA-I BLD-SCNC: 1.21 MMOL/L (ref 1.15–1.33)
CA-I BLD-SCNC: 1.25 MMOL/L (ref 1.15–1.33)
CA-I BLD-SCNC: 1.3 MMOL/L (ref 1.15–1.33)
CALCIUM SERPL-MCNC: 8.6 MG/DL (ref 8.3–10.6)
CALCIUM SERPL-MCNC: 9 MG/DL (ref 8.3–10.6)
CALCIUM SERPL-MCNC: 9.2 MG/DL (ref 8.3–10.6)
CHLORIDE BLD-SCNC: 101 MMOL/L (ref 99–110)
CHLORIDE BLD-SCNC: 101 MMOL/L (ref 99–110)
CHLORIDE BLD-SCNC: 102 MMOL/L (ref 99–110)
CHLORIDE BLD-SCNC: 102 MMOL/L (ref 99–110)
CHLORIDE BLD-SCNC: 103 MMOL/L (ref 99–110)
CHLORIDE BLD-SCNC: 103 MMOL/L (ref 99–110)
CHLORIDE BLD-SCNC: 106 MMOL/L (ref 99–110)
CHLORIDE BLD-SCNC: 107 MMOL/L (ref 99–110)
CHLORIDE BLD-SCNC: 107 MMOL/L (ref 99–110)
CHLORIDE BLD-SCNC: 108 MMOL/L (ref 99–110)
CHLORIDE BLD-SCNC: 109 MMOL/L (ref 99–110)
CHLORIDE SERPL-SCNC: 106 MMOL/L (ref 99–110)
CHLORIDE SERPL-SCNC: 109 MMOL/L (ref 99–110)
CHLORIDE SERPL-SCNC: 98 MMOL/L (ref 99–110)
CO2 SERPL-SCNC: 21 MMOL/L (ref 21–32)
CO2 SERPL-SCNC: 21 MMOL/L (ref 21–32)
CO2 SERPL-SCNC: 27 MMOL/L (ref 21–32)
CREAT BLD-MCNC: 3.6 MG/DL (ref 0.5–1.2)
CREAT BLD-MCNC: 3.7 MG/DL (ref 0.5–1.2)
CREAT BLD-MCNC: 3.8 MG/DL (ref 0.5–1.2)
CREAT BLD-MCNC: 4.1 MG/DL (ref 0.5–1.2)
CREAT BLD-MCNC: 4.2 MG/DL (ref 0.5–1.2)
CREAT BLD-MCNC: 4.2 MG/DL (ref 0.5–1.2)
CREAT BLD-MCNC: 4.4 MG/DL (ref 0.5–1.2)
CREAT BLD-MCNC: 4.5 MG/DL (ref 0.5–1.2)
CREAT BLD-MCNC: 4.6 MG/DL (ref 0.5–1.2)
CREAT BLD-MCNC: 4.7 MG/DL (ref 0.5–1.2)
CREAT BLD-MCNC: 4.8 MG/DL (ref 0.5–1.2)
CREAT BLD-MCNC: 4.8 MG/DL (ref 0.5–1.2)
CREAT SERPL-MCNC: 4.3 MG/DL (ref 0.9–1.3)
CREAT SERPL-MCNC: 4.7 MG/DL (ref 0.9–1.3)
CREAT SERPL-MCNC: 4.9 MG/DL (ref 0.9–1.3)
CRITICAL ACTION: NORMAL
CRITICAL NOTIFICATION DATE/TIME: NORMAL
CRITICAL NOTIFICATION: NORMAL
EGFR, POC: 14 ML/MIN/1.73M2
EGFR, POC: 15 ML/MIN/1.73M2
EGFR, POC: 16 ML/MIN/1.73M2
EGFR, POC: 16 ML/MIN/1.73M2
EGFR, POC: 17 ML/MIN/1.73M2
EGFR, POC: 18 ML/MIN/1.73M2
EGFR, POC: 19 ML/MIN/1.73M2
EGFR, POC: 19 ML/MIN/1.73M2
EOSINOPHIL # BLD: 0 K/UL
EOSINOPHILS RELATIVE PERCENT: 0 % (ref 0–3)
ERYTHROCYTE [DISTWIDTH] IN BLOOD BY AUTOMATED COUNT: 13.2 % (ref 11.7–14.9)
ERYTHROCYTE [DISTWIDTH] IN BLOOD BY AUTOMATED COUNT: 13.2 % (ref 11.7–14.9)
ERYTHROCYTE [DISTWIDTH] IN BLOOD BY AUTOMATED COUNT: 13.4 % (ref 11.7–14.9)
FIBRINOGEN PPP-MCNC: 261 MG/DL (ref 170–540)
GFR, ESTIMATED: 12 ML/MIN/1.73M2
GFR, ESTIMATED: 13 ML/MIN/1.73M2
GFR, ESTIMATED: 14 ML/MIN/1.73M2
GLUCOSE BLD-MCNC: 101 MG/DL (ref 74–99)
GLUCOSE BLD-MCNC: 105 MG/DL (ref 74–99)
GLUCOSE BLD-MCNC: 110 MG/DL (ref 74–99)
GLUCOSE BLD-MCNC: 112 MG/DL (ref 74–99)
GLUCOSE BLD-MCNC: 112 MG/DL (ref 74–99)
GLUCOSE BLD-MCNC: 114 MG/DL (ref 74–99)
GLUCOSE BLD-MCNC: 120 MG/DL (ref 74–99)
GLUCOSE BLD-MCNC: 126 MG/DL (ref 74–99)
GLUCOSE BLD-MCNC: 127 MG/DL (ref 74–99)
GLUCOSE BLD-MCNC: 129 MG/DL (ref 74–99)
GLUCOSE BLD-MCNC: 134 MG/DL (ref 74–99)
GLUCOSE BLD-MCNC: 135 MG/DL (ref 74–99)
GLUCOSE BLD-MCNC: 141 MG/DL (ref 74–99)
GLUCOSE BLD-MCNC: 144 MG/DL (ref 74–99)
GLUCOSE BLD-MCNC: 145 MG/DL (ref 74–99)
GLUCOSE BLD-MCNC: 149 MG/DL (ref 74–99)
GLUCOSE BLD-MCNC: 151 MG/DL (ref 74–99)
GLUCOSE BLD-MCNC: 162 MG/DL (ref 74–99)
GLUCOSE SERPL-MCNC: 103 MG/DL (ref 74–99)
GLUCOSE SERPL-MCNC: 105 MG/DL (ref 74–99)
GLUCOSE SERPL-MCNC: 131 MG/DL (ref 74–99)
HCT VFR BLD AUTO: 19 % (ref 38–51)
HCT VFR BLD AUTO: 21 % (ref 38–51)
HCT VFR BLD AUTO: 22 % (ref 38–51)
HCT VFR BLD AUTO: 23 % (ref 38–51)
HCT VFR BLD AUTO: 25 % (ref 38–51)
HCT VFR BLD AUTO: 26 % (ref 38–51)
HCT VFR BLD AUTO: 26.3 % (ref 42–52)
HCT VFR BLD AUTO: 28 % (ref 38–51)
HCT VFR BLD AUTO: 29 % (ref 38–51)
HCT VFR BLD AUTO: 29.4 % (ref 42–52)
HCT VFR BLD AUTO: 34.3 % (ref 42–52)
HGB BLD-MCNC: 11.3 G/DL (ref 13.5–18)
HGB BLD-MCNC: 8.6 G/DL (ref 13.5–18)
HGB BLD-MCNC: 9.7 G/DL (ref 13.5–18)
IMM GRANULOCYTES # BLD AUTO: 0.02 K/UL
IMM GRANULOCYTES NFR BLD: 0 %
INR PPP: 1
INR PPP: 1.4
LYMPHOCYTES NFR BLD: 1.17 K/UL
LYMPHOCYTES RELATIVE PERCENT: 20 % (ref 24–44)
MAGNESIUM SERPL-MCNC: 2.2 MG/DL (ref 1.8–2.4)
MAGNESIUM SERPL-MCNC: 2.9 MG/DL (ref 1.8–2.4)
MAGNESIUM SERPL-MCNC: 3.1 MG/DL (ref 1.8–2.4)
MCH RBC QN AUTO: 30.4 PG (ref 27–31)
MCH RBC QN AUTO: 30.5 PG (ref 27–31)
MCH RBC QN AUTO: 30.8 PG (ref 27–31)
MCHC RBC AUTO-ENTMCNC: 32.7 G/DL (ref 32–36)
MCHC RBC AUTO-ENTMCNC: 32.9 G/DL (ref 32–36)
MCHC RBC AUTO-ENTMCNC: 33 G/DL (ref 32–36)
MCV RBC AUTO: 92.5 FL (ref 78–100)
MCV RBC AUTO: 92.9 FL (ref 78–100)
MCV RBC AUTO: 93.3 FL (ref 78–100)
MONOCYTES NFR BLD: 0.62 K/UL
MONOCYTES NFR BLD: 11 % (ref 0–4)
NEGATIVE BASE EXCESS, ART: 0.2 MMOL/L (ref 0–3)
NEGATIVE BASE EXCESS, ART: 0.7 MMOL/L (ref 0–3)
NEGATIVE BASE EXCESS, ART: 1.3 MMOL/L (ref 0–3)
NEGATIVE BASE EXCESS, ART: 1.3 MMOL/L (ref 0–3)
NEGATIVE BASE EXCESS, ART: 1.6 MMOL/L (ref 0–3)
NEGATIVE BASE EXCESS, ART: 2.4 MMOL/L (ref 0–3)
NEGATIVE BASE EXCESS, ART: 2.5 MMOL/L (ref 0–3)
NEGATIVE BASE EXCESS, ART: 2.9 MMOL/L (ref 0–3)
NEGATIVE BASE EXCESS, ART: 3.1 MMOL/L (ref 0–3)
NEGATIVE BASE EXCESS, ART: 3.5 MMOL/L (ref 0–3)
NEGATIVE BASE EXCESS, ART: 4.3 MMOL/L (ref 0–3)
NEGATIVE BASE EXCESS, ART: 5.4 MMOL/L (ref 0–3)
NEUTROPHILS NFR BLD: 68 % (ref 36–66)
NEUTS SEG NFR BLD: 3.89 K/UL
OXYHGB MFR BLD: 80 %
PARTIAL THROMBOPLASTIN TIME: 33.3 SEC (ref 25.1–37.1)
PHOSPHATE SERPL-MCNC: 5 MG/DL (ref 2.5–4.9)
PLATELET # BLD AUTO: 186 K/UL (ref 140–440)
PLATELET # BLD AUTO: 190 K/UL (ref 140–440)
PLATELET # BLD AUTO: 196 K/UL (ref 140–440)
PMV BLD AUTO: 11.2 FL (ref 7.5–11.1)
PMV BLD AUTO: 11.2 FL (ref 7.5–11.1)
PMV BLD AUTO: 11.5 FL (ref 7.5–11.1)
POC ANION GAP: ABNORMAL MMOL/L (ref 7–16)
POC HCO3: 20.2 MMOL/L (ref 21–28)
POC HCO3: 21.1 MMOL/L (ref 21–28)
POC HCO3: 21.9 MMOL/L (ref 21–28)
POC HCO3: 21.9 MMOL/L (ref 21–28)
POC HCO3: 22.6 MMOL/L (ref 21–28)
POC HCO3: 22.7 MMOL/L (ref 21–28)
POC HCO3: 22.7 MMOL/L (ref 21–28)
POC HCO3: 23.5 MMOL/L (ref 21–28)
POC HCO3: 23.6 MMOL/L (ref 21–28)
POC HCO3: 24 MMOL/L (ref 21–28)
POC HCO3: 24.7 MMOL/L (ref 21–28)
POC HCO3: 24.9 MMOL/L (ref 21–28)
POC HCO3: 26.2 MMOL/L (ref 21–28)
POC HCO3: 27.7 MMOL/L (ref 21–28)
POC HEMOGLOBIN (CALC): 6.6 G/DL (ref 12–17)
POC HEMOGLOBIN (CALC): 7.2 G/DL (ref 12–17)
POC HEMOGLOBIN (CALC): 7.4 G/DL (ref 12–17)
POC HEMOGLOBIN (CALC): 7.4 G/DL (ref 12–17)
POC HEMOGLOBIN (CALC): 7.5 G/DL (ref 12–17)
POC HEMOGLOBIN (CALC): 7.8 G/DL (ref 12–17)
POC HEMOGLOBIN (CALC): 8.3 G/DL (ref 12–17)
POC HEMOGLOBIN (CALC): 8.7 G/DL (ref 12–17)
POC HEMOGLOBIN (CALC): 8.8 G/DL (ref 12–17)
POC HEMOGLOBIN (CALC): 8.9 G/DL (ref 12–17)
POC HEMOGLOBIN (CALC): 9.6 G/DL (ref 12–17)
POC HEMOGLOBIN (CALC): 9.9 G/DL (ref 12–17)
POC O2 SATURATION: 100 % (ref 94–98)
POC O2 SATURATION: 95.6 % (ref 94–98)
POC O2 SATURATION: 95.7 % (ref 94–98)
POC O2 SATURATION: 98.6 % (ref 94–98)
POC O2 SATURATION: 98.9 % (ref 94–98)
POC O2 SATURATION: 99.5 % (ref 94–98)
POC O2 SATURATION: 99.9 % (ref 94–98)
POC PCO2: 33.7 MM HG (ref 35–48)
POC PCO2: 35.1 MM HG (ref 35–48)
POC PCO2: 38.7 MM HG (ref 35–48)
POC PCO2: 39 MM HG (ref 35–48)
POC PCO2: 39.5 MM HG (ref 35–48)
POC PCO2: 40.3 MM HG (ref 35–48)
POC PCO2: 40.5 MM HG (ref 35–48)
POC PCO2: 41.5 MM HG (ref 35–48)
POC PCO2: 42.2 MM HG (ref 35–48)
POC PCO2: 42.9 MM HG (ref 35–48)
POC PCO2: 43.1 MM HG (ref 35–48)
POC PCO2: 45.5 MM HG (ref 35–48)
POC PCO2: 47.8 MM HG (ref 35–48)
POC PCO2: 51.6 MM HG (ref 35–48)
POC PH: 7.3 (ref 7.35–7.45)
POC PH: 7.32 (ref 7.35–7.45)
POC PH: 7.33 (ref 7.35–7.45)
POC PH: 7.34 (ref 7.35–7.45)
POC PH: 7.36 (ref 7.35–7.45)
POC PH: 7.37 (ref 7.35–7.45)
POC PH: 7.38 (ref 7.35–7.45)
POC PH: 7.4 (ref 7.35–7.45)
POC PH: 7.44 (ref 7.35–7.45)
POC PO2: 127.1 MM HG (ref 83–108)
POC PO2: 135.8 MM HG (ref 83–108)
POC PO2: 173.3 MM HG (ref 83–108)
POC PO2: 262.4 MM HG (ref 83–108)
POC PO2: 352.8 MM HG (ref 83–108)
POC PO2: 378.2 MM HG (ref 83–108)
POC PO2: 482.3 MM HG (ref 83–108)
POC PO2: 488.8 MM HG (ref 83–108)
POC PO2: 536.1 MM HG (ref 83–108)
POC PO2: 544 MM HG (ref 83–108)
POC PO2: 572.3 MM HG (ref 83–108)
POC PO2: 636.8 MM HG (ref 83–108)
POC PO2: 81.1 MM HG (ref 83–108)
POC PO2: 86.1 MM HG (ref 83–108)
POSITIVE BASE EXCESS, ART: 0.7 MMOL/L (ref 0–3)
POSITIVE BASE EXCESS, ART: 1.5 MMOL/L (ref 0–3)
POTASSIUM BLD-SCNC: 3.6 MMOL/L (ref 3.5–5.1)
POTASSIUM BLD-SCNC: 3.7 MMOL/L (ref 3.5–5.1)
POTASSIUM BLD-SCNC: 3.8 MMOL/L (ref 3.5–5.1)
POTASSIUM BLD-SCNC: 3.9 MMOL/L (ref 3.5–5.1)
POTASSIUM BLD-SCNC: 4 MMOL/L (ref 3.5–5.1)
POTASSIUM BLD-SCNC: 4.1 MMOL/L (ref 3.5–5.1)
POTASSIUM BLD-SCNC: 4.3 MMOL/L (ref 3.5–5.1)
POTASSIUM BLD-SCNC: 4.4 MMOL/L (ref 3.5–5.1)
POTASSIUM SERPL-SCNC: 3.8 MMOL/L (ref 3.5–5.1)
POTASSIUM SERPL-SCNC: 4.2 MMOL/L (ref 3.5–5.1)
PROTHROMBIN TIME: 13.7 SEC (ref 11.7–14.5)
PROTHROMBIN TIME: 17.6 SEC (ref 11.7–14.5)
RBC # BLD AUTO: 2.83 M/UL (ref 4.6–6.2)
RBC # BLD AUTO: 3.15 M/UL (ref 4.6–6.2)
RBC # BLD AUTO: 3.71 M/UL (ref 4.6–6.2)
SODIUM BLD-SCNC: 137 MMOL/L (ref 136–145)
SODIUM BLD-SCNC: 138 MMOL/L (ref 136–145)
SODIUM BLD-SCNC: 139 MMOL/L (ref 136–145)
SODIUM BLD-SCNC: 140 MMOL/L (ref 136–145)
SODIUM BLD-SCNC: 140 MMOL/L (ref 136–145)
SODIUM BLD-SCNC: 141 MMOL/L (ref 136–145)
SODIUM SERPL-SCNC: 139 MMOL/L (ref 136–145)
SODIUM SERPL-SCNC: 139 MMOL/L (ref 136–145)
SODIUM SERPL-SCNC: 144 MMOL/L (ref 136–145)
TCO2 (CALC), ART: 21 MMOL/L (ref 21–32)
TCO2 (CALC), ART: 22 MMOL/L (ref 21–32)
TCO2 (CALC), ART: 23 MMOL/L (ref 21–32)
TCO2 (CALC), ART: 23 MMOL/L (ref 21–32)
TCO2 (CALC), ART: 24 MMOL/L (ref 21–32)
TCO2 (CALC), ART: 25 MMOL/L (ref 21–32)
TCO2 (CALC), ART: 26 MMOL/L (ref 21–32)
TCO2 (CALC), ART: 26 MMOL/L (ref 21–32)
TCO2 (CALC), ART: 28 MMOL/L (ref 21–32)
TCO2 (CALC), ART: 29 MMOL/L (ref 21–32)
WBC OTHER # BLD: 19.1 K/UL (ref 4–10.5)
WBC OTHER # BLD: 5.7 K/UL (ref 4–10.5)
WBC OTHER # BLD: 9 K/UL (ref 4–10.5)

## 2024-12-11 PROCEDURE — 30233L1 TRANSFUSION OF NONAUTOLOGOUS FRESH PLASMA INTO PERIPHERAL VEIN, PERCUTANEOUS APPROACH: ICD-10-PCS | Performed by: THORACIC SURGERY (CARDIOTHORACIC VASCULAR SURGERY)

## 2024-12-11 PROCEDURE — 80047 BASIC METABLC PNL IONIZED CA: CPT

## 2024-12-11 PROCEDURE — P9016 RBC LEUKOCYTES REDUCED: HCPCS

## 2024-12-11 PROCEDURE — 6360000002 HC RX W HCPCS

## 2024-12-11 PROCEDURE — P9047 ALBUMIN (HUMAN), 25%, 50ML: HCPCS

## 2024-12-11 PROCEDURE — 02100Z9 BYPASS CORONARY ARTERY, ONE ARTERY FROM LEFT INTERNAL MAMMARY, OPEN APPROACH: ICD-10-PCS | Performed by: THORACIC SURGERY (CARDIOTHORACIC VASCULAR SURGERY)

## 2024-12-11 PROCEDURE — 93005 ELECTROCARDIOGRAM TRACING: CPT | Performed by: PHYSICIAN ASSISTANT

## 2024-12-11 PROCEDURE — 33508 ENDOSCOPIC VEIN HARVEST: CPT | Performed by: THORACIC SURGERY (CARDIOTHORACIC VASCULAR SURGERY)

## 2024-12-11 PROCEDURE — 6360000002 HC RX W HCPCS: Performed by: THORACIC SURGERY (CARDIOTHORACIC VASCULAR SURGERY)

## 2024-12-11 PROCEDURE — 5A1221Z PERFORMANCE OF CARDIAC OUTPUT, CONTINUOUS: ICD-10-PCS | Performed by: THORACIC SURGERY (CARDIOTHORACIC VASCULAR SURGERY)

## 2024-12-11 PROCEDURE — 2500000003 HC RX 250 WO HCPCS: Performed by: THORACIC SURGERY (CARDIOTHORACIC VASCULAR SURGERY)

## 2024-12-11 PROCEDURE — 2580000003 HC RX 258: Performed by: PHYSICIAN ASSISTANT

## 2024-12-11 PROCEDURE — 33519 CABG ARTERY-VEIN THREE: CPT | Performed by: THORACIC SURGERY (CARDIOTHORACIC VASCULAR SURGERY)

## 2024-12-11 PROCEDURE — 2500000003 HC RX 250 WO HCPCS

## 2024-12-11 PROCEDURE — 85730 THROMBOPLASTIN TIME PARTIAL: CPT

## 2024-12-11 PROCEDURE — P9045 ALBUMIN (HUMAN), 5%, 250 ML: HCPCS

## 2024-12-11 PROCEDURE — 2709999900 HC NON-CHARGEABLE SUPPLY: Performed by: THORACIC SURGERY (CARDIOTHORACIC VASCULAR SURGERY)

## 2024-12-11 PROCEDURE — 99291 CRITICAL CARE FIRST HOUR: CPT | Performed by: INTERNAL MEDICINE

## 2024-12-11 PROCEDURE — 2580000003 HC RX 258

## 2024-12-11 PROCEDURE — B24BZZ4 ULTRASONOGRAPHY OF HEART WITH AORTA, TRANSESOPHAGEAL: ICD-10-PCS | Performed by: THORACIC SURGERY (CARDIOTHORACIC VASCULAR SURGERY)

## 2024-12-11 PROCEDURE — 82330 ASSAY OF CALCIUM: CPT

## 2024-12-11 PROCEDURE — C1729 CATH, DRAINAGE: HCPCS | Performed by: THORACIC SURGERY (CARDIOTHORACIC VASCULAR SURGERY)

## 2024-12-11 PROCEDURE — 80048 BASIC METABOLIC PNL TOTAL CA: CPT

## 2024-12-11 PROCEDURE — 71045 X-RAY EXAM CHEST 1 VIEW: CPT

## 2024-12-11 PROCEDURE — A4648 IMPLANTABLE TISSUE MARKER: HCPCS | Performed by: THORACIC SURGERY (CARDIOTHORACIC VASCULAR SURGERY)

## 2024-12-11 PROCEDURE — 6360000002 HC RX W HCPCS: Performed by: PHYSICIAN ASSISTANT

## 2024-12-11 PROCEDURE — 4A133B3 MONITORING OF ARTERIAL PRESSURE, PULMONARY, PERCUTANEOUS APPROACH: ICD-10-PCS | Performed by: THORACIC SURGERY (CARDIOTHORACIC VASCULAR SURGERY)

## 2024-12-11 PROCEDURE — 2500000003 HC RX 250 WO HCPCS: Performed by: PHYSICIAN ASSISTANT

## 2024-12-11 PROCEDURE — 6370000000 HC RX 637 (ALT 250 FOR IP): Performed by: PHYSICIAN ASSISTANT

## 2024-12-11 PROCEDURE — C1768 GRAFT, VASCULAR: HCPCS | Performed by: THORACIC SURGERY (CARDIOTHORACIC VASCULAR SURGERY)

## 2024-12-11 PROCEDURE — 2700000000 HC OXYGEN THERAPY PER DAY

## 2024-12-11 PROCEDURE — 94640 AIRWAY INHALATION TREATMENT: CPT

## 2024-12-11 PROCEDURE — 82803 BLOOD GASES ANY COMBINATION: CPT

## 2024-12-11 PROCEDURE — A4217 STERILE WATER/SALINE, 500 ML: HCPCS | Performed by: THORACIC SURGERY (CARDIOTHORACIC VASCULAR SURGERY)

## 2024-12-11 PROCEDURE — 3600000018 HC SURGERY OHS ADDTL 15MIN: Performed by: THORACIC SURGERY (CARDIOTHORACIC VASCULAR SURGERY)

## 2024-12-11 PROCEDURE — 6370000000 HC RX 637 (ALT 250 FOR IP): Performed by: THORACIC SURGERY (CARDIOTHORACIC VASCULAR SURGERY)

## 2024-12-11 PROCEDURE — C1769 GUIDE WIRE: HCPCS | Performed by: THORACIC SURGERY (CARDIOTHORACIC VASCULAR SURGERY)

## 2024-12-11 PROCEDURE — 82805 BLOOD GASES W/O2 SATURATION: CPT

## 2024-12-11 PROCEDURE — 2580000003 HC RX 258: Performed by: THORACIC SURGERY (CARDIOTHORACIC VASCULAR SURGERY)

## 2024-12-11 PROCEDURE — C1751 CATH, INF, PER/CENT/MIDLINE: HCPCS | Performed by: THORACIC SURGERY (CARDIOTHORACIC VASCULAR SURGERY)

## 2024-12-11 PROCEDURE — 94761 N-INVAS EAR/PLS OXIMETRY MLT: CPT

## 2024-12-11 PROCEDURE — 3700000000 HC ANESTHESIA ATTENDED CARE: Performed by: THORACIC SURGERY (CARDIOTHORACIC VASCULAR SURGERY)

## 2024-12-11 PROCEDURE — 84132 ASSAY OF SERUM POTASSIUM: CPT

## 2024-12-11 PROCEDURE — 83735 ASSAY OF MAGNESIUM: CPT

## 2024-12-11 PROCEDURE — 85027 COMPLETE CBC AUTOMATED: CPT

## 2024-12-11 PROCEDURE — 82962 GLUCOSE BLOOD TEST: CPT

## 2024-12-11 PROCEDURE — 85610 PROTHROMBIN TIME: CPT

## 2024-12-11 PROCEDURE — P9045 ALBUMIN (HUMAN), 5%, 250 ML: HCPCS | Performed by: PHYSICIAN ASSISTANT

## 2024-12-11 PROCEDURE — 85014 HEMATOCRIT: CPT

## 2024-12-11 PROCEDURE — 36415 COLL VENOUS BLD VENIPUNCTURE: CPT

## 2024-12-11 PROCEDURE — 36430 TRANSFUSION BLD/BLD COMPNT: CPT

## 2024-12-11 PROCEDURE — 33533 CABG ARTERIAL SINGLE: CPT | Performed by: THORACIC SURGERY (CARDIOTHORACIC VASCULAR SURGERY)

## 2024-12-11 PROCEDURE — 2720000010 HC SURG SUPPLY STERILE: Performed by: THORACIC SURGERY (CARDIOTHORACIC VASCULAR SURGERY)

## 2024-12-11 PROCEDURE — 85025 COMPLETE CBC W/AUTO DIFF WBC: CPT

## 2024-12-11 PROCEDURE — 3600000008 HC SURGERY OHS BASE: Performed by: THORACIC SURGERY (CARDIOTHORACIC VASCULAR SURGERY)

## 2024-12-11 PROCEDURE — 85347 COAGULATION TIME ACTIVATED: CPT

## 2024-12-11 PROCEDURE — 2100000000 HC CCU R&B

## 2024-12-11 PROCEDURE — 30233N1 TRANSFUSION OF NONAUTOLOGOUS RED BLOOD CELLS INTO PERIPHERAL VEIN, PERCUTANEOUS APPROACH: ICD-10-PCS | Performed by: THORACIC SURGERY (CARDIOTHORACIC VASCULAR SURGERY)

## 2024-12-11 PROCEDURE — 3700000001 HC ADD 15 MINUTES (ANESTHESIA): Performed by: THORACIC SURGERY (CARDIOTHORACIC VASCULAR SURGERY)

## 2024-12-11 PROCEDURE — 6370000000 HC RX 637 (ALT 250 FOR IP)

## 2024-12-11 PROCEDURE — 021209W BYPASS CORONARY ARTERY, THREE ARTERIES FROM AORTA WITH AUTOLOGOUS VENOUS TISSUE, OPEN APPROACH: ICD-10-PCS | Performed by: THORACIC SURGERY (CARDIOTHORACIC VASCULAR SURGERY)

## 2024-12-11 PROCEDURE — 30233K1 TRANSFUSION OF NONAUTOLOGOUS FROZEN PLASMA INTO PERIPHERAL VEIN, PERCUTANEOUS APPROACH: ICD-10-PCS | Performed by: THORACIC SURGERY (CARDIOTHORACIC VASCULAR SURGERY)

## 2024-12-11 PROCEDURE — 06BQ4ZZ EXCISION OF LEFT SAPHENOUS VEIN, PERCUTANEOUS ENDOSCOPIC APPROACH: ICD-10-PCS | Performed by: THORACIC SURGERY (CARDIOTHORACIC VASCULAR SURGERY)

## 2024-12-11 PROCEDURE — 94002 VENT MGMT INPAT INIT DAY: CPT

## 2024-12-11 PROCEDURE — 85384 FIBRINOGEN ACTIVITY: CPT

## 2024-12-11 PROCEDURE — 84100 ASSAY OF PHOSPHORUS: CPT

## 2024-12-11 DEVICE — CLIP INT L ORNG TI TRNSVRS GRV CHEVRON SHP W/ PRECIS TIP TO: Type: IMPLANTABLE DEVICE | Site: CHEST | Status: FUNCTIONAL

## 2024-12-11 RX ORDER — BISACODYL 10 MG
10 SUPPOSITORY, RECTAL RECTAL DAILY PRN
Status: DISCONTINUED | OUTPATIENT
Start: 2024-12-11 | End: 2024-12-17 | Stop reason: HOSPADM

## 2024-12-11 RX ORDER — MILRINONE LACTATE 0.2 MG/ML
INJECTION, SOLUTION INTRAVENOUS
Status: DISCONTINUED | OUTPATIENT
Start: 2024-12-11 | End: 2024-12-11 | Stop reason: SDUPTHER

## 2024-12-11 RX ORDER — ATORVASTATIN CALCIUM 40 MG/1
40 TABLET, FILM COATED ORAL NIGHTLY
Status: DISCONTINUED | OUTPATIENT
Start: 2024-12-12 | End: 2024-12-17 | Stop reason: HOSPADM

## 2024-12-11 RX ORDER — ALBUMIN HUMAN 50 G/1000ML
12.5 SOLUTION INTRAVENOUS PRN
Status: DISCONTINUED | OUTPATIENT
Start: 2024-12-11 | End: 2024-12-12

## 2024-12-11 RX ORDER — GINSENG 100 MG
CAPSULE ORAL 2 TIMES DAILY
Status: DISCONTINUED | OUTPATIENT
Start: 2024-12-13 | End: 2024-12-17 | Stop reason: HOSPADM

## 2024-12-11 RX ORDER — POTASSIUM CHLORIDE 7.45 MG/ML
10 INJECTION INTRAVENOUS PRN
Status: DISCONTINUED | OUTPATIENT
Start: 2024-12-11 | End: 2024-12-11 | Stop reason: ALTCHOICE

## 2024-12-11 RX ORDER — SODIUM CHLORIDE 9 MG/ML
INJECTION, SOLUTION INTRAVENOUS PRN
Status: DISCONTINUED | OUTPATIENT
Start: 2024-12-11 | End: 2024-12-17 | Stop reason: HOSPADM

## 2024-12-11 RX ORDER — HEPARIN SODIUM 1000 [USP'U]/ML
INJECTION, SOLUTION INTRAVENOUS; SUBCUTANEOUS
Status: DISCONTINUED | OUTPATIENT
Start: 2024-12-11 | End: 2024-12-11 | Stop reason: SDUPTHER

## 2024-12-11 RX ORDER — ALBUMIN HUMAN 50 G/1000ML
SOLUTION INTRAVENOUS
Status: COMPLETED
Start: 2024-12-11 | End: 2024-12-11

## 2024-12-11 RX ORDER — SODIUM CHLORIDE 0.9 % (FLUSH) 0.9 %
5-40 SYRINGE (ML) INJECTION PRN
Status: DISCONTINUED | OUTPATIENT
Start: 2024-12-11 | End: 2024-12-11 | Stop reason: HOSPADM

## 2024-12-11 RX ORDER — CALCIUM CHLORIDE 100 MG/ML
INJECTION INTRAVENOUS; INTRAVENTRICULAR
Status: DISCONTINUED | OUTPATIENT
Start: 2024-12-11 | End: 2024-12-11

## 2024-12-11 RX ORDER — ONDANSETRON 4 MG/1
4 TABLET, ORALLY DISINTEGRATING ORAL EVERY 8 HOURS PRN
Status: DISCONTINUED | OUTPATIENT
Start: 2024-12-11 | End: 2024-12-17 | Stop reason: HOSPADM

## 2024-12-11 RX ORDER — PROPOFOL 10 MG/ML
5-50 INJECTION, EMULSION INTRAVENOUS CONTINUOUS
Status: DISCONTINUED | OUTPATIENT
Start: 2024-12-11 | End: 2024-12-12

## 2024-12-11 RX ORDER — PHENYLEPHRINE HCL IN 0.9% NACL 50MG/250ML
10-300 PLASTIC BAG, INJECTION (ML) INTRAVENOUS CONTINUOUS PRN
Status: DISCONTINUED | OUTPATIENT
Start: 2024-12-11 | End: 2024-12-17 | Stop reason: HOSPADM

## 2024-12-11 RX ORDER — DEXTROSE MONOHYDRATE 100 MG/ML
INJECTION, SOLUTION INTRAVENOUS CONTINUOUS PRN
Status: DISCONTINUED | OUTPATIENT
Start: 2024-12-11 | End: 2024-12-17 | Stop reason: HOSPADM

## 2024-12-11 RX ORDER — ONDANSETRON 2 MG/ML
4 INJECTION INTRAMUSCULAR; INTRAVENOUS EVERY 6 HOURS PRN
Status: DISCONTINUED | OUTPATIENT
Start: 2024-12-11 | End: 2024-12-17 | Stop reason: HOSPADM

## 2024-12-11 RX ORDER — FAMOTIDINE 20 MG/1
20 TABLET, FILM COATED ORAL EVERY OTHER DAY
Status: DISCONTINUED | OUTPATIENT
Start: 2024-12-12 | End: 2024-12-16

## 2024-12-11 RX ORDER — IPRATROPIUM BROMIDE AND ALBUTEROL SULFATE 2.5; .5 MG/3ML; MG/3ML
1 SOLUTION RESPIRATORY (INHALATION)
Status: DISCONTINUED | OUTPATIENT
Start: 2024-12-11 | End: 2024-12-15

## 2024-12-11 RX ORDER — POTASSIUM CHLORIDE 29.8 MG/ML
20 INJECTION INTRAVENOUS PRN
Status: DISCONTINUED | OUTPATIENT
Start: 2024-12-11 | End: 2024-12-11

## 2024-12-11 RX ORDER — FAMOTIDINE 10 MG/ML
20 INJECTION, SOLUTION INTRAVENOUS EVERY OTHER DAY
Status: DISCONTINUED | OUTPATIENT
Start: 2024-12-12 | End: 2024-12-16

## 2024-12-11 RX ORDER — LIDOCAINE HYDROCHLORIDE 20 MG/ML
INJECTION, SOLUTION EPIDURAL; INFILTRATION; INTRACAUDAL; PERINEURAL
Status: DISCONTINUED | OUTPATIENT
Start: 2024-12-11 | End: 2024-12-11 | Stop reason: SDUPTHER

## 2024-12-11 RX ORDER — SODIUM CHLORIDE 9 MG/ML
INJECTION, SOLUTION INTRAVENOUS PRN
Status: DISCONTINUED | OUTPATIENT
Start: 2024-12-11 | End: 2024-12-12

## 2024-12-11 RX ORDER — FENTANYL CITRATE 0.05 MG/ML
25 INJECTION, SOLUTION INTRAMUSCULAR; INTRAVENOUS
Status: DISCONTINUED | OUTPATIENT
Start: 2024-12-11 | End: 2024-12-11

## 2024-12-11 RX ORDER — ROCURONIUM BROMIDE 10 MG/ML
INJECTION, SOLUTION INTRAVENOUS
Status: DISCONTINUED | OUTPATIENT
Start: 2024-12-11 | End: 2024-12-11 | Stop reason: SDUPTHER

## 2024-12-11 RX ORDER — POTASSIUM CHLORIDE 1500 MG/1
40 TABLET, EXTENDED RELEASE ORAL PRN
Status: DISCONTINUED | OUTPATIENT
Start: 2024-12-11 | End: 2024-12-11 | Stop reason: ALTCHOICE

## 2024-12-11 RX ORDER — SODIUM CHLORIDE 9 MG/ML
INJECTION, SOLUTION INTRAVENOUS PRN
Status: DISCONTINUED | OUTPATIENT
Start: 2024-12-11 | End: 2024-12-11 | Stop reason: HOSPADM

## 2024-12-11 RX ORDER — FAMOTIDINE 20 MG/1
20 TABLET, FILM COATED ORAL 2 TIMES DAILY
Status: DISCONTINUED | OUTPATIENT
Start: 2024-12-11 | End: 2024-12-11

## 2024-12-11 RX ORDER — VASOPRESSIN IN DEXTROSE 5 % 20/100 ML
.01-.04 PLASTIC BAG, INJECTION (ML) INTRAVENOUS CONTINUOUS
Status: DISCONTINUED | OUTPATIENT
Start: 2024-12-11 | End: 2024-12-17

## 2024-12-11 RX ORDER — SODIUM CHLORIDE 9 MG/ML
INJECTION, SOLUTION INTRAVENOUS CONTINUOUS
Status: DISCONTINUED | OUTPATIENT
Start: 2024-12-11 | End: 2024-12-17

## 2024-12-11 RX ORDER — MUPIROCIN 20 MG/G
OINTMENT TOPICAL 2 TIMES DAILY
Status: COMPLETED | OUTPATIENT
Start: 2024-12-11 | End: 2024-12-15

## 2024-12-11 RX ORDER — FENTANYL CITRATE 0.05 MG/ML
INJECTION, SOLUTION INTRAMUSCULAR; INTRAVENOUS
Status: DISCONTINUED | OUTPATIENT
Start: 2024-12-11 | End: 2024-12-11 | Stop reason: SDUPTHER

## 2024-12-11 RX ORDER — VECURONIUM BROMIDE 1 MG/ML
INJECTION, POWDER, LYOPHILIZED, FOR SOLUTION INTRAVENOUS
Status: DISCONTINUED | OUTPATIENT
Start: 2024-12-11 | End: 2024-12-11 | Stop reason: SDUPTHER

## 2024-12-11 RX ORDER — MILRINONE LACTATE 0.2 MG/ML
0.1 INJECTION, SOLUTION INTRAVENOUS CONTINUOUS
Status: DISCONTINUED | OUTPATIENT
Start: 2024-12-11 | End: 2024-12-12

## 2024-12-11 RX ORDER — GABAPENTIN 300 MG/1
300 CAPSULE ORAL 3 TIMES DAILY
Status: DISCONTINUED | OUTPATIENT
Start: 2024-12-11 | End: 2024-12-12

## 2024-12-11 RX ORDER — M-VIT,TX,IRON,MINS/CALC/FOLIC 27MG-0.4MG
1 TABLET ORAL
Status: DISCONTINUED | OUTPATIENT
Start: 2024-12-12 | End: 2024-12-17 | Stop reason: HOSPADM

## 2024-12-11 RX ORDER — MAGNESIUM HYDROXIDE 1200 MG/15ML
LIQUID ORAL CONTINUOUS PRN
Status: COMPLETED | OUTPATIENT
Start: 2024-12-11 | End: 2024-12-11

## 2024-12-11 RX ORDER — SENNA AND DOCUSATE SODIUM 50; 8.6 MG/1; MG/1
1 TABLET, FILM COATED ORAL 2 TIMES DAILY
Status: DISCONTINUED | OUTPATIENT
Start: 2024-12-11 | End: 2024-12-17 | Stop reason: HOSPADM

## 2024-12-11 RX ORDER — GLUCAGON 1 MG/ML
1 KIT INJECTION PRN
Status: DISCONTINUED | OUTPATIENT
Start: 2024-12-11 | End: 2024-12-17 | Stop reason: HOSPADM

## 2024-12-11 RX ORDER — SODIUM CHLORIDE 0.9 % (FLUSH) 0.9 %
5-40 SYRINGE (ML) INJECTION PRN
Status: DISCONTINUED | OUTPATIENT
Start: 2024-12-11 | End: 2024-12-17 | Stop reason: HOSPADM

## 2024-12-11 RX ORDER — MAGNESIUM SULFATE IN WATER 40 MG/ML
2000 INJECTION, SOLUTION INTRAVENOUS PRN
Status: DISCONTINUED | OUTPATIENT
Start: 2024-12-11 | End: 2024-12-11 | Stop reason: ALTCHOICE

## 2024-12-11 RX ORDER — MUPIROCIN 20 MG/G
OINTMENT TOPICAL ONCE
Status: COMPLETED | OUTPATIENT
Start: 2024-12-11 | End: 2024-12-11

## 2024-12-11 RX ORDER — HYDRALAZINE HYDROCHLORIDE 20 MG/ML
20 INJECTION INTRAMUSCULAR; INTRAVENOUS EVERY 6 HOURS PRN
Status: DISCONTINUED | OUTPATIENT
Start: 2024-12-14 | End: 2024-12-13

## 2024-12-11 RX ORDER — SODIUM CHLORIDE 0.9 % (FLUSH) 0.9 %
5-40 SYRINGE (ML) INJECTION EVERY 12 HOURS SCHEDULED
Status: DISCONTINUED | OUTPATIENT
Start: 2024-12-11 | End: 2024-12-17 | Stop reason: HOSPADM

## 2024-12-11 RX ORDER — CHLORHEXIDINE GLUCONATE ORAL RINSE 1.2 MG/ML
15 SOLUTION DENTAL 2 TIMES DAILY
Status: DISCONTINUED | OUTPATIENT
Start: 2024-12-11 | End: 2024-12-17 | Stop reason: HOSPADM

## 2024-12-11 RX ORDER — CHLORHEXIDINE GLUCONATE 40 MG/ML
SOLUTION TOPICAL ONCE
Status: COMPLETED | OUTPATIENT
Start: 2024-12-11 | End: 2024-12-11

## 2024-12-11 RX ORDER — FENTANYL CITRATE 50 UG/ML
25 INJECTION, SOLUTION INTRAMUSCULAR; INTRAVENOUS
Status: DISCONTINUED | OUTPATIENT
Start: 2024-12-11 | End: 2024-12-12

## 2024-12-11 RX ORDER — FAMOTIDINE 10 MG/ML
20 INJECTION, SOLUTION INTRAVENOUS 2 TIMES DAILY
Status: DISCONTINUED | OUTPATIENT
Start: 2024-12-11 | End: 2024-12-11

## 2024-12-11 RX ORDER — TRAMADOL HYDROCHLORIDE 50 MG/1
50 TABLET ORAL EVERY 6 HOURS PRN
Status: DISCONTINUED | OUTPATIENT
Start: 2024-12-11 | End: 2024-12-13

## 2024-12-11 RX ORDER — MIDAZOLAM HYDROCHLORIDE 1 MG/ML
INJECTION, SOLUTION INTRAMUSCULAR; INTRAVENOUS
Status: DISCONTINUED | OUTPATIENT
Start: 2024-12-11 | End: 2024-12-11 | Stop reason: SDUPTHER

## 2024-12-11 RX ORDER — ETOMIDATE 2 MG/ML
INJECTION INTRAVENOUS
Status: DISCONTINUED | OUTPATIENT
Start: 2024-12-11 | End: 2024-12-11 | Stop reason: SDUPTHER

## 2024-12-11 RX ORDER — POLYETHYLENE GLYCOL 3350 17 G/17G
17 POWDER, FOR SOLUTION ORAL DAILY
Status: DISCONTINUED | OUTPATIENT
Start: 2024-12-11 | End: 2024-12-17 | Stop reason: HOSPADM

## 2024-12-11 RX ORDER — ASPIRIN 81 MG/1
81 TABLET, CHEWABLE ORAL DAILY
Status: DISCONTINUED | OUTPATIENT
Start: 2024-12-12 | End: 2024-12-17 | Stop reason: HOSPADM

## 2024-12-11 RX ORDER — ONDANSETRON 2 MG/ML
INJECTION INTRAMUSCULAR; INTRAVENOUS
Status: DISCONTINUED | OUTPATIENT
Start: 2024-12-11 | End: 2024-12-11 | Stop reason: SDUPTHER

## 2024-12-11 RX ORDER — TRAZODONE HYDROCHLORIDE 50 MG/1
50 TABLET, FILM COATED ORAL NIGHTLY PRN
Status: DISCONTINUED | OUTPATIENT
Start: 2024-12-11 | End: 2024-12-17 | Stop reason: HOSPADM

## 2024-12-11 RX ORDER — PROTAMINE SULFATE 10 MG/ML
50 INJECTION, SOLUTION INTRAVENOUS
Status: ACTIVE | OUTPATIENT
Start: 2024-12-11 | End: 2024-12-12

## 2024-12-11 RX ORDER — HEPARIN SODIUM 5000 [USP'U]/ML
5000 INJECTION, SOLUTION INTRAVENOUS; SUBCUTANEOUS EVERY 8 HOURS SCHEDULED
Status: DISCONTINUED | OUTPATIENT
Start: 2024-12-12 | End: 2024-12-17 | Stop reason: HOSPADM

## 2024-12-11 RX ORDER — SODIUM CHLORIDE 0.9 % (FLUSH) 0.9 %
5-40 SYRINGE (ML) INJECTION EVERY 12 HOURS SCHEDULED
Status: DISCONTINUED | OUTPATIENT
Start: 2024-12-11 | End: 2024-12-11 | Stop reason: HOSPADM

## 2024-12-11 RX ORDER — CHLORHEXIDINE GLUCONATE ORAL RINSE 1.2 MG/ML
15 SOLUTION DENTAL ONCE
Status: COMPLETED | OUTPATIENT
Start: 2024-12-11 | End: 2024-12-11

## 2024-12-11 RX ORDER — DEXMEDETOMIDINE HYDROCHLORIDE 4 UG/ML
.1-1.5 INJECTION, SOLUTION INTRAVENOUS CONTINUOUS
Status: DISCONTINUED | OUTPATIENT
Start: 2024-12-11 | End: 2024-12-12

## 2024-12-11 RX ORDER — CLOPIDOGREL BISULFATE 75 MG/1
75 TABLET ORAL DAILY
Status: DISCONTINUED | OUTPATIENT
Start: 2024-12-13 | End: 2024-12-15

## 2024-12-11 RX ORDER — ACETAMINOPHEN 500 MG
1000 TABLET ORAL EVERY 6 HOURS SCHEDULED
Status: DISCONTINUED | OUTPATIENT
Start: 2024-12-11 | End: 2024-12-17 | Stop reason: HOSPADM

## 2024-12-11 RX ORDER — PROTAMINE SULFATE 10 MG/ML
INJECTION, SOLUTION INTRAVENOUS
Status: DISCONTINUED | OUTPATIENT
Start: 2024-12-11 | End: 2024-12-11 | Stop reason: SDUPTHER

## 2024-12-11 RX ORDER — MILRINONE LACTATE 0.2 MG/ML
0.15 INJECTION, SOLUTION INTRAVENOUS CONTINUOUS
Status: DISCONTINUED | OUTPATIENT
Start: 2024-12-11 | End: 2024-12-11

## 2024-12-11 RX ADMIN — SODIUM BICARBONATE 50 MEQ: 84 INJECTION, SOLUTION INTRAVENOUS at 16:46

## 2024-12-11 RX ADMIN — MIDAZOLAM 2 MG: 1 INJECTION INTRAMUSCULAR; INTRAVENOUS at 12:55

## 2024-12-11 RX ADMIN — SODIUM CHLORIDE, PRESERVATIVE FREE 10 ML: 5 INJECTION INTRAVENOUS at 20:11

## 2024-12-11 RX ADMIN — SUGAMMADEX 200 MG: 100 INJECTION, SOLUTION INTRAVENOUS at 13:25

## 2024-12-11 RX ADMIN — FENTANYL CITRATE 100 MCG: 50 INJECTION, SOLUTION INTRAMUSCULAR; INTRAVENOUS at 08:11

## 2024-12-11 RX ADMIN — CHLORHEXIDINE GLUCONATE 0.12% ORAL RINSE 15 ML: 1.2 LIQUID ORAL at 14:50

## 2024-12-11 RX ADMIN — CALCIUM CHLORIDE 1 G: 100 INJECTION, SOLUTION INTRAVENOUS; INTRAVENTRICULAR at 12:27

## 2024-12-11 RX ADMIN — ETOMIDATE 14 MG: 2 INJECTION, SOLUTION INTRAVENOUS at 08:18

## 2024-12-11 RX ADMIN — ALBUMIN (HUMAN) 12.5 G: 12.5 INJECTION, SOLUTION INTRAVENOUS at 17:45

## 2024-12-11 RX ADMIN — FENTANYL CITRATE 100 MCG: 50 INJECTION, SOLUTION INTRAMUSCULAR; INTRAVENOUS at 08:18

## 2024-12-11 RX ADMIN — Medication 50 MCG/MIN: at 11:52

## 2024-12-11 RX ADMIN — ACETAMINOPHEN 1000 MG: 500 TABLET ORAL at 18:44

## 2024-12-11 RX ADMIN — Medication 150 MCG/MIN: at 20:24

## 2024-12-11 RX ADMIN — DEXMEDETOMIDINE HYDROCHLORIDE 0.6 MCG/KG/HR: 4 INJECTION, SOLUTION INTRAVENOUS at 09:24

## 2024-12-11 RX ADMIN — WATER 2000 MG: 1 INJECTION INTRAMUSCULAR; INTRAVENOUS; SUBCUTANEOUS at 14:50

## 2024-12-11 RX ADMIN — SODIUM CHLORIDE 3 UNITS/HR: 9 INJECTION, SOLUTION INTRAVENOUS at 10:38

## 2024-12-11 RX ADMIN — SENNOSIDES AND DOCUSATE SODIUM 1 TABLET: 50; 8.6 TABLET ORAL at 14:50

## 2024-12-11 RX ADMIN — VECURONIUM BROMIDE 5 MG: 1 INJECTION, POWDER, LYOPHILIZED, FOR SOLUTION INTRAVENOUS at 11:25

## 2024-12-11 RX ADMIN — IPRATROPIUM BROMIDE AND ALBUTEROL SULFATE 1 DOSE: 2.5; .5 SOLUTION RESPIRATORY (INHALATION) at 21:25

## 2024-12-11 RX ADMIN — MILRINONE LACTATE IN DEXTROSE 0.1 MCG/KG/MIN: 200 INJECTION, SOLUTION INTRAVENOUS at 19:26

## 2024-12-11 RX ADMIN — ROCURONIUM BROMIDE 100 MG: 10 INJECTION, SOLUTION INTRAVENOUS at 08:18

## 2024-12-11 RX ADMIN — ALBUMIN (HUMAN) 12.5 G: 12.5 INJECTION, SOLUTION INTRAVENOUS at 14:47

## 2024-12-11 RX ADMIN — ALBUMIN (HUMAN) 12.5 G: 12.5 INJECTION, SOLUTION INTRAVENOUS at 22:38

## 2024-12-11 RX ADMIN — FENTANYL CITRATE 150 MCG: 50 INJECTION, SOLUTION INTRAMUSCULAR; INTRAVENOUS at 11:54

## 2024-12-11 RX ADMIN — CHLORHEXIDINE GLUCONATE 236 ML: 4 SOLUTION TOPICAL at 07:34

## 2024-12-11 RX ADMIN — HEPARIN SODIUM 40000 UNITS: 1000 INJECTION INTRAVENOUS; SUBCUTANEOUS at 09:53

## 2024-12-11 RX ADMIN — ONDANSETRON 4 MG: 2 INJECTION INTRAMUSCULAR; INTRAVENOUS at 13:25

## 2024-12-11 RX ADMIN — ALBUMIN (HUMAN) 12.5 G: 12.5 INJECTION, SOLUTION INTRAVENOUS at 16:04

## 2024-12-11 RX ADMIN — ACETAMINOPHEN 1000 MG: 500 TABLET ORAL at 14:50

## 2024-12-11 RX ADMIN — MUPIROCIN: 20 OINTMENT TOPICAL at 07:16

## 2024-12-11 RX ADMIN — SODIUM BICARBONATE 50 MEQ: 84 INJECTION, SOLUTION INTRAVENOUS at 13:32

## 2024-12-11 RX ADMIN — MILRINONE LACTATE IN DEXTROSE 0.25 MCG/KG/MIN: 200 INJECTION, SOLUTION INTRAVENOUS at 12:12

## 2024-12-11 RX ADMIN — ROCURONIUM BROMIDE 50 MG: 10 INJECTION, SOLUTION INTRAVENOUS at 09:54

## 2024-12-11 RX ADMIN — EPINEPHRINE 2 MCG/MIN: 1 INJECTION INTRAMUSCULAR; INTRAVENOUS; SUBCUTANEOUS at 11:52

## 2024-12-11 RX ADMIN — TRAMADOL HYDROCHLORIDE 50 MG: 50 TABLET, COATED ORAL at 20:09

## 2024-12-11 RX ADMIN — MIDAZOLAM 1 MG: 1 INJECTION INTRAMUSCULAR; INTRAVENOUS at 09:25

## 2024-12-11 RX ADMIN — FENTANYL CITRATE 150 MCG: 50 INJECTION, SOLUTION INTRAMUSCULAR; INTRAVENOUS at 09:07

## 2024-12-11 RX ADMIN — FENTANYL CITRATE 25 MCG: 50 INJECTION, SOLUTION INTRAMUSCULAR; INTRAVENOUS at 15:45

## 2024-12-11 RX ADMIN — CHLORHEXIDINE GLUCONATE 0.12% ORAL RINSE 15 ML: 1.2 LIQUID ORAL at 07:16

## 2024-12-11 RX ADMIN — SENNOSIDES AND DOCUSATE SODIUM 1 TABLET: 50; 8.6 TABLET ORAL at 20:11

## 2024-12-11 RX ADMIN — SODIUM BICARBONATE 50 MEQ: 84 INJECTION, SOLUTION INTRAVENOUS at 18:35

## 2024-12-11 RX ADMIN — POLYETHYLENE GLYCOL (3350) 17 G: 17 POWDER, FOR SOLUTION ORAL at 14:50

## 2024-12-11 RX ADMIN — HEPARIN SODIUM: 1000 INJECTION INTRAVENOUS; SUBCUTANEOUS at 08:30

## 2024-12-11 RX ADMIN — SODIUM CHLORIDE: 9 INJECTION, SOLUTION INTRAVENOUS at 13:55

## 2024-12-11 RX ADMIN — WATER 2000 MG: 1 INJECTION INTRAMUSCULAR; INTRAVENOUS; SUBCUTANEOUS at 23:10

## 2024-12-11 RX ADMIN — MUPIROCIN: 20 OINTMENT TOPICAL at 14:49

## 2024-12-11 RX ADMIN — WATER 2000 MG: 1 INJECTION INTRAMUSCULAR; INTRAVENOUS; SUBCUTANEOUS at 08:53

## 2024-12-11 RX ADMIN — PROTAMINE SULFATE 30 MG: 10 INJECTION, SOLUTION INTRAVENOUS at 12:13

## 2024-12-11 RX ADMIN — ALBUMIN (HUMAN) 12.5 G: 12.5 INJECTION, SOLUTION INTRAVENOUS at 13:53

## 2024-12-11 RX ADMIN — PROTAMINE SULFATE 350 MG: 10 INJECTION, SOLUTION INTRAVENOUS at 12:02

## 2024-12-11 RX ADMIN — GABAPENTIN 300 MG: 300 CAPSULE ORAL at 18:35

## 2024-12-11 RX ADMIN — MIDAZOLAM 2 MG: 1 INJECTION INTRAMUSCULAR; INTRAVENOUS at 11:25

## 2024-12-11 RX ADMIN — VASOPRESSIN 0.02 UNITS/MIN: 0.2 INJECTION INTRAVENOUS at 19:49

## 2024-12-11 RX ADMIN — SODIUM CHLORIDE: 9 INJECTION, SOLUTION INTRAVENOUS at 08:07

## 2024-12-11 RX ADMIN — MUPIROCIN: 20 OINTMENT TOPICAL at 20:11

## 2024-12-11 RX ADMIN — LIDOCAINE HYDROCHLORIDE 60 MG: 20 INJECTION, SOLUTION EPIDURAL; INFILTRATION; INTRACAUDAL; PERINEURAL at 08:18

## 2024-12-11 RX ADMIN — FENTANYL CITRATE 100 MCG: 50 INJECTION, SOLUTION INTRAMUSCULAR; INTRAVENOUS at 12:08

## 2024-12-11 RX ADMIN — CHLORHEXIDINE GLUCONATE 0.12% ORAL RINSE 15 ML: 1.2 LIQUID ORAL at 20:11

## 2024-12-11 RX ADMIN — ACETAMINOPHEN 1000 MG: 500 TABLET ORAL at 23:41

## 2024-12-11 RX ADMIN — MIDAZOLAM 1 MG: 1 INJECTION INTRAMUSCULAR; INTRAVENOUS at 08:08

## 2024-12-11 RX ADMIN — AMINOCAPROIC ACID 5 G/HR: 250 INJECTION, SOLUTION INTRAVENOUS at 09:04

## 2024-12-11 ASSESSMENT — PULMONARY FUNCTION TESTS
PIF_VALUE: 17
PIF_VALUE: 17
PIF_VALUE: 22
PIF_VALUE: 17
PIF_VALUE: 15
PIF_VALUE: 17
PIF_VALUE: 15
PIF_VALUE: 17
PIF_VALUE: 18
PIF_VALUE: 17
PIF_VALUE: 17
PIF_VALUE: 16
PIF_VALUE: 17
PIF_VALUE: 16
PIF_VALUE: 17
PIF_VALUE: 15
PIF_VALUE: 17

## 2024-12-11 ASSESSMENT — PAIN SCALES - WONG BAKER
WONGBAKER_NUMERICALRESPONSE: NO HURT
WONGBAKER_NUMERICALRESPONSE: NO HURT

## 2024-12-11 ASSESSMENT — PAIN DESCRIPTION - DESCRIPTORS: DESCRIPTORS: ACHING

## 2024-12-11 ASSESSMENT — PAIN SCALES - GENERAL
PAINLEVEL_OUTOF10: 0
PAINLEVEL_OUTOF10: 0
PAINLEVEL_OUTOF10: 5
PAINLEVEL_OUTOF10: 0
PAINLEVEL_OUTOF10: 0

## 2024-12-11 ASSESSMENT — PAIN - FUNCTIONAL ASSESSMENT: PAIN_FUNCTIONAL_ASSESSMENT: 0-10

## 2024-12-11 ASSESSMENT — PAIN DESCRIPTION - ORIENTATION: ORIENTATION: MID

## 2024-12-11 ASSESSMENT — PAIN DESCRIPTION - LOCATION: LOCATION: CHEST

## 2024-12-11 ASSESSMENT — LIFESTYLE VARIABLES: SMOKING_STATUS: 1

## 2024-12-11 NOTE — PROGRESS NOTES
Patient arrived to CVICU at 13:07 and connected to monitor.  Chest tubes x3 assessed and connected to suction.   RT at bedside to place pt on ventilator.  Report received from OR RN and anesthesia.   Labs & ABG/EPOC collected.   Drips infusing from OR: Insulin, Rao, Epi, Precedex, Milrinone,    Left radial arterial line intact and in place.    Ventricular wires connected to pacer box.   Assessment completed, see documentation.   Care plan ongoing. Vital signs stable.   All needs met at this time.

## 2024-12-11 NOTE — PROCEDURES
PROCEDURE NOTE  Intubation Procedure Note    Indication: Respiratory failure    Consent: Unable to be obtained due to the emergent nature of this procedure.    Medications Used: etomidate intravenously, midazolam intravenously, and rocuronium intravenously    Procedure: The patient was placed in the appropriate position.  Cricoid pressure was not required.  Intubation was performed by direct laryngoscopy using a glidescope and a 7.5 cuffed endotracheal tube.  The cuff was then inflated and the tube was secured appropriately at a distance of 24 cm to the dental ridge.  Initial confirmation of placement included bilateral breath sounds, an end tidal CO2 detector, absence of sounds over the stomach, and tube fogging.  A chest x-ray to verify correct placement of the tube has been ordered but is still pending.    The patient tolerated the procedure well.     Complications: None

## 2024-12-11 NOTE — ANESTHESIA POSTPROCEDURE EVALUATION
Department of Anesthesiology  Postprocedure Note    Patient: Landry Morley  MRN: [id number]  YOB: 1970  Date of evaluation: 12/11/2024    Procedure Summary       Date: 12/11/24 Room / Location: [address]    Anesthesia Start: 0807 Anesthesia Stop: 1342    Procedure: CORONARY ARTERY BYPASS GRAFT X4, LIMA - LAD, SVG - RAMUS, SVG -PDA, SVG - OM2; LEFT ENDOSCOPIC GREATER SAPHENOUS VEIN HARVEST; INTRAOPERATIVE TRANSESOPHAGEAL ECHOCARDIOGRAM (Chest) Diagnosis:       Coronary atherosclerosis of native coronary artery      (Coronary atherosclerosis of native coronary artery [I25.10])    Surgeons: Shayan Kapadia MD Responsible Provider: Kg Nguyen MD    Anesthesia Type: general ASA Status: 4            Anesthesia Type: No value filed.    Efrain Phase I: Efrain Score: 10    Efrain Phase II:      Anesthesia Post Evaluation    Patient location during evaluation: ICU  Patient participation: complete - patient cannot participate  Level of consciousness: sedated and ventilated  Pain score: 0  Airway patency: patent  Nausea & Vomiting: no nausea and no vomiting  Cardiovascular status: hemodynamically stable and vasoactive/inotropes  Respiratory status: ventilator and intubated  Hydration status: euvolemic  Pain management: adequate    No notable events documented.

## 2024-12-11 NOTE — PROGRESS NOTES
Patient following commands and able to lift head off pillow.  Breathing spontaneously on minimal ventilator settings.  NIF -20  RSBI 38   Patient meets all criteria for extubation at this time.   Ok to extubate per Charlotte Chung PA-C  Extubated at 1733 by Sugar KEYS.   Placed on 2 L nasal cannula.  Pt tolerated well.

## 2024-12-11 NOTE — CONSULTS
Name:  Landry Morley /Age/Sex: 1970  (54 y.o. male)   MRN & CSN:  [id number] & [id number] Admission Date/Time: 2024  5:38 AM   Location:  -A PCP: Raza Fairchild DO       Hospital Day: 1          Referring physician:  Shayan Kapadia MD         Reason for consultation: Post CABG management            Thanks for referral.    Information source: Chart/staff    CC; admitted for CABG      HPI:   Thank you for involving me in taking  care of Landry Morley who  is a 54 y.o.year  Old male  Presents with history of severe coronary artery disease was admitted for CABG underwent CABG with 4 bypass graft LIMA to the LAD saphenous vein graft to the ramus and vein graft to the PDA and saphenous vein graft to the OM 2 today                   CORONARY ARTERY BYPASS GRAFT X4, LIMA - LAD, SVG - RAMUS, SVG -PDA, SVG - OM2; LEFT ENDOSCOPIC GREATER SAPHENOUS VEIN HARVEST; INTRAOPERATIVE TRANSESOPHAGEAL ECHOCARDIOGRAM (Chest)       Past medical history:    has a past medical history of Anxiety associated with depression, CAD (coronary artery disease), CKD (chronic kidney disease), stage V ([facility]), Family history of coronary artery disease, H/O echocardiogram, H/O echocardiogram, Hemodialysis patient ([facility]), History of cardiac catheterization, History of cardiovascular stress test, History of exercise stress test, History of myocardial infarction, History of nuclear stress test, History of PTCA, History of PTCA, Hyperlipidemia, Hypertension, Hypertriglyceridemia, Sepsis ([facility]), and Type II or unspecified type diabetes mellitus without mention of complication, not stated as uncontrolled.  Past surgical history:   has a past surgical history that includes hernia repair (); Coronary angioplasty with stent (2006); Cardiac procedure (N/A, 2024); IR NONTUNNELED VASCULAR CATHETER > 5 YEARS (2024); and IR TUNNELED CVC PLACE WO SQ PORT/PUMP > 5 YEARS (2024).  Social History:    ondansetron (ZOFRAN) injection 4 mg  4 mg IntraVENous Q6H PRN Yumi Vega PA-C        [START ON 12/12/2024] aspirin chewable tablet 81 mg  81 mg Oral Daily Yumi Vega PA-C        [START ON 12/13/2024] clopidogrel (PLAVIX) tablet 75 mg  75 mg Oral Daily Yumi Vega PA-C        fentaNYL (SUBLIMAZE) injection 25 mcg  25 mcg IntraVENous Q1H PRN Yumi Vega PA-C        chlorhexidine (PERIDEX) 0.12 % solution 15 mL  15 mL Mouth/Throat BID Yumi Vega PA-C        [START ON 12/14/2024] hydrALAZINE (APRESOLINE) injection 20 mg  20 mg IntraVENous Q6H PRN Yumi Vega PA-C        mupirocin (BACTROBAN) 2 % ointment   Each Nostril BID Yumi Vega PA-C        propofol infusion  5-50 mcg/kg/min IntraVENous Continuous Yumi Vega PA-C        sodium bicarbonate 8.4 % injection 50 mEq  50 mEq IntraVENous Q30 Min PRN Yumi Vega PA-C   50 mEq at 12/11/24 1332    [START ON 12/12/2024] therapeutic multivitamin-minerals 1 tablet  1 tablet Oral Daily with breakfast Yumi Vega PA-C        polyethylene glycol (GLYCOLAX) packet 17 g  17 g Oral Daily Yumi Vega PA-C        sennosides-docusate sodium (SENOKOT-S) 8.6-50 MG tablet 1 tablet  1 tablet Oral BID Yumi Vega PA-C        bisacodyl (DULCOLAX) suppository 10 mg  10 mg Rectal Daily PRN Yumi Vega PA-C        [START ON 12/12/2024] atorvastatin (LIPITOR) tablet 40 mg  40 mg Oral Nightly Yumi Vega PA-C        ceFAZolin (ANCEF) 2,000 mg in sterile water 20 mL IV syringe  2,000 mg IntraVENous Q8H Yumi Vega PA-C        calcium chloride 1,000 mg in sodium chloride 0.9 % 100 mL IVPB  1,000 mg IntraVENous PRN Yumi Vega PA-C        Or    calcium chloride 2,000 mg in sodium chloride 0.9 % 100 mL IVPB  2,000 mg IntraVENous PRN Yumi Vega PA-C        ipratropium 0.5 mg-albuterol 2.5 mg (DUONEB) nebulizer solution 1 Dose  1 Dose Inhalation Q4H WA RT Yumi Vega PA-C    Ref Range    POC pH 7.368 7.35 - 7.45    POC pCO2 45.5 35.0 - 48.0 mm Hg    POC PO2 352.8 (H) 83.0 - 108.0 mm Hg    POC HCO3 26.2 21.0 - 28.0 mmol/L    TCO2 (calc), Art 28 21 - 32 mmol/L    Positive Base Excess, Art 0.7 0.0 - 3.0 mmol/L    POC O2 SAT 99.9 (H) 94.0 - 98.0 %   POC PANEL BMP W/IOCA    Collection Time: 12/11/24 11:02 AM   Result Value Ref Range    POC Sodium 138 136 - 145 mmol/L    POC Potassium 4.3 3.5 - 5.1 mmol/L    POC Chloride 102 99 - 110 mmol/L    POC TCO2 PENDING mmol/L    POC Anion Gap Can not be calculated 7 - 16 mmol/L    POC Glucose 149 (H) 74 - 99 mg/dL    POC BUN 43 (H) 7 - 20 mg/dL    POC Creatinine 4.2 (H) 0.5 - 1.2 mg/dL    eGFR, POC 16 (L) >60 mL/min/1.73m2    POC Ionized Calcium 1.05 (L) 1.15 - 1.33 mmol/L   POCT H&H    Collection Time: 12/11/24 11:02 AM   Result Value Ref Range    POC Hemoglobin (calc) 7.8 (L) 12 - 17 g/dL    POC Hematocrit 23 (L) 38 - 51 %   Arterial Blood Gas, POC    Collection Time: 12/11/24 11:34 AM   Result Value Ref Range    POC pH 7.385 7.35 - 7.45    POC pCO2 41.5 35.0 - 48.0 mm Hg    POC PO2 482.3 (H) 83.0 - 108.0 mm Hg    POC HCO3 24.9 21.0 - 28.0 mmol/L    TCO2 (calc), Art 26 21 - 32 mmol/L    Negative Base Excess, Art 0.2 0.0 - 3.0 mmol/L    POC O2 .0 (H) 94.0 - 98.0 %   POC PANEL BMP W/IOCA    Collection Time: 12/11/24 11:34 AM   Result Value Ref Range    POC Sodium 139 136 - 145 mmol/L    POC Potassium 4.4 3.5 - 5.1 mmol/L    POC Chloride 103 99 - 110 mmol/L    POC TCO2 PENDING mmol/L    POC Anion Gap Can not be calculated 7 - 16 mmol/L    POC Glucose 129 (H) 74 - 99 mg/dL    POC BUN 44 (H) 7 - 20 mg/dL    POC Creatinine 3.7 (H) 0.5 - 1.2 mg/dL    eGFR, POC 19 (L) >60 mL/min/1.73m2    POC Ionized Calcium 1.02 (L) 1.15 - 1.33 mmol/L   POCT H&H    Collection Time: 12/11/24 11:34 AM   Result Value Ref Range    POC Hemoglobin (calc) 7.8 (L) 12 - 17 g/dL    POC Hematocrit 23 (L) 38 - 51 %   Arterial Blood Gas, POC    Collection Time: 12/11/24 12:10 PM

## 2024-12-11 NOTE — CONSULTS
History and Physical 24        NAME: Landry Morley  : 1970  MRN: [id number]      Assessment/Plan:  Landry Morley is a 54 y.o. male with a history of hypertension, diabetes, CAD status post PCI with MICHAEL x 5, CKD stage V on dialysis, congestive heart failure with reduced ejection fraction who presented to [facility] 2024 for scheduled CABG, the ICU post surgical recovery.      Problem list  CAD status post PCI and MICHAEL x 5  CABG x 4, LIMA to LAD, SVG to ramus, PDA and OM 2  Diabetes  ESRD on dialysis      Neuro: Intubated on Precedex, alert, following commands, titrate to RASS goal of 0 to -1, SAT/SBT with intent to extubate when clinically appropriate.  Pain controlled with current multimodal regimen  Cardio: Severe history of coronary artery disease and congestive heart failure known CAD with multiple MICHAEL's, now status post CABG x 4, LIMA to LAD, SVG to ramus, PDA and OM 2.  Known to have congestive heart failure with reduced ejection fraction, EF of 35 to 40%.  Last ROHIT from 2024 shows EF of 30 to 35% mid and apical septal wall hypokinesis and noted thrombus in the left atrial appendage.  Cardiology and CTS managing.  Postop cardiac index of 1.8 with cardiac output of 4 on epinephrine at 2 mcg/min, phenylephrine at 50 mcg/min and milrinone 0.25 mcg/kg/min.  V pacing wire in place, pacer to VVI backup at 60.  Resp: Postop on vent, adjust vent to optimize acid-base status.  Continue inhalers, nebs, aggressive pulmonary toileting.  SAT/SBT with intent to extubate per protocol as clinically appropriate.  Chest tubes in place, minimal output, monitor, management per CTS  GI: N.p.o., advance diet as tolerated when extubated, GI prophylaxis.  : ESRD on dialysis twice a week, nephrology following, still making urine, monitor urine output, monitor electrolytes and replenish as needed.  Heme: Hemoglobin of 9.7, platelets of 190, DVT prophylaxis with heparin.  Monitor for bleeding and transfuse as  needed.  ID: WBC of 19.1, likely reactive, continue perioperative biotics.  Endo: Known diabetic, on insulin drip postprocedure.  A1c of 5.8, transition to subcu regimen when clinically appropriate within the next 24 hours.  MSK: DTI prevention, out of bed to chair, PT/OT/ambulate 3 times daily.    Tubes/Lines/Drains: Tunneled dialysis catheter, PIV, A-line, introducer, PA catheter, pacing wire    Code Status: Full         Chief Complaint:    CABG    History of Present Illness:    54-year-old male with past medical history of hypertension, diabetes, CAD status post 5 stents, CHF, with reduced EF, CKD stage IV who presented with shortness of breath, pillow orthopnea, dyspnea on exertion.  Presented for CABG.    Patient seen and evaluated postprocedure, status post CABG x 4.  In the ICU, for postoperative management.    Labs and radiographic imaging reviewed personally by myself and case discussed with ED physician in detail. Agree with ICU admission.     ROS:  Unable to obtain given clinical condition    Past Medical, Surgical, Social, Family History:   Past Medical History:   Diagnosis Date    Anxiety associated with depression 06/26/2024    CAD (coronary artery disease)     H/O MI.    CKD (chronic kidney disease), stage V ([facility])     born with one kidney    Family history of coronary artery disease     H/O echocardiogram 06/2008 6/08 EF>55% TRACE MR, TR    H/O echocardiogram 04/15/2015    EF 50-55% Mild left atrial enlargement. Normal LV systolic function. Trace MR and TR. Normal sized abdominal aorta.     Hemodialysis patient ([facility])     Tuesday, Thursday and Saturday    History of cardiac catheterization 03/01/2017    Stenting of the LAD    History of cardiovascular stress test 06/06/2006 7/12 LAD territory ISCHEMIA, EF 62%    History of exercise stress test 03/20/2017    treadmill    History of myocardial infarction 04/2006    History of nuclear stress test 01/26/2017    lexiscan-scar,no ischemia,EF52%,apical      Hemoglobin A1C:   Lab Results   Component Value Date/Time    LABA1C 5.8 11/19/2024 12:26 PM     TSH:   Lab Results   Component Value Date/Time    TSH 2.18 12/06/2024 09:17 AM     Troponin: No results found for: \"TROPONINT\"  Lactic Acid: No results for input(s): \"LACTA\" in the last 72 hours.  BNP: No results for input(s): \"PROBNP\" in the last 72 hours.  UA:  Lab Results   Component Value Date/Time    NITRU NEGATIVE 12/06/2024 09:17 AM    COLORU Yellow 12/06/2024 09:17 AM    PHUR 6.5 12/06/2024 09:17 AM    WBCUA 2 12/06/2024 09:17 AM    RBCUA <1 12/06/2024 09:17 AM    RBCUA <1 06/13/2024 05:45 PM    MUCUS RARE 12/06/2024 09:17 AM    TRICHOMONAS None seen 12/06/2024 09:17 AM    BACTERIA NEGATIVE 06/13/2024 05:45 PM    CLARITYU CLEAR 06/13/2024 05:45 PM    LEUKOCYTESUR NEGATIVE 12/06/2024 09:17 AM    UROBILINOGEN 0.2 12/06/2024 09:17 AM    BILIRUBINUR NEGATIVE 12/06/2024 09:17 AM    BLOODU MODERATE NUMBER OR AMOUNT OBSERVED 06/13/2024 05:45 PM    GLUCOSEU NEGATIVE 12/06/2024 09:17 AM    KETUA NEGATIVE 12/06/2024 09:17 AM     Urine Cultures: No results found for: \"LABURIN\"  Blood Cultures: No results found for: \"BC\"  No results found for: \"BLOODCULT2\"  Organism: No results found for: \"ORG\"    Critical care attestation:    I spent 58 cumulative minutes (excluding procedures), in full attention to this critically ill patient.    I have personally reviewed the patient's history and interval events in the EMR, along with vitals, labs and radiology imaging. Critical care time was spent personally providing care for this patient, excluding billable procedures, and no overlapping with any other provider.  This includes documenting my assessment and plan of care and developing the care plan to treat the problems below.    The high probability of deterioration required my full and direct attention, intervention, and personal management due to do to underlying diagnosis and clinical problems including the treatment of active

## 2024-12-11 NOTE — ANESTHESIA PROCEDURE NOTES
Arterial Line:    An arterial line was placed using ultrasound guidance, in the OR for the following indication(s): continuous blood pressure monitoring and blood sampling needed.    A 20 gauge (size), 4.45 cm (length), Arrow (type) catheter was placed, Seldinger technique used, into the left radial artery, secured by tape.  Anesthesia type: Local    Events:  patient tolerated procedure well with no complications and EBL 0mL.12/11/2024 8:15 AM12/11/2024 8:18 AM  Resident/CRNA: Yasmany Longoria APRN - CRNA  Performed: Resident/CRNA   Preanesthetic Checklist  Completed: patient identified, IV checked, site marked, risks and benefits discussed, surgical/procedural consents, equipment checked, pre-op evaluation, timeout performed, anesthesia consent given, oxygen available, monitors applied/VS acknowledged, fire risk safety assessment completed and verbalized and blood product R/B/A discussed and consented          
Central Venous Line:    A central venous line was placed using ultrasound guidance, in the OR for the following indication(s): central venous access and CVP monitoring.12/11/2024 8:30 AM12/11/2024 8:41 AM    Sterility preparation included the following: provider used sterile gloves, gown, hat and mask, hand hygiene performed prior to central venous catheter insertion, sterile gel and probe cover used in ultrasound-guided central venous catheter insertion and maximum sterile barriers used during central venous catheter insertion.    The patient was placed in Trendelenburg position.The right internal jugular vein was prepped.    The site was prepped with Chloraprep.  A 9 Fr (size), introducer double lumen was placed.    During the procedure, the following specific steps were taken: target vein identified, needle advanced into vein and blood aspirated and guidewire advanced into vein.    Intravenous verification was obtained by ultrasound, venous blood return, ROHIT and ROHIT.    Post insertion care included: all ports aspirated, all ports flushed easily, guidewire removed intact, Biopatch applied, line sutured in place and dressing applied.    During the procedure the patient experienced: patient tolerated procedure well with no complications.      Outcomes: uncomplicated and patient tolerated procedure well  Anesthesia type: generalA(n) oximetric, 8 (size) Pulmonary Artery Catheter (PAC) was placed through the Introducer CVL in the right internal jugular vein.  The PAC placement was confirmed by pressure tracing changes and ROHIT.  The patient experienced the following events during the procedure: patient tolerated procedure well with no complications.PA Cath placed?: Yes  Staffing  Performed: Resident/CRNA   Resident/CRNA: Yasmany Longoria APRN - CRNA  Performed by: Yasmany Longoria APRN - CRNA  Authorized by: Marcial Jimenez MD    Preanesthetic Checklist  Completed: patient identified, IV checked, site marked, risks and 
Procedure Performed: ROHIT       Start Time:  12/11/2024 8:37 AM       End Time:          Preanesthesia Checklist:  Patient identified, IV assessed, risks and benefits discussed, monitors and equipment assessed, procedure being performed at surgeon's request and anesthesia consent obtained.    General Procedure Information  Diagnostic Indications for Echo:  assessment of surgical repair  Physician Requesting Echo: Shayan Kapadia MD  Location performed:  OR  Intubated  Bite block not placed  Heart visualized  Probe Insertion:  Easy (x1 attempt)  Probe Type:  3D  Modalities:  2D, 3D, color flow mapping, continuous wave Doppler, M-mode and pulse wave Doppler    Echocardiographic and Doppler Measurements    Ventricles    Ventricle  Cavity Size  Cavity          Dimension  Hypertrophy  Thrombus  Global FXN  EF    RV  normal        mildly impaired      LV  normal  5.7  No  No  moderately impaired (30-39%)       Ventricular Findings Comments:          Ventricular Regional Function:  1- Basal Anteroseptal:  hypokinetic  2- Basal Anterior:  hypokinetic  3- Basal Anterolateral:  hypokinetic                                    
dilatation.  No obvious thrombus appreciated in left atrial appendage.  PWD velocities in KIRT ~40-45 cm/s.    Septa    Interatrial Septal Morphology: normal        Other Atrial Septal Defect findings: Interatrial septum unremarkable for PFO  Interatrial septum is does not appear to have shunt by color flow doppler.   Lipomatous changes to IAS.    Interventricular Septal Morphology: normal      Other Ventricular Septal Defect findings: No obvious intracardiac shunts appreciated.     Diastolic Function Measurements:  Diastolic Dysfunction Grade= indeterminate  E=  40.6 cm/s ms  A=  54.5 cm/s ms  E/A Ratio=  0.7  DT=  ms  S/D=   IVRT=    Other Findings  Pericardium:  normal  Pleural Effusion:  none  Pulmonary Arteries:  normal  Pulmonary Venous Flow:  blunted (decreased) systolic flow  Post Intervention Follow-up Study  Ventricular Global Function: improved.   Ventricular Regional Function: improved     Valve  Function  Regurgitation  Area Prosthetic?    Aortic  unchanged  none      Mitral  unchanged  mild      Tricuspid  unchanged       Prosthetic         None  Comments: Patient underwent CPB s/p CABG x4 Graft (LIMA - LAD, SVG - RAMUS, SVG -PDA, SVG - OM2).     Prior to weaning from cardiopulmonary bypass the heart was had minimal air. Patient was weaned off bypass with epinephrine 2 mcg/min and phenylephrine 50 mcg/min. Pulmonary artery pressures elevated upon weaning; patient responded well milrinone infusion (0.25 mcg/kg/min). Left ventricle systolic function appeared mildly improved 35-40%; with noticeable improved wall thickening and contractility of the lateral inferior and anterior LV segments. No significant valvulopathy was noted post-bypass. Re-appreciation of mild mitral insufficiency. No new evidence of intracardiac shunting. No significant pericardial or pleural effusion. Ascending, descending, and limited examined aortic arch remained unchanged. Patient was brought to the ICU hemodynamically stable and

## 2024-12-11 NOTE — PROGRESS NOTES
Patient rec'd from OR and placed on vent AC 16 500 peep 5 100%. ETT 24 Lip. Will continue to monitor.

## 2024-12-11 NOTE — PROGRESS NOTES
4 Eyes Skin Assessment     NAME:  Landry Morley  YOB: 1970  MEDICAL RECORD NUMBER:  [id number]    The patient is being assessed for  Admission    I agree that at least one RN has performed a thorough Head to Toe Skin Assessment on the patient. ALL assessment sites listed below have been assessed.      Areas assessed by both nurses:    Head, Face, Ears, Shoulders, Back, Chest, Arms, Elbows, Hands, Sacrum. Buttock, Coccyx, Ischium, and Legs. Feet and Heels        Does the Patient have a Wound? No noted wound(s)       Alex Prevention initiated by RN: No  Wound Care Orders initiated by RN: No    Pressure Injury (Stage 3,4, Unstageable, DTI, NWPT, and Complex wounds) if present, place Wound referral order by RN under : No    New Ostomies, if present place, Ostomy referral order under : No     Nurse 1 eSignature: Electronically signed by Daren Gutierres RN on 12/11/24 at 5:56 PM EST    **SHARE this note so that the co-signing nurse can place an eSignature**    Nurse 2 eSignature: Electronically signed by Candice Mancini RN on 12/11/24 at 6:47 PM EST

## 2024-12-11 NOTE — PROGRESS NOTES
Switch patient to CPAP PS 10 30%.  RSBI is 25, working on NIF - Best NIF -17. Will come back soon and repeat.

## 2024-12-11 NOTE — INTERVAL H&P NOTE
TRANSFER - OUT REPORT:    Verbal report given to Celi(name) on Alexus Paredes  being transferred to 564(unit) for routine post - op       Report consisted of patients Situation, Background, Assessment and   Recommendations(SBAR). Time Pre op antibiotic given:1421  Anesthesia Stop time: 5530  Chambers Present on Transfer to floor:no  Order for Chambers on Chart:no  Discharge Prescriptions with Chart:yes    Information from the following report(s) SBAR, Kardex, OR Summary, Procedure Summary, Intake/Output, MAR, Recent Results and Med Rec Status was reviewed with the receiving nurse. Opportunity for questions and clarification was provided. Is the patient on 02? YES       L/Min 2       Other     Is the patient on a monitor? NO    Is the nurse transporting with the patient? NO    Surgical Waiting Area notified of patient's transfer from PACU? YES      The following personal items collected during your admission accompanied patient upon transfer:   Dental Appliance:    Vision:    Hearing Aid:    Jewelry: Jewelry: None  Clothing: Clothing: (clothing to PACU)  Other Valuables: Other Valuables: Bhakti Mcdaniels  Valuables sent to safe:        Clothes and glasses and cane sent to floor. Update History & Physical    The patient's History and Physical of November 27, 2027 was reviewed with the patient and I examined the patient. There was no change. The surgical site was confirmed by the patient and me.     Plan: The risks, benefits, expected outcome, and alternative to the recommended procedure have been discussed with the patient. Patient understands and wants to proceed with the procedure.     Electronically signed by Shayan Kapadia MD on 12/11/2024 at 7:54 AM

## 2024-12-12 ENCOUNTER — ANESTHESIA (OUTPATIENT)
Dept: OPERATING ROOM | Age: 54
DRG: 235 | End: 2024-12-12
Payer: MEDICARE

## 2024-12-12 ENCOUNTER — APPOINTMENT (OUTPATIENT)
Dept: GENERAL RADIOLOGY | Age: 54
DRG: 235 | End: 2024-12-12
Attending: THORACIC SURGERY (CARDIOTHORACIC VASCULAR SURGERY)
Payer: MEDICARE

## 2024-12-12 ENCOUNTER — ANESTHESIA EVENT (OUTPATIENT)
Dept: OPERATING ROOM | Age: 54
DRG: 235 | End: 2024-12-12
Payer: MEDICARE

## 2024-12-12 ENCOUNTER — APPOINTMENT (OUTPATIENT)
Dept: NON INVASIVE DIAGNOSTICS | Age: 54
DRG: 235 | End: 2024-12-12
Attending: INTERNAL MEDICINE
Payer: MEDICARE

## 2024-12-12 LAB
ALBUMIN SERPL-MCNC: 3.1 G/DL (ref 3.4–5)
ALBUMIN SERPL-MCNC: 3.9 G/DL (ref 3.4–5)
ALBUMIN/GLOB SERPL: 2.3 {RATIO} (ref 1.1–2.2)
ALBUMIN/GLOB SERPL: 2.5 {RATIO} (ref 1.1–2.2)
ALP SERPL-CCNC: 32 U/L (ref 40–129)
ALP SERPL-CCNC: 39 U/L (ref 40–129)
ALT SERPL-CCNC: 5 U/L (ref 10–40)
ALT SERPL-CCNC: <5 U/L (ref 10–40)
ANION GAP SERPL CALCULATED.3IONS-SCNC: 10 MMOL/L (ref 9–17)
ANION GAP SERPL CALCULATED.3IONS-SCNC: 13 MMOL/L (ref 9–17)
ANION GAP SERPL CALCULATED.3IONS-SCNC: 15 MMOL/L (ref 9–17)
ARTERIAL PATENCY WRIST A: ABNORMAL
AST SERPL-CCNC: 18 U/L (ref 15–37)
AST SERPL-CCNC: 23 U/L (ref 15–37)
BASOPHILS # BLD: 0.02 K/UL
BASOPHILS NFR BLD: 0 % (ref 0–1)
BDY SITE: ABNORMAL
BILIRUB DIRECT SERPL-MCNC: <0.2 MG/DL (ref 0–0.3)
BILIRUB DIRECT SERPL-MCNC: <0.2 MG/DL (ref 0–0.3)
BILIRUB INDIRECT SERPL-MCNC: ABNORMAL MG/DL (ref 0–0.7)
BILIRUB INDIRECT SERPL-MCNC: ABNORMAL MG/DL (ref 0–0.7)
BILIRUB SERPL-MCNC: 0.2 MG/DL (ref 0–1)
BILIRUB SERPL-MCNC: 0.3 MG/DL (ref 0–1)
BODY TEMPERATURE: 37
BUN BLD-MCNC: 28 MG/DL (ref 7–20)
BUN BLD-MCNC: 36 MG/DL (ref 7–20)
BUN BLD-MCNC: 39 MG/DL (ref 7–20)
BUN BLD-MCNC: 40 MG/DL (ref 7–20)
BUN BLD-MCNC: 40 MG/DL (ref 7–20)
BUN BLD-MCNC: 42 MG/DL (ref 7–20)
BUN BLD-MCNC: 43 MG/DL (ref 7–20)
BUN BLD-MCNC: 44 MG/DL (ref 7–20)
BUN BLD-MCNC: 45 MG/DL (ref 7–20)
BUN BLD-MCNC: 46 MG/DL (ref 7–20)
BUN BLD-MCNC: 47 MG/DL (ref 7–20)
BUN BLD-MCNC: 48 MG/DL (ref 7–20)
BUN BLD-MCNC: 48 MG/DL (ref 7–20)
BUN BLD-MCNC: 49 MG/DL (ref 7–20)
BUN SERPL-MCNC: 31 MG/DL (ref 7–20)
BUN SERPL-MCNC: 41 MG/DL (ref 7–20)
BUN SERPL-MCNC: 49 MG/DL (ref 7–20)
CA-I BLD-SCNC: 0.88 MMOL/L (ref 1.15–1.33)
CA-I BLD-SCNC: 1.08 MMOL/L (ref 1.15–1.33)
CA-I BLD-SCNC: 1.1 MMOL/L (ref 1.15–1.33)
CA-I BLD-SCNC: 1.1 MMOL/L (ref 1.15–1.33)
CA-I BLD-SCNC: 1.13 MMOL/L (ref 1.15–1.33)
CA-I BLD-SCNC: 1.13 MMOL/L (ref 1.15–1.33)
CA-I BLD-SCNC: 1.14 MMOL/L (ref 1.15–1.33)
CA-I BLD-SCNC: 1.15 MMOL/L (ref 1.15–1.33)
CA-I BLD-SCNC: 1.16 MMOL/L (ref 1.15–1.33)
CA-I BLD-SCNC: 1.16 MMOL/L (ref 1.15–1.33)
CA-I BLD-SCNC: 1.17 MMOL/L (ref 1.15–1.33)
CA-I BLD-SCNC: 1.18 MMOL/L (ref 1.15–1.33)
CA-I BLD-SCNC: 1.19 MMOL/L (ref 1.15–1.33)
CA-I BLD-SCNC: 1.19 MMOL/L (ref 1.15–1.33)
CA-I BLD-SCNC: 1.21 MMOL/L (ref 1.15–1.33)
CA-I BLD-SCNC: 1.31 MMOL/L (ref 1.15–1.33)
CA-I BLD-SCNC: 1.4 MMOL/L (ref 1.15–1.33)
CALCIUM SERPL-MCNC: 7.2 MG/DL (ref 8.3–10.6)
CALCIUM SERPL-MCNC: 8.4 MG/DL (ref 8.3–10.6)
CALCIUM SERPL-MCNC: 8.6 MG/DL (ref 8.3–10.6)
CHLORIDE BLD-SCNC: 105 MMOL/L (ref 99–110)
CHLORIDE BLD-SCNC: 105 MMOL/L (ref 99–110)
CHLORIDE BLD-SCNC: 106 MMOL/L (ref 99–110)
CHLORIDE BLD-SCNC: 107 MMOL/L (ref 99–110)
CHLORIDE BLD-SCNC: 108 MMOL/L (ref 99–110)
CHLORIDE BLD-SCNC: 110 MMOL/L (ref 99–110)
CHLORIDE BLD-SCNC: ABNORMAL MMOL/L (ref 99–110)
CHLORIDE SERPL-SCNC: 105 MMOL/L (ref 99–110)
CHLORIDE SERPL-SCNC: 106 MMOL/L (ref 99–110)
CHLORIDE SERPL-SCNC: 112 MMOL/L (ref 99–110)
CO2 SERPL-SCNC: 21 MMOL/L (ref 21–32)
CO2 SERPL-SCNC: 22 MMOL/L (ref 21–32)
CO2 SERPL-SCNC: 24 MMOL/L (ref 21–32)
COHGB MFR BLD: 0.2 % (ref 0.5–1.5)
COHGB MFR BLD: 0.5 % (ref 0.5–1.5)
COHGB MFR BLD: 0.6 % (ref 0.5–1.5)
COHGB MFR BLD: 1 % (ref 0.5–1.5)
CREAT BLD-MCNC: 3.8 MG/DL (ref 0.5–1.2)
CREAT BLD-MCNC: 3.8 MG/DL (ref 0.5–1.2)
CREAT BLD-MCNC: 4.1 MG/DL (ref 0.5–1.2)
CREAT BLD-MCNC: 4.1 MG/DL (ref 0.5–1.2)
CREAT BLD-MCNC: 4.2 MG/DL (ref 0.5–1.2)
CREAT BLD-MCNC: 4.3 MG/DL (ref 0.5–1.2)
CREAT BLD-MCNC: 4.4 MG/DL (ref 0.5–1.2)
CREAT BLD-MCNC: 4.5 MG/DL (ref 0.5–1.2)
CREAT BLD-MCNC: 4.7 MG/DL (ref 0.5–1.2)
CREAT BLD-MCNC: 4.7 MG/DL (ref 0.5–1.2)
CREAT BLD-MCNC: 4.8 MG/DL (ref 0.5–1.2)
CREAT BLD-MCNC: 4.9 MG/DL (ref 0.5–1.2)
CREAT BLD-MCNC: 4.9 MG/DL (ref 0.5–1.2)
CREAT BLD-MCNC: 5.1 MG/DL (ref 0.5–1.2)
CREAT BLD-MCNC: 5.2 MG/DL (ref 0.5–1.2)
CREAT BLD-MCNC: 5.8 MG/DL (ref 0.5–1.2)
CREAT SERPL-MCNC: 3.3 MG/DL (ref 0.9–1.3)
CREAT SERPL-MCNC: 4.4 MG/DL (ref 0.9–1.3)
CREAT SERPL-MCNC: 5.1 MG/DL (ref 0.9–1.3)
D DIMER PPP FEU-MCNC: 0.28 UG/ML FEU (ref 0–0.46)
D DIMER PPP FEU-MCNC: <0.27 UG/ML FEU (ref 0–0.46)
ECHO BSA: 2.27 M2
ECHO LV EDV A4C: 94 ML
ECHO LV EDV INDEX A4C: 42 ML/M2
ECHO LV EF PHYSICIAN: 45 %
ECHO LV EJECTION FRACTION A4C: 47 %
ECHO LV ESV A4C: 50 ML
ECHO LV ESV INDEX A4C: 22 ML/M2
ECHO LV FRACTIONAL SHORTENING: 16 % (ref 28–44)
ECHO LV INTERNAL DIMENSION DIASTOLE INDEX: 2.23 CM/M2
ECHO LV INTERNAL DIMENSION DIASTOLIC: 5 CM (ref 4.2–5.9)
ECHO LV INTERNAL DIMENSION SYSTOLIC INDEX: 1.88 CM/M2
ECHO LV INTERNAL DIMENSION SYSTOLIC: 4.2 CM
ECHO LV IVSD: 1.2 CM (ref 0.6–1)
ECHO LV MASS 2D: 247.6 G (ref 88–224)
ECHO LV MASS INDEX 2D: 110.5 G/M2 (ref 49–115)
ECHO LV POSTERIOR WALL DIASTOLIC: 1.3 CM (ref 0.6–1)
ECHO LV RELATIVE WALL THICKNESS RATIO: 0.52
ECHO LVOT AREA: 4.5 CM2
ECHO LVOT DIAM: 2.4 CM
EGFR, POC: 11 ML/MIN/1.73M2
EGFR, POC: 12 ML/MIN/1.73M2
EGFR, POC: 13 ML/MIN/1.73M2
EGFR, POC: 14 ML/MIN/1.73M2
EGFR, POC: 15 ML/MIN/1.73M2
EGFR, POC: 15 ML/MIN/1.73M2
EGFR, POC: 16 ML/MIN/1.73M2
EGFR, POC: 16 ML/MIN/1.73M2
EGFR, POC: 17 ML/MIN/1.73M2
EGFR, POC: 17 ML/MIN/1.73M2
EGFR, POC: 18 ML/MIN/1.73M2
EGFR, POC: 18 ML/MIN/1.73M2
EKG ATRIAL RATE: 75 BPM
EKG ATRIAL RATE: 78 BPM
EKG DIAGNOSIS: NORMAL
EKG DIAGNOSIS: NORMAL
EKG P AXIS: 34 DEGREES
EKG P AXIS: 54 DEGREES
EKG P-R INTERVAL: 196 MS
EKG P-R INTERVAL: 198 MS
EKG Q-T INTERVAL: 392 MS
EKG Q-T INTERVAL: 408 MS
EKG QRS DURATION: 104 MS
EKG QRS DURATION: 88 MS
EKG QTC CALCULATION (BAZETT): 446 MS
EKG QTC CALCULATION (BAZETT): 455 MS
EKG R AXIS: -15 DEGREES
EKG R AXIS: -9 DEGREES
EKG T AXIS: 69 DEGREES
EKG T AXIS: 83 DEGREES
EKG VENTRICULAR RATE: 75 BPM
EKG VENTRICULAR RATE: 78 BPM
EOSINOPHIL # BLD: 0 K/UL
EOSINOPHILS RELATIVE PERCENT: 0 % (ref 0–3)
ERYTHROCYTE [DISTWIDTH] IN BLOOD BY AUTOMATED COUNT: 13.7 % (ref 11.7–14.9)
ERYTHROCYTE [DISTWIDTH] IN BLOOD BY AUTOMATED COUNT: 14.1 % (ref 11.7–14.9)
ERYTHROCYTE [DISTWIDTH] IN BLOOD BY AUTOMATED COUNT: 14.2 % (ref 11.7–14.9)
ERYTHROCYTE [DISTWIDTH] IN BLOOD BY AUTOMATED COUNT: 15.9 % (ref 11.7–14.9)
ERYTHROCYTE [DISTWIDTH] IN BLOOD BY AUTOMATED COUNT: 16.9 % (ref 11.7–14.9)
FIBRINOGEN PPP-MCNC: 253 MG/DL (ref 170–540)
FIBRINOGEN PPP-MCNC: 281 MG/DL (ref 170–540)
FIBRINOGEN PPP-MCNC: 369 MG/DL (ref 170–540)
GAS FLOW.O2 O2 DELIVERY SYS: ABNORMAL L/MIN
GFR, ESTIMATED: 12 ML/MIN/1.73M2
GFR, ESTIMATED: 14 ML/MIN/1.73M2
GFR, ESTIMATED: 20 ML/MIN/1.73M2
GLUCOSE BLD-MCNC: 100 MG/DL (ref 74–99)
GLUCOSE BLD-MCNC: 101 MG/DL (ref 74–99)
GLUCOSE BLD-MCNC: 103 MG/DL (ref 74–99)
GLUCOSE BLD-MCNC: 104 MG/DL (ref 74–99)
GLUCOSE BLD-MCNC: 106 MG/DL (ref 74–99)
GLUCOSE BLD-MCNC: 107 MG/DL (ref 74–99)
GLUCOSE BLD-MCNC: 107 MG/DL (ref 74–99)
GLUCOSE BLD-MCNC: 108 MG/DL (ref 74–99)
GLUCOSE BLD-MCNC: 109 MG/DL (ref 74–99)
GLUCOSE BLD-MCNC: 109 MG/DL (ref 74–99)
GLUCOSE BLD-MCNC: 113 MG/DL (ref 74–99)
GLUCOSE BLD-MCNC: 115 MG/DL (ref 74–99)
GLUCOSE BLD-MCNC: 115 MG/DL (ref 74–99)
GLUCOSE BLD-MCNC: 130 MG/DL (ref 74–99)
GLUCOSE BLD-MCNC: 134 MG/DL (ref 74–99)
GLUCOSE BLD-MCNC: 153 MG/DL (ref 74–99)
GLUCOSE BLD-MCNC: 89 MG/DL (ref 74–99)
GLUCOSE BLD-MCNC: 90 MG/DL (ref 74–99)
GLUCOSE BLD-MCNC: 93 MG/DL (ref 74–99)
GLUCOSE BLD-MCNC: 95 MG/DL (ref 74–99)
GLUCOSE BLD-MCNC: 98 MG/DL (ref 74–99)
GLUCOSE BLD-MCNC: 99 MG/DL (ref 74–99)
GLUCOSE SERPL-MCNC: 103 MG/DL (ref 74–99)
GLUCOSE SERPL-MCNC: 87 MG/DL (ref 74–99)
GLUCOSE SERPL-MCNC: 89 MG/DL (ref 74–99)
HCO3 ARTERIAL: 21.2 MMOL/L (ref 21–28)
HCO3 ARTERIAL: 24.2 MMOL/L (ref 21–28)
HCO3 VENOUS: 22.1 MMOL/L (ref 22–29)
HCO3 VENOUS: 23.2 MMOL/L (ref 22–29)
HCO3 VENOUS: 23.7 MMOL/L (ref 22–29)
HCO3 VENOUS: 25.6 MMOL/L (ref 22–29)
HCO3 VENOUS: 26 MMOL/L (ref 22–29)
HCT VFR BLD AUTO: 21 % (ref 38–51)
HCT VFR BLD AUTO: 22 % (ref 38–51)
HCT VFR BLD AUTO: 22 % (ref 38–51)
HCT VFR BLD AUTO: 22.5 % (ref 42–52)
HCT VFR BLD AUTO: 23 % (ref 38–51)
HCT VFR BLD AUTO: 23 % (ref 42–52)
HCT VFR BLD AUTO: 24 % (ref 38–51)
HCT VFR BLD AUTO: 24.4 % (ref 42–52)
HCT VFR BLD AUTO: 25 % (ref 38–51)
HCT VFR BLD AUTO: 25 % (ref 42–52)
HCT VFR BLD AUTO: 25.4 % (ref 42–52)
HCT VFR BLD AUTO: <10 % (ref 38–51)
HGB BLD-MCNC: 7.5 G/DL (ref 13.5–18)
HGB BLD-MCNC: 7.6 G/DL (ref 13.5–18)
HGB BLD-MCNC: 8.2 G/DL (ref 13.5–18)
HGB BLD-MCNC: 8.2 G/DL (ref 13.5–18)
HGB BLD-MCNC: 8.3 G/DL (ref 13.5–18)
IMM GRANULOCYTES # BLD AUTO: 0.02 K/UL
IMM GRANULOCYTES NFR BLD: 0 %
INR PPP: 1.3
INR PPP: 1.4
INR PPP: 1.7
LACTATE BLDV-SCNC: 0.7 MMOL/L (ref 0.4–2)
LACTATE BLDV-SCNC: 1.9 MMOL/L (ref 0.4–2)
LYMPHOCYTES NFR BLD: 0.78 K/UL
LYMPHOCYTES RELATIVE PERCENT: 13 % (ref 24–44)
MAGNESIUM SERPL-MCNC: 2.1 MG/DL (ref 1.8–2.4)
MAGNESIUM SERPL-MCNC: 2.5 MG/DL (ref 1.8–2.4)
MAGNESIUM SERPL-MCNC: 2.8 MG/DL (ref 1.8–2.4)
MCH RBC QN AUTO: 29.8 PG (ref 27–31)
MCH RBC QN AUTO: 30.1 PG (ref 27–31)
MCH RBC QN AUTO: 30.8 PG (ref 27–31)
MCH RBC QN AUTO: 30.9 PG (ref 27–31)
MCH RBC QN AUTO: 31.5 PG (ref 27–31)
MCHC RBC AUTO-ENTMCNC: 32.7 G/DL (ref 32–36)
MCHC RBC AUTO-ENTMCNC: 32.8 G/DL (ref 32–36)
MCHC RBC AUTO-ENTMCNC: 33 G/DL (ref 32–36)
MCHC RBC AUTO-ENTMCNC: 33.3 G/DL (ref 32–36)
MCHC RBC AUTO-ENTMCNC: 33.6 G/DL (ref 32–36)
MCV RBC AUTO: 89.7 FL (ref 78–100)
MCV RBC AUTO: 90.9 FL (ref 78–100)
MCV RBC AUTO: 93.1 FL (ref 78–100)
MCV RBC AUTO: 94.4 FL (ref 78–100)
MCV RBC AUTO: 94.5 FL (ref 78–100)
METHEMOGLOBIN: 0.5 % (ref 0.5–1.5)
METHEMOGLOBIN: 0.9 % (ref 0.5–1.5)
METHEMOGLOBIN: 1 % (ref 0.5–1.5)
METHEMOGLOBIN: 1.1 % (ref 0.5–1.5)
MONOCYTES NFR BLD: 0.71 K/UL
MONOCYTES NFR BLD: 12 % (ref 0–4)
NEGATIVE BASE EXCESS, ART: 1.1 MMOL/L (ref 0–3)
NEGATIVE BASE EXCESS, ART: 1.2 MMOL/L (ref 0–3)
NEGATIVE BASE EXCESS, ART: 1.7 MMOL/L (ref 0–3)
NEGATIVE BASE EXCESS, ART: 1.7 MMOL/L (ref 0–3)
NEGATIVE BASE EXCESS, ART: 1.9 MMOL/L (ref 0–3)
NEGATIVE BASE EXCESS, ART: 2.2 MMOL/L (ref 0–3)
NEGATIVE BASE EXCESS, ART: 2.3 MMOL/L (ref 0–3)
NEGATIVE BASE EXCESS, ART: 2.8 MMOL/L (ref 0–3)
NEGATIVE BASE EXCESS, ART: 2.8 MMOL/L (ref 0–3)
NEGATIVE BASE EXCESS, ART: 5.3 MMOL/L (ref 0–3)
NEGATIVE BASE EXCESS, ART: 5.6 MMOL/L (ref 0–3)
NEGATIVE BASE EXCESS, VEN: 1.4 MMOL/L (ref 0–3)
NEGATIVE BASE EXCESS, VEN: 2.3 MMOL/L (ref 0–3)
NEGATIVE BASE EXCESS, VEN: 3.8 MMOL/L (ref 0–3)
NEGATIVE BASE EXCESS, VEN: 3.9 MMOL/L (ref 0–3)
NEGATIVE BASE EXCESS, VEN: 5.4 MMOL/L (ref 0–3)
NEUTROPHILS NFR BLD: 75 % (ref 36–66)
NEUTS SEG NFR BLD: 4.57 K/UL
O2 SAT, VEN: 43.2 % (ref 34–95)
O2 SAT, VEN: 59.5 % (ref 34–95)
O2 SAT, VEN: 95 % (ref 34–95)
OXYHGB MFR BLD: 57.3 %
OXYHGB MFR BLD: 68.8 %
OXYHGB MFR BLD: 95.2 %
OXYHGB MFR BLD: 96 %
PARTIAL THROMBOPLASTIN TIME: 31 SEC (ref 25.1–37.1)
PARTIAL THROMBOPLASTIN TIME: 31.3 SEC (ref 25.1–37.1)
PARTIAL THROMBOPLASTIN TIME: 41.6 SEC (ref 25.1–37.1)
PCO2 ARTERIAL: 47.8 MMHG (ref 35–48)
PCO2 ARTERIAL: 50.4 MMHG (ref 35–48)
PCO2 VENOUS: 53.2 MM HG (ref 38–54)
PCO2 VENOUS: 53.8 MM HG (ref 38–54)
PCO2 VENOUS: 55.3 MM HG (ref 41–51)
PCO2 VENOUS: 65.6 MM HG (ref 41–51)
PCO2 VENOUS: 66.9 MM HG (ref 41–51)
PH ARTERIAL: 7.26 (ref 7.35–7.45)
PH ARTERIAL: 7.3 (ref 7.35–7.45)
PH VENOUS: 7.2 (ref 7.32–7.43)
PH VENOUS: 7.2 (ref 7.32–7.43)
PH VENOUS: 7.23 (ref 7.32–7.43)
PH VENOUS: 7.24 (ref 7.32–7.43)
PH VENOUS: 7.26 (ref 7.32–7.43)
PHOSPHATE SERPL-MCNC: 4.1 MG/DL (ref 2.5–4.9)
PHOSPHATE SERPL-MCNC: 5.7 MG/DL (ref 2.5–4.9)
PHOSPHATE SERPL-MCNC: 7.3 MG/DL (ref 2.5–4.9)
PLATELET # BLD AUTO: 141 K/UL (ref 140–440)
PLATELET # BLD AUTO: 153 K/UL (ref 140–440)
PLATELET # BLD AUTO: 176 K/UL (ref 140–440)
PLATELET, FLUORESCENCE: 126 K/UL (ref 140–440)
PLATELET, FLUORESCENCE: 129 K/UL (ref 140–440)
PMV BLD AUTO: 10.6 FL (ref 7.5–11.1)
PMV BLD AUTO: 11.1 FL (ref 7.5–11.1)
PMV BLD AUTO: 11.1 FL (ref 7.5–11.1)
PMV BLD AUTO: 11.4 FL (ref 7.5–11.1)
PMV BLD AUTO: 11.7 FL (ref 7.5–11.1)
PO2 ARTERIAL: 106.7 MMHG (ref 83–108)
PO2 ARTERIAL: 129.5 MMHG (ref 83–108)
PO2 VENOUS: 28.8 MM HG (ref 28–48)
PO2 VENOUS: 33.3 MM HG (ref 23–48)
PO2 VENOUS: 38.7 MM HG (ref 28–48)
PO2 VENOUS: 39.3 MM HG (ref 23–48)
PO2 VENOUS: 95.1 MM HG (ref 28–48)
POC ANION GAP: ABNORMAL MMOL/L (ref 7–16)
POC HCO3: 21.2 MMOL/L (ref 21–28)
POC HCO3: 23.4 MMOL/L (ref 21–28)
POC HCO3: 24 MMOL/L (ref 21–28)
POC HCO3: 24.6 MMOL/L (ref 21–28)
POC HCO3: 25.2 MMOL/L (ref 21–28)
POC HCO3: 25.6 MMOL/L (ref 21–28)
POC HCO3: 26.3 MMOL/L (ref 21–28)
POC HCO3: 26.4 MMOL/L (ref 21–28)
POC HCO3: 26.9 MMOL/L (ref 21–28)
POC HCO3: 27 MMOL/L (ref 21–28)
POC HCO3: 28.5 MMOL/L (ref 21–28)
POC HCO3: 28.8 MMOL/L (ref 21–28)
POC HCO3: 29.4 MMOL/L (ref 21–28)
POC HEMOGLOBIN (CALC): 7 G/DL (ref 12–17)
POC HEMOGLOBIN (CALC): 7.1 G/DL (ref 12–17)
POC HEMOGLOBIN (CALC): 7.1 G/DL (ref 12–17)
POC HEMOGLOBIN (CALC): 7.3 G/DL (ref 12–17)
POC HEMOGLOBIN (CALC): 7.6 G/DL (ref 12–17)
POC HEMOGLOBIN (CALC): 7.7 G/DL (ref 12–17)
POC HEMOGLOBIN (CALC): 7.8 G/DL (ref 12–17)
POC HEMOGLOBIN (CALC): 7.9 G/DL (ref 12–17)
POC HEMOGLOBIN (CALC): 8 G/DL (ref 12–17)
POC HEMOGLOBIN (CALC): 8 G/DL (ref 12–17)
POC HEMOGLOBIN (CALC): 8.1 G/DL (ref 12–17)
POC HEMOGLOBIN (CALC): 8.2 G/DL (ref 12–17)
POC HEMOGLOBIN (CALC): 8.3 G/DL (ref 12–17)
POC HEMOGLOBIN (CALC): ABNORMAL G/DL (ref 12–17)
POC O2 SATURATION: 92 % (ref 94–98)
POC O2 SATURATION: 92.2 % (ref 94–98)
POC O2 SATURATION: 95.6 % (ref 94–98)
POC O2 SATURATION: 95.7 % (ref 94–98)
POC O2 SATURATION: 96.5 % (ref 94–98)
POC O2 SATURATION: 96.9 % (ref 94–98)
POC O2 SATURATION: 97.1 % (ref 94–98)
POC O2 SATURATION: 97.2 % (ref 94–98)
POC O2 SATURATION: 97.7 % (ref 94–98)
POC O2 SATURATION: 97.7 % (ref 94–98)
POC O2 SATURATION: 98.1 % (ref 94–98)
POC O2 SATURATION: 98.1 % (ref 94–98)
POC O2 SATURATION: 98.7 % (ref 94–98)
POC PCO2: 45.3 MM HG (ref 35–48)
POC PCO2: 46.3 MM HG (ref 35–48)
POC PCO2: 46.8 MM HG (ref 35–48)
POC PCO2: 50.8 MM HG (ref 35–48)
POC PCO2: 52 MM HG (ref 35–48)
POC PCO2: 55.5 MM HG (ref 35–48)
POC PCO2: 61.6 MM HG (ref 35–48)
POC PCO2: 61.9 MM HG (ref 35–48)
POC PCO2: 62.7 MM HG (ref 35–48)
POC PCO2: 63.3 MM HG (ref 35–48)
POC PCO2: 64.9 MM HG (ref 35–48)
POC PCO2: 65 MM HG (ref 35–48)
POC PCO2: 72.3 MM HG (ref 35–48)
POC PH: 7.18 (ref 7.35–7.45)
POC PH: 7.22 (ref 7.35–7.45)
POC PH: 7.22 (ref 7.35–7.45)
POC PH: 7.23 (ref 7.35–7.45)
POC PH: 7.25 (ref 7.35–7.45)
POC PH: 7.27 (ref 7.35–7.45)
POC PH: 7.28 (ref 7.35–7.45)
POC PH: 7.29 (ref 7.35–7.45)
POC PH: 7.3 (ref 7.35–7.45)
POC PH: 7.31 (ref 7.35–7.45)
POC PH: 7.33 (ref 7.35–7.45)
POC PO2: 105.7 MM HG (ref 83–108)
POC PO2: 109 MM HG (ref 83–108)
POC PO2: 111.2 MM HG (ref 83–108)
POC PO2: 111.4 MM HG (ref 83–108)
POC PO2: 112 MM HG (ref 83–108)
POC PO2: 115.3 MM HG (ref 83–108)
POC PO2: 117.8 MM HG (ref 83–108)
POC PO2: 121.9 MM HG (ref 83–108)
POC PO2: 128.6 MM HG (ref 83–108)
POC PO2: 73 MM HG (ref 83–108)
POC PO2: 82 MM HG (ref 83–108)
POC PO2: 87 MM HG (ref 83–108)
POC PO2: 91.5 MM HG (ref 83–108)
POSITIVE BASE EXCESS, ART: 0.2 MMOL/L (ref 0–3)
POSITIVE BASE EXCESS, ART: 0.8 MMOL/L (ref 0–3)
POSITIVE BASE EXCESS, ART: 1.6 MMOL/L (ref 0–3)
POSITIVE BASE EXCESS, ART: 2.2 MMOL/L (ref 0–3)
POTASSIUM BLD-SCNC: 4.2 MMOL/L (ref 3.5–5.1)
POTASSIUM BLD-SCNC: 4.2 MMOL/L (ref 3.5–5.1)
POTASSIUM BLD-SCNC: 4.3 MMOL/L (ref 3.5–5.1)
POTASSIUM BLD-SCNC: 4.4 MMOL/L (ref 3.5–5.1)
POTASSIUM BLD-SCNC: 4.5 MMOL/L (ref 3.5–5.1)
POTASSIUM BLD-SCNC: 4.9 MMOL/L (ref 3.5–5.1)
POTASSIUM SERPL-SCNC: 3.8 MMOL/L (ref 3.5–5.1)
POTASSIUM SERPL-SCNC: 4.3 MMOL/L (ref 3.5–5.1)
POTASSIUM SERPL-SCNC: 4.6 MMOL/L (ref 3.5–5.1)
POTASSIUM SERPL-SCNC: 4.7 MMOL/L (ref 3.5–5.1)
PROT SERPL-MCNC: 4.4 G/DL (ref 6.4–8.2)
PROT SERPL-MCNC: 5.5 G/DL (ref 6.4–8.2)
PROTHROMBIN TIME: 16.2 SEC (ref 11.7–14.5)
PROTHROMBIN TIME: 16.2 SEC (ref 11.7–14.5)
PROTHROMBIN TIME: 16.5 SEC (ref 11.7–14.5)
PROTHROMBIN TIME: 17.4 SEC (ref 11.7–14.5)
PROTHROMBIN TIME: 19.9 SEC (ref 11.7–14.5)
RBC # BLD AUTO: 2.38 M/UL (ref 4.6–6.2)
RBC # BLD AUTO: 2.47 M/UL (ref 4.6–6.2)
RBC # BLD AUTO: 2.69 M/UL (ref 4.6–6.2)
RBC # BLD AUTO: 2.72 M/UL (ref 4.6–6.2)
RBC # BLD AUTO: 2.75 M/UL (ref 4.6–6.2)
SODIUM BLD-SCNC: 137 MMOL/L (ref 136–145)
SODIUM BLD-SCNC: 138 MMOL/L (ref 136–145)
SODIUM BLD-SCNC: 138 MMOL/L (ref 136–145)
SODIUM BLD-SCNC: 139 MMOL/L (ref 136–145)
SODIUM BLD-SCNC: 140 MMOL/L (ref 136–145)
SODIUM BLD-SCNC: 141 MMOL/L (ref 136–145)
SODIUM BLD-SCNC: 142 MMOL/L (ref 136–145)
SODIUM SERPL-SCNC: 142 MMOL/L (ref 136–145)
SODIUM SERPL-SCNC: 143 MMOL/L (ref 136–145)
SODIUM SERPL-SCNC: 143 MMOL/L (ref 136–145)
TCO2 (CALC), ART: 23 MMOL/L (ref 21–32)
TCO2 (CALC), ART: 25 MMOL/L (ref 21–32)
TCO2 (CALC), ART: 26 MMOL/L (ref 21–32)
TCO2 (CALC), ART: 26 MMOL/L (ref 21–32)
TCO2 (CALC), ART: 27 MMOL/L (ref 21–32)
TCO2 (CALC), ART: 28 MMOL/L (ref 21–32)
TCO2 (CALC), ART: 29 MMOL/L (ref 21–32)
TCO2 (CALC), ART: 29 MMOL/L (ref 21–32)
TCO2 (CALC), ART: 31 MMOL/L (ref 21–32)
TCO2 CALC VENOUS: 25 MMOL/L (ref 21–32)
TCO2 CALC VENOUS: 28 MMOL/L (ref 21–32)
TCO2 CALC VENOUS: 28 MMOL/L (ref 21–32)
TEXT FOR RESPIRATORY: ABNORMAL
WBC OTHER # BLD: 4.8 K/UL (ref 4–10.5)
WBC OTHER # BLD: 6.1 K/UL (ref 4–10.5)
WBC OTHER # BLD: 6.3 K/UL (ref 4–10.5)
WBC OTHER # BLD: 6.4 K/UL (ref 4–10.5)
WBC OTHER # BLD: 7.8 K/UL (ref 4–10.5)

## 2024-12-12 PROCEDURE — 80048 BASIC METABOLIC PNL TOTAL CA: CPT

## 2024-12-12 PROCEDURE — 82330 ASSAY OF CALCIUM: CPT

## 2024-12-12 PROCEDURE — 6360000002 HC RX W HCPCS: Performed by: THORACIC SURGERY (CARDIOTHORACIC VASCULAR SURGERY)

## 2024-12-12 PROCEDURE — 86927 PLASMA FRESH FROZEN: CPT

## 2024-12-12 PROCEDURE — 85379 FIBRIN DEGRADATION QUANT: CPT

## 2024-12-12 PROCEDURE — 2580000003 HC RX 258: Performed by: STUDENT IN AN ORGANIZED HEALTH CARE EDUCATION/TRAINING PROGRAM

## 2024-12-12 PROCEDURE — 85025 COMPLETE CBC W/AUTO DIFF WBC: CPT

## 2024-12-12 PROCEDURE — 85610 PROTHROMBIN TIME: CPT

## 2024-12-12 PROCEDURE — 94761 N-INVAS EAR/PLS OXIMETRY MLT: CPT

## 2024-12-12 PROCEDURE — 2500000003 HC RX 250 WO HCPCS: Performed by: PHYSICIAN ASSISTANT

## 2024-12-12 PROCEDURE — P9045 ALBUMIN (HUMAN), 5%, 250 ML: HCPCS | Performed by: THORACIC SURGERY (CARDIOTHORACIC VASCULAR SURGERY)

## 2024-12-12 PROCEDURE — 6360000002 HC RX W HCPCS: Performed by: PHYSICIAN ASSISTANT

## 2024-12-12 PROCEDURE — 80047 BASIC METABLC PNL IONIZED CA: CPT

## 2024-12-12 PROCEDURE — 6370000000 HC RX 637 (ALT 250 FOR IP): Performed by: PHYSICIAN ASSISTANT

## 2024-12-12 PROCEDURE — 36415 COLL VENOUS BLD VENIPUNCTURE: CPT

## 2024-12-12 PROCEDURE — 3E1M39Z IRRIGATION OF PERITONEAL CAVITY USING DIALYSATE, PERCUTANEOUS APPROACH: ICD-10-PCS | Performed by: THORACIC SURGERY (CARDIOTHORACIC VASCULAR SURGERY)

## 2024-12-12 PROCEDURE — 93308 TTE F-UP OR LMTD: CPT | Performed by: INTERNAL MEDICINE

## 2024-12-12 PROCEDURE — 93010 ELECTROCARDIOGRAM REPORT: CPT | Performed by: INTERNAL MEDICINE

## 2024-12-12 PROCEDURE — 90945 DIALYSIS ONE EVALUATION: CPT

## 2024-12-12 PROCEDURE — 2720000010 HC SURG SUPPLY STERILE

## 2024-12-12 PROCEDURE — 99291 CRITICAL CARE FIRST HOUR: CPT | Performed by: INTERNAL MEDICINE

## 2024-12-12 PROCEDURE — 83735 ASSAY OF MAGNESIUM: CPT

## 2024-12-12 PROCEDURE — 82248 BILIRUBIN DIRECT: CPT

## 2024-12-12 PROCEDURE — 85384 FIBRINOGEN ACTIVITY: CPT

## 2024-12-12 PROCEDURE — P9073 PLATELETS PHERESIS PATH REDU: HCPCS

## 2024-12-12 PROCEDURE — 82803 BLOOD GASES ANY COMBINATION: CPT

## 2024-12-12 PROCEDURE — 93325 DOPPLER ECHO COLOR FLOW MAPG: CPT | Performed by: INTERNAL MEDICINE

## 2024-12-12 PROCEDURE — 84132 ASSAY OF SERUM POTASSIUM: CPT

## 2024-12-12 PROCEDURE — 93321 DOPPLER ECHO F-UP/LMTD STD: CPT

## 2024-12-12 PROCEDURE — 85027 COMPLETE CBC AUTOMATED: CPT

## 2024-12-12 PROCEDURE — 2100000000 HC CCU R&B

## 2024-12-12 PROCEDURE — 6360000002 HC RX W HCPCS: Performed by: STUDENT IN AN ORGANIZED HEALTH CARE EDUCATION/TRAINING PROGRAM

## 2024-12-12 PROCEDURE — 2580000003 HC RX 258: Performed by: PHYSICIAN ASSISTANT

## 2024-12-12 PROCEDURE — 93321 DOPPLER ECHO F-UP/LMTD STD: CPT | Performed by: INTERNAL MEDICINE

## 2024-12-12 PROCEDURE — P9017 PLASMA 1 DONOR FRZ W/IN 8 HR: HCPCS

## 2024-12-12 PROCEDURE — 85014 HEMATOCRIT: CPT

## 2024-12-12 PROCEDURE — 83605 ASSAY OF LACTIC ACID: CPT

## 2024-12-12 PROCEDURE — 6360000002 HC RX W HCPCS

## 2024-12-12 PROCEDURE — 71045 X-RAY EXAM CHEST 1 VIEW: CPT

## 2024-12-12 PROCEDURE — 82962 GLUCOSE BLOOD TEST: CPT

## 2024-12-12 PROCEDURE — 85730 THROMBOPLASTIN TIME PARTIAL: CPT

## 2024-12-12 PROCEDURE — 80053 COMPREHEN METABOLIC PANEL: CPT

## 2024-12-12 PROCEDURE — 93005 ELECTROCARDIOGRAM TRACING: CPT | Performed by: THORACIC SURGERY (CARDIOTHORACIC VASCULAR SURGERY)

## 2024-12-12 PROCEDURE — 94660 CPAP INITIATION&MGMT: CPT

## 2024-12-12 PROCEDURE — 36430 TRANSFUSION BLD/BLD COMPNT: CPT

## 2024-12-12 PROCEDURE — 2500000003 HC RX 250 WO HCPCS: Performed by: STUDENT IN AN ORGANIZED HEALTH CARE EDUCATION/TRAINING PROGRAM

## 2024-12-12 PROCEDURE — 5A09457 ASSISTANCE WITH RESPIRATORY VENTILATION, 24-96 CONSECUTIVE HOURS, CONTINUOUS POSITIVE AIRWAY PRESSURE: ICD-10-PCS | Performed by: THORACIC SURGERY (CARDIOTHORACIC VASCULAR SURGERY)

## 2024-12-12 PROCEDURE — 2580000003 HC RX 258: Performed by: INTERNAL MEDICINE

## 2024-12-12 PROCEDURE — 6370000000 HC RX 637 (ALT 250 FOR IP): Performed by: INTERNAL MEDICINE

## 2024-12-12 PROCEDURE — 84100 ASSAY OF PHOSPHORUS: CPT

## 2024-12-12 PROCEDURE — 82805 BLOOD GASES W/O2 SATURATION: CPT

## 2024-12-12 PROCEDURE — 94640 AIRWAY INHALATION TREATMENT: CPT

## 2024-12-12 PROCEDURE — 37799 UNLISTED PX VASCULAR SURGERY: CPT

## 2024-12-12 PROCEDURE — 2500000003 HC RX 250 WO HCPCS

## 2024-12-12 PROCEDURE — 2700000000 HC OXYGEN THERAPY PER DAY

## 2024-12-12 RX ORDER — OXYCODONE HYDROCHLORIDE 5 MG/1
5 TABLET ORAL ONCE
Status: COMPLETED | OUTPATIENT
Start: 2024-12-12 | End: 2024-12-12

## 2024-12-12 RX ORDER — FUROSEMIDE 10 MG/ML
100 INJECTION INTRAMUSCULAR; INTRAVENOUS ONCE
Status: COMPLETED | OUTPATIENT
Start: 2024-12-12 | End: 2024-12-12

## 2024-12-12 RX ORDER — FUROSEMIDE 10 MG/ML
80 INJECTION INTRAMUSCULAR; INTRAVENOUS ONCE
Status: COMPLETED | OUTPATIENT
Start: 2024-12-12 | End: 2024-12-12

## 2024-12-12 RX ORDER — CALCIUM GLUCONATE 20 MG/ML
1000 INJECTION, SOLUTION INTRAVENOUS PRN
Status: DISCONTINUED | OUTPATIENT
Start: 2024-12-12 | End: 2024-12-13

## 2024-12-12 RX ORDER — GABAPENTIN 100 MG/1
100 CAPSULE ORAL 2 TIMES DAILY
Status: DISCONTINUED | OUTPATIENT
Start: 2024-12-12 | End: 2024-12-17 | Stop reason: HOSPADM

## 2024-12-12 RX ORDER — MORPHINE SULFATE 4 MG/ML
INJECTION, SOLUTION INTRAMUSCULAR; INTRAVENOUS
Status: DISPENSED
Start: 2024-12-12 | End: 2024-12-12

## 2024-12-12 RX ORDER — HEPARIN SODIUM 1000 [USP'U]/ML
INJECTION, SOLUTION INTRAVENOUS; SUBCUTANEOUS PRN
Status: DISCONTINUED | OUTPATIENT
Start: 2024-12-12 | End: 2024-12-13

## 2024-12-12 RX ORDER — TRANEXAMIC ACID 10 MG/ML
1000 INJECTION, SOLUTION INTRAVENOUS ONCE
Status: COMPLETED | OUTPATIENT
Start: 2024-12-12 | End: 2024-12-12

## 2024-12-12 RX ORDER — MORPHINE SULFATE 2 MG/ML
2 INJECTION, SOLUTION INTRAMUSCULAR; INTRAVENOUS ONCE
Status: COMPLETED | OUTPATIENT
Start: 2024-12-12 | End: 2024-12-12

## 2024-12-12 RX ORDER — ALBUMIN HUMAN 50 G/1000ML
12.5 SOLUTION INTRAVENOUS ONCE
Status: COMPLETED | OUTPATIENT
Start: 2024-12-12 | End: 2024-12-12

## 2024-12-12 RX ORDER — METHOCARBAMOL 500 MG/1
500 TABLET, FILM COATED ORAL EVERY 4 HOURS PRN
Status: DISCONTINUED | OUTPATIENT
Start: 2024-12-12 | End: 2024-12-17 | Stop reason: HOSPADM

## 2024-12-12 RX ORDER — SODIUM CHLORIDE 9 MG/ML
INJECTION, SOLUTION INTRAVENOUS PRN
Status: DISCONTINUED | OUTPATIENT
Start: 2024-12-12 | End: 2024-12-12

## 2024-12-12 RX ORDER — CALCIUM CHLORIDE 100 MG/ML
1000 INJECTION INTRAVENOUS; INTRAVENTRICULAR PRN
Status: DISCONTINUED | OUTPATIENT
Start: 2024-12-12 | End: 2024-12-17 | Stop reason: HOSPADM

## 2024-12-12 RX ORDER — MAGNESIUM SULFATE 1 G/100ML
1000 INJECTION INTRAVENOUS PRN
Status: DISCONTINUED | OUTPATIENT
Start: 2024-12-12 | End: 2024-12-13

## 2024-12-12 RX ORDER — 0.9 % SODIUM CHLORIDE 0.9 %
1000 INTRAVENOUS SOLUTION INTRAVENOUS PRN
Status: DISCONTINUED | OUTPATIENT
Start: 2024-12-12 | End: 2024-12-13

## 2024-12-12 RX ORDER — ALBUMIN HUMAN 50 G/1000ML
12.5 SOLUTION INTRAVENOUS PRN
Status: DISCONTINUED | OUTPATIENT
Start: 2024-12-12 | End: 2024-12-17 | Stop reason: HOSPADM

## 2024-12-12 RX ORDER — MORPHINE SULFATE 2 MG/ML
INJECTION, SOLUTION INTRAMUSCULAR; INTRAVENOUS
Status: COMPLETED
Start: 2024-12-12 | End: 2024-12-12

## 2024-12-12 RX ORDER — POTASSIUM CHLORIDE 29.8 MG/ML
20 INJECTION INTRAVENOUS PRN
Status: DISCONTINUED | OUTPATIENT
Start: 2024-12-12 | End: 2024-12-13

## 2024-12-12 RX ORDER — CALCIUM GLUCONATE 20 MG/ML
2000 INJECTION, SOLUTION INTRAVENOUS PRN
Status: DISCONTINUED | OUTPATIENT
Start: 2024-12-12 | End: 2024-12-13

## 2024-12-12 RX ADMIN — IPRATROPIUM BROMIDE AND ALBUTEROL SULFATE 1 DOSE: 2.5; .5 SOLUTION RESPIRATORY (INHALATION) at 15:42

## 2024-12-12 RX ADMIN — Medication: at 20:40

## 2024-12-12 RX ADMIN — VASOPRESSIN 0.03 UNITS/MIN: 0.2 INJECTION INTRAVENOUS at 13:30

## 2024-12-12 RX ADMIN — ACETAMINOPHEN 1000 MG: 500 TABLET ORAL at 05:00

## 2024-12-12 RX ADMIN — Medication 100 MEQ: at 09:44

## 2024-12-12 RX ADMIN — MORPHINE SULFATE 2 MG: 2 INJECTION, SOLUTION INTRAMUSCULAR; INTRAVENOUS at 09:45

## 2024-12-12 RX ADMIN — IPRATROPIUM BROMIDE AND ALBUTEROL SULFATE 1 DOSE: 2.5; .5 SOLUTION RESPIRATORY (INHALATION) at 19:07

## 2024-12-12 RX ADMIN — Medication: at 20:37

## 2024-12-12 RX ADMIN — DESMOPRESSIN ACETATE 32 MCG: 4 INJECTION, SOLUTION INTRAVENOUS; SUBCUTANEOUS at 09:00

## 2024-12-12 RX ADMIN — IPRATROPIUM BROMIDE AND ALBUTEROL SULFATE 1 DOSE: 2.5; .5 SOLUTION RESPIRATORY (INHALATION) at 07:33

## 2024-12-12 RX ADMIN — Medication: at 10:21

## 2024-12-12 RX ADMIN — SODIUM BICARBONATE 100 MEQ: 84 INJECTION, SOLUTION INTRAVENOUS at 09:44

## 2024-12-12 RX ADMIN — WATER 2000 MG: 1 INJECTION INTRAMUSCULAR; INTRAVENOUS; SUBCUTANEOUS at 22:44

## 2024-12-12 RX ADMIN — Medication: at 20:30

## 2024-12-12 RX ADMIN — MORPHINE SULFATE 2 MG: 2 INJECTION, SOLUTION INTRAMUSCULAR; INTRAVENOUS at 09:23

## 2024-12-12 RX ADMIN — FUROSEMIDE 100 MG: 10 INJECTION, SOLUTION INTRAMUSCULAR; INTRAVENOUS at 09:52

## 2024-12-12 RX ADMIN — FENTANYL CITRATE 25 MCG: 50 INJECTION, SOLUTION INTRAMUSCULAR; INTRAVENOUS at 05:57

## 2024-12-12 RX ADMIN — Medication: at 10:20

## 2024-12-12 RX ADMIN — TRAMADOL HYDROCHLORIDE 50 MG: 50 TABLET, COATED ORAL at 23:21

## 2024-12-12 RX ADMIN — Medication: at 15:31

## 2024-12-12 RX ADMIN — IPRATROPIUM BROMIDE AND ALBUTEROL SULFATE 1 DOSE: 2.5; .5 SOLUTION RESPIRATORY (INHALATION) at 11:19

## 2024-12-12 RX ADMIN — MUPIROCIN: 20 OINTMENT TOPICAL at 20:55

## 2024-12-12 RX ADMIN — MUPIROCIN: 20 OINTMENT TOPICAL at 11:17

## 2024-12-12 RX ADMIN — WATER 2000 MG: 1 INJECTION INTRAMUSCULAR; INTRAVENOUS; SUBCUTANEOUS at 14:35

## 2024-12-12 RX ADMIN — METHOCARBAMOL 500 MG: 500 TABLET ORAL at 11:14

## 2024-12-12 RX ADMIN — HEPARIN SODIUM 5000 UNITS: 5000 INJECTION INTRAVENOUS; SUBCUTANEOUS at 06:13

## 2024-12-12 RX ADMIN — TRANEXAMIC ACID 1000 MG: 10 INJECTION, SOLUTION INTRAVENOUS at 09:31

## 2024-12-12 RX ADMIN — Medication 150 MCG/MIN: at 03:18

## 2024-12-12 RX ADMIN — TRAMADOL HYDROCHLORIDE 50 MG: 50 TABLET, COATED ORAL at 08:43

## 2024-12-12 RX ADMIN — ATORVASTATIN CALCIUM 40 MG: 40 TABLET, FILM COATED ORAL at 20:54

## 2024-12-12 RX ADMIN — WATER 2000 MG: 1 INJECTION INTRAMUSCULAR; INTRAVENOUS; SUBCUTANEOUS at 07:38

## 2024-12-12 RX ADMIN — METHOCARBAMOL 500 MG: 500 TABLET ORAL at 19:44

## 2024-12-12 RX ADMIN — GABAPENTIN 100 MG: 100 CAPSULE ORAL at 20:54

## 2024-12-12 RX ADMIN — FAMOTIDINE 20 MG: 10 INJECTION, SOLUTION INTRAVENOUS at 10:35

## 2024-12-12 RX ADMIN — FUROSEMIDE 80 MG: 10 INJECTION, SOLUTION INTRAMUSCULAR; INTRAVENOUS at 14:35

## 2024-12-12 RX ADMIN — ALBUMIN (HUMAN) 12.5 G: 12.5 INJECTION, SOLUTION INTRAVENOUS at 09:03

## 2024-12-12 RX ADMIN — CALCIUM CHLORIDE 2000 MG: 100 INJECTION, SOLUTION INTRAVENOUS at 16:13

## 2024-12-12 RX ADMIN — VASOPRESSIN 0.02 UNITS/MIN: 0.2 INJECTION INTRAVENOUS at 22:55

## 2024-12-12 RX ADMIN — TRANEXAMIC ACID 1000 MG: 10 INJECTION, SOLUTION INTRAVENOUS at 08:43

## 2024-12-12 RX ADMIN — ACETAMINOPHEN 1000 MG: 500 TABLET ORAL at 13:17

## 2024-12-12 RX ADMIN — TRAMADOL HYDROCHLORIDE 50 MG: 50 TABLET, COATED ORAL at 16:28

## 2024-12-12 RX ADMIN — OXYCODONE HYDROCHLORIDE 5 MG: 5 TABLET ORAL at 13:17

## 2024-12-12 RX ADMIN — SODIUM CHLORIDE, PRESERVATIVE FREE 10 ML: 5 INJECTION INTRAVENOUS at 20:55

## 2024-12-12 ASSESSMENT — PAIN SCALES - GENERAL
PAINLEVEL_OUTOF10: 0
PAINLEVEL_OUTOF10: 1
PAINLEVEL_OUTOF10: 7
PAINLEVEL_OUTOF10: 1
PAINLEVEL_OUTOF10: 0
PAINLEVEL_OUTOF10: 7
PAINLEVEL_OUTOF10: 4
PAINLEVEL_OUTOF10: 7
PAINLEVEL_OUTOF10: 0
PAINLEVEL_OUTOF10: 0
PAINLEVEL_OUTOF10: 6
PAINLEVEL_OUTOF10: 4

## 2024-12-12 ASSESSMENT — PAIN DESCRIPTION - ORIENTATION
ORIENTATION: MID
ORIENTATION: POSTERIOR
ORIENTATION: LEFT

## 2024-12-12 ASSESSMENT — PAIN DESCRIPTION - LOCATION
LOCATION: CHEST;BACK
LOCATION: BACK;SHOULDER
LOCATION: CHEST;BACK
LOCATION: INCISION
LOCATION: SHOULDER
LOCATION: STERNUM

## 2024-12-12 ASSESSMENT — PAIN DESCRIPTION - DESCRIPTORS
DESCRIPTORS: ACHING
DESCRIPTORS: ACHING;SPASM
DESCRIPTORS: ACHING
DESCRIPTORS: SPASM
DESCRIPTORS: ACHING;DISCOMFORT

## 2024-12-12 ASSESSMENT — PAIN DESCRIPTION - PAIN TYPE: TYPE: SURGICAL PAIN

## 2024-12-12 ASSESSMENT — PAIN - FUNCTIONAL ASSESSMENT
PAIN_FUNCTIONAL_ASSESSMENT: ACTIVITIES ARE NOT PREVENTED

## 2024-12-12 ASSESSMENT — PAIN DESCRIPTION - FREQUENCY: FREQUENCY: CONTINUOUS

## 2024-12-12 NOTE — PROGRESS NOTES
0800: Notified SHIRLEY Weiss of pleural chest tube sanguinous output 140 mL from [id number]     0832: Dr. Kapadia , Dr. Thomas, and SHIRLEY Weiss at bedside. Pt with additional 200 mL out of chest tube from 5420-7637.  Orders for TXA and DDAVP. Pharmacy called to prepare stat.

## 2024-12-12 NOTE — CARE COORDINATION
.Chart reviewed. The patient is from home with daughter and son in law. Daughter at bedside says the patient will want to go home. He is CABG post op day one. He has not been active with any HHC and has been going to HD treatments [facility]. He is new to HD. The patient has a PCP and insurance and can afford and take his medications as prescribed. The patient has reliable transportation with family. The daughter says that the discharge plan will likely be home with outpatient therapy. CM will follow as patient progresses. He is independent at baseline. Patient has two chest tubes, cisneros and is on bipap.   12/12/24 8308   Service Assessment   Patient Orientation Other (see comment)  (patient is normally alert but had gotten pain medicaitons and was very sleepy at time of assessment)   Cognition Alert   History Provided By Medical Record;Child/Family   Primary Caregiver Self   Support Systems Family Members   Patient's Healthcare Decision Maker is: Legal Next of Kin   PCP Verified by CM Yes   Prior Functional Level Independent in ADLs/IADLs   Current Functional Level Assistance with the following:  (per hospital policy)   Can patient return to prior living arrangement Yes   Ability to make needs known: Good   Family able to assist with home care needs: Yes   Would you like for me to discuss the discharge plan with any other family members/significant others, and if so, who? Yes  (spoke with daughter at bedside)   Financial Resources Medicare;Medicaid   Community Resources None   Discharge Planning   Type of Residence House   Living Arrangements Children  (plan is home with daughter and son in law)   Current Services Prior To Admission   (On HD [facility])   Potential Assistance Needed Skilled Nursing Facility;Outpatient PT/OT   Potential Assistance Purchasing Medications No   Type of Home Care Services None   History of falls? 0   Condition of Participation: Discharge Planning   The Patient and/or Patient Representative was  provided with a Choice of Provider? Patient   The Patient and/Or Patient Representative agree with the Discharge Plan? Yes   Freedom of Choice list was provided with basic dialogue that supports the patient's individualized plan of care/goals, treatment preferences, and shares the quality data associated with the providers?  Yes

## 2024-12-12 NOTE — PROGRESS NOTES
Spiritual Health History and Assessment/Progress Note  [facility]    Spiritual/Emotional Needs,  ,  ,      Name: Landry Morley MRN: [id number]    Age: 54 y.o.     Sex: male   Language: English   Jew: None   Coronary atherosclerosis of native coronary artery     Date: 12/12/2024            Total Time Calculated: 6 min              [facility]        Referral/Consult From: Rounding   Encounter Overview/Reason: Spiritual/Emotional Needs  Service Provided For: Patient and family together    Dayana, Belief, Meaning:   Patient has beliefs or practices that help with coping during difficult times  Family/Friends have beliefs or practices that help with coping during difficult times      Importance and Influence:  Patient has spiritual/personal beliefs that influence decisions regarding their health  Family/Friends have spiritual/personal beliefs that influence decisions regarding the patient's health    Community:  Patient feels well-supported. Support system includes: Children  Family/Friends feel well-supported. Support system includes: Parent/s    Assessment and Plan of Care:     Patient Interventions include: Facilitated expression of thoughts and feelings  Family/Friends Interventions include: Facilitated expression of thoughts and feelings    Patient Plan of Care: Spiritual Care available upon further referral  Family/Friends Plan of Care: Spiritual Care available upon further referral    Electronically signed by Chaplain Haylie on 12/12/2024 at 11:14 AM

## 2024-12-12 NOTE — PROGRESS NOTES
sodium chloride flush  5-40 mL IntraVENous 2 times per day    aspirin  81 mg Oral Daily    [Held by provider] clopidogrel  75 mg Oral Daily    chlorhexidine  15 mL Mouth/Throat BID    mupirocin   Each Nostril BID    multivitamin  1 tablet Oral Daily with breakfast    polyethylene glycol  17 g Oral Daily    sennosides-docusate sodium  1 tablet Oral BID    atorvastatin  40 mg Oral Nightly    ceFAZolin (ANCEF) IV  2,000 mg IntraVENous Q8H    ipratropium 0.5 mg-albuterol 2.5 mg  1 Dose Inhalation Q4H WA RT    [Held by provider] heparin (porcine)  5,000 Units SubCUTAneous 3 times per day    [START ON 12/13/2024] bacitracin   Topical BID    acetaminophen  1,000 mg Oral 4 times per day    famotidine  20 mg Oral Every Other Day    Or    famotidine (PEPCID) injection  20 mg IntraVENous Every Other Day      sodium chloride      prismaSATE BGK 4/2.5 500 mL/hr at 12/12/24 1021    prismaSATE BGK 4/2.5 500 mL/hr at 12/12/24 1021    prismaSATE BGK 4/2.5 1,000 mL/hr at 12/12/24 1020    sodium chloride 50 mL/hr at 12/12/24 0600    sodium chloride      phenylephrine 80 mcg/min (12/12/24 0837)    niCARdipine      insulin 4.2 Units/hr (12/12/24 1114)    dextrose      EPINEPHrine 2 mcg/min (12/12/24 0600)    VASOpressin 0.02 Units/min (12/12/24 0620)     sodium chloride, calcium chloride, potassium chloride, magnesium sulfate, sodium phosphate 6 mmol in sodium chloride 0.9 % 250 mL IVPB **OR** sodium phosphate 12 mmol in sodium chloride 0.9 % 250 mL IVPB **OR** sodium phosphate 18 mmol in sodium chloride 0.9 % 500 mL IVPB **OR** sodium phosphate 24 mmol in sodium chloride 0.9 % 500 mL IVPB, sodium chloride, calcium gluconate **OR** calcium gluconate **OR** calcium gluconate **OR** calcium gluconate, heparin (porcine) **AND** heparin (porcine), morphine, methocarbamol, traZODone, protamine, sodium chloride flush, sodium chloride, ondansetron **OR** ondansetron, [START ON 12/14/2024] hydrALAZINE, bisacodyl, calcium chloride 1,000 mg in  sodium chloride 0.9 % 100 mL IVPB **OR** calcium chloride 2,000 mg in sodium chloride 0.9 % 100 mL IVPB, albumin human 5%, phenylephrine, niCARdipine, glucose, dextrose bolus **OR** dextrose bolus, glucagon (rDNA), dextrose, traMADol    Lab Data:  CBC:   Recent Labs     12/12/24  0100 12/12/24  0440 12/12/24  0910 12/12/24  1050   WBC 6.3 6.4 7.8 4.8   HGB 7.6* 8.3* 7.5* 8.2*   HCT 23.0* 25.4* 22.5* 25.0*   MCV 93.1 94.4 94.5 90.9    153 141  --      BMP:   Recent Labs     12/11/24  1320 12/11/24  1323 12/11/24  1715 12/11/24  1759 12/11/24  2030 12/11/24  2208 12/12/24  0430 12/12/24  0450 12/12/24  0816 12/12/24  0907 12/12/24  0957 12/12/24  1050     --   --   --  144  --  143  --   --   --   --   --    K 3.8  --  4.2  --  4.2  --  4.6  --   --   --   --  4.3     --   --   --  106  --  106  --   --   --   --   --    CO2 21  --   --   --  21  --  22  --   --   --   --   --    PHOS  --   --  5.0*  --   --   --  7.3*  --   --   --   --   --    BUN 44*  --   --   --  48*  --  49*  --   --   --   --   --    CREATININE 4.3*   < >  --    < > 4.9*   < > 5.1*   < > 5.2* 5.8* 4.9*  --     < > = values in this interval not displayed.     LIVER PROFILE: No results for input(s): \"AST\", \"ALT\", \"LIPASE\", \"AMYLASE\", \"BILIDIR\", \"BILITOT\", \"ALKPHOS\" in the last 72 hours.    Invalid input(s): \"ALB\"  PT/INR:   Recent Labs     12/11/24  1320 12/12/24  0100 12/12/24  0430   PROTIME 17.6* 17.4* 16.5*   INR 1.4 1.4 1.3     APTT:   Recent Labs     12/11/24  1320 12/12/24  0430   APTT 33.3 31.3     BNP:  No results for input(s): \"BNP\" in the last 72 hours.  TROPONIN: No results for input(s): \"TROPONINI\" in the last 72 hours. No results for input(s): \"TROPONINT\" in the last 72 hours.     Labs, consult, tests reviewed        Due to the immediate potential for life-threatening deterioration due to post CABG, I spent 40 minutes providing critical care.  This time is excluding time spent performing  procedures.              Luli Sam MD, PA-C 12/12/2024 11:33 AM     Please note this report has been partially produced using speech recognition software and may contain errors related to that system including errors in grammar, punctuation, and spelling, as well as words and phrases that may be inappropriate. If there are any questions or concerns please feel free to contact the dictating provider for clarification.

## 2024-12-12 NOTE — OP NOTE
12/11/2024    PATIENT: Landry Morley    PREOPERATIVE DIAGNOSIS:  Severe coronary artery disease.    POSTOPERATIVE DIAGNOSIS:  Severe coronary artery disease.     ASSISTANT:  SA Curt Benjamin PA    OPERATION:  Left internal mammary artery graft to the distal left anterior descending coronary artery, saphenous vein bypass graft from the aorta to OM2 coronary artery,  saphenous vein bypass graft from the aorta to ramus coronary artery, saphenous vein bypass graft from the aorta to PDA coronary artery (four distal anastamosis) using cardiopulmonary bypass, mild hypothermia, cold cardioplegia.      FINDINGS:  The heart has severe hypertrophic cardiomyopathy and aorta were of normal size.  The LAD, OM2, ramus and PDA were 1.5, 1.0, 1.75 and 1.5 mm in diameter, distal targets has moderate calcification.      EBL:  300 mL    PQRI measures:  1. Patient received pre-operative beta-blockers.  2. Patient received pre-operative antibiotics.  3. Internal mammary artery was used.    TECHNIQUE:  Routine pressure lines were inserted for monitoring.  Suitable segments of long saphenous vein were removed from the left leg using endoscopic harvesting technique.  A primary median sternotomy was performed.  The left internal mammary artery was taken down; this vessel had good caliber and flow.  Cardiopulmonary bypass was established with a single cannula in the ascending aorta and a single cannula in the right atrium.  When on bypass, the patient was cooled to 32 degrees centigrade.  The ascending aorta was   cross-clamped, and cardioplegic solution was infused into the aortic root.  The heart was topically cooled with cold saline.  Antegrade cardioplegia was used.        The OM2 coronary artery was identified and opened, and a segment of long saphenous vein was anastomosed to this artery.  The ramus coronary artery was identified and opened, and a segment of long saphenous vein was anastomosed to this artery.  The PDA

## 2024-12-12 NOTE — CONSULTS
PHARMACY RENAL DOSING MONITORING SERVICE FOR CONTINUOUS RENAL REPLACEMENT THERAPY  Pharmacy consulted by Dr. Sommer for monitoring and adjustment.    Patient is currently managed on CRRT:  CVVHD-F    BFR: 250 ml/min  Dialysate: 1000 ml/hr  Replacement fluid PRE-filter: 500 mL/hr  Replacement fluid POST- filter: 500 mL/hr  Fluid removal:none to net negative 50ml/hr pending BP  Effluent rate: ~2L/hr ml/hr  ~20 ml/kg/hr    RN reported alarms or CRRT downtimes: new start    Pertinent Laboratory Values:   Recent Labs     12/12/24  0430 12/12/24  0450 12/12/24  0816 12/12/24  0907     --   --   --    K 4.6  --   --   --    PHOS 7.3*  --   --   --    CO2 22  --   --   --    CAION 1.14*  --   --   --    MG 2.8*  --   --   --    BUN 49*  --   --   --    CREATININE 5.1* 4.7* 5.2* 5.8*       Intake/Output Summary (Last 24 hours) at 12/12/2024 0944  Last data filed at 12/12/2024 0900  Gross per 24 hour   Intake 5953.97 ml   Output 4390 ml   Net 1563.97 ml     In: 5954   Out: 4390 [Urine:1780]     Medication requiring adjustment for CRRT: (Include Vd, MW, PB%)  Famotidine 20mg every other day  Gabapentin 300mg TID adjusted to 100mg BID (post op order, not home med)    Medications requiring monitoring while on CRRT  Vasopressors: epinephrine, vasopressin and phenylephrine   Insulin infusion- post op    Thank you for the consult.  Марина Mosquera RPH  12/12/2024 9:44 AM

## 2024-12-12 NOTE — CONSULTS
Nephrology Service Consultation      [address], [address]  [place] [address]  Phone: [phone]  Office Hours: 8:30AM - 4:30PM  Monday - Friday        MEDICAL DECISION MAKING and Recommendations   -S/p CABG on 12/11/24  -ESRD on HD TTS: right tunnelled HD cath present  -CHF hx  -CKD MBD    SUGGESt;  -Since hemodynamics are tenuous, will plan HD for now    Thank you            Patient Active Problem List    Diagnosis Date Noted    Precordial pain     Coronary artery disease involving native coronary artery of native heart without angina pectoris     Mixed hyperlipidemia     Essential hypertension     Hyperpotassemia 03/09/2023    Coronary artery disease (CAD) excluded 12/11/2024    Coronary atherosclerosis of native coronary artery 12/04/2024    Acute respiratory failure 11/19/2024    NSTEMI (non-ST elevated myocardial infarction) (HCC) 11/19/2024    Pneumonia of right lower lobe due to infectious organism 11/19/2024    CAD (coronary artery disease) 10/16/2024    Anxiety associated with depression 06/26/2024    Chronic kidney disease, stage V (HCC) 02/06/2024    Nephrotic syndrome 02/06/2024    Class 2 severe obesity due to excess calories with serious comorbidity and body mass index (BMI) of 39.0 to 39.9 in adult 06/15/2021    Spinal stenosis of lumbar region 06/15/2021    Constipation 06/15/2021    Acute renal failure (HCC) 09/24/2020    Persistent proteinuria 09/24/2020    Diabetic nephropathy associated with type 2 diabetes mellitus (HCC) 09/24/2020    Metabolic syndrome 09/24/2020    Renal agenesis 09/24/2020    Erectile disorder due to medical condition in male patient 09/24/2020    Hypertension, secondary 09/24/2020    Family history of coronary artery disease     H/O echocardiogram 04/15/2015    Type 2 diabetes mellitus with circulatory disorder, without long-term current use of insulin (HCC)     Chest pain 07/04/2012    History of myocardial infarction 04/01/2006    History of PTCA  Raza MATRINS,    Insulin Pen Needle (TRUEPLUS 5-BEVEL PEN NEEDLES) 31G X 5 MM MISC Use one needle two times daily as directed on package.  Patient not taking: Reported on 12/3/2024 3/25/24   Yudelka Davis, REBECCA - CNP   Lancets Thin MISC 1 actuation by Does not apply route daily 2/2/23 12/3/24  Raza Fairchild,    Blood Glucose Monitoring Suppl (TRUE METRIX AIR GLUCOSE METER) w/Device KIT 1 actuation by Does not apply route daily 2/2/23 12/3/24  Raza Fairchild, DO   Insulin Pen Needle (PEN NEEDLES 3/16\") 31G X 5 MM MISC 1 each by Does not apply route daily Use one needle two times daily as directed on package.  Patient not taking: Reported on 12/3/2024 9/22/22   Raza Fairchild, DO   Insulin Pen Needle (PEN NEEDLES 3/16\") 31G X 5 MM MISC 1 each by Does not apply route daily To be used 4 times daily as directed on package  Patient not taking: Reported on 12/3/2024 6/15/21 12/3/24  Raza Fairchild DO        Allergies:  Patient has no known allergies.    Social History:   TOBACCO:   reports that he quit smoking about 8 years ago. His smoking use included cigarettes. He started smoking about 35 years ago. He has a 27 pack-year smoking history. He has been exposed to tobacco smoke. He has never used smokeless tobacco.  ETOH:   reports that he does not currently use alcohol.  OCCUPATION:      Family History:       Problem Relation Age of Onset    High Blood Pressure Mother     High Cholesterol Mother     Heart Disease Father     Cancer Sister     Arthritis Maternal Grandmother     Cancer Maternal Grandfather      Physical Exam:    Vitals: /76   Pulse 67   Temp 98.8 °F (37.1 °C) (Core)   Resp 26   Wt 101.8 kg (224 lb 6.9 oz)   SpO2 96%   BMI 30.44 kg/m²   General appearance: in no acute distress, appears stated age  Skin: Skin color, texture, turgor normal. No rashes or lesions  HEENT: normocephalic, atraumatic  Neck: supple, trachea midline  Lungs: clear to auscultation bilaterally, breathing

## 2024-12-12 NOTE — PROGRESS NOTES
Inpatient Progress Note 12/12/2024        Landry Morley  1970  [id number]      Assessment/Plan:  Landry Morley is a 54 y.o. male with a history of hypertension, diabetes, CAD status post PCI with MICHAEL x 5, CKD stage V on dialysis, congestive heart failure with reduced ejection fraction who presented to [facility] 12/11/2024 for scheduled CABG, the ICU post surgical recovery.        Problem list  CAD status post PCI and MICHAEL x 5  CABG x 4, LIMA to LAD, SVG to ramus, PDA and OM 2  Diabetes  ESRD on dialysis  Hypovolemic shock  Post-op Bleeding  Hypercarbic respiratory failure  Urbemic bleeding     Neuro: alert, oriented foloowing commands, slightly more drowzy in the afternoon after the events of the AM. He is still following commands, with no FND.Pain controlled with current multimodal regimen  Cardio: Severe history of coronary artery disease and congestive heart failure known CAD with multiple MICHAEL's, now status post CABG x 4, LIMA to LAD, SVG to ramus, PDA and OM 2.  Known to have congestive heart failure with reduced ejection fraction, EF of 35 to 40%.  Last ROHIT from 11/20/2024 shows EF of 30 to 35% mid and apical septal wall hypokinesis and noted thrombus in the left atrial appendage.  Cardiology and CTS managing.  Postop cardiac index of 3.4 with cardiac output of 6.7 on epinephrine at 2 mcg/min, phenylephrine at 50 mcg/min and vasopressin @ 0.02U/min.  V pacing wire in place, pacer to VVI backup at 60. S/p bleeding from pleural chest tube with transfused of product, now likley volume overloaded contributing to his hypercapnia.   Resp: Extubated to NC yesterday now noted to be hypercapnic on BiPAP 15/8, FiO2 40%, adjust setting to optimize acid base status. Continue inhalers, nebs and aggressive pulm hygiene. Continue diuresis with CRRT.   Chest tubes in place, noted to have significant output from the pleural tube this morning, concern for Uremic bleeding, decreased after administration of DDAVP/TXA.   GI:  1:57 AM   Result Value Ref Range    POC Sodium 138 136 - 145 mmol/L    POC Potassium 4.9 3.5 - 5.1 mmol/L    POC Chloride 107 99 - 110 mmol/L    POC Anion Gap Can not be calculated 7 - 16 mmol/L    POC Glucose 108 (H) 74 - 99 mg/dL    POC BUN 49 (H) 7 - 20 mg/dL    POC Creatinine 5.1 (H) 0.5 - 1.2 mg/dL    eGFR, POC 13 (L) >60 mL/min/1.73m2    POC Ionized Calcium 1.18 1.15 - 1.33 mmol/L   POCT H&H    Collection Time: 12/12/24  1:57 AM   Result Value Ref Range    POC Hemoglobin (calc) 7.1 (L) 12 - 17 g/dL    POC Hematocrit 21 (L) 38 - 51 %   POCT Glucose    Collection Time: 12/12/24  1:59 AM   Result Value Ref Range    POC Glucose 109 (H) 74 - 99 mg/dL   POCT Glucose    Collection Time: 12/12/24  3:08 AM   Result Value Ref Range    POC Glucose 100 (H) 74 - 99 mg/dL   POCT Glucose    Collection Time: 12/12/24  4:08 AM   Result Value Ref Range    POC Glucose 93 74 - 99 mg/dL   Blood Gas, Arterial    Collection Time: 12/12/24  4:22 AM   Result Value Ref Range    pH, Arterial 7.264 (L) 7.350 - 7.450    pCO2, Arterial 47.8 35.0 - 48.0 mmHg    pO2, Arterial 106.7 83.0 - 108.0 mmHg    HCO3, Arterial 21.2 21 - 28 mmol/L    Negative Base Excess, Art 5.6 (H) 0 - 3 mmol/L    Oxyhemoglobin 95.2 %    Carboxyhemoglobin 0.6 0.5 - 1.5 %    Methemoglobin 1.0 0.5 - 1.5 %    Pt Temp 37.0     Magan Test NOT APPLICABLE     Text for Respiratory Called to RN on 12/12/2024 at 04:27    Blood Gas, Venous    Collection Time: 12/12/24  4:24 AM   Result Value Ref Range    pH, Kj 7.232 (L) 7.320 - 7.430    pCO2, Kj 53.8 38 - 54 mm Hg    PO2, Kj 39.3 23 - 48 mm Hg    HCO3, Venous 22.1 22 - 29 mmol/L    Negative Base Excess, Kj 5.4 (H) 0 - 3 mmol/L    Oxyhemoglobin 68.8 %    Carboxyhemoglobin 0.5 0.5 - 1.5 %    Methemoglobin 0.5 0.5 - 1.5 %    Pt Temp 37.0     O2 Device/Flow/% Cannula     Magan Test NOT APPLICABLE     Text for Respiratory Called to RN on 12/12/2024 at 04:27    Basic Metabolic Panel    Collection Time: 12/12/24  4:30 AM   Result  Hemoglobin (calc) 8.1 (L) 12 - 17 g/dL    POC Hematocrit 24 (L) 38 - 51 %   Echo (TTE) limited (PRN contrast/bubble/strain/3D)    Collection Time: 12/12/24 12:00 PM   Result Value Ref Range    LV EDV A4C 94 mL    LV ESV A4C 50 mL    IVSd 1.2 (A) 0.6 - 1.0 cm    LVIDd 5.0 4.2 - 5.9 cm    LVIDs 4.2 cm    LVOT Diameter 2.4 cm    LVPWd 1.3 (A) 0.6 - 1.0 cm    LV Ejection Fraction A4C 47 %    LVOT Area 4.5 cm2    Body Surface Area 2.27 m2    Fractional Shortening 2D 16 28 - 44 %    LV ESV Index A4C 22 mL/m2    LV EDV Index A4C 42 mL/m2    LVIDd Index 2.23 cm/m2    LVIDs Index 1.88 cm/m2    LV RWT Ratio 0.52     LV Mass 2D 247.6 (A) 88 - 224 g    LV Mass 2D Index 110.5 49 - 115 g/m2    EF Physician 45 %   POCT Blood gas, venous    Collection Time: 12/12/24 12:00 PM   Result Value Ref Range    pH, Kj 7.197 (L) 7.32 - 7.43    pCO2, Kj 66.9 (H) 41.0 - 51.0 mm Hg    PO2, Kj 95.1 (H) 28.0 - 48.0 mm Hg    HCO3, Venous 26.0 22.0 - 29.0 mmol/L    TC02 (Calc), Kj 28 21 - 32 mmol/L    Negative Base Excess, Kj 2.3 0.0 - 3.0 mmol/L    O2 Sat, Kj 95.0 34.0 - 95.0 %   POC PANEL BMP W/IOCA    Collection Time: 12/12/24 12:00 PM   Result Value Ref Range    POC Sodium 140 136 - 145 mmol/L    POC Potassium 4.2 3.5 - 5.1 mmol/L    POC Chloride 107 99 - 110 mmol/L    POC TCO2 PENDING mmol/L    POC Anion Gap Can not be calculated 7 - 16 mmol/L    POC Glucose 115 (H) 74 - 99 mg/dL    POC BUN 44 (H) 7 - 20 mg/dL    POC Creatinine 4.7 (H) 0.5 - 1.2 mg/dL    eGFR, POC 14 (L) >60 mL/min/1.73m2    POC Ionized Calcium 1.17 1.15 - 1.33 mmol/L   POCT H&H    Collection Time: 12/12/24 12:00 PM   Result Value Ref Range    POC Hemoglobin (calc) 7.8 (L) 12 - 17 g/dL    POC Hematocrit 23 (L) 38 - 51 %   Arterial Blood Gas, POC    Collection Time: 12/12/24  1:03 PM   Result Value Ref Range    POC pH 7.179 (L) 7.35 - 7.45    POC pCO2 72.3 (H) 35.0 - 48.0 mm Hg    POC PO2 82.0 (L) 83.0 - 108.0 mm Hg    POC HCO3 26.9 21.0 - 28.0 mmol/L    TCO2 (calc), Art

## 2024-12-12 NOTE — PROGRESS NOTES
[facility] Cardiothoracic Surgery  Daily Progress Note    Surgeon:  Kang    Procedure:  s/p CORONARY ARTERY BYPASS GRAFT X4, LIMA - LAD, SVG - RAMUS, SVG -PDA, SVG - OM2; LEFT ENDOSCOPIC GREATER SAPHENOUS VEIN HARVEST; INTRAOPERATIVE TRANSESOPHAGEAL ECHOCARDIOGRAM  on 12/11/24    Subjective:     Patient was seen and examined at bedside this morning without any complaints of chest pain.  Got dizzy when tried to get up to move to the chair.  Then c/o of shoulder pain and pleural chest tube output started increasing    Hospital Course:  12/11/24:  Extubated last night. Received 2u of prbc's in the icu. 350cc blood out in last hour.  CC giving txa and ddavp.  Continues on epi @ 2, Rao @125, and Vaso @0.2.  with a CI of 2.6.  U/O 1500.  1200cc of blood from pleural chest tube this am.  MTP ordered.      Vital Signs: /77   Pulse 67   Temp 98.6 °F (37 °C) (Core)   Resp 15   Wt 101.8 kg (224 lb 6.9 oz)   SpO2 96%   BMI 30.44 kg/m²  O2 Flow Rate (L/min): 1 L/min   Admit Weight: Weight - Scale: 92.5 kg (204 lb)       I/O's:  I/O last 3 completed shifts:  In: 5317.1 [P.O.:240; I.V.:3325.2; Blood:670; IV Piggyback:1081.9]  Out: 3295 [Urine:1585; Blood:500; Chest Tube:1210]    Data:    CBC:   Recent Labs     12/11/24 2030 12/12/24  0100 12/12/24  0440   WBC 9.0 6.3 6.4   HGB 8.6* 7.6* 8.3*   HCT 26.3* 23.0* 25.4*   MCV 92.9 93.1 94.4    176 153     BMP:   Recent Labs     12/11/24  1320 12/11/24  1323 12/11/24  1715 12/11/24  1759 12/11/24  1905 12/11/24  2030 12/12/24  0430     --   --   --   --  144 143   K 3.8  --  4.2  --   --  4.2 4.6     --   --   --   --  106 106   CO2 21  --   --   --   --  21 22   PHOS  --   --  5.0*  --   --   --  7.3*   BUN 44*  --   --   --   --  48* 49*   CREATININE 4.3*   < >  --    < > 4.8* 4.9* 5.1*    < > = values in this interval not displayed.     PT/INR:   Recent Labs     12/11/24  1320 12/12/24  0100 12/12/24  0430

## 2024-12-12 NOTE — FLOWSHEET NOTE
Jose Roberto Keller (listed as primary contact) called and given an update regarding current condition. She states she will be coming to the hospital this morning. Aware of blood products given. All questions answered at this time.

## 2024-12-12 NOTE — PROGRESS NOTES
V2.0  [place] Consult Note      Name:  Landry Morley /Age/Sex: 1970  (54 y.o. male)   MRN & CSN:  [id number] & [id number] Encounter Date/Time: 2024 1:15 PM EST   Location:  -A PCP: Raza Fairchild DO     Attending:Shayan Kapadia MD  Consulting Provider: Karuna Stephen MD      Hospital Day: 2    Assessment and Recommendations   Landry Morley is a 54 y.o. male with pmh of hypertension, T2DM, CAD status post 5 stents, and CKD stage IV who presents with Coronary atherosclerosis of native coronary artery    Hospital Problems             Last Modified POA    * (Principal) Coronary atherosclerosis of native coronary artery 2024 Unknown    Coronary artery disease (CAD) excluded 2024 Yes       Recommendations:   CAD  Status post CABG x 4, LIMA to LAD, SVG to ramus, PDA and OM 2  Patient with a history of cardiovascular disease status post placement of 5 stents who presented for scheduled CABG and is status post procedure.  POD 1  --Cardiothoracic surgery is primary, patient currently on norepi, vasopressin and epinephrine.  He is also on a insulin drip, milrinone is turned off at this time.  -- Cardio is on board, GDMT as tolerated per their recommendations, EF post CABG is mildly reduced at 40 to 45%  -- Per discussion with bedside nurse, patient is post 2 units of PRBC, 1 of platelets, 1 of FFP, a bolus of TXA and is currently on TXA drip  -- Patient remains critical and condition remains guarded, continue to monitor closely, management per primary    T2DM  Continue Accu-Cheks  --Currently on an insulin drip, transition to basal bolus is appropriately covered with hypoglycemic protocol    CKD 4  Progressive worsening of his kidney function, nephrology is following and are currently dialyzing at this time      Diet ADULT DIET; Clear Liquid; 4 carb choices (60 gm/meal); Low Fat/Low Chol/High Fiber/2 gm Na; 1500 ml   DVT Prophylaxis [] Lovenox, []  Heparin, [x] SCDs, []  04/15/2015    EF 50-55% Mild left atrial enlargement. Normal LV systolic function. Trace MR and TR. Normal sized abdominal aorta.     Hemodialysis patient (HCC)     Tuesday, Thursday and Saturday    History of cardiac catheterization 2017    Stenting of the LAD    History of cardiovascular stress test 2006 LAD territory ISCHEMIA, EF 62%    History of exercise stress test 2017    treadmill    History of myocardial infarction 2006    History of nuclear stress test 2017    lexiscan-scar,no ischemia,EF52%,apical hdyskinesis    History of PTCA 2006    STENT TO RCA    History of PTCA 2012    STENT TO LAD, RCA    Hyperlipidemia     Hypertension 2012    Hypertriglyceridemia     Sepsis (HCC) 2024    Type II or unspecified type diabetes mellitus without mention of complication, not stated as uncontrolled      PSHX:  has a past surgical history that includes hernia repair (); Coronary angioplasty with stent (2006); Cardiac procedure (N/A, 2024); IR NONTUNNELED VASCULAR CATHETER > 5 YEARS (2024); and IR TUNNELED CVC PLACE WO SQ PORT/PUMP > 5 YEARS (2024).  Allergies: No Known Allergies  Fam HX:  family history includes Arthritis in his maternal grandmother; Cancer in his maternal grandfather and sister; Heart Disease in his father; High Blood Pressure in his mother; High Cholesterol in his mother.  Soc HX:   Social History     Socioeconomic History    Marital status:      Spouse name: None    Number of children: None    Years of education: None    Highest education level: None   Occupational History    Occupation:    Tobacco Use    Smoking status: Former     Current packs/day: 0.00     Average packs/day: 1 pack/day for 27.0 years (27.0 ttl pk-yrs)     Types: Cigarettes     Start date: 12/3/1989     Quit date: 12/3/2016     Years since quittin.0     Passive exposure: Past    Smokeless tobacco: Never    Tobacco comments:     Pt  \"ALKPHOS\" in the last 72 hours.    Invalid input(s): \"ALB\"  Lipids:   Lab Results   Component Value Date/Time    CHOL 113 09/19/2023 12:36 PM    HDL 33 09/19/2023 12:36 PM    TRIG 82 09/19/2023 12:36 PM     Hemoglobin A1C:   Lab Results   Component Value Date/Time    LABA1C 5.8 11/19/2024 12:26 PM     TSH:   Lab Results   Component Value Date/Time    TSH 2.18 12/06/2024 09:17 AM     Troponin: No results found for: \"TROPONINT\"  Lactic Acid: No results for input(s): \"LACTA\" in the last 72 hours.  BNP: No results for input(s): \"PROBNP\" in the last 72 hours.  UA:  Lab Results   Component Value Date/Time    NITRU NEGATIVE 12/06/2024 09:17 AM    COLORU Yellow 12/06/2024 09:17 AM    PHUR 6.5 12/06/2024 09:17 AM    WBCUA 2 12/06/2024 09:17 AM    RBCUA <1 12/06/2024 09:17 AM    RBCUA <1 06/13/2024 05:45 PM    MUCUS RARE 12/06/2024 09:17 AM    TRICHOMONAS None seen 12/06/2024 09:17 AM    BACTERIA NEGATIVE 06/13/2024 05:45 PM    CLARITYU CLEAR 06/13/2024 05:45 PM    LEUKOCYTESUR NEGATIVE 12/06/2024 09:17 AM    UROBILINOGEN 0.2 12/06/2024 09:17 AM    BILIRUBINUR NEGATIVE 12/06/2024 09:17 AM    BLOODU MODERATE NUMBER OR AMOUNT OBSERVED 06/13/2024 05:45 PM    GLUCOSEU NEGATIVE 12/06/2024 09:17 AM    KETUA NEGATIVE 12/06/2024 09:17 AM     Urine Cultures: No results found for: \"LABURIN\"  Blood Cultures: No results found for: \"BC\"  No results found for: \"BLOODCULT2\"  Organism: No results found for: \"ORG\"      Electronically signed by Karuna Stephen MD on 12/12/2024 at 1:15 PM

## 2024-12-12 NOTE — PROGRESS NOTES
12/12/24 1403   NIV Type   $NIV $Daily Charge   Suction Setup and Functional Yes   NIV Started/Stopped On   Equipment Type v60   Mode Bilevel   Mask Type Full face mask   Mask Size Medium   Assessment   Pulse 76   Respirations 12   SpO2 100 %   Comfort Level Good   Using Accessory Muscles No   Mask Compliance Good   Settings/Measurements   PIP Observed 17 cm H20   IPAP 15 cmH20   CPAP/EPAP 8 cmH2O   Vt (Measured) 427 mL   Rate Ordered 14   FiO2  35 %   Minute Volume (L/min) 12 Liters   Mask Leak (lpm) 33 lpm   Alarm Settings   Alarms On Y   Low Pressure (cmH2O) 5 cmH2O   High Pressure (cmH2O) 30 cmH2O   RR Low (bpm) 13   RR High (bpm) 50 br/min

## 2024-12-12 NOTE — PROGRESS NOTES
Patient's plural CT output increased from 20's to 150 of sanguinous fluid. CBC and PT. INR checked. HB went down from 8.6 post blood transfusion at the beginning of the shift to 7.4. Ok to give I more unit of RBC and monitor pt. Meanwhile, respiratory acidosis is persisting, critical care will re-evaluate in the am per Jannette the AND. Unable to reach DR. Kapadia.

## 2024-12-12 NOTE — PROGRESS NOTES
CT output reduced slowed down, Pt still acidotic on ABG's. Was orthostatic this morning while trying to get out of the bed to the chair. Left in bed in a sitting position.

## 2024-12-12 NOTE — CONSENT
Informed Consent for Blood Component Transfusion Note    I have discussed with the patient the rationale for blood component transfusion; its benefits in treating or preventing fatigue, organ damage, or death; and its risk which includes mild transfusion reactions, rare risk of blood borne infection, or more serious but rare reactions. I have discussed the alternatives to transfusion, including the risk and consequences of not receiving transfusion. The patient had an opportunity to ask questions and had agreed to proceed with transfusion of blood components.    Electronically signed by Yasmany Le PA-C on 12/12/24 at 7:39 AM EST

## 2024-12-12 NOTE — PROGRESS NOTES
Physical Therapy  Attempted to see pt for PT/OT evals. Pt is not medically appropriate at this time and RN requested therapy hold today. Will re-attempt as medical status improves and pt able to participate.

## 2024-12-12 NOTE — PROGRESS NOTES
See previous note at 0840 regarding chest tube output and actions taken.     From 0800 to 1000 pt with 1150 from pleural chest tube. Dr. Kapadia and Dr. Thomas remained at bedside during this time and updated every 15 min on chest tube output.     0843: 1 gram of TXA given IVPB.      0845: Orders from Dr. Sommer to start CRRT  today when Dr. Kapadia deems appropriate.    0850: Orders from Dr. Kapadia to initiate massive transfusion protocol.     0900: DDAVP given.     Pt given 2 PRBCs, 2 FFP, 1 Plts with MTP. PRBCs and FFP infused through [place] rapid infuser per orders.     0915 Pt c/o severe left shoulder and back pain. Orders from Dr. Kapadia to give 2 mg IVP Morphine. Pt was satisfied with pain relief.     0945: Pt c/o severe left shoulder and back pain. Pt also states \"it feels like the pain is behind my heart\" Orders from Dr. Kapadia to give additional dose 2 mg IVP Morphine. Pt satisfied with pain relief.    1045: Orders from Dr. Kapadia to prime CRRT machine and start therapy.     1046: Pt c/o severe chest pain in left shoulder and back. Pt also states \"it feels like its behind my heart\" Notified SHIRLEY Weiss and Dr. De Leon. Orders for 12 lead EKG. Completed and given to Dr. Kapadia. Orders to start Robaxin on pt for pain.     1125: Pt c/o worsening left shoulder pain and back pain. Notified SHIRLEY Weiss. Orders to consult with cardiology. Dr. Sam updated on pt condition. Orders from Dr. Sam at bedside for limited echo STAT.     1200: Dr. Kapadia aware of UO 25 mL.  Per Dr. Kapadia, notify Dr. Sommer of urine output below 30 mL/hr.    1310: Notified Dr. Sommer and SHIRLEY Castrejon of urine output 6 ml last hr. No new orders at this time.     1332: EPOC ABG pCO2 72 pH 7.17, pO2 82, HCO3 26, Be -1.9. Notified SHIRLEY Eason, Dr. Kapadia and Dr. Thomas.     1347: Dr. Thomas at bedside. Orders to place pt on BiPAP. Dr. Kapadia aware. RT called.     1400: [place], RT placed pt on

## 2024-12-12 NOTE — BRIEF OP NOTE
Brief Postoperative Note      Patient: Landry Morley  YOB: 1970  MRN: [id number]    Date of Procedure: 12/11/2024    Pre-Op Diagnosis Codes:      * Coronary atherosclerosis of native coronary artery [I25.10]    Post-Op Diagnosis: Same       Procedure(s):  CORONARY ARTERY BYPASS GRAFT X4, LIMA - LAD, SVG - RAMUS, SVG -PDA, SVG - OM2; LEFT ENDOSCOPIC GREATER SAPHENOUS VEIN HARVEST; INTRAOPERATIVE TRANSESOPHAGEAL ECHOCARDIOGRAM    Surgeon(s):  Shayan Kapadia MD    Assistant:  Surgical Assistant: July Robertson  Physician Assistant: Curt Correa PA    Anesthesia: General    Estimated Blood Loss (mL): 300     Complications: None    Specimens:   * No specimens in log *    Implants:  Implant Name Type Inv. Item Serial No.  Lot No. LRB No. Used Action   CLIP INT L ORNG TI TRNSVRS GRV CHEVRON SHP W/ PRECIS TIP TO - [id number]  CLIP INT L ORNG TI TRNSVRS GRV [place] SHP W/ PRECIS TIP TO  [employer]- [id number] N/A 1 Implanted         Drains:   Chest Tube Left Pleural 1 (Active)   $ Chest tube insertion $ Yes 12/11/24 1212   Chest Tube Airleak No 12/12/24 0400   Status Continuous Suction 12/11/24 2000   Suction -20 cm H2O 12/11/24 2000   Y Connector Used No 12/12/24 0400   Drainage Description Sanguinous 12/12/24 0400   Dressing Status Clean, dry & intact 12/12/24 0400   Chest Tube Dressing None 12/11/24 2000   Site Assessment Clean, dry & intact 12/11/24 1900   Surrounding Skin Unable to view 12/11/24 1900   Output (ml) 100 ml 12/12/24 0600       Chest Tube Mediastinal 2 (Active)   $ Chest tube insertion $ Yes 12/11/24 1208   Chest Tube Airleak Yes 12/12/24 0400   Status Continuous Suction 12/11/24 2000   Suction -20 cm H2O 12/11/24 2000   Y Connector Used Yes 12/12/24 0400   Drainage Description Sanguinous 12/12/24 0400   Dressing Status Clean, dry & intact 12/12/24 0400   Chest Tube Dressing Dry 12/11/24 2000   Site Assessment Clean, dry & intact 12/12/24 0400   Surrounding Skin  Unable to view 12/12/24 0400   Output (ml) 0 ml 12/12/24 0600       Urinary Catheter 12/11/24 Perez-Temperature (Active)   $ Urethral catheter insertion Inserted for procedure 12/11/24 0828   Catheter Indications Need for fluid volume management of the critically ill patient in a critical care setting 12/12/24 0400   Site Assessment Pink 12/12/24 0400   Urine Color Yellow 12/12/24 0400   Urine Appearance Clear 12/12/24 0400   Collection Container Standard 12/12/24 0400   Securement Method Securing device (Describe) 12/12/24 0400   Catheter Care  Perineal wipes 12/12/24 0000   Catheter Best Practices  Drainage tube clipped to bed;Catheter secured to thigh;Tamper seal intact;Bag below bladder;Bag not on floor;Lack of dependent loop in tubing;Drainage bag less than half full 12/12/24 0400   Status Draining;Patent 12/12/24 0400   Output (mL) 38 mL 12/12/24 0400       Findings:  Infection Present At Time Of Surgery (PATOS) (choose all levels that have infection present):  No infection present  Other Findings:     Electronically signed by Yasmany Le PA-C on 12/12/2024 at 7:40 AM

## 2024-12-13 ENCOUNTER — APPOINTMENT (OUTPATIENT)
Dept: GENERAL RADIOLOGY | Age: 54
DRG: 235 | End: 2024-12-13
Attending: THORACIC SURGERY (CARDIOTHORACIC VASCULAR SURGERY)
Payer: MEDICARE

## 2024-12-13 LAB
ALBUMIN SERPL-MCNC: 3.5 G/DL (ref 3.4–5)
ALBUMIN SERPL-MCNC: 3.6 G/DL (ref 3.4–5)
ALBUMIN/GLOB SERPL: 2.1 {RATIO} (ref 1.1–2.2)
ALBUMIN/GLOB SERPL: 2.1 {RATIO} (ref 1.1–2.2)
ALP SERPL-CCNC: 41 U/L (ref 40–129)
ALP SERPL-CCNC: 42 U/L (ref 40–129)
ALT SERPL-CCNC: <5 U/L (ref 10–40)
ALT SERPL-CCNC: <5 U/L (ref 10–40)
ANION GAP SERPL CALCULATED.3IONS-SCNC: 10 MMOL/L (ref 9–17)
ANION GAP SERPL CALCULATED.3IONS-SCNC: 9 MMOL/L (ref 9–17)
ARTERIAL PATENCY WRIST A: ABNORMAL
ARTERIAL PATENCY WRIST A: NORMAL
ARTERIAL PATENCY WRIST A: NORMAL
AST SERPL-CCNC: 21 U/L (ref 15–37)
AST SERPL-CCNC: 22 U/L (ref 15–37)
BASOPHILS # BLD: 0.02 K/UL
BASOPHILS # BLD: 0.02 K/UL
BASOPHILS # BLD: 0.03 K/UL
BASOPHILS NFR BLD: 0 % (ref 0–1)
BASOPHILS NFR BLD: 1 % (ref 0–1)
BASOPHILS NFR BLD: 1 % (ref 0–1)
BDY SITE: ABNORMAL
BILIRUB DIRECT SERPL-MCNC: <0.2 MG/DL (ref 0–0.3)
BILIRUB DIRECT SERPL-MCNC: <0.2 MG/DL (ref 0–0.3)
BILIRUB INDIRECT SERPL-MCNC: ABNORMAL MG/DL (ref 0–0.7)
BILIRUB INDIRECT SERPL-MCNC: ABNORMAL MG/DL (ref 0–0.7)
BILIRUB SERPL-MCNC: 0.3 MG/DL (ref 0–1)
BILIRUB SERPL-MCNC: 0.3 MG/DL (ref 0–1)
BLOOD BANK BLOOD PRODUCT EXPIRATION DATE: NORMAL
BLOOD BANK DISPENSE STATUS: NORMAL
BLOOD BANK ISBT PRODUCT BLOOD TYPE: 6200
BLOOD BANK PRODUCT CODE: NORMAL
BLOOD BANK UNIT TYPE AND RH: NORMAL
BODY TEMPERATURE: 37
BPU ID: NORMAL
BUN BLD-MCNC: 27 MG/DL (ref 7–20)
BUN BLD-MCNC: 28 MG/DL (ref 7–20)
BUN BLD-MCNC: 30 MG/DL (ref 7–20)
BUN BLD-MCNC: 32 MG/DL (ref 7–20)
BUN BLD-MCNC: 32 MG/DL (ref 7–20)
BUN BLD-MCNC: 33 MG/DL (ref 7–20)
BUN BLD-MCNC: 36 MG/DL (ref 7–20)
BUN BLD-MCNC: 37 MG/DL (ref 7–20)
BUN BLD-MCNC: 37 MG/DL (ref 7–20)
BUN BLD-MCNC: 47 MG/DL (ref 7–20)
BUN SERPL-MCNC: 30 MG/DL (ref 7–20)
BUN SERPL-MCNC: 32 MG/DL (ref 7–20)
CA-I BLD-SCNC: 1.07 MMOL/L (ref 1.15–1.33)
CA-I BLD-SCNC: 1.11 MMOL/L (ref 1.15–1.33)
CA-I BLD-SCNC: 1.15 MMOL/L (ref 1.15–1.33)
CA-I BLD-SCNC: 1.16 MMOL/L (ref 1.15–1.33)
CA-I BLD-SCNC: 1.18 MMOL/L (ref 1.15–1.33)
CA-I BLD-SCNC: 1.19 MMOL/L (ref 1.15–1.33)
CA-I BLD-SCNC: 1.2 MMOL/L (ref 1.15–1.33)
CA-I BLD-SCNC: 1.24 MMOL/L (ref 1.15–1.33)
CA-I BLD-SCNC: 1.27 MMOL/L (ref 1.15–1.33)
CA-I BLD-SCNC: 1.27 MMOL/L (ref 1.15–1.33)
CA-I BLD-SCNC: 1.28 MMOL/L (ref 1.15–1.33)
CA-I BLD-SCNC: 1.3 MMOL/L (ref 1.15–1.33)
CALCIUM SERPL-MCNC: 8.7 MG/DL (ref 8.3–10.6)
CALCIUM SERPL-MCNC: 9 MG/DL (ref 8.3–10.6)
CHLORIDE BLD-SCNC: 103 MMOL/L (ref 99–110)
CHLORIDE BLD-SCNC: 104 MMOL/L (ref 99–110)
CHLORIDE BLD-SCNC: 105 MMOL/L (ref 99–110)
CHLORIDE BLD-SCNC: 105 MMOL/L (ref 99–110)
CHLORIDE BLD-SCNC: 106 MMOL/L (ref 99–110)
CHLORIDE BLD-SCNC: 106 MMOL/L (ref 99–110)
CHLORIDE BLD-SCNC: 108 MMOL/L (ref 99–110)
CHLORIDE BLD-SCNC: 109 MMOL/L (ref 99–110)
CHLORIDE BLD-SCNC: 109 MMOL/L (ref 99–110)
CHLORIDE SERPL-SCNC: 104 MMOL/L (ref 99–110)
CHLORIDE SERPL-SCNC: 104 MMOL/L (ref 99–110)
CO2 SERPL-SCNC: 24 MMOL/L (ref 21–32)
CO2 SERPL-SCNC: 25 MMOL/L (ref 21–32)
COHGB MFR BLD: 0.3 % (ref 0.5–1.5)
COMPONENT: NORMAL
CREAT BLD-MCNC: 3.1 MG/DL (ref 0.5–1.2)
CREAT BLD-MCNC: 3.1 MG/DL (ref 0.5–1.2)
CREAT BLD-MCNC: 3.2 MG/DL (ref 0.5–1.2)
CREAT BLD-MCNC: 3.2 MG/DL (ref 0.5–1.2)
CREAT BLD-MCNC: 3.3 MG/DL (ref 0.5–1.2)
CREAT BLD-MCNC: 3.6 MG/DL (ref 0.5–1.2)
CREAT BLD-MCNC: 3.8 MG/DL (ref 0.5–1.2)
CREAT BLD-MCNC: 3.9 MG/DL (ref 0.5–1.2)
CREAT BLD-MCNC: 4.1 MG/DL (ref 0.5–1.2)
CREAT BLD-MCNC: 4.2 MG/DL (ref 0.5–1.2)
CREAT BLD-MCNC: 4.5 MG/DL (ref 0.5–1.2)
CREAT BLD-MCNC: 4.8 MG/DL (ref 0.5–1.2)
CREAT BLD-MCNC: ABNORMAL MG/DL (ref 0.5–1.2)
CREAT SERPL-MCNC: 3.4 MG/DL (ref 0.9–1.3)
CREAT SERPL-MCNC: 3.6 MG/DL (ref 0.9–1.3)
D DIMER PPP FEU-MCNC: <0.27 UG/ML FEU (ref 0–0.46)
D DIMER PPP FEU-MCNC: <0.27 UG/ML FEU (ref 0–0.46)
EGFR, POC: 14 ML/MIN/1.73M2
EGFR, POC: 15 ML/MIN/1.73M2
EGFR, POC: 16 ML/MIN/1.73M2
EGFR, POC: 17 ML/MIN/1.73M2
EGFR, POC: 18 ML/MIN/1.73M2
EGFR, POC: 18 ML/MIN/1.73M2
EGFR, POC: 19 ML/MIN/1.73M2
EGFR, POC: 21 ML/MIN/1.73M2
EGFR, POC: 22 ML/MIN/1.73M2
EGFR, POC: 22 ML/MIN/1.73M2
EGFR, POC: 23 ML/MIN/1.73M2
EGFR, POC: 23 ML/MIN/1.73M2
EGFR, POC: ABNORMAL ML/MIN/1.73M2
EOSINOPHIL # BLD: 0 K/UL
EOSINOPHIL # BLD: 0 K/UL
EOSINOPHIL # BLD: 0.01 K/UL
EOSINOPHILS RELATIVE PERCENT: 0 % (ref 0–3)
ERYTHROCYTE [DISTWIDTH] IN BLOOD BY AUTOMATED COUNT: 17 % (ref 11.7–14.9)
ERYTHROCYTE [DISTWIDTH] IN BLOOD BY AUTOMATED COUNT: 17 % (ref 11.7–14.9)
ERYTHROCYTE [DISTWIDTH] IN BLOOD BY AUTOMATED COUNT: 17.1 % (ref 11.7–14.9)
FIBRINOGEN PPP-MCNC: 395 MG/DL (ref 170–540)
FIBRINOGEN PPP-MCNC: 516 MG/DL (ref 170–540)
GAS FLOW.O2 O2 DELIVERY SYS: ABNORMAL L/MIN
GFR, ESTIMATED: 18 ML/MIN/1.73M2
GFR, ESTIMATED: 19 ML/MIN/1.73M2
GLUCOSE BLD-MCNC: 100 MG/DL (ref 74–99)
GLUCOSE BLD-MCNC: 102 MG/DL (ref 74–99)
GLUCOSE BLD-MCNC: 102 MG/DL (ref 74–99)
GLUCOSE BLD-MCNC: 106 MG/DL (ref 74–99)
GLUCOSE BLD-MCNC: 107 MG/DL (ref 74–99)
GLUCOSE BLD-MCNC: 108 MG/DL (ref 74–99)
GLUCOSE BLD-MCNC: 109 MG/DL (ref 74–99)
GLUCOSE BLD-MCNC: 110 MG/DL (ref 74–99)
GLUCOSE BLD-MCNC: 110 MG/DL (ref 74–99)
GLUCOSE BLD-MCNC: 111 MG/DL (ref 74–99)
GLUCOSE BLD-MCNC: 112 MG/DL (ref 74–99)
GLUCOSE BLD-MCNC: 113 MG/DL (ref 74–99)
GLUCOSE BLD-MCNC: 121 MG/DL (ref 74–99)
GLUCOSE BLD-MCNC: 129 MG/DL (ref 74–99)
GLUCOSE BLD-MCNC: 152 MG/DL (ref 74–99)
GLUCOSE BLD-MCNC: 157 MG/DL (ref 74–99)
GLUCOSE BLD-MCNC: 94 MG/DL (ref 74–99)
GLUCOSE BLD-MCNC: 97 MG/DL (ref 74–99)
GLUCOSE BLD-MCNC: 98 MG/DL (ref 74–99)
GLUCOSE SERPL-MCNC: 101 MG/DL (ref 74–99)
GLUCOSE SERPL-MCNC: 88 MG/DL (ref 74–99)
HCO3 ARTERIAL: 23.1 MMOL/L (ref 21–28)
HCO3 VENOUS: 25.3 MMOL/L (ref 22–29)
HCT VFR BLD AUTO: 21 % (ref 38–51)
HCT VFR BLD AUTO: 21 % (ref 38–51)
HCT VFR BLD AUTO: 21.7 % (ref 42–52)
HCT VFR BLD AUTO: 22 % (ref 38–51)
HCT VFR BLD AUTO: 22.6 % (ref 42–52)
HCT VFR BLD AUTO: 23 % (ref 38–51)
HCT VFR BLD AUTO: 24.5 % (ref 42–52)
HCT VFR BLD AUTO: 25 % (ref 38–51)
HGB BLD-MCNC: 7.2 G/DL (ref 13.5–18)
HGB BLD-MCNC: 7.5 G/DL (ref 13.5–18)
HGB BLD-MCNC: 8.1 G/DL (ref 13.5–18)
IMM GRANULOCYTES # BLD AUTO: 0.01 K/UL
IMM GRANULOCYTES NFR BLD: 0 %
INR PPP: 1.2
INR PPP: 1.4
LACTATE BLDV-SCNC: 0.5 MMOL/L (ref 0.4–2)
LACTATE BLDV-SCNC: 0.6 MMOL/L (ref 0.4–2)
LYMPHOCYTES NFR BLD: 0.65 K/UL
LYMPHOCYTES NFR BLD: 0.79 K/UL
LYMPHOCYTES NFR BLD: 0.97 K/UL
LYMPHOCYTES RELATIVE PERCENT: 16 % (ref 24–44)
LYMPHOCYTES RELATIVE PERCENT: 18 % (ref 24–44)
LYMPHOCYTES RELATIVE PERCENT: 20 % (ref 24–44)
MAGNESIUM SERPL-MCNC: 2.5 MG/DL (ref 1.8–2.4)
MAGNESIUM SERPL-MCNC: 2.6 MG/DL (ref 1.8–2.4)
MCH RBC QN AUTO: 29.5 PG (ref 27–31)
MCH RBC QN AUTO: 29.8 PG (ref 27–31)
MCH RBC QN AUTO: 29.9 PG (ref 27–31)
MCHC RBC AUTO-ENTMCNC: 33.1 G/DL (ref 32–36)
MCHC RBC AUTO-ENTMCNC: 33.2 G/DL (ref 32–36)
MCHC RBC AUTO-ENTMCNC: 33.2 G/DL (ref 32–36)
MCV RBC AUTO: 89.1 FL (ref 78–100)
MCV RBC AUTO: 89.7 FL (ref 78–100)
MCV RBC AUTO: 90 FL (ref 78–100)
METHEMOGLOBIN: 0.6 % (ref 0.5–1.5)
MONOCYTES NFR BLD: 0.45 K/UL
MONOCYTES NFR BLD: 0.53 K/UL
MONOCYTES NFR BLD: 0.6 K/UL
MONOCYTES NFR BLD: 11 % (ref 0–4)
MONOCYTES NFR BLD: 12 % (ref 0–4)
MONOCYTES NFR BLD: 12 % (ref 0–4)
NEGATIVE BASE EXCESS, ART: 0.1 MMOL/L (ref 0–3)
NEGATIVE BASE EXCESS, ART: 0.1 MMOL/L (ref 0–3)
NEGATIVE BASE EXCESS, ART: 0.4 MMOL/L (ref 0–3)
NEGATIVE BASE EXCESS, ART: 0.4 MMOL/L (ref 0–3)
NEGATIVE BASE EXCESS, ART: 0.7 MMOL/L (ref 0–3)
NEGATIVE BASE EXCESS, ART: 2.2 MMOL/L (ref 0–3)
NEGATIVE BASE EXCESS, ART: 2.9 MMOL/L (ref 0–3)
NEGATIVE BASE EXCESS, VEN: 0.2 MMOL/L (ref 0–3)
NEUTROPHILS NFR BLD: 68 % (ref 36–66)
NEUTROPHILS NFR BLD: 69 % (ref 36–66)
NEUTROPHILS NFR BLD: 71 % (ref 36–66)
NEUTS SEG NFR BLD: 2.55 K/UL
NEUTS SEG NFR BLD: 3.23 K/UL
NEUTS SEG NFR BLD: 3.43 K/UL
O2 SAT, VEN: 94.4 % (ref 34–95)
OXYHGB MFR BLD: 70.1 %
OXYHGB MFR BLD: 71.3 %
OXYHGB MFR BLD: 95.9 %
PARTIAL THROMBOPLASTIN TIME: 34.6 SEC (ref 25.1–37.1)
PARTIAL THROMBOPLASTIN TIME: 38.5 SEC (ref 25.1–37.1)
PCO2 ARTERIAL: 45.4 MMHG (ref 35–48)
PCO2 VENOUS: 44.5 MM HG (ref 41–51)
PH ARTERIAL: 7.32 (ref 7.35–7.45)
PH VENOUS: 7.36 (ref 7.32–7.43)
PHOSPHATE SERPL-MCNC: 3.8 MG/DL (ref 2.5–4.9)
PHOSPHATE SERPL-MCNC: 4.2 MG/DL (ref 2.5–4.9)
PLATELET CONFIRMATION: NORMAL
PLATELET, FLUORESCENCE: 70 K/UL (ref 140–440)
PLATELET, FLUORESCENCE: 80 K/UL (ref 140–440)
PLATELET, FLUORESCENCE: 94 K/UL (ref 140–440)
PMV BLD AUTO: 11.1 FL (ref 7.5–11.1)
PO2 ARTERIAL: 103.8 MMHG (ref 83–108)
PO2 VENOUS: 75.6 MM HG (ref 28–48)
POC ANION GAP: 5 MMOL/L (ref 7–16)
POC ANION GAP: ABNORMAL MMOL/L (ref 7–16)
POC HCO3: 24.8 MMOL/L (ref 21–28)
POC HCO3: 25 MMOL/L (ref 21–28)
POC HCO3: 25.4 MMOL/L (ref 21–28)
POC HCO3: 26 MMOL/L (ref 21–28)
POC HCO3: 26.1 MMOL/L (ref 21–28)
POC HCO3: 26.1 MMOL/L (ref 21–28)
POC HCO3: 26.2 MMOL/L (ref 21–28)
POC HCO3: 26.5 MMOL/L (ref 21–28)
POC HCO3: 28.2 MMOL/L (ref 21–28)
POC HCO3: 28.7 MMOL/L (ref 21–28)
POC HCO3: 29.8 MMOL/L (ref 21–28)
POC HCO3: 31.2 MMOL/L (ref 21–28)
POC HEMOGLOBIN (CALC): 7.1 G/DL (ref 12–17)
POC HEMOGLOBIN (CALC): 7.3 G/DL (ref 12–17)
POC HEMOGLOBIN (CALC): 7.3 G/DL (ref 12–17)
POC HEMOGLOBIN (CALC): 7.4 G/DL (ref 12–17)
POC HEMOGLOBIN (CALC): 7.4 G/DL (ref 12–17)
POC HEMOGLOBIN (CALC): 7.5 G/DL (ref 12–17)
POC HEMOGLOBIN (CALC): 7.6 G/DL (ref 12–17)
POC HEMOGLOBIN (CALC): 7.8 G/DL (ref 12–17)
POC HEMOGLOBIN (CALC): 8.6 G/DL (ref 12–17)
POC O2 SATURATION: 93.7 % (ref 94–98)
POC O2 SATURATION: 96 % (ref 94–98)
POC O2 SATURATION: 96.3 % (ref 94–98)
POC O2 SATURATION: 96.7 % (ref 94–98)
POC O2 SATURATION: 96.9 % (ref 94–98)
POC O2 SATURATION: 97.3 % (ref 94–98)
POC O2 SATURATION: 97.4 % (ref 94–98)
POC O2 SATURATION: 97.5 % (ref 94–98)
POC O2 SATURATION: 98 % (ref 94–98)
POC O2 SATURATION: 98.1 % (ref 94–98)
POC O2 SATURATION: 98.4 % (ref 94–98)
POC O2 SATURATION: ABNORMAL % (ref 94–98)
POC PCO2: 42.2 MM HG (ref 35–48)
POC PCO2: 43.5 MM HG (ref 35–48)
POC PCO2: 44 MM HG (ref 35–48)
POC PCO2: 45.8 MM HG (ref 35–48)
POC PCO2: 49.5 MM HG (ref 35–48)
POC PCO2: 51 MM HG (ref 35–48)
POC PCO2: 51.3 MM HG (ref 35–48)
POC PCO2: 53.1 MM HG (ref 35–48)
POC PCO2: 54 MM HG (ref 35–48)
POC PCO2: 59.6 MM HG (ref 35–48)
POC PCO2: 60.1 MM HG (ref 35–48)
POC PCO2: 69.9 MM HG (ref 35–48)
POC PH: 7.19 (ref 7.35–7.45)
POC PH: 7.29 (ref 7.35–7.45)
POC PH: 7.31 (ref 7.35–7.45)
POC PH: 7.32 (ref 7.35–7.45)
POC PH: 7.32 (ref 7.35–7.45)
POC PH: 7.33 (ref 7.35–7.45)
POC PH: 7.34 (ref 7.35–7.45)
POC PH: 7.35 (ref 7.35–7.45)
POC PH: 7.36 (ref 7.35–7.45)
POC PH: 7.37 (ref 7.35–7.45)
POC PH: 7.37 (ref 7.35–7.45)
POC PH: 7.38 (ref 7.35–7.45)
POC PO2: 103.9 MM HG (ref 83–108)
POC PO2: 104.5 MM HG (ref 83–108)
POC PO2: 104.5 MM HG (ref 83–108)
POC PO2: 110.8 MM HG (ref 83–108)
POC PO2: 116.4 MM HG (ref 83–108)
POC PO2: 117.9 MM HG (ref 83–108)
POC PO2: 72.1 MM HG (ref 83–108)
POC PO2: 91 MM HG (ref 83–108)
POC PO2: 91.7 MM HG (ref 83–108)
POC PO2: 99.1 MM HG (ref 83–108)
POC PO2: 99.2 MM HG (ref 83–108)
POSITIVE BASE EXCESS, ART: 0.1 MMOL/L (ref 0–3)
POSITIVE BASE EXCESS, ART: 0.6 MMOL/L (ref 0–3)
POSITIVE BASE EXCESS, ART: 2.3 MMOL/L (ref 0–3)
POSITIVE BASE EXCESS, ART: 2.5 MMOL/L (ref 0–3)
POSITIVE BASE EXCESS, ART: 2.9 MMOL/L (ref 0–3)
POSITIVE BASE EXCESS, ART: 4.4 MMOL/L (ref 0–3)
POTASSIUM BLD-SCNC: 4.1 MMOL/L (ref 3.5–5.1)
POTASSIUM BLD-SCNC: 4.4 MMOL/L (ref 3.5–5.1)
POTASSIUM BLD-SCNC: 4.4 MMOL/L (ref 3.5–5.1)
POTASSIUM BLD-SCNC: 4.5 MMOL/L (ref 3.5–5.1)
POTASSIUM BLD-SCNC: 4.6 MMOL/L (ref 3.5–5.1)
POTASSIUM SERPL-SCNC: 4.6 MMOL/L (ref 3.5–5.1)
POTASSIUM SERPL-SCNC: 4.6 MMOL/L (ref 3.5–5.1)
PROT SERPL-MCNC: 5.2 G/DL (ref 6.4–8.2)
PROT SERPL-MCNC: 5.3 G/DL (ref 6.4–8.2)
PROTHROMBIN TIME: 15.9 SEC (ref 11.7–14.5)
PROTHROMBIN TIME: 17.2 SEC (ref 11.7–14.5)
RBC # BLD AUTO: 2.42 M/UL (ref 4.6–6.2)
RBC # BLD AUTO: 2.51 M/UL (ref 4.6–6.2)
RBC # BLD AUTO: 2.75 M/UL (ref 4.6–6.2)
SODIUM BLD-SCNC: 136 MMOL/L (ref 136–145)
SODIUM BLD-SCNC: 136 MMOL/L (ref 136–145)
SODIUM BLD-SCNC: 137 MMOL/L (ref 136–145)
SODIUM BLD-SCNC: 138 MMOL/L (ref 136–145)
SODIUM BLD-SCNC: 138 MMOL/L (ref 136–145)
SODIUM BLD-SCNC: 139 MMOL/L (ref 136–145)
SODIUM BLD-SCNC: 140 MMOL/L (ref 136–145)
SODIUM BLD-SCNC: 140 MMOL/L (ref 136–145)
SODIUM BLD-SCNC: 141 MMOL/L (ref 136–145)
SODIUM BLD-SCNC: 141 MMOL/L (ref 136–145)
SODIUM BLD-SCNC: 142 MMOL/L (ref 136–145)
SODIUM SERPL-SCNC: 137 MMOL/L (ref 136–145)
SODIUM SERPL-SCNC: 138 MMOL/L (ref 136–145)
TCO2 (CALC), ART: 26 MMOL/L (ref 21–32)
TCO2 (CALC), ART: 26 MMOL/L (ref 21–32)
TCO2 (CALC), ART: 27 MMOL/L (ref 21–32)
TCO2 (CALC), ART: 28 MMOL/L (ref 21–32)
TCO2 (CALC), ART: 29 MMOL/L (ref 21–32)
TCO2 (CALC), ART: 30 MMOL/L (ref 21–32)
TCO2 (CALC), ART: 30 MMOL/L (ref 21–32)
TCO2 (CALC), ART: 32 MMOL/L (ref 21–32)
TCO2 (CALC), ART: 33 MMOL/L (ref 21–32)
TCO2 CALC VENOUS: 27 MMOL/L (ref 21–32)
TEXT FOR RESPIRATORY: NORMAL
TEXT FOR RESPIRATORY: NORMAL
TRANSFUSION STATUS: NORMAL
UNIT DIVISION: 0
UNIT ISSUE DATE/TIME: NORMAL
WBC OTHER # BLD: 3.7 K/UL (ref 4–10.5)
WBC OTHER # BLD: 4.8 K/UL (ref 4–10.5)
WBC OTHER # BLD: 4.9 K/UL (ref 4–10.5)

## 2024-12-13 PROCEDURE — 82330 ASSAY OF CALCIUM: CPT

## 2024-12-13 PROCEDURE — 36430 TRANSFUSION BLD/BLD COMPNT: CPT

## 2024-12-13 PROCEDURE — 83605 ASSAY OF LACTIC ACID: CPT

## 2024-12-13 PROCEDURE — 85730 THROMBOPLASTIN TIME PARTIAL: CPT

## 2024-12-13 PROCEDURE — 83735 ASSAY OF MAGNESIUM: CPT

## 2024-12-13 PROCEDURE — 2720000010 HC SURG SUPPLY STERILE

## 2024-12-13 PROCEDURE — 6370000000 HC RX 637 (ALT 250 FOR IP): Performed by: PHYSICIAN ASSISTANT

## 2024-12-13 PROCEDURE — 80047 BASIC METABLC PNL IONIZED CA: CPT

## 2024-12-13 PROCEDURE — 86022 PLATELET ANTIBODIES: CPT

## 2024-12-13 PROCEDURE — 2100000000 HC CCU R&B

## 2024-12-13 PROCEDURE — 85384 FIBRINOGEN ACTIVITY: CPT

## 2024-12-13 PROCEDURE — 82805 BLOOD GASES W/O2 SATURATION: CPT

## 2024-12-13 PROCEDURE — 6360000002 HC RX W HCPCS: Performed by: PHYSICIAN ASSISTANT

## 2024-12-13 PROCEDURE — 6360000002 HC RX W HCPCS: Performed by: INTERNAL MEDICINE

## 2024-12-13 PROCEDURE — 90945 DIALYSIS ONE EVALUATION: CPT

## 2024-12-13 PROCEDURE — 82803 BLOOD GASES ANY COMBINATION: CPT

## 2024-12-13 PROCEDURE — 80053 COMPREHEN METABOLIC PANEL: CPT

## 2024-12-13 PROCEDURE — 99233 SBSQ HOSP IP/OBS HIGH 50: CPT | Performed by: INTERNAL MEDICINE

## 2024-12-13 PROCEDURE — 84100 ASSAY OF PHOSPHORUS: CPT

## 2024-12-13 PROCEDURE — 85014 HEMATOCRIT: CPT

## 2024-12-13 PROCEDURE — 2580000003 HC RX 258: Performed by: INTERNAL MEDICINE

## 2024-12-13 PROCEDURE — 94761 N-INVAS EAR/PLS OXIMETRY MLT: CPT

## 2024-12-13 PROCEDURE — 97163 PT EVAL HIGH COMPLEX 45 MIN: CPT

## 2024-12-13 PROCEDURE — 85610 PROTHROMBIN TIME: CPT

## 2024-12-13 PROCEDURE — 94640 AIRWAY INHALATION TREATMENT: CPT

## 2024-12-13 PROCEDURE — 97167 OT EVAL HIGH COMPLEX 60 MIN: CPT

## 2024-12-13 PROCEDURE — 6370000000 HC RX 637 (ALT 250 FOR IP): Performed by: STUDENT IN AN ORGANIZED HEALTH CARE EDUCATION/TRAINING PROGRAM

## 2024-12-13 PROCEDURE — 82248 BILIRUBIN DIRECT: CPT

## 2024-12-13 PROCEDURE — 2700000000 HC OXYGEN THERAPY PER DAY

## 2024-12-13 PROCEDURE — 94660 CPAP INITIATION&MGMT: CPT

## 2024-12-13 PROCEDURE — P9017 PLASMA 1 DONOR FRZ W/IN 8 HR: HCPCS

## 2024-12-13 PROCEDURE — 82962 GLUCOSE BLOOD TEST: CPT

## 2024-12-13 PROCEDURE — 85025 COMPLETE CBC W/AUTO DIFF WBC: CPT

## 2024-12-13 PROCEDURE — 71045 X-RAY EXAM CHEST 1 VIEW: CPT

## 2024-12-13 PROCEDURE — 86927 PLASMA FRESH FROZEN: CPT

## 2024-12-13 PROCEDURE — 85379 FIBRIN DEGRADATION QUANT: CPT

## 2024-12-13 PROCEDURE — 2580000003 HC RX 258: Performed by: PHYSICIAN ASSISTANT

## 2024-12-13 PROCEDURE — 2500000003 HC RX 250 WO HCPCS: Performed by: PHYSICIAN ASSISTANT

## 2024-12-13 PROCEDURE — 6370000000 HC RX 637 (ALT 250 FOR IP): Performed by: INTERNAL MEDICINE

## 2024-12-13 RX ORDER — GUAIFENESIN 600 MG/1
1200 TABLET, EXTENDED RELEASE ORAL 2 TIMES DAILY
Status: DISCONTINUED | OUTPATIENT
Start: 2024-12-13 | End: 2024-12-17 | Stop reason: HOSPADM

## 2024-12-13 RX ORDER — HYDRALAZINE HYDROCHLORIDE 20 MG/ML
20 INJECTION INTRAMUSCULAR; INTRAVENOUS EVERY 6 HOURS PRN
Status: DISCONTINUED | OUTPATIENT
Start: 2024-12-13 | End: 2024-12-17 | Stop reason: HOSPADM

## 2024-12-13 RX ORDER — SODIUM CHLORIDE 9 MG/ML
INJECTION, SOLUTION INTRAVENOUS PRN
Status: DISCONTINUED | OUTPATIENT
Start: 2024-12-13 | End: 2024-12-16

## 2024-12-13 RX ORDER — INSULIN LISPRO 100 [IU]/ML
0-16 INJECTION, SOLUTION INTRAVENOUS; SUBCUTANEOUS EVERY 4 HOURS
Status: DISCONTINUED | OUTPATIENT
Start: 2024-12-13 | End: 2024-12-14

## 2024-12-13 RX ORDER — TRAMADOL HYDROCHLORIDE 50 MG/1
50 TABLET ORAL EVERY 6 HOURS PRN
Status: DISCONTINUED | OUTPATIENT
Start: 2024-12-13 | End: 2024-12-17 | Stop reason: HOSPADM

## 2024-12-13 RX ORDER — METOPROLOL TARTRATE 25 MG/1
25 TABLET, FILM COATED ORAL 2 TIMES DAILY
Status: DISCONTINUED | OUTPATIENT
Start: 2024-12-13 | End: 2024-12-17 | Stop reason: HOSPADM

## 2024-12-13 RX ORDER — TRAMADOL HYDROCHLORIDE 50 MG/1
100 TABLET ORAL EVERY 6 HOURS PRN
Status: DISCONTINUED | OUTPATIENT
Start: 2024-12-13 | End: 2024-12-17 | Stop reason: HOSPADM

## 2024-12-13 RX ORDER — SODIUM CHLORIDE FOR INHALATION 0.9 %
3 VIAL, NEBULIZER (ML) INHALATION
Status: DISCONTINUED | OUTPATIENT
Start: 2024-12-13 | End: 2024-12-15

## 2024-12-13 RX ORDER — FUROSEMIDE 10 MG/ML
80 INJECTION INTRAMUSCULAR; INTRAVENOUS 2 TIMES DAILY
Status: DISCONTINUED | OUTPATIENT
Start: 2024-12-13 | End: 2024-12-16

## 2024-12-13 RX ADMIN — Medication 3 ML: at 15:09

## 2024-12-13 RX ADMIN — CALCIUM CHLORIDE 2000 MG: 100 INJECTION, SOLUTION INTRAVENOUS at 00:43

## 2024-12-13 RX ADMIN — GUAIFENESIN 1200 MG: 600 TABLET, EXTENDED RELEASE ORAL at 20:38

## 2024-12-13 RX ADMIN — Medication 1 TABLET: at 08:46

## 2024-12-13 RX ADMIN — Medication 3 ML: at 19:15

## 2024-12-13 RX ADMIN — SODIUM CHLORIDE, PRESERVATIVE FREE 10 ML: 5 INJECTION INTRAVENOUS at 20:39

## 2024-12-13 RX ADMIN — METHOCARBAMOL 500 MG: 500 TABLET ORAL at 03:50

## 2024-12-13 RX ADMIN — SODIUM CHLORIDE 5 MG/HR: 9 INJECTION, SOLUTION INTRAVENOUS at 04:15

## 2024-12-13 RX ADMIN — GABAPENTIN 100 MG: 100 CAPSULE ORAL at 08:47

## 2024-12-13 RX ADMIN — METHOCARBAMOL 500 MG: 500 TABLET ORAL at 08:49

## 2024-12-13 RX ADMIN — FUROSEMIDE 80 MG: 10 INJECTION, SOLUTION INTRAMUSCULAR; INTRAVENOUS at 10:23

## 2024-12-13 RX ADMIN — CHLORHEXIDINE GLUCONATE 0.12% ORAL RINSE 15 ML: 1.2 LIQUID ORAL at 08:46

## 2024-12-13 RX ADMIN — TRAMADOL HYDROCHLORIDE 100 MG: 50 TABLET, COATED ORAL at 11:49

## 2024-12-13 RX ADMIN — EPOETIN ALFA-EPBX 10000 UNITS: 10000 INJECTION, SOLUTION INTRAVENOUS; SUBCUTANEOUS at 17:12

## 2024-12-13 RX ADMIN — SENNOSIDES AND DOCUSATE SODIUM 1 TABLET: 50; 8.6 TABLET ORAL at 20:38

## 2024-12-13 RX ADMIN — TRAZODONE HYDROCHLORIDE 50 MG: 50 TABLET ORAL at 23:39

## 2024-12-13 RX ADMIN — ATORVASTATIN CALCIUM 40 MG: 40 TABLET, FILM COATED ORAL at 20:38

## 2024-12-13 RX ADMIN — MUPIROCIN: 20 OINTMENT TOPICAL at 09:04

## 2024-12-13 RX ADMIN — METOPROLOL TARTRATE 25 MG: 25 TABLET, FILM COATED ORAL at 08:47

## 2024-12-13 RX ADMIN — MUPIROCIN: 20 OINTMENT TOPICAL at 20:38

## 2024-12-13 RX ADMIN — METOPROLOL TARTRATE 25 MG: 25 TABLET, FILM COATED ORAL at 20:38

## 2024-12-13 RX ADMIN — FUROSEMIDE 80 MG: 10 INJECTION, SOLUTION INTRAMUSCULAR; INTRAVENOUS at 17:12

## 2024-12-13 RX ADMIN — GABAPENTIN 100 MG: 100 CAPSULE ORAL at 20:38

## 2024-12-13 RX ADMIN — BACITRACIN: 500 OINTMENT TOPICAL at 20:37

## 2024-12-13 RX ADMIN — ACETAMINOPHEN 1000 MG: 500 TABLET ORAL at 11:48

## 2024-12-13 RX ADMIN — Medication: at 07:52

## 2024-12-13 RX ADMIN — CHLORHEXIDINE GLUCONATE 0.12% ORAL RINSE 15 ML: 1.2 LIQUID ORAL at 20:37

## 2024-12-13 RX ADMIN — ACETAMINOPHEN 1000 MG: 500 TABLET ORAL at 06:38

## 2024-12-13 RX ADMIN — CALCIUM CHLORIDE 1000 MG: 100 INJECTION, SOLUTION INTRAVENOUS at 07:08

## 2024-12-13 RX ADMIN — IPRATROPIUM BROMIDE AND ALBUTEROL SULFATE 1 DOSE: 2.5; .5 SOLUTION RESPIRATORY (INHALATION) at 19:15

## 2024-12-13 RX ADMIN — Medication: at 07:33

## 2024-12-13 RX ADMIN — INSULIN LISPRO 2 UNITS: 100 INJECTION, SOLUTION INTRAVENOUS; SUBCUTANEOUS at 16:36

## 2024-12-13 RX ADMIN — IPRATROPIUM BROMIDE AND ALBUTEROL SULFATE 1 DOSE: 2.5; .5 SOLUTION RESPIRATORY (INHALATION) at 15:04

## 2024-12-13 RX ADMIN — ACETAMINOPHEN 1000 MG: 500 TABLET ORAL at 23:38

## 2024-12-13 RX ADMIN — SODIUM CHLORIDE 5 MG/HR: 9 INJECTION, SOLUTION INTRAVENOUS at 07:19

## 2024-12-13 RX ADMIN — GUAIFENESIN 1200 MG: 600 TABLET, EXTENDED RELEASE ORAL at 11:49

## 2024-12-13 RX ADMIN — IPRATROPIUM BROMIDE AND ALBUTEROL SULFATE 1 DOSE: 2.5; .5 SOLUTION RESPIRATORY (INHALATION) at 11:26

## 2024-12-13 RX ADMIN — SODIUM CHLORIDE, PRESERVATIVE FREE 10 ML: 5 INJECTION INTRAVENOUS at 08:54

## 2024-12-13 RX ADMIN — IPRATROPIUM BROMIDE AND ALBUTEROL SULFATE 1 DOSE: 2.5; .5 SOLUTION RESPIRATORY (INHALATION) at 07:15

## 2024-12-13 RX ADMIN — METHOCARBAMOL 500 MG: 500 TABLET ORAL at 16:37

## 2024-12-13 RX ADMIN — ASPIRIN 81 MG: 81 TABLET, CHEWABLE ORAL at 08:47

## 2024-12-13 RX ADMIN — ACETAMINOPHEN 1000 MG: 500 TABLET ORAL at 17:12

## 2024-12-13 ASSESSMENT — PAIN DESCRIPTION - LOCATION
LOCATION: CHEST;SHOULDER
LOCATION: CHEST
LOCATION: CHEST;SHOULDER
LOCATION: BACK

## 2024-12-13 ASSESSMENT — PAIN DESCRIPTION - ORIENTATION
ORIENTATION: MID
ORIENTATION: LEFT;MID
ORIENTATION: LEFT;POSTERIOR
ORIENTATION: MID;RIGHT

## 2024-12-13 ASSESSMENT — PAIN - FUNCTIONAL ASSESSMENT
PAIN_FUNCTIONAL_ASSESSMENT: ACTIVITIES ARE NOT PREVENTED
PAIN_FUNCTIONAL_ASSESSMENT: PREVENTS OR INTERFERES SOME ACTIVE ACTIVITIES AND ADLS

## 2024-12-13 ASSESSMENT — PAIN DESCRIPTION - ONSET
ONSET: GRADUAL
ONSET: ON-GOING

## 2024-12-13 ASSESSMENT — PAIN DESCRIPTION - DESCRIPTORS
DESCRIPTORS: ACHING
DESCRIPTORS: SHARP
DESCRIPTORS: ACHING
DESCRIPTORS: ACHING

## 2024-12-13 ASSESSMENT — PULMONARY FUNCTION TESTS: PEFR_L/MIN: 18

## 2024-12-13 ASSESSMENT — PAIN SCALES - GENERAL
PAINLEVEL_OUTOF10: 5
PAINLEVEL_OUTOF10: 9
PAINLEVEL_OUTOF10: 3
PAINLEVEL_OUTOF10: 6
PAINLEVEL_OUTOF10: 0

## 2024-12-13 ASSESSMENT — PAIN DESCRIPTION - PAIN TYPE
TYPE: SURGICAL PAIN
TYPE: SURGICAL PAIN

## 2024-12-13 ASSESSMENT — PAIN DESCRIPTION - FREQUENCY
FREQUENCY: CONTINUOUS
FREQUENCY: CONTINUOUS

## 2024-12-13 NOTE — PROGRESS NOTES
Inpatient Progress Note 12/13/2024        Landry Morley  1970  [id number]      Assessment/Plan:  Landry Morley is a 54 y.o. male with a history of hypertension, diabetes, CAD status post PCI with MICHAEL x 5, CKD stage V on dialysis, congestive heart failure with reduced ejection fraction who presented to [facility] 12/11/2024 for scheduled CABG, the ICU post surgical recovery.        Problem list  CAD status post PCI and MICHAEL x 5  CABG x 4, LIMA to LAD, SVG to ramus, PDA and OM 2  Diabetes  ESRD on dialysis  Hypovolemic shock  Post-op Bleeding  Hypercarbic respiratory failure  Urbemic bleeding     Neuro: alert, oriented foloowing commands. Pain controlled with current multimodal regimen.   Cardio: Severe history of coronary artery disease and congestive heart failure known CAD with multiple MICHAEL's, now status post CABG x 4, LIMA to LAD, SVG to ramus, PDA and OM 2.  Known to have congestive heart failure with reduced ejection fraction, EF of 35 to 40%.  Last ROHIT from 11/20/2024 shows EF of 30 to 35% mid and apical septal wall hypokinesis and noted thrombus in the left atrial appendage.  Cardiology and CTS managing.  Postop cardiac index of 3 with cardiac output of 6.5 off any vasoactive agents at this time, on Cardene for SBP Goal 120s. V pacing wire in place, pacer to VVI backup at 60. pleural chest tube with minimal bleeding at thsi time.   Resp: Acute hypercarbic respiratory failure, was transiently on BiPAP now on HFNC. Titrate off as able. adjust setting to optimize acid base status. Continue inhalers, nebs and aggressive pulm hygiene. Continue diuresis with CRRT.   Chest tubes in place, with minimal bleeding this morning  GI: Advance diet as tolerated, GI prophylaxis.  : ESRD on CRRT, Net -50 to -110ml/hr. Plan to stop CRRT and transition to IHD today. nephrology following. Uop improve slightly though nominal as he is on iHD. Monitor electrolytes and replenish as needed.  Heme: Hemoglobin of 8.1, platelets  mm Hg    POC PO2 99.2 83.0 - 108.0 mm Hg    POC HCO3 25.0 21.0 - 28.0 mmol/L    TCO2 (calc), Art 26 21 - 32 mmol/L    Negative Base Excess, Art 0.4 0.0 - 3.0 mmol/L    POC O2 SAT 97.4 94.0 - 98.0 %   POC PANEL BMP W/IOCA    Collection Time: 12/13/24  7:57 AM   Result Value Ref Range    POC Sodium 138 136 - 145 mmol/L    POC Potassium 4.5 3.5 - 5.1 mmol/L    POC Chloride 106 99 - 110 mmol/L    POC TCO2 PENDING mmol/L    POC Anion Gap Can not be calculated 7 - 16 mmol/L    POC Glucose 100 (H) 74 - 99 mg/dL    POC BUN 30 (H) 7 - 20 mg/dL    POC Creatinine 3.3 (H) 0.5 - 1.2 mg/dL    eGFR, POC 21 (L) >60 mL/min/1.73m2    POC Ionized Calcium 1.27 1.15 - 1.33 mmol/L   POCT H&H    Collection Time: 12/13/24  7:57 AM   Result Value Ref Range    POC Hemoglobin (calc) 7.4 (L) 12 - 17 g/dL    POC Hematocrit 22 (L) 38 - 51 %   Basic Metabolic Panel    Collection Time: 12/13/24  8:32 AM   Result Value Ref Range    Sodium 137 136 - 145 mmol/L    Potassium 4.6 3.5 - 5.1 mmol/L    Chloride 104 99 - 110 mmol/L    CO2 24 21 - 32 mmol/L    Anion Gap 10 9 - 17 mmol/L    Glucose 101 (H) 74 - 99 mg/dL    BUN 30 (H) 7 - 20 mg/dL    Creatinine 3.4 (H) 0.9 - 1.3 mg/dL    Est, Glom Filt Rate 19 (L) >60 mL/min/1.73m2    Calcium 9.0 8.3 - 10.6 mg/dL   APTT    Collection Time: 12/13/24  8:32 AM   Result Value Ref Range    APTT 34.6 25.1 - 37.1 sec   Calcium, Ionized    Collection Time: 12/13/24  8:32 AM   Result Value Ref Range    Calcium, Ionized 1.19 1.15 - 1.33 mmol/L   CBC with Auto Differential    Collection Time: 12/13/24  8:32 AM   Result Value Ref Range    WBC 4.9 4.0 - 10.5 k/uL    RBC 2.75 (L) 4.60 - 6.20 m/uL    Hemoglobin 8.1 (L) 13.5 - 18.0 g/dL    Hematocrit 24.5 (L) 42.0 - 52.0 %    MCV 89.1 78.0 - 100.0 fL    MCH 29.5 27.0 - 31.0 pg    MCHC 33.1 32.0 - 36.0 g/dL    RDW 17.0 (H) 11.7 - 14.9 %    MPV 11.1 7.5 - 11.1 fL    Platelet, Fluorescence 80 (L) 140 - 440 k/uL    Neutrophils % 71 (H) 36 - 66 %    Lymphocytes % 16 (L) 24 - 44 %  reviewed the patient's history and interval events in the EMR, along with vitals, labs and radiology imaging. Critical care time was spent personally providing care for this patient, excluding billable procedures, and no overlapping with any other provider.  This includes documenting my assessment and plan of care and developing the care plan to treat the problems below.     The high probability of deterioration required my full and direct attention, intervention, and personal management due to do to underlying diagnosis and clinical problems including the treatment of active or impending:  [] Neurological monitoring and treatment  [x] Respiratory failure -assessment of ventilator support, adjustment, ventilator weaning  [x] Hemodynamic and volume assessment with volume resuscitation  [x] Mechanical and/or chemical support of the circulation,  [x] Frequent vasoactive agent adjustment,  [x] Renal replacement therapy,  [x] For rapid decompensation,  [x] Electrolyte instability  [] Suctioning every 2 hours  [] Every hour neuro checks  [] Every hour neurovascular checks  [x] Frequent evaluation of patient's response to treatment and titration of therapies,  [x] Interpretation of laboratory and radiological data,  [x] Application and interpretation of advanced monitoring technologies,  [x] Extensive interpretation of multiple databases,  [x] Development of treatment plan with patient, surrogate, or consultants.  [] Others:     Electronically signed by Duke Thomas MD on 12/13/2024 at 1:33 PM

## 2024-12-13 NOTE — PROGRESS NOTES
Dr. Kapadia at bedside,     Verbal orders to remove swan-hilary  Verbal orders to remove med chest tube.  Verbal orders to remove art line 2-4 hours after Cardene is off.     Verbal orders to use hydralazine PRN to control BP. SBP goal of less than 140.  Titrate off Cardene.       Elle Cutler,ANTOINEN RN

## 2024-12-13 NOTE — PROGRESS NOTES
[facility] Cardiothoracic Surgery  Daily Progress Note    Surgeon:  Kang    Procedure:  s/p CORONARY ARTERY BYPASS GRAFT X4, LIMA - LAD, SVG - RAMUS, SVG -PDA, SVG - OM2; LEFT ENDOSCOPIC GREATER SAPHENOUS VEIN HARVEST; INTRAOPERATIVE TRANSESOPHAGEAL ECHOCARDIOGRAM on 12/11/24    Subjective:    Patient was seen and examined at bedside this morning with no complaints.     Hospital Course:  12/11/24: Patient underwent surgical intervention.  See op note for full details.  Transferred to the CVICU in a stable condition.  On some pressor support.  12/12/24:  Extubated last night. Received 2u of prbc's in the icu. 350cc blood out in last hour.  CC giving txa and ddavp.  Continues on epi @ 2, Rao @125, and Vaso @0.2.  with a CI of 2.6.  U/O 1500.  1200cc of blood from pleural chest tube this am.  MTP ordered.    12/13/24: On BiPAP this morning.  On 7.5 of Cardene.  Weaned off all pressor support last night.  Transfuse 1 unit of blood this morning.  Repeat H&H.  Give 1 FFP.  H&H 7.2 and 21.7.  Thrombocytopenic with platelet count of 70,000.  HIT panel sent.  Maintain chest tubes today.  Maintain Perez catheter.  Had 419 cc in the past 24 hours and 200 cc out overnight.  1.2 L of fluid removed of CRRT.  24-hour net fluid balance was -142 cc.  Output and index are stable at 3.3 and 7.0.  Maintain [place] today.  Start Lopressor 25 mg twice daily.  Electrolytes replaced.  Hydralazine as needed.  Consider switching CRRT to HD.  Will discuss with nephrology.    Vital Signs: /68   Pulse 72   Temp 97.2 °F (36.2 °C) (Core)   Resp 15   Ht 1.829 m (6')   Wt 107.7 kg (237 lb 7 oz)   SpO2 100%   BMI 32.20 kg/m²  O2 Flow Rate (L/min): (S) 4 L/min   Admit Weight: Weight - Scale: 92.5 kg (204 lb)       I/O's:  I/O last 3 completed shifts:  In: 6009 [P.O.:480; I.V.:1883.3; Blood:2828.6; IV Piggyback:817]  Out: 4719 [Urine:921; Chest Tube:2515]    Data:    CBC:   Recent Labs      HARVEST; INTRAOPERATIVE TRANSESOPHAGEAL ECHOCARDIOGRAM on 12/11/24.     DM    ESRD       Plan:  All pressors turned off last night.  On Cardene at 7.5 mg twice daily.  Currently on BiPAP.  Will repeat ABG and see if we can put patient on nasal cannula oxygen today.  Transfuse 1 unit of blood this morning.  Repeat CBC today.  Thrombocytopenic with platelet count 70,000.  HIT panel sent.  Continue to trend.  Plavix and DVT prophylaxis on hold at this time.  Transfuse 1 unit of FFP today.  Hydralazine 20 mg every 6 hours as needed for blood pressure over 155  Chest tubes remain in place today.  Maintain [place]-Truman, IJ, arterial line, and Perez catheter.  Will discuss with nephrology about switching CRRT to HD as well as recommendations on Lasix  Not yet had a bowel movement.  Start Lopressor 25 mg twice daily.  Further recommendations as per Dr. Kapadia     The above recommendations including medications and orders were discussed and agreed upon with Dr. Kang Vega PA-C 12/13/24 8:36 AM

## 2024-12-13 NOTE — PLAN OF CARE
Problem: Discharge Planning  Goal: Discharge to home or other facility with appropriate resources  Outcome: Progressing  Flowsheets (Taken 12/12/2024 2000)  Discharge to home or other facility with appropriate resources:   Identify barriers to discharge with patient and caregiver   Arrange for needed discharge resources and transportation as appropriate   Identify discharge learning needs (meds, wound care, etc)     Problem: Pain  Goal: Verbalizes/displays adequate comfort level or baseline comfort level  Outcome: Progressing  Flowsheets (Taken 12/12/2024 2000)  Verbalizes/displays adequate comfort level or baseline comfort level:   Encourage patient to monitor pain and request assistance   Assess pain using appropriate pain scale   Administer analgesics based on type and severity of pain and evaluate response   Implement non-pharmacological measures as appropriate and evaluate response   Consider cultural and social influences on pain and pain management   Notify Licensed Independent Practitioner if interventions unsuccessful or patient reports new pain     Problem: Chronic Conditions and Co-morbidities  Goal: Patient's chronic conditions and co-morbidity symptoms are monitored and maintained or improved  Outcome: Progressing  Flowsheets (Taken 12/12/2024 2000)  Care Plan - Patient's Chronic Conditions and Co-Morbidity Symptoms are Monitored and Maintained or Improved:   Monitor and assess patient's chronic conditions and comorbid symptoms for stability, deterioration, or improvement   Collaborate with multidisciplinary team to address chronic and comorbid conditions and prevent exacerbation or deterioration   Update acute care plan with appropriate goals if chronic or comorbid symptoms are exacerbated and prevent overall improvement and discharge     Problem: Safety - Adult  Goal: Free from fall injury  Outcome: Progressing

## 2024-12-13 NOTE — CONSULTS
[facility] ACUTE CARE PHYSICAL THERAPY EVALUATION  Landry Morley, 1970, 2004/2004-A, 12/13/2024    History  [place]:  The encounter diagnosis was Chest pain, unspecified type.  Patient  has a past medical history of Anxiety associated with depression, CAD (coronary artery disease), CKD (chronic kidney disease), stage V ([facility]), Family history of coronary artery disease, H/O echocardiogram, H/O echocardiogram, Hemodialysis patient ([facility]), History of cardiac catheterization, History of cardiovascular stress test, History of exercise stress test, History of myocardial infarction, History of nuclear stress test, History of PTCA, History of PTCA, Hyperlipidemia, Hypertension, Hypertriglyceridemia, Sepsis ([facility]), and Type II or unspecified type diabetes mellitus without mention of complication, not stated as uncontrolled.  Patient  has a past surgical history that includes hernia repair (1985); Coronary angioplasty with stent (01/01/2006); Cardiac procedure (N/A, 11/19/2024); IR NONTUNNELED VASCULAR CATHETER > 5 YEARS (11/20/2024); IR TUNNELED CVC PLACE WO SQ PORT/PUMP > 5 YEARS (11/25/2024); and Coronary artery bypass graft (N/A, 12/11/2024).    Discharge Recommendation: Facility for intensive rehabilitation, anticipate 3 hours per day and 5 days per week. Vs  pending progress. RN requesting bed to chair only this date    Equipment: TBD at next level of care    Subjective:    Patient states:  \"Last time I sat up, there was blood everywhere.\"      Pain:  4/10 pain in chest at sx site.      Communication with other providers:  Handoff to RN, OT    Restrictions: sternal precautions, fall risk, chest tube    Home Setup/Prior level of function  Social/Functional History  Lives With: Family  Type of Home: House  Home Layout: One level  Home Access: Stairs to enter with rails  Prior Level of Assist for ADLs: Independent  Prior Level of Assist for Homemaking: Independent  Prior Level of Assist for Ambulation:  Goal 1: pt to complete all bed mobility mod I  Short Term Goal 2: pt to complete all STS transfers to/from bed, commode, and chair mod I  Short Term Goal 3: pt to ambulate 200' with LRAD mod I  Short Term Goal 4: pt to ascend/descend 3 stairs with LRAD SBA to simulate home set up       Treatment plan:  Bed mobility, transfers, balance, gait, TA, TX,    Recommendations for NURSING mobility: stand pivot CGA with gait belt    Time:   Time in: 1104  Time out: 1115  Timed treatment minutes: 0  Total time: 11    Electronically signed by:    Loren Rey, PT  12/13/2024, 2:34 PM

## 2024-12-13 NOTE — PROGRESS NOTES
Pt with UO less than 30 ml/hr since 1200. Dr. Sommer and Dr. Kapadia aware.     1900: Dr. Kapadia given update on current infusions, vitals, hemodynamics, labs, ABG, urine output, chest tube output, and fluid removal from CRRT.     1930: Orders from Dr. Kapadia to keep pt net zero, give 12.5 g 5% Albumin if CVP less than 14, give 1 unit PRBCs if Hgb less than 8. Urine output has consistently been less than 30 mL/hr. Do not update him on urine output overnight per Dr. Kapadia.

## 2024-12-13 NOTE — PROGRESS NOTES
Occupational Therapy  [facility] ACUTE CARE OCCUPATIONAL THERAPY EVALUATION    Landry Morley, 1970, 2004/2004-A, 12/13/2024    Discharge Recommendation: ARU vs [facility]      History:  [place]:  The encounter diagnosis was Chest pain, unspecified type.  Past Medical History:   Diagnosis Date    Anxiety associated with depression 06/26/2024    CAD (coronary artery disease)     H/O MI.    CKD (chronic kidney disease), stage V ([facility])     born with one kidney    Family history of coronary artery disease     H/O echocardiogram 06/2008 6/08 EF>55% TRACE MR, TR    H/O echocardiogram 04/15/2015    EF 50-55% Mild left atrial enlargement. Normal LV systolic function. Trace MR and TR. Normal sized abdominal aorta.     Hemodialysis patient ([facility])     Tuesday, Thursday and Saturday    History of cardiac catheterization 03/01/2017    Stenting of the LAD    History of cardiovascular stress test 06/06/2006 7/12 LAD territory ISCHEMIA, EF 62%    History of exercise stress test 03/20/2017    treadmill    History of myocardial infarction 04/2006    History of nuclear stress test 01/26/2017    lexiscan-scar,no ischemia,EF52%,apical hdyskinesis    History of PTCA 04/2006    STENT TO RCA    History of PTCA 07/2012    STENT TO LAD, RCA    Hyperlipidemia     Hypertension 07/04/2012    Hypertriglyceridemia     Sepsis ([facility]) 06/13/2024    Type II or unspecified type diabetes mellitus without mention of complication, not stated as uncontrolled          Subjective:  Patient states: \"Ready if you are\"  Pain:  sternal pain incision site  Communication with other providers: co-eval w/ PT, handoff to RN  Restrictions: General Precautions, Fall Risk, sternal precautions, chest tube    Home Setup/Prior level of function:  Social/Functional History  Lives With: Family  Type of Home: House  Prior Level of Assist for ADLs: Independent  Prior Level of Assist for Homemaking: Independent  Prior Level of Assist for Ambulation: Independent  LOC    Time:   Time in: 1104  Time out: 1115  Total time: 11  Timed treatment minutes: 0        Electronically signed by:      MANUEL Jack/ERUM  [id number]

## 2024-12-13 NOTE — PROGRESS NOTES
CARDIOLOGY  NOTE        Name:  Landry Morley /Age/Sex: 1970  (54 y.o. male)   MRN & CSN:  [id number] & [id number] Admission Date/Time: 2024  5:38 AM   Location:  -A PCP: Raza Fairchild DO       Hospital Day: 3        PLAN FROM CARDIOLOGY FOR TODAY:   Patient this a.m. is looking better feeling better his left shoulder pain is almost gone echo done yesterday did not show any new regional wall motion abnormalities    - cardiology consult is for: Post CABG left shoulder pain    -  Interval history: Feeling better    ASSESSMENT/ PLAN:  Blood pressure on the higher side Cardene being tapered    On Lipitor 40 mg p.o. daily      Left shoulder pain is better today but was probably musculoskeletal will obtain echocardiogram to rule out any pericardial effusion or any worsening of the EF for new regional wall motion abnormality        Post CABG management  EF mildly reduced we will continue to monitor and advance GDMT as tolerated   Hyperlipidemia on Lipitor 40 mg p.o. daily which will be continued  Beta-blocker started    Patient is on antiplatelet therapy per cardiothoracic surgery    HFrEF with EF of 40 to 45% patient is on Isordil and hydralazine as OP, can slowly start reinstituting on IV hydralazine already    Creatinine is 4.3 and patient is on dialysis    Anemic with a hemoglobin of 9.7 probably secondary to chronic kidney disease              Subjective:      Todays complain: Patient better    HPI:  Landry is a 54 y.o.year old who and presents with had no chief complaint listed for this encounter.  No chief complaint on file.          Objective: Temperature:  Current - Temp: 98.1 °F (36.7 °C); Max - Temp  Av.9 °F (36.6 °C)  Min: 97.2 °F (36.2 °C)  Max: 98.6 °F (37 °C)    Respiratory Rate : Resp  Av.4  Min: 11  Max: 30    Pulse Range: Pulse  Av.9  Min: 61  Max: 84    Blood Presuure Range:  Systolic (24hrs), Av ,     metoprolol tartrate  25 mg Oral BID    furosemide  80 mg IntraVENous BID    guaiFENesin  1,200 mg Oral BID    sodium chloride nebulizer  3 mL Nebulization Q4H WA RT    gabapentin  100 mg Oral BID    sodium chloride flush  5-40 mL IntraVENous 2 times per day    aspirin  81 mg Oral Daily    [Held by provider] clopidogrel  75 mg Oral Daily    chlorhexidine  15 mL Mouth/Throat BID    mupirocin   Each Nostril BID    multivitamin  1 tablet Oral Daily with breakfast    polyethylene glycol  17 g Oral Daily    sennosides-docusate sodium  1 tablet Oral BID    atorvastatin  40 mg Oral Nightly    ipratropium 0.5 mg-albuterol 2.5 mg  1 Dose Inhalation Q4H WA RT    [Held by provider] heparin (porcine)  5,000 Units SubCUTAneous 3 times per day    bacitracin   Topical BID    acetaminophen  1,000 mg Oral 4 times per day    famotidine  20 mg Oral Every Other Day    Or    famotidine (PEPCID) injection  20 mg IntraVENous Every Other Day      sodium chloride      sodium chloride      sodium chloride 50 mL/hr at 12/12/24 0600    sodium chloride      phenylephrine Stopped (12/12/24 1745)    niCARdipine 2.5 mg/hr (12/13/24 1036)    insulin 0.4 Units/hr (12/13/24 1044)    dextrose      EPINEPHrine 1 mcg/min (12/13/24 0214)    VASOpressin Stopped (12/13/24 0055)     sodium chloride, sodium chloride, hydrALAZINE, traMADol **OR** traMADol, calcium chloride, methocarbamol, albumin human 5%, traZODone, sodium chloride flush, sodium chloride, ondansetron **OR** ondansetron, bisacodyl, calcium chloride 1,000 mg in sodium chloride 0.9 % 100 mL IVPB **OR** calcium chloride 2,000 mg in sodium chloride 0.9 % 100 mL IVPB, phenylephrine, niCARdipine, glucose, dextrose bolus **OR** dextrose bolus, glucagon (rDNA), dextrose    Lab Data:  CBC:   Recent Labs     12/12/24  0100 12/12/24  0440 12/12/24  0910 12/12/24  1050 12/12/24  2345 12/13/24  0440 12/13/24  0832   WBC 6.3 6.4 7.8   < > 4.8 3.7* 4.9   HGB 7.6* 8.3* 7.5*   < > 7.5* 7.2* 8.1*   HCT 23.0*

## 2024-12-13 NOTE — PROGRESS NOTES
Nephrology  Dialysis Note        [address], [address]  [place] [address]  Phone: [phone]  Office Hours: 8:30AM - 4:30PM  Monday - Friday          PROCEDURE:  Patient seen during CVVHDF      PHYSICIAN:  RACHEL      INDICATION:  End-stage renal disease      RX:  See dialysis flowsheet for specifics on access, blood flow rate, dialysate baths, duration of dialysis, anticoagulation and other technical information.      COMMENTS:    NET NEGATIVE 20ML IN THE PAST 24HRS  TOLERATING CRRT  REPLETE ELECTROLYTES PER SLIDING SCALE  START RETACRIT SUBQ FOR ANEMIA OF ESRD MGMT  CONTINUE CRRT FOR NOW      -S/p CABG on 12/11/24  -ESRD on HD TTS: right tunnelled HD cath present  -CHF hx  -CKD MBD        Electronically signed by Leonor Sommer DO on 12/13/2024 at 6:39 AM    [facility]  MD ANGELICA PEREZ DO  [address],  [address]  [place] [address]  PHONE: [phone]  FAX: [phone]

## 2024-12-13 NOTE — PROGRESS NOTES
I met with patient in room 2004.  This is POD # 2. I introduced myself to patient as the cardiac rehab nurse, as well as [facility].  The patient stated that he has been through the program before following stent placement. He liked the program and feels it would be good to go through it again.  I explained to patient that he would not be starting program for six weeks and that the Cardiac Rehab facility would be in contact with him to setup an appointment when it is closer to his release date from restrictions.  Patient had no further questions regarding rehab at this time.

## 2024-12-13 NOTE — PROGRESS NOTES
Attempted to call Dr. Kapadia to update on patient changes unable to reach, secure message sent to update.

## 2024-12-14 ENCOUNTER — APPOINTMENT (OUTPATIENT)
Dept: GENERAL RADIOLOGY | Age: 54
DRG: 235 | End: 2024-12-14
Attending: THORACIC SURGERY (CARDIOTHORACIC VASCULAR SURGERY)
Payer: MEDICARE

## 2024-12-14 LAB
ANION GAP SERPL CALCULATED.3IONS-SCNC: 13 MMOL/L (ref 9–17)
BASOPHILS # BLD: 0.02 K/UL
BASOPHILS NFR BLD: 1 % (ref 0–1)
BUN SERPL-MCNC: 45 MG/DL (ref 7–20)
CA-I BLD-SCNC: 1.11 MMOL/L (ref 1.15–1.33)
CALCIUM SERPL-MCNC: 8 MG/DL (ref 8.3–10.6)
CHLORIDE SERPL-SCNC: 102 MMOL/L (ref 99–110)
CO2 SERPL-SCNC: 21 MMOL/L (ref 21–32)
CREAT SERPL-MCNC: 4 MG/DL (ref 0.9–1.3)
EOSINOPHIL # BLD: 0.01 K/UL
EOSINOPHILS RELATIVE PERCENT: 0 % (ref 0–3)
ERYTHROCYTE [DISTWIDTH] IN BLOOD BY AUTOMATED COUNT: 16.4 % (ref 11.7–14.9)
GFR, ESTIMATED: 16 ML/MIN/1.73M2
GLUCOSE BLD-MCNC: 108 MG/DL (ref 74–99)
GLUCOSE BLD-MCNC: 115 MG/DL (ref 74–99)
GLUCOSE BLD-MCNC: 134 MG/DL (ref 74–99)
GLUCOSE BLD-MCNC: 167 MG/DL (ref 74–99)
GLUCOSE BLD-MCNC: 177 MG/DL (ref 74–99)
GLUCOSE SERPL-MCNC: 100 MG/DL (ref 74–99)
HCT VFR BLD AUTO: 24.2 % (ref 42–52)
HGB BLD-MCNC: 7.8 G/DL (ref 13.5–18)
IMM GRANULOCYTES # BLD AUTO: 0.02 K/UL
IMM GRANULOCYTES NFR BLD: 1 %
INR PPP: 1.2
LYMPHOCYTES NFR BLD: 0.73 K/UL
LYMPHOCYTES RELATIVE PERCENT: 17 % (ref 24–44)
MAGNESIUM SERPL-MCNC: 2.3 MG/DL (ref 1.8–2.4)
MCH RBC QN AUTO: 29.7 PG (ref 27–31)
MCHC RBC AUTO-ENTMCNC: 32.2 G/DL (ref 32–36)
MCV RBC AUTO: 92 FL (ref 78–100)
MONOCYTES NFR BLD: 0.29 K/UL
MONOCYTES NFR BLD: 7 % (ref 0–4)
NEUTROPHILS NFR BLD: 75 % (ref 36–66)
NEUTS SEG NFR BLD: 3.27 K/UL
PARTIAL THROMBOPLASTIN TIME: 26.7 SEC (ref 25.1–37.1)
PHOSPHATE SERPL-MCNC: 4.6 MG/DL (ref 2.5–4.9)
PLATELET CONFIRMATION: NORMAL
PLATELET, FLUORESCENCE: 81 K/UL (ref 140–440)
PMV BLD AUTO: 11 FL (ref 7.5–11.1)
POTASSIUM BLD-SCNC: 4 MMOL/L (ref 3.5–5.1)
POTASSIUM SERPL-SCNC: 4.4 MMOL/L (ref 3.5–5.1)
PROTHROMBIN TIME: 15 SEC (ref 11.7–14.5)
RBC # BLD AUTO: 2.63 M/UL (ref 4.6–6.2)
SODIUM SERPL-SCNC: 136 MMOL/L (ref 136–145)
WBC OTHER # BLD: 4.3 K/UL (ref 4–10.5)

## 2024-12-14 PROCEDURE — 6370000000 HC RX 637 (ALT 250 FOR IP): Performed by: STUDENT IN AN ORGANIZED HEALTH CARE EDUCATION/TRAINING PROGRAM

## 2024-12-14 PROCEDURE — 84100 ASSAY OF PHOSPHORUS: CPT

## 2024-12-14 PROCEDURE — 84132 ASSAY OF SERUM POTASSIUM: CPT

## 2024-12-14 PROCEDURE — 2580000003 HC RX 258: Performed by: PHYSICIAN ASSISTANT

## 2024-12-14 PROCEDURE — 94669 MECHANICAL CHEST WALL OSCILL: CPT

## 2024-12-14 PROCEDURE — 83735 ASSAY OF MAGNESIUM: CPT

## 2024-12-14 PROCEDURE — 82330 ASSAY OF CALCIUM: CPT

## 2024-12-14 PROCEDURE — 6370000000 HC RX 637 (ALT 250 FOR IP): Performed by: PHYSICIAN ASSISTANT

## 2024-12-14 PROCEDURE — 80048 BASIC METABOLIC PNL TOTAL CA: CPT

## 2024-12-14 PROCEDURE — 97116 GAIT TRAINING THERAPY: CPT

## 2024-12-14 PROCEDURE — 99233 SBSQ HOSP IP/OBS HIGH 50: CPT | Performed by: INTERNAL MEDICINE

## 2024-12-14 PROCEDURE — 94761 N-INVAS EAR/PLS OXIMETRY MLT: CPT

## 2024-12-14 PROCEDURE — 85610 PROTHROMBIN TIME: CPT

## 2024-12-14 PROCEDURE — 85730 THROMBOPLASTIN TIME PARTIAL: CPT

## 2024-12-14 PROCEDURE — 6370000000 HC RX 637 (ALT 250 FOR IP): Performed by: INTERNAL MEDICINE

## 2024-12-14 PROCEDURE — 2100000000 HC CCU R&B

## 2024-12-14 PROCEDURE — 71045 X-RAY EXAM CHEST 1 VIEW: CPT

## 2024-12-14 PROCEDURE — 82962 GLUCOSE BLOOD TEST: CPT

## 2024-12-14 PROCEDURE — 94640 AIRWAY INHALATION TREATMENT: CPT

## 2024-12-14 PROCEDURE — 36415 COLL VENOUS BLD VENIPUNCTURE: CPT

## 2024-12-14 PROCEDURE — 94150 VITAL CAPACITY TEST: CPT

## 2024-12-14 PROCEDURE — 6360000002 HC RX W HCPCS: Performed by: INTERNAL MEDICINE

## 2024-12-14 PROCEDURE — 85025 COMPLETE CBC W/AUTO DIFF WBC: CPT

## 2024-12-14 RX ORDER — INSULIN LISPRO 100 [IU]/ML
0-16 INJECTION, SOLUTION INTRAVENOUS; SUBCUTANEOUS
Status: DISCONTINUED | OUTPATIENT
Start: 2024-12-14 | End: 2024-12-17 | Stop reason: HOSPADM

## 2024-12-14 RX ADMIN — IPRATROPIUM BROMIDE AND ALBUTEROL SULFATE 1 DOSE: 2.5; .5 SOLUTION RESPIRATORY (INHALATION) at 07:55

## 2024-12-14 RX ADMIN — METOPROLOL TARTRATE 25 MG: 25 TABLET, FILM COATED ORAL at 20:41

## 2024-12-14 RX ADMIN — FAMOTIDINE 20 MG: 20 TABLET, FILM COATED ORAL at 09:07

## 2024-12-14 RX ADMIN — SENNOSIDES AND DOCUSATE SODIUM 1 TABLET: 50; 8.6 TABLET ORAL at 09:07

## 2024-12-14 RX ADMIN — Medication 3 ML: at 11:41

## 2024-12-14 RX ADMIN — SENNOSIDES AND DOCUSATE SODIUM 1 TABLET: 50; 8.6 TABLET ORAL at 20:41

## 2024-12-14 RX ADMIN — Medication 3 ML: at 19:14

## 2024-12-14 RX ADMIN — MUPIROCIN: 20 OINTMENT TOPICAL at 20:42

## 2024-12-14 RX ADMIN — MUPIROCIN: 20 OINTMENT TOPICAL at 08:58

## 2024-12-14 RX ADMIN — ACETAMINOPHEN 1000 MG: 500 TABLET ORAL at 07:02

## 2024-12-14 RX ADMIN — METOPROLOL TARTRATE 25 MG: 25 TABLET, FILM COATED ORAL at 09:06

## 2024-12-14 RX ADMIN — Medication 3 ML: at 08:05

## 2024-12-14 RX ADMIN — IPRATROPIUM BROMIDE AND ALBUTEROL SULFATE 1 DOSE: 2.5; .5 SOLUTION RESPIRATORY (INHALATION) at 16:30

## 2024-12-14 RX ADMIN — ACETAMINOPHEN 1000 MG: 500 TABLET ORAL at 18:15

## 2024-12-14 RX ADMIN — CHLORHEXIDINE GLUCONATE 0.12% ORAL RINSE 15 ML: 1.2 LIQUID ORAL at 20:40

## 2024-12-14 RX ADMIN — FUROSEMIDE 80 MG: 10 INJECTION, SOLUTION INTRAMUSCULAR; INTRAVENOUS at 09:06

## 2024-12-14 RX ADMIN — SODIUM CHLORIDE, PRESERVATIVE FREE 10 ML: 5 INJECTION INTRAVENOUS at 09:07

## 2024-12-14 RX ADMIN — BACITRACIN: 500 OINTMENT TOPICAL at 08:57

## 2024-12-14 RX ADMIN — FUROSEMIDE 80 MG: 10 INJECTION, SOLUTION INTRAMUSCULAR; INTRAVENOUS at 18:15

## 2024-12-14 RX ADMIN — GABAPENTIN 100 MG: 100 CAPSULE ORAL at 20:41

## 2024-12-14 RX ADMIN — BACITRACIN: 500 OINTMENT TOPICAL at 22:13

## 2024-12-14 RX ADMIN — GABAPENTIN 100 MG: 100 CAPSULE ORAL at 09:06

## 2024-12-14 RX ADMIN — CHLORHEXIDINE GLUCONATE 0.12% ORAL RINSE 15 ML: 1.2 LIQUID ORAL at 09:07

## 2024-12-14 RX ADMIN — POLYETHYLENE GLYCOL (3350) 17 G: 17 POWDER, FOR SOLUTION ORAL at 09:06

## 2024-12-14 RX ADMIN — ACETAMINOPHEN 1000 MG: 500 TABLET ORAL at 23:48

## 2024-12-14 RX ADMIN — Medication 1 TABLET: at 09:06

## 2024-12-14 RX ADMIN — TRAZODONE HYDROCHLORIDE 50 MG: 50 TABLET ORAL at 23:48

## 2024-12-14 RX ADMIN — GUAIFENESIN 1200 MG: 600 TABLET, EXTENDED RELEASE ORAL at 20:41

## 2024-12-14 RX ADMIN — ACETAMINOPHEN 1000 MG: 500 TABLET ORAL at 12:00

## 2024-12-14 RX ADMIN — IPRATROPIUM BROMIDE AND ALBUTEROL SULFATE 1 DOSE: 2.5; .5 SOLUTION RESPIRATORY (INHALATION) at 19:14

## 2024-12-14 RX ADMIN — INSULIN LISPRO 2 UNITS: 100 INJECTION, SOLUTION INTRAVENOUS; SUBCUTANEOUS at 22:13

## 2024-12-14 RX ADMIN — ASPIRIN 81 MG: 81 TABLET, CHEWABLE ORAL at 09:06

## 2024-12-14 RX ADMIN — IPRATROPIUM BROMIDE AND ALBUTEROL SULFATE 1 DOSE: 2.5; .5 SOLUTION RESPIRATORY (INHALATION) at 11:40

## 2024-12-14 RX ADMIN — GUAIFENESIN 1200 MG: 600 TABLET, EXTENDED RELEASE ORAL at 09:06

## 2024-12-14 RX ADMIN — ATORVASTATIN CALCIUM 40 MG: 40 TABLET, FILM COATED ORAL at 20:41

## 2024-12-14 RX ADMIN — INSULIN LISPRO 2 UNITS: 100 INJECTION, SOLUTION INTRAVENOUS; SUBCUTANEOUS at 14:59

## 2024-12-14 RX ADMIN — SODIUM CHLORIDE, PRESERVATIVE FREE 10 ML: 5 INJECTION INTRAVENOUS at 20:41

## 2024-12-14 RX ADMIN — Medication 3 ML: at 16:30

## 2024-12-14 ASSESSMENT — PAIN - FUNCTIONAL ASSESSMENT: PAIN_FUNCTIONAL_ASSESSMENT: ACTIVITIES ARE NOT PREVENTED

## 2024-12-14 ASSESSMENT — PAIN DESCRIPTION - ORIENTATION: ORIENTATION: MID

## 2024-12-14 ASSESSMENT — PAIN SCALES - GENERAL
PAINLEVEL_OUTOF10: 0
PAINLEVEL_OUTOF10: 2

## 2024-12-14 ASSESSMENT — PAIN DESCRIPTION - PAIN TYPE: TYPE: SURGICAL PAIN

## 2024-12-14 ASSESSMENT — PAIN DESCRIPTION - FREQUENCY: FREQUENCY: CONTINUOUS

## 2024-12-14 ASSESSMENT — PAIN DESCRIPTION - DESCRIPTORS: DESCRIPTORS: ACHING

## 2024-12-14 ASSESSMENT — PAIN DESCRIPTION - LOCATION: LOCATION: CHEST

## 2024-12-14 NOTE — PROGRESS NOTES
CARDIOLOGY PROGRESS NOTE                                                  Name:  Landry Morley /Age/Sex: 1970  (54 y.o. male)   MRN & CSN:  [id number] & [id number] Admission Date/Time: 2024  5:38 AM   Location:  -A PCP: Raza Fairchild DO         Admit Date:  2024  Hospital Day: 4      SUBJECTIVE:     Seen patient as follow up as consultation for  post CABG    mild chest pain.  + shortness of breath  No palpations    TELEMETRY: SR         Intake/Output Summary (Last 24 hours) at 2024 1147  Last data filed at 2024 0915  Gross per 24 hour   Intake 1495.22 ml   Output 2970 ml   Net -1474.78 ml       Assessment/Plan:      Multivessel CAD status post CABG.  PATHAK LAD, SVG ramus intermedius, SVG PDA - SVG OM 2  Essential hypertension  Ischemic cardiomyopathy with LV ejection fraction of 40 to 45%  Chronic kidney failure with hemodialysis  Anemia with GI bleeding, anemia of chronic disease  Diabetes mellitus    Continue with aspirin 81 mg daily  Recommend to hold off on Plavix or anticoagulation aspirin should suffice.  Continue with Lasix 80 mg twice daily, nephrology follow-up - on CRRT  Continue with beta-blocker: Metoprolol tartrate 25 twice daily  Continue with high intensity statins Lipitor 40 mg p.o. daily        Past medical history:    has a past medical history of Anxiety associated with depression, CAD (coronary artery disease), CKD (chronic kidney disease), stage V ([facility]), Family history of coronary artery disease, H/O echocardiogram, H/O echocardiogram, Hemodialysis patient ([facility]), History of cardiac catheterization, History of cardiovascular stress test, History of exercise stress test, History of myocardial infarction, History of nuclear stress test, History of PTCA, History of PTCA, Hyperlipidemia, Hypertension, Hypertriglyceridemia, Sepsis ([facility]), and Type II or unspecified type diabetes mellitus without mention of complication, not stated as  22.5*   < > 21.7* 24.5* 24.2*   MCV 93.1 94.4 94.5   < > 89.7 89.1 92.0    153 141  --   --   --   --     < > = values in this interval not displayed.     BMP:   Recent Labs     12/13/24  0440 12/13/24  0757 12/13/24  0832 12/13/24  0938 12/13/24  1038 12/13/24  1139 12/14/24  0600     --  137  --   --   --  136   K 4.6  --  4.6  --   --   --  4.4     --  104  --   --   --  102   CO2 25  --  24  --   --   --  21   PHOS 4.2  --  3.8  --   --   --  4.6   BUN 32*  --  30*  --   --   --  45*   CREATININE 3.6*   < > 3.4*   < > 3.1* 3.2* 4.0*    < > = values in this interval not displayed.     LIVER PROFILE:   Recent Labs     12/12/24  2145 12/13/24  0440 12/13/24  0832   AST 18 22 21   ALT <5* <5* <5*   BILIDIR <0.2 <0.2 <0.2   BILITOT 0.2 0.3 0.3   ALKPHOS 32* 41 42     PT/INR:   Recent Labs     12/13/24  0440 12/13/24  0832 12/14/24  0600   PROTIME 17.2* 15.9* 15.0*   INR 1.4 1.2 1.2     APTT:   Recent Labs     12/13/24  0440 12/13/24  0832 12/14/24  0600   APTT 38.5* 34.6 26.7     BNP:  No results for input(s): \"BNP\" in the last 72 hours.        Jose Armando Marquez MD, MD 12/14/2024 11:47 AM

## 2024-12-14 NOTE — PROGRESS NOTES
Nephrology Progress Note        [address], [address]  [place] [address]  Phone: [phone]  Office Hours: 8:30AM - 4:30PM  Monday - Friday 12/14/2024 8:07 AM  Subjective:   Admit Date: 12/11/2024  PCP: Raza Fairchild DO  Interval History:   On room air  Doing ok  Good BP      Diet: ADULT DIET; Regular; 4 carb choices (60 gm/meal); Low Fat/Low Chol/High Fiber/2 gm Na  ADULT ORAL NUTRITION SUPPLEMENT; Breakfast, Lunch, Dinner; Renal Oral Supplement      Data:   Scheduled Meds:   epoetin sydnee-epbx  10,000 Units SubCUTAneous Once per day on Monday Wednesday Friday    metoprolol tartrate  25 mg Oral BID    furosemide  80 mg IntraVENous BID    guaiFENesin  1,200 mg Oral BID    sodium chloride nebulizer  3 mL Nebulization Q4H WA RT    insulin lispro  0-16 Units SubCUTAneous Q4H    gabapentin  100 mg Oral BID    sodium chloride flush  5-40 mL IntraVENous 2 times per day    aspirin  81 mg Oral Daily    [Held by provider] clopidogrel  75 mg Oral Daily    chlorhexidine  15 mL Mouth/Throat BID    mupirocin   Each Nostril BID    multivitamin  1 tablet Oral Daily with breakfast    polyethylene glycol  17 g Oral Daily    sennosides-docusate sodium  1 tablet Oral BID    atorvastatin  40 mg Oral Nightly    ipratropium 0.5 mg-albuterol 2.5 mg  1 Dose Inhalation Q4H WA RT    [Held by provider] heparin (porcine)  5,000 Units SubCUTAneous 3 times per day    bacitracin   Topical BID    acetaminophen  1,000 mg Oral 4 times per day    famotidine  20 mg Oral Every Other Day    Or    famotidine (PEPCID) injection  20 mg IntraVENous Every Other Day     Continuous Infusions:   sodium chloride      sodium chloride      sodium chloride 50 mL/hr at 12/12/24 0600    sodium chloride      phenylephrine Stopped (12/12/24 1745)    niCARdipine Stopped (12/13/24 1116)    dextrose      EPINEPHrine Stopped (12/13/24 0240)    VASOpressin Stopped (12/13/24 0055)     PRN Meds:sodium chloride, sodium chloride, hydrALAZINE,  traMADol **OR** traMADol, calcium chloride, methocarbamol, albumin human 5%, traZODone, sodium chloride flush, sodium chloride, ondansetron **OR** ondansetron, bisacodyl, calcium chloride 1,000 mg in sodium chloride 0.9 % 100 mL IVPB **OR** calcium chloride 2,000 mg in sodium chloride 0.9 % 100 mL IVPB, phenylephrine, niCARdipine, glucose, dextrose bolus **OR** dextrose bolus, glucagon (rDNA), dextrose  I/O last 3 completed shifts:  In: 2795.6 [P.O.:1240; I.V.:597.7; Blood:857; IV Piggyback:100.9]  Out: 4785 [Urine:2774; Chest Tube:530]  No intake/output data recorded.    Intake/Output Summary (Last 24 hours) at 12/14/2024 0807  Last data filed at 12/14/2024 0620  Gross per 24 hour   Intake 1945.02 ml   Output 3343 ml   Net -1397.98 ml       CBC:   Recent Labs     12/12/24  0100 12/12/24  0440 12/12/24  0910 12/12/24  1050 12/13/24  0440 12/13/24  0832 12/14/24  0600   WBC 6.3 6.4 7.8   < > 3.7* 4.9 4.3   HGB 7.6* 8.3* 7.5*   < > 7.2* 8.1* 7.8*    153 141  --   --   --   --     < > = values in this interval not displayed.       BMP:    Recent Labs     12/13/24  0440 12/13/24  0757 12/13/24  0832 12/13/24  0938 12/13/24  1038 12/13/24  1139 12/14/24  0600     --  137  --   --   --  136   K 4.6  --  4.6  --   --   --  4.4     --  104  --   --   --  102   CO2 25  --  24  --   --   --  21   BUN 32*  --  30*  --   --   --  45*   CREATININE 3.6*   < > 3.4*   < > 3.1* 3.2* 4.0*   GLUCOSE 88  --  101*  --   --   --  100*   CALCIUM 8.7  --  9.0  --   --   --  8.0*    < > = values in this interval not displayed.     Hepatic:   Recent Labs     12/12/24  2145 12/13/24  0440 12/13/24  0832   AST 18 22 21   ALT <5* <5* <5*   BILITOT 0.2 0.3 0.3   ALKPHOS 32* 41 42     Troponin: No results for input(s): \"TROPONINI\" in the last 72 hours.  BNP: No results for input(s): \"BNP\" in the last 72 hours.  Lipids: No results for input(s): \"CHOL\", \"HDL\" in the last 72 hours.    Invalid input(s): \"LDLCALCU\"  ABGs:   Lab  Results   Component Value Date/Time    PHART 7.324 12/13/2024 04:44 AM    PO2ART 103.8 12/13/2024 04:44 AM    LGC2MRY 45.4 12/13/2024 04:44 AM     INR:   Recent Labs     12/13/24  0440 12/13/24  0832 12/14/24  0600   INR 1.4 1.2 1.2       Objective:   Vitals: /81   Pulse 69   Temp 98.2 °F (36.8 °C) (Bladder)   Resp 15   Ht 1.829 m (6')   Wt 98.4 kg (217 lb)   SpO2 95%   BMI 29.43 kg/m²   General appearance: alert and cooperative with exam, in no acute distress  HEENT: normocephalic, atraumatic,   Neck: supple, trachea midline  Lungs: , breathing comfortably on room air  Heart:: regular rate and rhythm,  Extremities: extremities atraumatic, no cyanosis or edema  Neurologic: alert, oriented, follows commands, interactive    MEDICAL DECISION MAKING     -S/p CABG on 12/11/24  -ESRD on HD TTS: right tunnelled HD cath present  -CHF hx  -CKD MBD     Suggest:  Will plan next HD on Monday  Continue IV lasix                  Electronically signed by Leonor Sommer DO on 12/14/2024 at 8:07 AM    [facility]  MD ANGELICA PEREZ DO  [address],  [address] [place] [address]  PHONE: [phone]  FAX: [phone]

## 2024-12-14 NOTE — PROGRESS NOTES
Epoc potassium done  with second dose of 80 mg lasix.  Patient is a hemodialysis patient and is not receiving potassium  with lasix.  Epoc K+ level is 4.0

## 2024-12-14 NOTE — PROGRESS NOTES
Labs, Imaging and Studies reviewed:    Results       Procedure Component Value Units Date/Time    Culture, MRSA, Screening - All markets except [facility] [[id number]] Collected: 12/06/24 0917    Order Status: Canceled Specimen: Nares     MRSA by PCR [[id number]] Collected: 12/06/24 0917    Order Status: Completed Specimen: Nasal Updated: 12/06/24 1311     Specimen Description .NASAL SWAB     MRSA, DNA, Nasal Not Detected     Comment:       MRSA DNA not detected by nucleic acid amplification.        Results should be used as an adjunct to nosocomial control efforts to identify patients   needing enhanced precautions.    The test is not intended to identify patients with staphylococcal infections.  Results   should not be used to guide or monitor treatment for MRSA infections.                 XR CHEST PORTABLE    Result Date: 12/14/2024  Chest X-ray INDICATION: Postop cardiac surgery. COMPARISON: Multiple prior examinations most recently dated 12/13/2024 TECHNIQUE: AP/PA view of the chest was obtained. FINDINGS: Right-sided IJ and dual lumen venous access catheter, stable in position. Left-sided drainage catheter, stable in position. Mediastinal drain has been removed. Slight improved aeration involving the left lower lobe, with residual pleural effusion and hazy airspace disease involving the left lower lobe. Right lung is clear. Cardiomegaly. Prior sternotomy. EKG wires overlie the chest.. . .       Slight improved aeration involving the left lower lobe, with residual pleural effusion and/or airspace disease Electronically signed by Alonzo Awan    XR CHEST PORTABLE    Result Date: 12/13/2024  Chest X-ray INDICATION: Postop cardiac surgery. Hypoxia. COMPARISON: Several prior examinations most recently dated 12/12/2024 TECHNIQUE: AP/PA view of the chest was obtained. FINDINGS: Intermediate lung volumes. Mild airspace disease and/or pleural effusion involving the left lower lobe, slightly  PANEL BMP W/IOCA    Collection Time: 12/13/24 10:38 AM   Result Value Ref Range    POC Sodium 136 136 - 145 mmol/L    POC Potassium 4.6 3.5 - 5.1 mmol/L    POC Chloride 103 99 - 110 mmol/L    POC TCO2 PENDING mmol/L    POC Anion Gap Can not be calculated 7 - 16 mmol/L    POC Glucose 108 (H) 74 - 99 mg/dL    POC BUN 30 (H) 7 - 20 mg/dL    POC Creatinine 3.1 (H) 0.5 - 1.2 mg/dL    eGFR, POC 23 (L) >60 mL/min/1.73m2    POC Ionized Calcium 1.24 1.15 - 1.33 mmol/L   POCT H&H    Collection Time: 12/13/24 10:38 AM   Result Value Ref Range    POC Hemoglobin (calc) 7.3 (L) 12 - 17 g/dL    POC Hematocrit 22 (L) 38 - 51 %   Arterial Blood Gas, POC    Collection Time: 12/13/24 11:39 AM   Result Value Ref Range    POC pH 7.377 7.35 - 7.45    POC pCO2 42.2 35.0 - 48.0 mm Hg    POC PO2 91.7 83.0 - 108.0 mm Hg    POC HCO3 24.8 21.0 - 28.0 mmol/L    TCO2 (calc), Art 26 21 - 32 mmol/L    Negative Base Excess, Art 0.4 0.0 - 3.0 mmol/L    POC O2 SAT 96.9 94.0 - 98.0 %   POC PANEL [facility] W/IOCA    Collection Time: 12/13/24 11:39 AM   Result Value Ref Range    POC Sodium 137 136 - 145 mmol/L    POC Potassium 4.6 3.5 - 5.1 mmol/L    POC Chloride 103 99 - 110 mmol/L    POC TCO2 PENDING mmol/L    POC Anion Gap Can not be calculated 7 - 16 mmol/L    POC Glucose 111 (H) 74 - 99 mg/dL    POC BUN 32 (H) 7 - 20 mg/dL    POC Creatinine 3.2 (H) 0.5 - 1.2 mg/dL    eGFR, POC 22 (L) >60 mL/min/1.73m2    POC Ionized Calcium 1.24 1.15 - 1.33 mmol/L   POCT H&H    Collection Time: 12/13/24 11:39 AM   Result Value Ref Range    POC Hemoglobin (calc) 7.6 (L) 12 - 17 g/dL    POC Hematocrit 22 (L) 38 - 51 %   POCT Glucose    Collection Time: 12/13/24  4:33 PM   Result Value Ref Range    POC Glucose 152 (H) 74 - 99 mg/dL   POCT Glucose    Collection Time: 12/13/24  8:35 PM   Result Value Ref Range    POC Glucose 112 (H) 74 - 99 mg/dL   POCT Glucose    Collection Time: 12/14/24  3:39 AM   Result Value Ref Range    POC Glucose 108 (H) 74 - 99 mg/dL   APTT     Glucose    Collection Time: 12/14/24  9:09 AM   Result Value Ref Range    POC Glucose 134 (H) 74 - 99 mg/dL       Electronically signed by Duke Thomas MD on 12/14/2024 at 10:23 AM

## 2024-12-14 NOTE — PROGRESS NOTES
Physical Therapy    Physical Therapy Treatment Note  Name: Landry Morley MRN: [id number] :   1970   Date:  2024   Admission Date: 2024 Room:     Restrictions/Precautions:          sternal precautions, fall risk, chest tube   Communication with other providers:  Hand off with Nurse    Subjective:  Patient states: Pt. Agreeable to work with therapy.    Pain:  (0/10 to 10/10):  Does no voice pain when asked   Objective:    Observation: Pt. Up walking in hallway with nurse upon arrival to room   Objective Measures:  Room Air, A&O x4  Treatment, including education/measures:  Therapeutic Activity Training:   Therapeutic activity training was instructed today.  Cues were given for safety, sequence, UE/LE placement, awareness, and balance.    Activities performed today included sit-stand.    Bed Mobility: DNT, OOB pre/post session   Sit to stand transfer: CGA from Commode    Sitting balance:SBA while on commode   Standing balance:CGA-SBA with CW     Gait Training:  Cues were given for safety, sequence, device management, balance, posture, awareness, path.    Amb x400ft, CGA-SBA, CW. No Noted LOB or instability.     Education:   Pt. Educated on importance of out of bed activity, safe functional mobility, and walker management.     Assessment / Impression:    Pt. Left up in recliner at end of session, all needs met, phone and call light in reach, bed/personal alarm active, and nursing updated.     Patient's tolerance of treatment:  Great    Adverse Reaction: none  Significant change in status and impact:  none  Barriers to improvement:  none  Plan for Next Session:    Cont per POC and progress to FWW at next visit.   Timed Code Treatment Minutes: 10 Minutes  PT Individual Minutes  Time In: 1005  Time Out: 1015  Minutes: 10              Previously filed items:  Social/Functional History  Lives With: Family  Type of Home: House  Home Layout: One level  Home Access: Stairs to enter with

## 2024-12-14 NOTE — PROGRESS NOTES
12/14/24 0100   NIV Type   NIV Started/Stopped Off   Equipment Type standby v60, pt refusing   Mode Bilevel

## 2024-12-14 NOTE — PROGRESS NOTES
[facility] Cardiothoracic Surgery  Daily Progress Note    Surgeon:  Kang    Procedure:  s/p CORONARY ARTERY BYPASS GRAFT X4, LIMA - LAD, SVG - RAMUS, SVG -PDA, SVG - OM2; LEFT ENDOSCOPIC GREATER SAPHENOUS VEIN HARVEST; INTRAOPERATIVE TRANSESOPHAGEAL ECHOCARDIOGRAM on 12/11/24    Subjective:    Patient was seen and examined at bedside this morning with no complaints.     Hospital Course:  12/11/24: Patient underwent surgical intervention.  See op note for full details.  Transferred to the CVICU in a stable condition.  On some pressor support.  12/12/24:  Extubated last night. Received 2u of prbc's in the icu. 350cc blood out in last hour.  CC giving txa and ddavp.  Continues on epi @ 2, Rao @125, and Vaso @0.2.  with a CI of 2.6.  U/O 1500.  1200cc of blood from pleural chest tube this am.  MTP ordered.    12/13/24: On BiPAP this morning.  On 7.5 of Cardene.  Weaned off all pressor support last night.  Transfuse 1 unit of blood this morning.  Repeat H&H.  Give 1 FFP.  H&H 7.2 and 21.7.  Thrombocytopenic with platelet count of 70,000.  HIT panel sent.  Maintain chest tubes today.  Maintain Perez catheter.  Had 419 cc in the past 24 hours and 200 cc out overnight.  1.2 L of fluid removed of CRRT.  24-hour net fluid balance was -142 cc.  Output and index are stable at 3.3 and 7.0.  Maintain [place] today.  Start Lopressor 25 mg twice daily.  Electrolytes replaced.  Hydralazine as needed.  Consider switching CRRT to HD.  Will discuss with nephrology.  12/14/2024: Cont     Vital Signs: BP (!) 146/81   Pulse 65   Temp 98.2 °F (36.8 °C) (Bladder)   Resp 13   Ht 1.829 m (6')   Wt 98.4 kg (217 lb)   SpO2 100%   BMI 29.43 kg/m²  O2 Flow Rate (L/min): 4 L/min   Admit Weight: Weight - Scale: 92.5 kg (204 lb)       I/O's:  I/O last 3 completed shifts:  In: 2795.6 [P.O.:1240; I.V.:597.7; Blood:857; IV Piggyback:100.9]  Out: 4785 [Urine:2774; Chest Tube:530]    Data:    CBC:

## 2024-12-15 ENCOUNTER — APPOINTMENT (OUTPATIENT)
Dept: GENERAL RADIOLOGY | Age: 54
DRG: 235 | End: 2024-12-15
Attending: THORACIC SURGERY (CARDIOTHORACIC VASCULAR SURGERY)
Payer: MEDICARE

## 2024-12-15 LAB
ABO/RH: NORMAL
ANION GAP SERPL CALCULATED.3IONS-SCNC: 12 MMOL/L (ref 9–17)
ANTIBODY SCREEN: NEGATIVE
BASOPHILS # BLD: 0.02 K/UL
BASOPHILS NFR BLD: 0 % (ref 0–1)
BLOOD BANK BLOOD PRODUCT EXPIRATION DATE: NORMAL
BLOOD BANK COMMENT: NORMAL
BLOOD BANK DISPENSE STATUS: NORMAL
BLOOD BANK ISBT PRODUCT BLOOD TYPE: 5100
BLOOD BANK ISBT PRODUCT BLOOD TYPE: 5100
BLOOD BANK ISBT PRODUCT BLOOD TYPE: 9500
BLOOD BANK PRODUCT CODE: NORMAL
BLOOD BANK SAMPLE EXPIRATION: NORMAL
BLOOD BANK UNIT TYPE AND RH: NORMAL
BPU ID: NORMAL
BUN SERPL-MCNC: 63 MG/DL (ref 7–20)
CA-I BLD-SCNC: 1.08 MMOL/L (ref 1.15–1.33)
CALCIUM SERPL-MCNC: 8.4 MG/DL (ref 8.3–10.6)
CHLORIDE SERPL-SCNC: 100 MMOL/L (ref 99–110)
CO2 SERPL-SCNC: 23 MMOL/L (ref 21–32)
COMPONENT: NORMAL
CREAT SERPL-MCNC: 5 MG/DL (ref 0.9–1.3)
CROSSMATCH RESULT: NORMAL
EOSINOPHIL # BLD: 0.02 K/UL
EOSINOPHILS RELATIVE PERCENT: 0 % (ref 0–3)
ERYTHROCYTE [DISTWIDTH] IN BLOOD BY AUTOMATED COUNT: 15.8 % (ref 11.7–14.9)
GFR, ESTIMATED: 12 ML/MIN/1.73M2
GLUCOSE BLD-MCNC: 116 MG/DL (ref 74–99)
GLUCOSE BLD-MCNC: 136 MG/DL (ref 74–99)
GLUCOSE BLD-MCNC: 179 MG/DL (ref 74–99)
GLUCOSE SERPL-MCNC: 107 MG/DL (ref 74–99)
HCT VFR BLD AUTO: 24.5 % (ref 42–52)
HGB BLD-MCNC: 7.9 G/DL (ref 13.5–18)
IMM GRANULOCYTES # BLD AUTO: 0.01 K/UL
IMM GRANULOCYTES NFR BLD: 0 %
INR PPP: 1.1
LYMPHOCYTES NFR BLD: 0.75 K/UL
LYMPHOCYTES RELATIVE PERCENT: 16 % (ref 24–44)
MAGNESIUM SERPL-MCNC: 2.7 MG/DL (ref 1.8–2.4)
MCH RBC QN AUTO: 29.7 PG (ref 27–31)
MCHC RBC AUTO-ENTMCNC: 32.2 G/DL (ref 32–36)
MCV RBC AUTO: 92.1 FL (ref 78–100)
MONOCYTES NFR BLD: 0.54 K/UL
MONOCYTES NFR BLD: 11 % (ref 0–4)
NEUTROPHILS NFR BLD: 72 % (ref 36–66)
NEUTS SEG NFR BLD: 3.49 K/UL
PARTIAL THROMBOPLASTIN TIME: 33.2 SEC (ref 25.1–37.1)
PHOSPHATE SERPL-MCNC: 5.4 MG/DL (ref 2.5–4.9)
PLATELET, FLUORESCENCE: 112 K/UL (ref 140–440)
PMV BLD AUTO: 11.7 FL (ref 7.5–11.1)
POTASSIUM SERPL-SCNC: 4.2 MMOL/L (ref 3.5–5.1)
PROTHROMBIN TIME: 14.5 SEC (ref 11.7–14.5)
RBC # BLD AUTO: 2.66 M/UL (ref 4.6–6.2)
SODIUM SERPL-SCNC: 135 MMOL/L (ref 136–145)
TRANSFUSION STATUS: NORMAL
UNIT DIVISION: 0
UNIT ISSUE DATE/TIME: NORMAL
WBC OTHER # BLD: 4.8 K/UL (ref 4–10.5)

## 2024-12-15 PROCEDURE — 80048 BASIC METABOLIC PNL TOTAL CA: CPT

## 2024-12-15 PROCEDURE — 85730 THROMBOPLASTIN TIME PARTIAL: CPT

## 2024-12-15 PROCEDURE — 82962 GLUCOSE BLOOD TEST: CPT

## 2024-12-15 PROCEDURE — 6360000002 HC RX W HCPCS: Performed by: INTERNAL MEDICINE

## 2024-12-15 PROCEDURE — 99233 SBSQ HOSP IP/OBS HIGH 50: CPT | Performed by: INTERNAL MEDICINE

## 2024-12-15 PROCEDURE — 36415 COLL VENOUS BLD VENIPUNCTURE: CPT

## 2024-12-15 PROCEDURE — 82330 ASSAY OF CALCIUM: CPT

## 2024-12-15 PROCEDURE — 2580000003 HC RX 258: Performed by: PHYSICIAN ASSISTANT

## 2024-12-15 PROCEDURE — 71045 X-RAY EXAM CHEST 1 VIEW: CPT

## 2024-12-15 PROCEDURE — 84100 ASSAY OF PHOSPHORUS: CPT

## 2024-12-15 PROCEDURE — 94640 AIRWAY INHALATION TREATMENT: CPT

## 2024-12-15 PROCEDURE — 2100000000 HC CCU R&B

## 2024-12-15 PROCEDURE — 85610 PROTHROMBIN TIME: CPT

## 2024-12-15 PROCEDURE — 6370000000 HC RX 637 (ALT 250 FOR IP): Performed by: PHYSICIAN ASSISTANT

## 2024-12-15 PROCEDURE — 97110 THERAPEUTIC EXERCISES: CPT

## 2024-12-15 PROCEDURE — 6370000000 HC RX 637 (ALT 250 FOR IP): Performed by: STUDENT IN AN ORGANIZED HEALTH CARE EDUCATION/TRAINING PROGRAM

## 2024-12-15 PROCEDURE — 94761 N-INVAS EAR/PLS OXIMETRY MLT: CPT

## 2024-12-15 PROCEDURE — 85025 COMPLETE CBC W/AUTO DIFF WBC: CPT

## 2024-12-15 PROCEDURE — 97530 THERAPEUTIC ACTIVITIES: CPT

## 2024-12-15 PROCEDURE — 94669 MECHANICAL CHEST WALL OSCILL: CPT

## 2024-12-15 PROCEDURE — 6370000000 HC RX 637 (ALT 250 FOR IP): Performed by: INTERNAL MEDICINE

## 2024-12-15 PROCEDURE — 83735 ASSAY OF MAGNESIUM: CPT

## 2024-12-15 PROCEDURE — 97116 GAIT TRAINING THERAPY: CPT

## 2024-12-15 RX ORDER — SODIUM CHLORIDE FOR INHALATION 0.9 %
3 VIAL, NEBULIZER (ML) INHALATION PRN
Status: DISCONTINUED | OUTPATIENT
Start: 2024-12-15 | End: 2024-12-17 | Stop reason: HOSPADM

## 2024-12-15 RX ORDER — CLOPIDOGREL BISULFATE 75 MG/1
75 TABLET ORAL DAILY
Status: DISCONTINUED | OUTPATIENT
Start: 2024-12-16 | End: 2024-12-17 | Stop reason: HOSPADM

## 2024-12-15 RX ORDER — IPRATROPIUM BROMIDE AND ALBUTEROL SULFATE 2.5; .5 MG/3ML; MG/3ML
1 SOLUTION RESPIRATORY (INHALATION) EVERY 4 HOURS PRN
Status: DISCONTINUED | OUTPATIENT
Start: 2024-12-15 | End: 2024-12-17 | Stop reason: HOSPADM

## 2024-12-15 RX ADMIN — GABAPENTIN 100 MG: 100 CAPSULE ORAL at 20:58

## 2024-12-15 RX ADMIN — METOPROLOL TARTRATE 25 MG: 25 TABLET, FILM COATED ORAL at 20:58

## 2024-12-15 RX ADMIN — SENNOSIDES AND DOCUSATE SODIUM 1 TABLET: 50; 8.6 TABLET ORAL at 08:45

## 2024-12-15 RX ADMIN — MUPIROCIN: 20 OINTMENT TOPICAL at 21:00

## 2024-12-15 RX ADMIN — ASPIRIN 81 MG: 81 TABLET, CHEWABLE ORAL at 08:45

## 2024-12-15 RX ADMIN — GABAPENTIN 100 MG: 100 CAPSULE ORAL at 08:45

## 2024-12-15 RX ADMIN — METOPROLOL TARTRATE 25 MG: 25 TABLET, FILM COATED ORAL at 08:45

## 2024-12-15 RX ADMIN — SODIUM CHLORIDE, PRESERVATIVE FREE 10 ML: 5 INJECTION INTRAVENOUS at 08:46

## 2024-12-15 RX ADMIN — INSULIN LISPRO 2 UNITS: 100 INJECTION, SOLUTION INTRAVENOUS; SUBCUTANEOUS at 11:36

## 2024-12-15 RX ADMIN — Medication 1 TABLET: at 08:45

## 2024-12-15 RX ADMIN — ACETAMINOPHEN 1000 MG: 500 TABLET ORAL at 18:17

## 2024-12-15 RX ADMIN — IPRATROPIUM BROMIDE AND ALBUTEROL SULFATE 1 DOSE: 2.5; .5 SOLUTION RESPIRATORY (INHALATION) at 12:10

## 2024-12-15 RX ADMIN — CHLORHEXIDINE GLUCONATE 0.12% ORAL RINSE 15 ML: 1.2 LIQUID ORAL at 21:12

## 2024-12-15 RX ADMIN — BACITRACIN: 500 OINTMENT TOPICAL at 21:00

## 2024-12-15 RX ADMIN — ACETAMINOPHEN 1000 MG: 500 TABLET ORAL at 08:52

## 2024-12-15 RX ADMIN — GUAIFENESIN 1200 MG: 600 TABLET, EXTENDED RELEASE ORAL at 08:45

## 2024-12-15 RX ADMIN — MUPIROCIN: 20 OINTMENT TOPICAL at 09:00

## 2024-12-15 RX ADMIN — CHLORHEXIDINE GLUCONATE 0.12% ORAL RINSE 15 ML: 1.2 LIQUID ORAL at 09:01

## 2024-12-15 RX ADMIN — SENNOSIDES AND DOCUSATE SODIUM 1 TABLET: 50; 8.6 TABLET ORAL at 20:59

## 2024-12-15 RX ADMIN — FUROSEMIDE 80 MG: 10 INJECTION, SOLUTION INTRAMUSCULAR; INTRAVENOUS at 08:45

## 2024-12-15 RX ADMIN — Medication 3 ML: at 12:10

## 2024-12-15 RX ADMIN — SODIUM CHLORIDE, PRESERVATIVE FREE 10 ML: 5 INJECTION INTRAVENOUS at 20:59

## 2024-12-15 RX ADMIN — TRAZODONE HYDROCHLORIDE 50 MG: 50 TABLET ORAL at 23:37

## 2024-12-15 RX ADMIN — ACETAMINOPHEN 1000 MG: 500 TABLET ORAL at 23:37

## 2024-12-15 RX ADMIN — POLYETHYLENE GLYCOL (3350) 17 G: 17 POWDER, FOR SOLUTION ORAL at 08:45

## 2024-12-15 RX ADMIN — BACITRACIN: 500 OINTMENT TOPICAL at 09:03

## 2024-12-15 RX ADMIN — FUROSEMIDE 80 MG: 10 INJECTION, SOLUTION INTRAMUSCULAR; INTRAVENOUS at 18:17

## 2024-12-15 RX ADMIN — TRAMADOL HYDROCHLORIDE 50 MG: 50 TABLET, COATED ORAL at 21:12

## 2024-12-15 RX ADMIN — ATORVASTATIN CALCIUM 40 MG: 40 TABLET, FILM COATED ORAL at 20:59

## 2024-12-15 RX ADMIN — GUAIFENESIN 1200 MG: 600 TABLET, EXTENDED RELEASE ORAL at 20:59

## 2024-12-15 RX ADMIN — SODIUM CHLORIDE, PRESERVATIVE FREE 10 ML: 5 INJECTION INTRAVENOUS at 21:14

## 2024-12-15 ASSESSMENT — PAIN DESCRIPTION - LOCATION
LOCATION: CHEST

## 2024-12-15 ASSESSMENT — PAIN DESCRIPTION - PAIN TYPE: TYPE: SURGICAL PAIN

## 2024-12-15 ASSESSMENT — PAIN SCALES - GENERAL
PAINLEVEL_OUTOF10: 4
PAINLEVEL_OUTOF10: 0
PAINLEVEL_OUTOF10: 0

## 2024-12-15 ASSESSMENT — PAIN DESCRIPTION - ONSET: ONSET: GRADUAL

## 2024-12-15 ASSESSMENT — PAIN DESCRIPTION - ORIENTATION: ORIENTATION: RIGHT;UPPER

## 2024-12-15 ASSESSMENT — PAIN DESCRIPTION - DESCRIPTORS: DESCRIPTORS: SORE;ACHING

## 2024-12-15 ASSESSMENT — PAIN DESCRIPTION - FREQUENCY: FREQUENCY: CONTINUOUS

## 2024-12-15 ASSESSMENT — PAIN - FUNCTIONAL ASSESSMENT: PAIN_FUNCTIONAL_ASSESSMENT: PREVENTS OR INTERFERES SOME ACTIVE ACTIVITIES AND ADLS

## 2024-12-15 NOTE — PROGRESS NOTES
Nephrology Progress Note        [address] [facility], [address]  [place] [address]  Phone: [phone]  Office Hours: 8:30AM - 4:30PM  Monday - Friday        12/15/2024 7:58 AM  Subjective:   Admit Date: 12/11/2024  PCP: Raza Fairchild DO  Interval History:   On room air  Just came back from a walk  Good BP    Diet: ADULT DIET; Regular; 4 carb choices (60 gm/meal); Low Fat/Low Chol/High Fiber/2 gm Na  ADULT ORAL NUTRITION SUPPLEMENT; Breakfast, Lunch, Dinner; Renal Oral Supplement      Data:   Scheduled Meds:   insulin lispro  0-16 Units SubCUTAneous 4x Daily AC & HS    epoetin sydnee-epbx  10,000 Units SubCUTAneous Once per day on Monday Wednesday Friday    metoprolol tartrate  25 mg Oral BID    furosemide  80 mg IntraVENous BID    guaiFENesin  1,200 mg Oral BID    sodium chloride nebulizer  3 mL Nebulization Q4H WA RT    gabapentin  100 mg Oral BID    sodium chloride flush  5-40 mL IntraVENous 2 times per day    aspirin  81 mg Oral Daily    [Held by provider] clopidogrel  75 mg Oral Daily    chlorhexidine  15 mL Mouth/Throat BID    mupirocin   Each Nostril BID    multivitamin  1 tablet Oral Daily with breakfast    polyethylene glycol  17 g Oral Daily    sennosides-docusate sodium  1 tablet Oral BID    atorvastatin  40 mg Oral Nightly    ipratropium 0.5 mg-albuterol 2.5 mg  1 Dose Inhalation Q4H WA RT    [Held by provider] heparin (porcine)  5,000 Units SubCUTAneous 3 times per day    bacitracin   Topical BID    acetaminophen  1,000 mg Oral 4 times per day    famotidine  20 mg Oral Every Other Day    Or    famotidine (PEPCID) injection  20 mg IntraVENous Every Other Day     Continuous Infusions:   sodium chloride      sodium chloride      sodium chloride 50 mL/hr at 12/12/24 0600    sodium chloride      phenylephrine Stopped (12/12/24 1745)    dextrose      EPINEPHrine Stopped (12/13/24 0240)    VASOpressin Stopped (12/13/24 0055)     PRN Meds:sodium chloride, sodium chloride, hydrALAZINE, traMADol

## 2024-12-15 NOTE — RT PROTOCOL NOTE
RT Inhaler-Nebulizer Bronchodilator Protocol Note    There is a bronchodilator order in the chart from a provider indicating to follow the RT Bronchodilator Protocol and there is an “Initiate RT Inhaler-Nebulizer Bronchodilator Protocol” order as well (see protocol at bottom of note).    CXR Findings:  XR CHEST PORTABLE    Result Date: 12/14/2024   Slight improved aeration involving the left lower lobe, with residual pleural effusion and/or airspace disease Electronically signed by Alonzo Awan      The findings from the last RT Protocol Assessment were as follows:   History Pulmonary Disease: Smoker 15 pack years or more (27 pk yr hx, no copd dx found)  Respiratory Pattern: Regular pattern and RR 12-20 bpm  Breath Sounds: Slightly diminished and/or crackles  Cough: Strong, spontaneous, non-productive  Indication for Bronchodilator Therapy: None (No home tx)  Bronchodilator Assessment Score: 3    Aerosolized bronchodilator medication orders have been revised according to the RT Inhaler-Nebulizer Bronchodilator Protocol below.    Respiratory Therapist to perform RT Therapy Protocol Assessment initially then follow the protocol.  Repeat RT Therapy Protocol Assessment PRN for score 0-3 or on second treatment, BID, and PRN for scores above 3.    No Indications - adjust the frequency to every 6 hours PRN wheezing or bronchospasm, if no treatments needed after 48 hours then discontinue using Per Protocol order mode.     If indication present, adjust the RT bronchodilator orders based on the Bronchodilator Assessment Score as indicated below.  Use Inhaler orders unless patient has one or more of the following: on home nebulizer, not able to hold breath for 10 seconds, is not alert and oriented, cannot activate and use MDI correctly, or respiratory rate 25 breaths per minute or more, then use the equivalent nebulizer order(s) with same Frequency and PRN reasons based on the score.  If a patient is on this medication at home

## 2024-12-15 NOTE — PROGRESS NOTES
Inpatient Progress Note 12/15/2024        Landry Morley  1970  [id number]      Assessment/Plan:  Landry Morley is a 54 y.o. male with a history of hypertension, diabetes, CAD status post PCI with MICHAEL x 5, CKD stage V on dialysis, congestive heart failure with reduced ejection fraction who presented to [facility] 12/11/2024 for scheduled CABG, the ICU post surgical recovery.        Problem list  CAD status post PCI and MICHAEL x 5  CABG x 4, LIMA to LAD, SVG to ramus, PDA and OM 2  Diabetes  ESRD on dialysis  Hypovolemic shock  Post-op Bleeding  Hypercarbic respiratory failure  Urbemic bleeding     Neuro: alert, oriented foloowing commands. Pain controlled with current multimodal regimen.   Cardio: HDS this morning, off vasoactive agents. Known Severe history of coronary artery disease and congestive heart failure known CAD with multiple MICHAEL's, now status post CABG x 4, LIMA to LAD, SVG to ramus, PDA and OM 2.  Known to have congestive heart failure with reduced ejection fraction, EF of 35 to 40%.  Last ROHIT from 11/20/2024 shows EF of 30 to 35% mid and apical septal wall hypokinesis.  Cardiology and CTS managing started on BB and GDMT.  V pacing wire in place, pacer to VVI backup at 60. pleural chest tube with minimal bleeding at thsi time, managed by CTS (DC today)  Resp:  on NC Titrate off as able. Continue inhalers, nebs and aggressive pulm hygiene. Continue diuresis with CRRT.   Chest tubes in place x1 , with minimal bleeding this morning  GI: Advance diet as tolerated, GI prophylaxis.  : ESRD  transition to IHD MWF. nephrology following, recs appretiated. Uop improve slightly though nominal as he is on iHD. Monitor electrolytes and replenish as needed.  Heme: Hemoglobin of 7.9, platelets of 112, DVT prophylaxis with heparin.  Monitor for bleeding and transfuse as needed.  ID: WBC of 4.8, continue perioperative biotics.  Endo: Known diabetic, with noted minimal requirement on drip, on SubQ regimen. Adjust as  12/14/24  5:18 PM   Result Value Ref Range    POC Glucose 115 (H) 74 - 99 mg/dL   POCT potassium    Collection Time: 12/14/24  6:36 PM   Result Value Ref Range    POC Potassium 4.0 3.5 - 5.1 mmol/L   POCT Glucose    Collection Time: 12/14/24  9:29 PM   Result Value Ref Range    POC Glucose 167 (H) 74 - 99 mg/dL   APTT    Collection Time: 12/15/24  6:00 AM   Result Value Ref Range    APTT 33.2 25.1 - 37.1 sec   CBC with Diff    Collection Time: 12/15/24  6:00 AM   Result Value Ref Range    WBC 4.8 4.0 - 10.5 k/uL    RBC 2.66 (L) 4.60 - 6.20 m/uL    Hemoglobin 7.9 (L) 13.5 - 18.0 g/dL    Hematocrit 24.5 (L) 42.0 - 52.0 %    MCV 92.1 78.0 - 100.0 fL    MCH 29.7 27.0 - 31.0 pg    MCHC 32.2 32.0 - 36.0 g/dL    RDW 15.8 (H) 11.7 - 14.9 %    MPV 11.7 (H) 7.5 - 11.1 fL    Platelet, Fluorescence 112 (L) 140 - 440 k/uL    Neutrophils % 72 (H) 36 - 66 %    Lymphocytes % 16 (L) 24 - 44 %    Monocytes % 11 (H) 0 - 4 %    Eosinophils % 0 0.0 - 3.0 %    Basophils % 0 0 - 1 %    Immature Granulocytes % 0 0 %    Neutrophils Absolute 3.49 k/uL    Lymphocytes Absolute 0.75 k/uL    Monocytes Absolute 0.54 k/uL    Eosinophils Absolute 0.02 k/uL    Basophils Absolute 0.02 k/uL    Immature Granulocytes Absolute 0.01 k/uL   Basic Metabolic Panel    Collection Time: 12/15/24  6:00 AM   Result Value Ref Range    Sodium 135 (L) 136 - 145 mmol/L    Potassium 4.2 3.5 - 5.1 mmol/L    Chloride 100 99 - 110 mmol/L    CO2 23 21 - 32 mmol/L    Anion Gap 12 9 - 17 mmol/L    Glucose 107 (H) 74 - 99 mg/dL    BUN 63 (H) 7 - 20 mg/dL    Creatinine 5.0 (H) 0.9 - 1.3 mg/dL    Est, Glom Filt Rate 12 (L) >60 mL/min/1.73m2    Calcium 8.4 8.3 - 10.6 mg/dL   Phosphorus    Collection Time: 12/15/24  6:00 AM   Result Value Ref Range    Phosphorus 5.4 (H) 2.5 - 4.9 mg/dL   Magnesium    Collection Time: 12/15/24  6:00 AM   Result Value Ref Range    Magnesium 2.7 (H) 1.8 - 2.4 mg/dL   Calcium, Ionic    Collection Time: 12/15/24  6:00 AM   Result Value Ref Range

## 2024-12-15 NOTE — PROGRESS NOTES
[facility] Cardiothoracic Surgery  Daily Progress Note    Surgeon:  Kang    Procedure:  s/p CORONARY ARTERY BYPASS GRAFT X4, LIMA - LAD, SVG - RAMUS, SVG -PDA, SVG - OM2; LEFT ENDOSCOPIC GREATER SAPHENOUS VEIN HARVEST; INTRAOPERATIVE TRANSESOPHAGEAL ECHOCARDIOGRAM on 12/11/24    Subjective:    Patient was seen and examined at bedside this morning with no complaints.     Hospital Course:  12/11/24: Patient underwent surgical intervention.  See op note for full details.  Transferred to the CVICU in a stable condition.  On some pressor support.  12/12/24:  Extubated last night. Received 2u of prbc's in the icu. 350cc blood out in last hour.  CC giving txa and ddavp.  Continues on epi @ 2, Rao @125, and Vaso @0.2.  with a CI of 2.6.  U/O 1500.  1200cc of blood from pleural chest tube this am.  MTP ordered.    12/13/24: On BiPAP this morning.  On 7.5 of Cardene.  Weaned off all pressor support last night.  Transfuse 1 unit of blood this morning.  Repeat H&H.  Give 1 FFP.  H&H 7.2 and 21.7.  Thrombocytopenic with platelet count of 70,000.  HIT panel sent.  Maintain chest tubes today.  Maintain Cisneros catheter.  Had 419 cc in the past 24 hours and 200 cc out overnight.  1.2 L of fluid removed of CRRT.  24-hour net fluid balance was -142 cc.  Output and index are stable at 3.3 and 7.0.  Maintain [place] today.  Start Lopressor 25 mg twice daily.  Electrolytes replaced.  Hydralazine as needed.  Consider switching CRRT to HD.  Will discuss with nephrology.  12/14/2024: Cont   12/15/24:  D/c pleural chest tube, remove cisneros per nephro.  Transfer to stepdown. Resume plavix.    Vital Signs: /81   Pulse 66   Temp 98 °F (36.7 °C) (Oral)   Resp 15   Ht 1.829 m (6')   Wt 98.4 kg (217 lb)   SpO2 98%   BMI 29.43 kg/m²  O2 Flow Rate (L/min): 0 L/min   Admit Weight: Weight - Scale: 92.5 kg (204 lb)       I/O's:  I/O last 3 completed shifts:  In: 2151.1 [P.O.:1680; I.V.:470.2; IV  Piggyback:0.9]  Out: 4990 [Urine:4800; Chest Tube:190]    Data:    CBC:   Recent Labs     12/13/24  0832 12/14/24  0600 12/15/24  0600   WBC 4.9 4.3 4.8   HGB 8.1* 7.8* 7.9*   HCT 24.5* 24.2* 24.5*   MCV 89.1 92.0 92.1     BMP:   Recent Labs     12/13/24  0832 12/13/24  0938 12/13/24  1139 12/14/24  0600 12/15/24  0600     --   --  136 135*   K 4.6  --   --  4.4 4.2     --   --  102 100   CO2 24  --   --  21 23   PHOS 3.8  --   --  4.6 5.4*   BUN 30*  --   --  45* 63*   CREATININE 3.4*   < > 3.2* 4.0* 5.0*    < > = values in this interval not displayed.     PT/INR:   Recent Labs     12/13/24  0832 12/14/24  0600 12/15/24  0600   PROTIME 15.9* 15.0* 14.5   INR 1.2 1.2 1.1     APTT:   Recent Labs     12/13/24  0832 12/14/24  0600 12/15/24  0600   APTT 34.6 26.7 33.2       Chest X-Ray:     Chest X-ray     INDICATION: Postop cardiac surgery.     COMPARISON: Multiple prior examinations most recently dated 12/13/2024     TECHNIQUE: AP/PA view of the chest was obtained.     FINDINGS: Right-sided IJ and dual lumen venous access catheter, stable in  position. Left-sided drainage catheter, stable in position. Mediastinal drain  has been removed. Slight improved aeration involving the left lower lobe, with  residual pleural effusion and hazy airspace disease involving the left lower  lobe. Right lung is clear. Cardiomegaly. Prior sternotomy. EKG wires overlie the  chest..     IMPRESSION:      Slight improved aeration involving the left lower lobe, with residual pleural  effusion and/or airspace disease         Electronically signed by Alonzo Awan    Scheduled Meds:    insulin lispro  0-16 Units SubCUTAneous 4x Daily AC & HS    epoetin sydnee-epbx  10,000 Units SubCUTAneous Once per day on Monday Wednesday Friday    metoprolol tartrate  25 mg Oral BID    furosemide  80 mg IntraVENous BID    guaiFENesin  1,200 mg Oral BID    sodium chloride nebulizer  3 mL Nebulization Q4H WA RT    gabapentin  100 mg Oral BID     recommendations regarding iHD per Nephrology  UOP 2.5L/24h. NET -1.4L  D/c cisneros  O2sat > 92% on RA. Aggressive pulmonary toileting  Plavix and DVT prophylaxis on hold at this time; discuss resuming today  Bowel regimen  Hgb 7.8, PLT 81. MARIFER panel pending. Hold on PRBC transfusion today  Cr 1.1, BUN 13, HCO3 21  Further recommendations as per Dr. Kapadia     Level of Care:  stepdown    The above recommendations including medications and orders were discussed and agreed upon with Dr. Kang Le PA-C 12/15/24 10:26 AM

## 2024-12-15 NOTE — PROGRESS NOTES
Physical Therapy    Physical Therapy Treatment Note  Name: Landry Morley MRN: [id number] :   1970   Date:  12/15/2024   Admission Date: 2024 Room:  A   Restrictions/Precautions:            sternal precautions, fall risk, chest tube   Communication with other providers:  per nurse ok to tx  Subjective:  Patient states:  agreeable to tx  Pain:   Location, Type, Intensity (0/10 to 10/10):  only c/o catheter pain  Objective:    Observation:  alert and oriented  Objective Measures:  vitals WNL  Treatment, including education/measures:  Education:  Pt given cardiac pk and was instructed in Sternal Precautions, Purpose of Exercise Program, Esa Scale and Signs and Symptoms of Exercise Intolerance per Cardiac Protocol . Home safety reviewed  Pt was given Initial Cardiac Exercises in Supine: pt needing increased time to read and complete ex.  Ankle Pumps x 10  Hip Abduction x 10  Heel Slides x 10  Shoulder Rolls x 10  Head Turns x 10  Front Arm Raises x 10   Side Arm Raises x 10  Arm Crosses x 10   Sit<=>stand cga  Amb without assistive device cga 400'  Safety  Patient left safely in the chair, with call light/phone in reach with alarm applied. Gait belt was used for transfers and gait.   Assessment / Impression:      Patient's tolerance of treatment:  good   Adverse Reaction: na  Significant change in status and impact:  na  Barriers to improvement:  none  Plan for Next Session:    Cont. POC    Time in:  830  Time out:  930  Timed treatment minutes:  60  Total treatment time:  60    Previously filed items:  Social/Functional History  Lives With: Family  Type of Home: House  Home Layout: One level  Home Access: Stairs to enter with rails  Prior Level of Assist for ADLs: Independent  Prior Level of Assist for Homemaking: Independent  Prior Level of Assist for Ambulation: Independent household ambulator, with or without device (does not use AD at baseline)  Prior Level of Assist for Transfers:

## 2024-12-15 NOTE — PROGRESS NOTES
surgical history:   has a past surgical history that includes hernia repair (1985); Coronary angioplasty with stent (01/01/2006); Cardiac procedure (N/A, 11/19/2024); IR NONTUNNELED VASCULAR CATHETER > 5 YEARS (11/20/2024); IR TUNNELED CVC PLACE WO SQ PORT/PUMP > 5 YEARS (11/25/2024); and Coronary artery bypass graft (N/A, 12/11/2024).  Social History:   reports that he quit smoking about 8 years ago. His smoking use included cigarettes. He started smoking about 35 years ago. He has a 27 pack-year smoking history. He has been exposed to tobacco smoke. He has never used smokeless tobacco. He reports that he does not currently use alcohol. He reports that he does not use drugs.  Family history:  family history includes Arthritis in his maternal grandmother; Cancer in his maternal grandfather and sister; Heart Disease in his father; High Blood Pressure in his mother; High Cholesterol in his mother.    OBJECTIVE:     /81   Pulse 66   Temp 98 °F (36.7 °C) (Oral)   Resp 15   Ht 1.829 m (6')   Wt 98.4 kg (217 lb)   SpO2 98%   BMI 29.43 kg/m²     Intake/Output Summary (Last 24 hours) at 12/15/2024 1451  Last data filed at 12/15/2024 1000  Gross per 24 hour   Intake 1073 ml   Output 3210 ml   Net -2137 ml       Physical Exam:    Constitutional:  Well developed   HENT:  Normocephalic   Eyes:  Conjunctiva normal, No discharge.   Respiratory:  Normal breath sounds, No respiratory distress   Cardiovascular post CABG S1-S2 NoMurmurs, added sounds.  Normal rate rhythm.  No rubs gallops.   Pedal pulses normal  pedal edema  GI:  Soft   : No CVA tenderness.   Musculoskeletal:  No tenderness,   Integument:  Warm, Dry, No erythema, No rash.   Lymphatic:  No lymphadenopathy noted.   Neurologic:   Alert & oriented x 3   Psychiatric:  Mood normal.           MEDICATIONS:     [START ON 12/16/2024] clopidogrel  75 mg Oral Daily    insulin lispro  0-16 Units SubCUTAneous 4x Daily AC & HS    epoetin sydnee-epbx  10,000 Units  SubCUTAneous Once per day on Monday Wednesday Friday    metoprolol tartrate  25 mg Oral BID    furosemide  80 mg IntraVENous BID    guaiFENesin  1,200 mg Oral BID    sodium chloride nebulizer  3 mL Nebulization Q4H WA RT    gabapentin  100 mg Oral BID    sodium chloride flush  5-40 mL IntraVENous 2 times per day    aspirin  81 mg Oral Daily    chlorhexidine  15 mL Mouth/Throat BID    mupirocin   Each Nostril BID    multivitamin  1 tablet Oral Daily with breakfast    polyethylene glycol  17 g Oral Daily    sennosides-docusate sodium  1 tablet Oral BID    atorvastatin  40 mg Oral Nightly    ipratropium 0.5 mg-albuterol 2.5 mg  1 Dose Inhalation Q4H WA RT    [Held by provider] heparin (porcine)  5,000 Units SubCUTAneous 3 times per day    bacitracin   Topical BID    acetaminophen  1,000 mg Oral 4 times per day    famotidine  20 mg Oral Every Other Day    Or    famotidine (PEPCID) injection  20 mg IntraVENous Every Other Day      sodium chloride      sodium chloride      sodium chloride 50 mL/hr at 12/12/24 0600    sodium chloride      phenylephrine Stopped (12/12/24 1745)    dextrose      EPINEPHrine Stopped (12/13/24 0240)    VASOpressin Stopped (12/13/24 0055)     sodium chloride, sodium chloride, hydrALAZINE, traMADol **OR** traMADol, calcium chloride, methocarbamol, albumin human 5%, traZODone, sodium chloride flush, sodium chloride, ondansetron **OR** ondansetron, bisacodyl, calcium chloride 1,000 mg in sodium chloride 0.9 % 100 mL IVPB **OR** calcium chloride 2,000 mg in sodium chloride 0.9 % 100 mL IVPB, phenylephrine, glucose, dextrose bolus **OR** dextrose bolus, glucagon (rDNA), dextrose  No Known Allergies    Lab Data:  CBC:   Recent Labs     12/13/24  0832 12/14/24  0600 12/15/24  0600   WBC 4.9 4.3 4.8   HGB 8.1* 7.8* 7.9*   HCT 24.5* 24.2* 24.5*   MCV 89.1 92.0 92.1     BMP:   Recent Labs     12/13/24  0832 12/13/24  0938 12/13/24  1139 12/14/24  0600 12/15/24  0600     --   --  136 135*   K 4.6  --

## 2024-12-16 ENCOUNTER — APPOINTMENT (OUTPATIENT)
Dept: GENERAL RADIOLOGY | Age: 54
DRG: 235 | End: 2024-12-16
Attending: THORACIC SURGERY (CARDIOTHORACIC VASCULAR SURGERY)
Payer: MEDICARE

## 2024-12-16 LAB
ALBUMIN SERPL-MCNC: 3.4 G/DL (ref 3.4–5)
ALBUMIN/GLOB SERPL: 1.5 {RATIO} (ref 1.1–2.2)
ALP SERPL-CCNC: 79 U/L (ref 40–129)
ALT SERPL-CCNC: <5 U/L (ref 10–40)
ANION GAP SERPL CALCULATED.3IONS-SCNC: 10 MMOL/L (ref 9–17)
ANION GAP SERPL CALCULATED.3IONS-SCNC: 13 MMOL/L (ref 9–17)
AST SERPL-CCNC: 17 U/L (ref 15–37)
BASOPHILS # BLD: 0.03 K/UL
BASOPHILS NFR BLD: 1 % (ref 0–1)
BILIRUB SERPL-MCNC: 0.3 MG/DL (ref 0–1)
BUN BLD-MCNC: 31 MG/DL (ref 7–20)
BUN SERPL-MCNC: 37 MG/DL (ref 7–20)
BUN SERPL-MCNC: 78 MG/DL (ref 7–20)
CA-I BLD-SCNC: 1.07 MMOL/L (ref 1.15–1.33)
CA-I BLD-SCNC: 1.21 MMOL/L (ref 1.15–1.33)
CALCIUM SERPL-MCNC: 8.5 MG/DL (ref 8.3–10.6)
CALCIUM SERPL-MCNC: 8.7 MG/DL (ref 8.3–10.6)
CHLORIDE BLD-SCNC: 103 MMOL/L (ref 99–110)
CHLORIDE SERPL-SCNC: 102 MMOL/L (ref 99–110)
CHLORIDE SERPL-SCNC: 98 MMOL/L (ref 99–110)
CO2 SERPL-SCNC: 23 MMOL/L (ref 21–32)
CO2 SERPL-SCNC: 26 MMOL/L (ref 21–32)
CREAT BLD-MCNC: 3.5 MG/DL (ref 0.5–1.2)
CREAT SERPL-MCNC: 3 MG/DL (ref 0.9–1.3)
CREAT SERPL-MCNC: 5.7 MG/DL (ref 0.9–1.3)
EGFR, POC: 20 ML/MIN/1.73M2
EOSINOPHIL # BLD: 0.01 K/UL
EOSINOPHILS RELATIVE PERCENT: 0 % (ref 0–3)
ERYTHROCYTE [DISTWIDTH] IN BLOOD BY AUTOMATED COUNT: 15.6 % (ref 11.7–14.9)
GFR, ESTIMATED: 10 ML/MIN/1.73M2
GFR, ESTIMATED: 22 ML/MIN/1.73M2
GLUCOSE BLD-MCNC: 107 MG/DL (ref 74–99)
GLUCOSE BLD-MCNC: 120 MG/DL (ref 74–99)
GLUCOSE BLD-MCNC: 129 MG/DL (ref 74–99)
GLUCOSE BLD-MCNC: 134 MG/DL (ref 74–99)
GLUCOSE SERPL-MCNC: 107 MG/DL (ref 74–99)
GLUCOSE SERPL-MCNC: 98 MG/DL (ref 74–99)
HCT VFR BLD AUTO: 22 % (ref 38–51)
HCT VFR BLD AUTO: 25.7 % (ref 42–52)
HGB BLD-MCNC: 8.2 G/DL (ref 13.5–18)
IMM GRANULOCYTES # BLD AUTO: 0.03 K/UL
IMM GRANULOCYTES NFR BLD: 1 %
INR PPP: 1
LYMPHOCYTES NFR BLD: 1.27 K/UL
LYMPHOCYTES RELATIVE PERCENT: 26 % (ref 24–44)
MAGNESIUM SERPL-MCNC: 2.7 MG/DL (ref 1.8–2.4)
MCH RBC QN AUTO: 29.8 PG (ref 27–31)
MCHC RBC AUTO-ENTMCNC: 31.9 G/DL (ref 32–36)
MCV RBC AUTO: 93.5 FL (ref 78–100)
MONOCYTES NFR BLD: 0.5 K/UL
MONOCYTES NFR BLD: 10 % (ref 0–4)
NEGATIVE BASE EXCESS, ART: 0.3 MMOL/L (ref 0–3)
NEUTROPHILS NFR BLD: 63 % (ref 36–66)
NEUTS SEG NFR BLD: 3.09 K/UL
PARTIAL THROMBOPLASTIN TIME: 36.7 SEC (ref 25.1–37.1)
PHOSPHATE SERPL-MCNC: 5.6 MG/DL (ref 2.5–4.9)
PLATELET # BLD AUTO: 147 K/UL (ref 140–440)
PMV BLD AUTO: 10.7 FL (ref 7.5–11.1)
POC ANION GAP: ABNORMAL MMOL/L (ref 7–16)
POC HCO3: 25.1 MMOL/L (ref 21–28)
POC HEMOGLOBIN (CALC): 7.6 G/DL (ref 12–17)
POC O2 SATURATION: 97.9 % (ref 94–98)
POC PCO2: 43.8 MM HG (ref 35–48)
POC PH: 7.37 (ref 7.35–7.45)
POC PO2: 105.7 MM HG (ref 83–108)
POTASSIUM BLD-SCNC: 4.6 MMOL/L (ref 3.5–5.1)
POTASSIUM SERPL-SCNC: 3.7 MMOL/L (ref 3.5–5.1)
POTASSIUM SERPL-SCNC: 4.4 MMOL/L (ref 3.5–5.1)
PROT SERPL-MCNC: 5.6 G/DL (ref 6.4–8.2)
PROTHROMBIN TIME: 13.7 SEC (ref 11.7–14.5)
RBC # BLD AUTO: 2.75 M/UL (ref 4.6–6.2)
SODIUM BLD-SCNC: 135 MMOL/L (ref 136–145)
SODIUM SERPL-SCNC: 134 MMOL/L (ref 136–145)
SODIUM SERPL-SCNC: 137 MMOL/L (ref 136–145)
TCO2 (CALC), ART: 27 MMOL/L (ref 21–32)
WBC OTHER # BLD: 4.9 K/UL (ref 4–10.5)

## 2024-12-16 PROCEDURE — 71046 X-RAY EXAM CHEST 2 VIEWS: CPT

## 2024-12-16 PROCEDURE — 80053 COMPREHEN METABOLIC PANEL: CPT

## 2024-12-16 PROCEDURE — 5A1D70Z PERFORMANCE OF URINARY FILTRATION, INTERMITTENT, LESS THAN 6 HOURS PER DAY: ICD-10-PCS | Performed by: THORACIC SURGERY (CARDIOTHORACIC VASCULAR SURGERY)

## 2024-12-16 PROCEDURE — 99232 SBSQ HOSP IP/OBS MODERATE 35: CPT | Performed by: INTERNAL MEDICINE

## 2024-12-16 PROCEDURE — 83735 ASSAY OF MAGNESIUM: CPT

## 2024-12-16 PROCEDURE — 6370000000 HC RX 637 (ALT 250 FOR IP): Performed by: PHYSICIAN ASSISTANT

## 2024-12-16 PROCEDURE — 2580000003 HC RX 258: Performed by: PHYSICIAN ASSISTANT

## 2024-12-16 PROCEDURE — 85025 COMPLETE CBC W/AUTO DIFF WBC: CPT

## 2024-12-16 PROCEDURE — 80048 BASIC METABOLIC PNL TOTAL CA: CPT

## 2024-12-16 PROCEDURE — 82962 GLUCOSE BLOOD TEST: CPT

## 2024-12-16 PROCEDURE — 97530 THERAPEUTIC ACTIVITIES: CPT

## 2024-12-16 PROCEDURE — 82330 ASSAY OF CALCIUM: CPT

## 2024-12-16 PROCEDURE — 90935 HEMODIALYSIS ONE EVALUATION: CPT

## 2024-12-16 PROCEDURE — 94669 MECHANICAL CHEST WALL OSCILL: CPT

## 2024-12-16 PROCEDURE — 71045 X-RAY EXAM CHEST 1 VIEW: CPT

## 2024-12-16 PROCEDURE — 85610 PROTHROMBIN TIME: CPT

## 2024-12-16 PROCEDURE — 2100000000 HC CCU R&B

## 2024-12-16 PROCEDURE — 84100 ASSAY OF PHOSPHORUS: CPT

## 2024-12-16 PROCEDURE — 94150 VITAL CAPACITY TEST: CPT

## 2024-12-16 PROCEDURE — 6360000002 HC RX W HCPCS: Performed by: INTERNAL MEDICINE

## 2024-12-16 PROCEDURE — 6370000000 HC RX 637 (ALT 250 FOR IP): Performed by: INTERNAL MEDICINE

## 2024-12-16 PROCEDURE — 94761 N-INVAS EAR/PLS OXIMETRY MLT: CPT

## 2024-12-16 PROCEDURE — 85730 THROMBOPLASTIN TIME PARTIAL: CPT

## 2024-12-16 RX ORDER — FAMOTIDINE 20 MG/1
20 TABLET, FILM COATED ORAL DAILY
Status: DISCONTINUED | OUTPATIENT
Start: 2024-12-17 | End: 2024-12-17 | Stop reason: HOSPADM

## 2024-12-16 RX ORDER — FUROSEMIDE 40 MG/1
80 TABLET ORAL DAILY
Status: DISCONTINUED | OUTPATIENT
Start: 2024-12-16 | End: 2024-12-17

## 2024-12-16 RX ORDER — FAMOTIDINE 10 MG/ML
20 INJECTION, SOLUTION INTRAVENOUS DAILY
Status: DISCONTINUED | OUTPATIENT
Start: 2024-12-17 | End: 2024-12-17 | Stop reason: HOSPADM

## 2024-12-16 RX ADMIN — ATORVASTATIN CALCIUM 40 MG: 40 TABLET, FILM COATED ORAL at 21:47

## 2024-12-16 RX ADMIN — METOPROLOL TARTRATE 25 MG: 25 TABLET, FILM COATED ORAL at 11:47

## 2024-12-16 RX ADMIN — SODIUM CHLORIDE, PRESERVATIVE FREE 10 ML: 5 INJECTION INTRAVENOUS at 11:47

## 2024-12-16 RX ADMIN — ACETAMINOPHEN 1000 MG: 500 TABLET ORAL at 11:47

## 2024-12-16 RX ADMIN — SODIUM CHLORIDE, PRESERVATIVE FREE 5 ML: 5 INJECTION INTRAVENOUS at 21:00

## 2024-12-16 RX ADMIN — ASPIRIN 81 MG: 81 TABLET, CHEWABLE ORAL at 11:47

## 2024-12-16 RX ADMIN — POLYETHYLENE GLYCOL (3350) 17 G: 17 POWDER, FOR SOLUTION ORAL at 11:41

## 2024-12-16 RX ADMIN — CLOPIDOGREL BISULFATE 75 MG: 75 TABLET ORAL at 11:47

## 2024-12-16 RX ADMIN — Medication 1 TABLET: at 11:51

## 2024-12-16 RX ADMIN — GUAIFENESIN 1200 MG: 600 TABLET, EXTENDED RELEASE ORAL at 11:47

## 2024-12-16 RX ADMIN — FAMOTIDINE 20 MG: 20 TABLET, FILM COATED ORAL at 11:47

## 2024-12-16 RX ADMIN — SENNOSIDES AND DOCUSATE SODIUM 1 TABLET: 50; 8.6 TABLET ORAL at 11:47

## 2024-12-16 RX ADMIN — BACITRACIN: 500 OINTMENT TOPICAL at 11:49

## 2024-12-16 RX ADMIN — METOPROLOL TARTRATE 25 MG: 25 TABLET, FILM COATED ORAL at 21:47

## 2024-12-16 RX ADMIN — SENNOSIDES AND DOCUSATE SODIUM 1 TABLET: 50; 8.6 TABLET ORAL at 21:47

## 2024-12-16 RX ADMIN — CHLORHEXIDINE GLUCONATE 0.12% ORAL RINSE 15 ML: 1.2 LIQUID ORAL at 21:47

## 2024-12-16 RX ADMIN — ACETAMINOPHEN 1000 MG: 500 TABLET ORAL at 16:56

## 2024-12-16 RX ADMIN — EPOETIN ALFA-EPBX 10000 UNITS: 10000 INJECTION, SOLUTION INTRAVENOUS; SUBCUTANEOUS at 16:57

## 2024-12-16 RX ADMIN — TRAMADOL HYDROCHLORIDE 100 MG: 50 TABLET, COATED ORAL at 22:21

## 2024-12-16 RX ADMIN — METHOCARBAMOL 500 MG: 500 TABLET ORAL at 16:56

## 2024-12-16 RX ADMIN — BACITRACIN: 500 OINTMENT TOPICAL at 21:00

## 2024-12-16 RX ADMIN — GUAIFENESIN 1200 MG: 600 TABLET, EXTENDED RELEASE ORAL at 21:47

## 2024-12-16 RX ADMIN — CHLORHEXIDINE GLUCONATE 0.12% ORAL RINSE 15 ML: 1.2 LIQUID ORAL at 11:47

## 2024-12-16 RX ADMIN — GABAPENTIN 100 MG: 100 CAPSULE ORAL at 21:47

## 2024-12-16 RX ADMIN — FUROSEMIDE 80 MG: 40 TABLET ORAL at 11:47

## 2024-12-16 RX ADMIN — GABAPENTIN 100 MG: 100 CAPSULE ORAL at 11:47

## 2024-12-16 RX ADMIN — TRAZODONE HYDROCHLORIDE 50 MG: 50 TABLET ORAL at 22:21

## 2024-12-16 ASSESSMENT — PAIN SCALES - GENERAL: PAINLEVEL_OUTOF10: 7

## 2024-12-16 NOTE — PROGRESS NOTES
Pt ran 3 hour treatment as ordered. No UF per  Orders. Pt tolerated tx well. CVC Rt chest used w/o issues. CVC fluhed and capped post rinse back.     Patient Name: Landry Morley  Patient : 1970  MRN: [id number]     Acct: [id number]  Date of Admission: 2024  Room/Bed: -A  Code Status:  Full Code  Allergies: No Known Allergies  Diagnosis:    Patient Active Problem List   Diagnosis    Chest pain    Type 2 diabetes mellitus with circulatory disorder, without long-term current use of insulin (HCC)    History of myocardial infarction    History of PTCA    H/O echocardiogram    Precordial pain    Coronary artery disease involving native coronary artery of native heart without angina pectoris    Mixed hyperlipidemia    Essential hypertension    Family history of coronary artery disease    Acute renal failure (HCC)    Persistent proteinuria    Diabetic nephropathy associated with type 2 diabetes mellitus (HCC)    Metabolic syndrome    Renal agenesis    Erectile disorder due to medical condition in male patient    Hypertension, secondary    Class 2 severe obesity due to excess calories with serious comorbidity and body mass index (BMI) of 39.0 to 39.9 in adult    Spinal stenosis of lumbar region    Constipation    Hyperpotassemia    Chronic kidney disease, stage V (HCC)    Nephrotic syndrome    Anxiety associated with depression    CAD (coronary artery disease)    Acute respiratory failure    NSTEMI (non-ST elevated myocardial infarction) (HCC)    Pneumonia of right lower lobe due to infectious organism    Coronary atherosclerosis of native coronary artery    Coronary artery disease (CAD) excluded         Treatment:  Hemodialysis 1:1  Priority: Routine  Location: ICU    Diabetic: Yes  NPO: No  Isolation Precautions: None     Consent for Treatment Verified: Yes  Blood Consent Verified: Not Applicable     Safety Verified: Identify (I), Consent (C), Equipment (E), HepB Status (B), Orders Complete  7.9* 8.2*   HCT 24.2* 24.5* 25.7*   PLT  --   --  147                                                                  Recent Labs     12/14/24  0600 12/15/24  0600 12/16/24  0620    135* 134*   K 4.4 4.2 4.4    100 98*   CO2 21 23 23   BUN 45* 63* 78*   CREATININE 4.0* 5.0* 5.7*   GLUCOSE 100* 107* 98   PHOS 4.6 5.4* 5.6*     IV Drips and Rate/Dose   sodium chloride 50 mL/hr at 12/12/24 0600    sodium chloride      phenylephrine Stopped (12/12/24 1745)    dextrose      EPINEPHrine Stopped (12/13/24 0240)    VASOpressin Stopped (12/13/24 0055)      Safety - Before each treatment:   Dialysis Machine No.: [id number]   Machine Number: [id number]  Dialyzer Lot No.: [id number]  RO Machine Log Sheet Completed: Yes  Machine Alarm Self Test: Completed;Passed (12/16/24 0812)     Air Foam Detector: Tested, Proper Function  Extracorporeal Circuit Tested for Integrity: Yes  Machine Conductivity: 14  Manual Conductivity: 13.9     Bicarbonate Concentrate Lot No.: [id number]  Acid Concentrate Lot No.: [id number]  Manual Ph: 7.2  Bleach Test (Neg): Yes  Bath Temperature: 96.8 °F (36 °C)  Tubing Lot#: [id number]  Conductivity Meter Serial #: [id number]  All Connections Secure?: Yes  Venous Parameters Set?: Yes  Arterial Parameters Set?: Yes  Saline Line Double Clamped?: Yes  Air Foam Detector Engaged?: Yes  Machine Functioning Alarm Free? Yes  Prime Given: 200ml    Chlorine Testing - Before each treatment and every 4 hours:   Treatment  Time On: 0807  Time Off: 1111  Treatment Goal: no UF  Weight - Scale: 100.2 kg (220 lb 14.4 oz) (12/16/24 0600)  1st check: less than 0.1 ppm at: 0745 hours  2nd check: less than 0.1 ppm at: 1015 hours  3rd check: Not Applicable  (if greater than 0.1 ppm, then check every 30 minutes from secondary)    Access Flows and Pressures  Patient Vitals for the past 8 hrs:   Blood Flow Rate (ml/min) Ultrafiltration Rate (ml/hr) Ultrafiltration Removed (ml) Arterial Pressure (mmHg) Venous Pressure (mmHg) TMP DFR

## 2024-12-16 NOTE — PROGRESS NOTES
(Oral)   Resp 15   Ht 1.829 m (6')   Wt 100.2 kg (220 lb 14.4 oz)   SpO2 100%   BMI 29.96 kg/m²  O2 Flow Rate (L/min): 0 L/min   Admit Weight: Weight - Scale: 92.5 kg (204 lb)       I/O's:  I/O last 3 completed shifts:  In: 2050 [P.O.:1810; I.V.:240]  Out: 5260 [Urine:5200; Chest Tube:60]    Data:    CBC:   Recent Labs     12/14/24  0600 12/15/24  0600 12/16/24 0620   WBC 4.3 4.8 4.9   HGB 7.8* 7.9* 8.2*   HCT 24.2* 24.5* 25.7*   MCV 92.0 92.1 93.5   PLT  --   --  147     BMP:   Recent Labs     12/14/24 0600 12/15/24  0600 12/16/24  0620    135* 134*   K 4.4 4.2 4.4    100 98*   CO2 21 23 23   PHOS 4.6 5.4* 5.6*   BUN 45* 63* 78*   CREATININE 4.0* 5.0* 5.7*     PT/INR:   Recent Labs     12/14/24 0600 12/15/24  0600 12/16/24  0620   PROTIME 15.0* 14.5 13.7   INR 1.2 1.1 1.0     APTT:   Recent Labs     12/14/24 0600 12/15/24  0600 12/16/24  0620   APTT 26.7 33.2 36.7       Chest X-Ray:   Portable upright chest radiograph, 1 view.     CLINICAL INDICATION: Status post open heart surgery.     COMPARISON: 12/15/2024.     FINDINGS: Similar prominence of the cardiomediastinal silhouette. Osseous  structures appear intact. Sternotomy wires and a right dialysis catheter remain  in place. A right internal jugular central line present on the prior study has  been removed.     Right lung is clear. Left basilar pleural-parenchymal opacity is slightly  improved. Left basilar chest tube present on the prior study has been removed.  No definite pneumothorax.     IMPRESSION:  Similar prominence of the cardiac silhouette. No definite pulmonary  vascular congestion.     Left basilar pleural-parenchymal opacity present on the prior study, which may  be due to a combination of airspace consolidation/pneumonia and left pleural  effusion, slightly improved. Recommend continued radiologic follow-up to  document resolution.     Interval removal of right internal jugular central line and left basilar chest  tube from the  high intensity statin, BB.  Hydralazine PRN for SBP > 150  Maintain transcutaneous pacing wires till discharge  Patient being dialyzed today.  Continue Lasix 80 mg daily per nephrology.  Patient had 3.8 L of urine out in the past 24 hours.  O2sat > 92% on RA. Aggressive pulmonary toileting  DVT prophylaxis remains on hold.  Plavix restarted.  Thrombocytopenic with platelet count 147,000.  Continue to trend  Bowel regimen  HIT panel pending.  Home health care order placed.  Possible discharge tomorrow  2 view chest x-rays  Further recommendations as per Dr. Kapadia     Level of Care:  stepdown    The above recommendations including medications and orders were discussed and agreed upon with Dr. Kang Vega PA-C 12/16/24 9:06 AM

## 2024-12-16 NOTE — PROGRESS NOTES
RENAL DOSE ADJUSTMENT MADE PER P/T PROTOCOL    PREVIOUS ORDER:  Pepcid 20 mg QOD    Estimated Creatinine Clearance: 35 mL/min (A) (based on SCr of 3 mg/dL (H)).  Recent Labs     12/14/24  0600 12/15/24  0600 12/16/24  0620 12/16/24  1135   BUN 45* 63* 78* 37*   CREATININE 4.0* 5.0* 5.7* 3.0*   PLT  --   --  147  --    INR 1.2 1.1 1.0  --      NEW RENALLY ADJUSTED ORDER:  PEPID 20 mg QDAY    HD pt    Rebecca Mejias RPH  12/16/2024 1:54 PM

## 2024-12-16 NOTE — PROGRESS NOTES
CARDIOLOGY PROGRESS NOTE                                                  Name:  Landry Morley /Age/Sex: 1970  (54 y.o. male)   MRN & CSN:  [id number] & [id number] Admission Date/Time: 2024  5:38 AM   Location:  -A PCP: Raza Fairchild DO         Admit Date:  2024  Hospital Day: 6      SUBJECTIVE:     Seen patient as follow up as consultation for  post CABG    mild chest pain.  + shortness of breath  No palpations    TELEMETRY: SR         Intake/Output Summary (Last 24 hours) at 2024 1330  Last data filed at 2024 1111  Gross per 24 hour   Intake 850 ml   Output 2350 ml   Net -1500 ml       Assessment/Plan:      Multivessel CAD status post CABG.  PATHAK LAD, SVG ramus intermedius, SVG PDA - SVG OM 2  Essential hypertension  Ischemic cardiomyopathy with LV ejection fraction of 40 to 45%  Chronic kidney failure with hemodialysis  Anemia with GI bleeding, anemia of chronic disease  Diabetes mellitus    Continue with aspirin 81 mg daily  Recommend to hold off on Plavix or anticoagulation aspirin should suffice.  Continue with Lasix 80 mg twice daily, nephrology follow-up - on CRRT  Continue with beta-blocker: Metoprolol tartrate 25 twice daily  Continue with high intensity statins Lipitor 40 mg p.o. daily    Respect modification  Cardiology will sign off please call us with any further questions    Past medical history:    has a past medical history of Anxiety associated with depression, CAD (coronary artery disease), CKD (chronic kidney disease), stage V ([facility]), Family history of coronary artery disease, H/O echocardiogram, H/O echocardiogram, Hemodialysis patient ([facility]), History of cardiac catheterization, History of cardiovascular stress test, History of exercise stress test, History of myocardial infarction, History of nuclear stress test, History of PTCA, History of PTCA, Hyperlipidemia, Hypertension, Hypertriglyceridemia, Sepsis ([facility]), and Type II or unspecified type  diabetes mellitus without mention of complication, not stated as uncontrolled.  Past surgical history:   has a past surgical history that includes hernia repair (1985); Coronary angioplasty with stent (01/01/2006); Cardiac procedure (N/A, 11/19/2024); IR NONTUNNELED VASCULAR CATHETER > 5 YEARS (11/20/2024); IR TUNNELED CVC PLACE WO SQ PORT/PUMP > 5 YEARS (11/25/2024); and Coronary artery bypass graft (N/A, 12/11/2024).  Social History:   reports that he quit smoking about 8 years ago. His smoking use included cigarettes. He started smoking about 35 years ago. He has a 27 pack-year smoking history. He has been exposed to tobacco smoke. He has never used smokeless tobacco. He reports that he does not currently use alcohol. He reports that he does not use drugs.  Family history:  family history includes Arthritis in his maternal grandmother; Cancer in his maternal grandfather and sister; Heart Disease in his father; High Blood Pressure in his mother; High Cholesterol in his mother.    OBJECTIVE:     /74   Pulse 67   Temp 97.7 °F (36.5 °C) (Oral)   Resp 18   Ht 1.829 m (6')   Wt 100.2 kg (220 lb 14.4 oz)   SpO2 97%   BMI 29.96 kg/m²     Intake/Output Summary (Last 24 hours) at 12/16/2024 1330  Last data filed at 12/16/2024 1111  Gross per 24 hour   Intake 850 ml   Output 2350 ml   Net -1500 ml       Physical Exam:    Constitutional:  Well developed   HENT:  Normocephalic   Eyes:  Conjunctiva normal, No discharge.   Respiratory:  Normal breath sounds, No respiratory distress   Cardiovascular post CABG S1-S2 NoMurmurs, added sounds.  Normal rate rhythm.  No rubs gallops.   Pedal pulses normal  pedal edema  GI:  Soft   : No CVA tenderness.   Musculoskeletal:  No tenderness,   Integument:  Warm, Dry, No erythema, No rash.   Lymphatic:  No lymphadenopathy noted.   Neurologic:   Alert & oriented x 3   Psychiatric:  Mood normal.           MEDICATIONS:     furosemide  80 mg Oral Daily    clopidogrel  75 mg Oral    CREATININE 4.0* 5.0* 5.7* 3.0*     LIVER PROFILE:   Recent Labs     12/16/24  1135   AST 17   ALT <5*   BILITOT 0.3   ALKPHOS 79     PT/INR:   Recent Labs     12/14/24  0600 12/15/24  0600 12/16/24  0620   PROTIME 15.0* 14.5 13.7   INR 1.2 1.1 1.0     APTT:   Recent Labs     12/14/24  0600 12/15/24  0600 12/16/24  0620   APTT 26.7 33.2 36.7     BNP:  No results for input(s): \"BNP\" in the last 72 hours.        Jose Armando Marquez MD, MD 12/16/2024 1:30 PM

## 2024-12-16 NOTE — PROGRESS NOTES
Nephrology Progress Note        [address] [facility] [address] [place] [address]  Phone: [phone]  Office Hours: 8:30AM - 4:30PM  Monday - Friday 12/16/2024 6:49 AM  Subjective:   Admit Date: 12/11/2024  PCP: Raza Fairchild DO  Interval History:   Doing well  On room air  Requesting HD today  Diet: ADULT DIET; Regular; 4 carb choices (60 gm/meal); Low Fat/Low Chol/High Fiber/2 gm Na  ADULT ORAL NUTRITION SUPPLEMENT; Breakfast, Lunch, Dinner; Renal Oral Supplement      Data:   Scheduled Meds:   furosemide  80 mg Oral Daily    clopidogrel  75 mg Oral Daily    insulin lispro  0-16 Units SubCUTAneous 4x Daily AC & HS    epoetin sydnee-epbx  10,000 Units SubCUTAneous Once per day on Monday Wednesday Friday    metoprolol tartrate  25 mg Oral BID    guaiFENesin  1,200 mg Oral BID    gabapentin  100 mg Oral BID    sodium chloride flush  5-40 mL IntraVENous 2 times per day    aspirin  81 mg Oral Daily    chlorhexidine  15 mL Mouth/Throat BID    multivitamin  1 tablet Oral Daily with breakfast    polyethylene glycol  17 g Oral Daily    sennosides-docusate sodium  1 tablet Oral BID    atorvastatin  40 mg Oral Nightly    [Held by provider] heparin (porcine)  5,000 Units SubCUTAneous 3 times per day    bacitracin   Topical BID    acetaminophen  1,000 mg Oral 4 times per day    famotidine  20 mg Oral Every Other Day    Or    famotidine (PEPCID) injection  20 mg IntraVENous Every Other Day     Continuous Infusions:   sodium chloride      sodium chloride      sodium chloride 50 mL/hr at 12/12/24 0600    sodium chloride      phenylephrine Stopped (12/12/24 1745)    dextrose      EPINEPHrine Stopped (12/13/24 0240)    VASOpressin Stopped (12/13/24 0055)     PRN Meds:ipratropium 0.5 mg-albuterol 2.5 mg, sodium chloride nebulizer, sodium chloride, sodium chloride, hydrALAZINE, traMADol **OR** traMADol, calcium chloride, methocarbamol, albumin human 5%, traZODone, sodium chloride flush, sodium chloride,  ondansetron **OR** ondansetron, bisacodyl, calcium chloride 1,000 mg in sodium chloride 0.9 % 100 mL IVPB **OR** calcium chloride 2,000 mg in sodium chloride 0.9 % 100 mL IVPB, phenylephrine, glucose, dextrose bolus **OR** dextrose bolus, glucagon (rDNA), dextrose  I/O last 3 completed shifts:  In: 3253 [P.O.:2790; I.V.:463]  Out: 5600 [Urine:5500; Chest Tube:100]  I/O this shift:  In: 100 [P.O.:100]  Out: 1400 [Urine:1400]    Intake/Output Summary (Last 24 hours) at 12/16/2024 0649  Last data filed at 12/16/2024 0600  Gross per 24 hour   Intake 1450 ml   Output 3840 ml   Net -2390 ml       CBC:   Recent Labs     12/14/24  0600 12/15/24  0600 12/16/24  0620   WBC 4.3 4.8 4.9   HGB 7.8* 7.9* 8.2*   PLT  --   --  147       BMP:    Recent Labs     12/13/24  0832 12/13/24  0938 12/13/24  1139 12/14/24  0600 12/15/24  0600     --   --  136 135*   K 4.6  --   --  4.4 4.2     --   --  102 100   CO2 24  --   --  21 23   BUN 30*  --   --  45* 63*   CREATININE 3.4*   < > 3.2* 4.0* 5.0*   GLUCOSE 101*  --   --  100* 107*   CALCIUM 9.0  --   --  8.0* 8.4    < > = values in this interval not displayed.     Hepatic:   Recent Labs     12/13/24  0832   AST 21   ALT <5*   BILITOT 0.3   ALKPHOS 42     Troponin: No results for input(s): \"TROPONINI\" in the last 72 hours.  BNP: No results for input(s): \"BNP\" in the last 72 hours.  Lipids: No results for input(s): \"CHOL\", \"HDL\" in the last 72 hours.    Invalid input(s): \"LDLCALCU\"  ABGs:   Lab Results   Component Value Date/Time    PHART 7.324 12/13/2024 04:44 AM    PO2ART 103.8 12/13/2024 04:44 AM    RVN1DLS 45.4 12/13/2024 04:44 AM     INR:   Recent Labs     12/13/24  0832 12/14/24  0600 12/15/24  0600   INR 1.2 1.2 1.1       Objective:   Vitals: /75   Pulse 53   Temp 98.4 °F (36.9 °C) (Oral)   Resp 13   Ht 1.829 m (6')   Wt 100.2 kg (220 lb 14.4 oz)   SpO2 100%   BMI 29.96 kg/m²   General appearance: alert and cooperative with exam, in no acute  distress  HEENT: normocephalic, atraumatic,   Neck: supple, trachea midline  Lungs: breathing comfortably on room air  Heart:: regular rate and rhythm,   Extremities: extremities atraumatic, no cyanosis or edema  Neurologic: alert, oriented, follows commands, interactive    MEDICAL DECISION MAKING       -S/p CABG on 12/11/24  -ESRD on HD TTS: right tunnelled HD cath present  -CHF hx  -CKD MBD     Suggest:  -Per his request, he will get HD today and the next one will be on Thursday  -Transition to oral lasix    Thank you                         Electronically signed by Leonor Sommer DO on 12/16/2024 at 6:49 AM    [facility]  MD ANGELICA PEREZ DO  [address] [facility],  [place] [address]  PHONE: [phone]  FAX: [phone]

## 2024-12-16 NOTE — CARE COORDINATION
CM reviewed pt's medical record and discussed with pt's provider. CM spoke with pt . Pt chose [facility] as his home healthcare provider. CM made referral to [facility].    Discharging Nurse: Upon discharge pt will be going home with [facility] home care    Please fax H/P, D/S.  AVS, order, and face sheet to [phone]    Please call [phone] and inform of discharge

## 2024-12-16 NOTE — PROGRESS NOTES
Physical Therapy    Physical Therapy Treatment Note  Name: Landry Morley MRN: [id number] :   1970   Date:  2024   Admission Date: 2024 Room:     Restrictions/Precautions:          sternal prec  Communication with other providers:  nurse oks  Subjective:  Patient states:  agreeable  Pain:   Location, Type, Intensity (0/10 to 10/10):  does not rate    Objective:    Observation:  in chair  HR72  Treatment, including education/measures:  STS SBA, SPT SBA  Stand balance Good  Gait training no AD x~500' SBA/S, vc for safe techs  Reviewed sternal prec  Safety  Patient left safely in the chair, with call light/phone in reach with alarm applied. Gait belt was used for transfers and gait.    Assessment / Impression:    Patient's tolerance of treatment:  good   Adverse Reaction: na  Significant change in status and impact:  na  Barriers to improvement:  weakness and endurance  Plan for Next Session:    Gait progression      Time in:  1359  Time out:  1414  Timed treatment minutes:  15  Total treatment time:  15    Previously filed items:  Social/Functional History  Lives With: Family  Type of Home: House  Home Layout: One level  Home Access: Stairs to enter with rails  Prior Level of Assist for ADLs: Independent  Prior Level of Assist for Homemaking: Independent  Prior Level of Assist for Ambulation: Independent household ambulator, with or without device (does not use AD at baseline)  Prior Level of Assist for Transfers: Independent        Short Term Goals  Time Frame for Short Term Goals: 1 week  Short Term Goal 1: pt to complete all bed mobility mod I  Short Term Goal 2: pt to complete all STS transfers to/from bed, commode, and chair mod I  Short Term Goal 3: pt to ambulate 200' with LRAD mod I  Short Term Goal 4: pt to ascend/descend 3 stairs with LRAD SBA to simulate home set up    Electronically signed by:    Jno Olguin PTA  2024, 2:29 PM

## 2024-12-16 NOTE — PROGRESS NOTES
12/16/24 0313   NIV Type   NIV Started/Stopped On   Equipment Type v60   Mode Bilevel   Settings/Measurements   PIP Observed 15 cm H20   IPAP 15 cmH20   CPAP/EPAP 8 cmH2O   Rate Ordered 14   Insp Rise Time (%) 2 %   FiO2  35 %   I Time/ I Time % 1.05 s   Minute Volume (L/min) 11.9 Liters   Mask Leak (lpm) 9 lpm   Patient's Home Machine Yes (type/vendor)   Alarm Settings   Alarms On Y   Low Pressure (cmH2O) 5 cmH2O   High Pressure (cmH2O) 30 cmH2O   Apnea (secs) 20 secs   RR Low (bpm) 13   RR High (bpm) 50 br/min

## 2024-12-16 NOTE — PROGRESS NOTES
Occupational Therapy  Attempted to see pt on this date. Pt in dialysis at this time. Will attempt as able and appropriate.    Kira PENA/L

## 2024-12-17 ENCOUNTER — APPOINTMENT (OUTPATIENT)
Dept: GENERAL RADIOLOGY | Age: 54
DRG: 235 | End: 2024-12-17
Attending: THORACIC SURGERY (CARDIOTHORACIC VASCULAR SURGERY)
Payer: MEDICARE

## 2024-12-17 VITALS
BODY MASS INDEX: 29.86 KG/M2 | WEIGHT: 220.46 LBS | TEMPERATURE: 97.5 F | OXYGEN SATURATION: 98 % | DIASTOLIC BLOOD PRESSURE: 83 MMHG | SYSTOLIC BLOOD PRESSURE: 134 MMHG | RESPIRATION RATE: 20 BRPM | HEIGHT: 72 IN | HEART RATE: 62 BPM

## 2024-12-17 LAB
ANION GAP SERPL CALCULATED.3IONS-SCNC: 12 MMOL/L (ref 9–17)
BASOPHILS # BLD: 0.03 K/UL
BASOPHILS NFR BLD: 1 % (ref 0–1)
BUN SERPL-MCNC: 51 MG/DL (ref 7–20)
CA-I BLD-SCNC: 1.07 MMOL/L (ref 1.15–1.33)
CALCIUM SERPL-MCNC: 7.9 MG/DL (ref 8.3–10.6)
CHLORIDE SERPL-SCNC: 102 MMOL/L (ref 99–110)
CO2 SERPL-SCNC: 19 MMOL/L (ref 21–32)
CREAT SERPL-MCNC: 4.3 MG/DL (ref 0.9–1.3)
EOSINOPHIL # BLD: 0.01 K/UL
EOSINOPHILS RELATIVE PERCENT: 0 % (ref 0–3)
ERYTHROCYTE [DISTWIDTH] IN BLOOD BY AUTOMATED COUNT: 15.7 % (ref 11.7–14.9)
GFR, ESTIMATED: 15 ML/MIN/1.73M2
GLUCOSE SERPL-MCNC: 89 MG/DL (ref 74–99)
HCT VFR BLD AUTO: 24.6 % (ref 42–52)
HGB BLD-MCNC: 7.7 G/DL (ref 13.5–18)
IMM GRANULOCYTES # BLD AUTO: 0.02 K/UL
IMM GRANULOCYTES NFR BLD: 0 %
INR PPP: 1.1
LYMPHOCYTES NFR BLD: 1.45 K/UL
LYMPHOCYTES RELATIVE PERCENT: 29 % (ref 24–44)
MAGNESIUM SERPL-MCNC: 2.3 MG/DL (ref 1.8–2.4)
MCH RBC QN AUTO: 29.7 PG (ref 27–31)
MCHC RBC AUTO-ENTMCNC: 31.3 G/DL (ref 32–36)
MCV RBC AUTO: 95 FL (ref 78–100)
MONOCYTES NFR BLD: 0.64 K/UL
MONOCYTES NFR BLD: 13 % (ref 0–4)
NEUTROPHILS NFR BLD: 57 % (ref 36–66)
NEUTS SEG NFR BLD: 2.9 K/UL
PARTIAL THROMBOPLASTIN TIME: 35.9 SEC (ref 25.1–37.1)
PHOSPHATE SERPL-MCNC: 4 MG/DL (ref 2.5–4.9)
PLATELET # BLD AUTO: 142 K/UL (ref 140–440)
PMV BLD AUTO: 11.3 FL (ref 7.5–11.1)
POTASSIUM SERPL-SCNC: 4.6 MMOL/L (ref 3.5–5.1)
PROTHROMBIN TIME: 14.7 SEC (ref 11.7–14.5)
RBC # BLD AUTO: 2.59 M/UL (ref 4.6–6.2)
SODIUM SERPL-SCNC: 133 MMOL/L (ref 136–145)
WBC OTHER # BLD: 5.1 K/UL (ref 4–10.5)

## 2024-12-17 PROCEDURE — 6370000000 HC RX 637 (ALT 250 FOR IP): Performed by: INTERNAL MEDICINE

## 2024-12-17 PROCEDURE — 94150 VITAL CAPACITY TEST: CPT

## 2024-12-17 PROCEDURE — 84100 ASSAY OF PHOSPHORUS: CPT

## 2024-12-17 PROCEDURE — 94761 N-INVAS EAR/PLS OXIMETRY MLT: CPT

## 2024-12-17 PROCEDURE — 6370000000 HC RX 637 (ALT 250 FOR IP): Performed by: PHYSICIAN ASSISTANT

## 2024-12-17 PROCEDURE — 85730 THROMBOPLASTIN TIME PARTIAL: CPT

## 2024-12-17 PROCEDURE — 2500000003 HC RX 250 WO HCPCS: Performed by: PHYSICIAN ASSISTANT

## 2024-12-17 PROCEDURE — 83735 ASSAY OF MAGNESIUM: CPT

## 2024-12-17 PROCEDURE — 94660 CPAP INITIATION&MGMT: CPT

## 2024-12-17 PROCEDURE — 71046 X-RAY EXAM CHEST 2 VIEWS: CPT

## 2024-12-17 PROCEDURE — 85025 COMPLETE CBC W/AUTO DIFF WBC: CPT

## 2024-12-17 PROCEDURE — 2580000003 HC RX 258: Performed by: PHYSICIAN ASSISTANT

## 2024-12-17 PROCEDURE — 80048 BASIC METABOLIC PNL TOTAL CA: CPT

## 2024-12-17 PROCEDURE — 36415 COLL VENOUS BLD VENIPUNCTURE: CPT

## 2024-12-17 PROCEDURE — 82330 ASSAY OF CALCIUM: CPT

## 2024-12-17 PROCEDURE — 85610 PROTHROMBIN TIME: CPT

## 2024-12-17 PROCEDURE — 94669 MECHANICAL CHEST WALL OSCILL: CPT

## 2024-12-17 RX ORDER — GABAPENTIN 100 MG/1
100 CAPSULE ORAL 2 TIMES DAILY
Qty: 90 CAPSULE | Refills: 0 | Status: SHIPPED | OUTPATIENT
Start: 2024-12-17 | End: 2025-01-31

## 2024-12-17 RX ORDER — METOPROLOL TARTRATE 25 MG/1
25 TABLET, FILM COATED ORAL 2 TIMES DAILY
Qty: 60 TABLET | Refills: 3 | Status: SHIPPED | OUTPATIENT
Start: 2024-12-17

## 2024-12-17 RX ORDER — FUROSEMIDE 40 MG/1
80 TABLET ORAL 2 TIMES DAILY
Status: DISCONTINUED | OUTPATIENT
Start: 2024-12-17 | End: 2024-12-17 | Stop reason: HOSPADM

## 2024-12-17 RX ORDER — GUAIFENESIN 600 MG/1
1200 TABLET, EXTENDED RELEASE ORAL 2 TIMES DAILY
Qty: 90 TABLET | Refills: 0 | Status: SHIPPED | OUTPATIENT
Start: 2024-12-17

## 2024-12-17 RX ORDER — TRAMADOL HYDROCHLORIDE 50 MG/1
50 TABLET ORAL EVERY 8 HOURS PRN
Qty: 15 TABLET | Refills: 0 | Status: SHIPPED | OUTPATIENT
Start: 2024-12-17 | End: 2024-12-24

## 2024-12-17 RX ADMIN — CHLORHEXIDINE GLUCONATE 0.12% ORAL RINSE 15 ML: 1.2 LIQUID ORAL at 07:58

## 2024-12-17 RX ADMIN — SENNOSIDES AND DOCUSATE SODIUM 1 TABLET: 50; 8.6 TABLET ORAL at 07:58

## 2024-12-17 RX ADMIN — BACITRACIN: 500 OINTMENT TOPICAL at 07:58

## 2024-12-17 RX ADMIN — CLOPIDOGREL BISULFATE 75 MG: 75 TABLET ORAL at 07:57

## 2024-12-17 RX ADMIN — ASPIRIN 81 MG: 81 TABLET, CHEWABLE ORAL at 07:58

## 2024-12-17 RX ADMIN — SODIUM CHLORIDE, PRESERVATIVE FREE 10 ML: 5 INJECTION INTRAVENOUS at 07:58

## 2024-12-17 RX ADMIN — METOPROLOL TARTRATE 25 MG: 25 TABLET, FILM COATED ORAL at 07:58

## 2024-12-17 RX ADMIN — ACETAMINOPHEN 1000 MG: 500 TABLET ORAL at 07:58

## 2024-12-17 RX ADMIN — FAMOTIDINE 20 MG: 20 TABLET, FILM COATED ORAL at 07:57

## 2024-12-17 RX ADMIN — CALCIUM CHLORIDE 1000 MG: 100 INJECTION, SOLUTION INTRAVENOUS at 09:23

## 2024-12-17 RX ADMIN — GABAPENTIN 100 MG: 100 CAPSULE ORAL at 07:57

## 2024-12-17 RX ADMIN — Medication 1 TABLET: at 07:58

## 2024-12-17 RX ADMIN — GUAIFENESIN 1200 MG: 600 TABLET, EXTENDED RELEASE ORAL at 07:58

## 2024-12-17 RX ADMIN — FUROSEMIDE 80 MG: 40 TABLET ORAL at 07:57

## 2024-12-17 NOTE — DISCHARGE SUMMARY
Cardiothoracic and Vascular Surgery Discharge Summary  Today's Date: 24  Name:  Landry Morley /Age/Sex: 1970  (54 y.o. male)   MRN & CSN:  [id number] & [id number] Admission Date/Time: 2024  5:38 AM   Location:  -A PCP: Raza Fairchild DO       Admit date: 2024   Discharge date: 2024      Admitting Provider: Shayan Kapadia MD   Discharge Provider: Yumi Vega PA-C     Discharge Diagnoses:   Active Hospital Problems    Diagnosis Date Noted    Coronary artery disease (CAD) excluded [Z03.89] 2024    Coronary atherosclerosis of native coronary artery [I25.10] 2024     Discharged Condition: stable.    Operation    S/p CORONARY ARTERY BYPASS GRAFT X4, LIMA - LAD, SVG - RAMUS, SVG -PDA, SVG - OM2; LEFT ENDOSCOPIC GREATER SAPHENOUS VEIN HARVEST; INTRAOPERATIVE TRANSESOPHAGEAL ECHOCARDIOGRAM on 24     Hospital Course:  24: Patient underwent surgical intervention.  See op note for full details.  Transferred to the CVICU in a stable condition.  On some pressor support.  24:  Extubated last night. Received 2u of prbc's in the icu. 350cc blood out in last hour.  CC giving txa and ddavp.  Continues on epi @ 2, Rao @125, and Vaso @0.2.  with a CI of 2.6.  U/O 1500.  1200cc of blood from pleural chest tube this am.  MTP ordered.    24: On BiPAP this morning.  On 7.5 of Cardene.  Weaned off all pressor support last night.  Transfuse 1 unit of blood this morning.  Repeat H&H.  Give 1 FFP.  H&H 7.2 and 21.7.  Thrombocytopenic with platelet count of 70,000.  HIT panel sent.  Maintain chest tubes today.  Maintain Perez catheter.  Had 419 cc in the past 24 hours and 200 cc out overnight.  1.2 L of fluid removed of CRRT.  24-hour net fluid balance was -142 cc.  Output and index are stable at 3.3 and 7.0.  Maintain Wilmont-Truman today.  Start Lopressor 25 mg twice daily.  Electrolytes replaced.  Hydralazine as needed.  Consider switching CRRT to HD.  Will  injection 5,000 Units, 5,000 Units, SubCUTAneous, 3 times per day, Yumi Vega PA-C, 5,000 Units at 12/12/24 0613    bacitracin ointment, , Topical, BID, Yumi Vega PA-C, Given at 12/17/24 0758    acetaminophen (TYLENOL) tablet 1,000 mg, 1,000 mg, Oral, 4 times per day, Yumi Vega PA-C, 1,000 mg at 12/17/24 0758         Cardiac Surgery: What to Expect at Home  Your Recovery     CABG  Coronary artery bypass graft (CABG) is surgery to treat coronary artery disease. The surgery helps blood make a detour, or bypass, around one or more narrowed or blocked coronary arteries. Coronary arteries are the blood vessels that bring blood to the heart muscle. Your doctor did the surgery through a cut, called an incision, in your chest.  A bypass graft was created using a piece of blood vessel from another part of your body (likely your leg). Your doctor will attach, or graft, this blood vessel above and below the narrowed or blocked section of your coronary artery.    Recovery from Cardiac Surgery  You will feel tired and sore for the first few weeks after surgery. You may have some brief, sharp pains on either side of your chest. Your chest, shoulders, and upper back may ache. These symptoms usually get better after 4 to 8 weeks. The incision in your chest may be sore or swollen. If you had a CABG, the area where the healthy blood vessel was taken may be sore or swollen as well.  You will probably be able to do many of your usual activities after 8 weeks. But for at least 8 weeks, avoid lifting heavy objects and doing activities that strain your chest or upper arm muscles. At first you may notice that you get tired easily and need to rest often. It may take 1 to 2 months to get your energy back.  Some people find that they are more emotional after this surgery. You may cry easily or show emotion in ways that are unusual for you. This is common and may last for up to a year. Some people get depressed after

## 2024-12-17 NOTE — PROGRESS NOTES
Nephrology Progress Note        [facility] [address] [place] [address]  Phone: [phone]  Office Hours: 8:30AM - 4:30PM  Monday - Friday 12/17/2024 7:27 AM  Subjective:   Admit Date: 12/11/2024  PCP: Raza Fairchild DO  Interval History:   On room air  No dyspnea  No leg edema  Had HD yesterday    Diet: ADULT DIET; Regular; 4 carb choices (60 gm/meal); Low Fat/Low Chol/High Fiber/2 gm Na  ADULT ORAL NUTRITION SUPPLEMENT; Breakfast, Lunch, Dinner; Renal Oral Supplement      Data:   Scheduled Meds:   furosemide  80 mg Oral BID    famotidine  20 mg Oral Daily    Or    famotidine (PEPCID) injection  20 mg IntraVENous Daily    clopidogrel  75 mg Oral Daily    insulin lispro  0-16 Units SubCUTAneous 4x Daily AC & HS    epoetin sydnee-epbx  10,000 Units SubCUTAneous Once per day on Monday Wednesday Friday    metoprolol tartrate  25 mg Oral BID    guaiFENesin  1,200 mg Oral BID    gabapentin  100 mg Oral BID    sodium chloride flush  5-40 mL IntraVENous 2 times per day    aspirin  81 mg Oral Daily    chlorhexidine  15 mL Mouth/Throat BID    multivitamin  1 tablet Oral Daily with breakfast    polyethylene glycol  17 g Oral Daily    sennosides-docusate sodium  1 tablet Oral BID    atorvastatin  40 mg Oral Nightly    [Held by provider] heparin (porcine)  5,000 Units SubCUTAneous 3 times per day    bacitracin   Topical BID    acetaminophen  1,000 mg Oral 4 times per day     Continuous Infusions:   sodium chloride 50 mL/hr at 12/12/24 0600    sodium chloride      phenylephrine Stopped (12/12/24 1745)    dextrose      EPINEPHrine Stopped (12/13/24 0240)    VASOpressin Stopped (12/13/24 0055)     PRN Meds:ipratropium 0.5 mg-albuterol 2.5 mg, sodium chloride nebulizer, hydrALAZINE, traMADol **OR** traMADol, calcium chloride, methocarbamol, albumin human 5%, traZODone, sodium chloride flush, sodium chloride, ondansetron **OR** ondansetron, bisacodyl, calcium chloride 1,000 mg in sodium chloride 0.9 %

## 2024-12-17 NOTE — PLAN OF CARE
Problem: Discharge Planning  Goal: Discharge to home or other facility with appropriate resources  12/17/2024 1203 by Daren Gutierres RN  Outcome: Completed  12/17/2024 0338 by Anahy Alatorre RN  Outcome: Progressing     Problem: Pain  Goal: Verbalizes/displays adequate comfort level or baseline comfort level  12/17/2024 1203 by Daren Gutierres RN  Outcome: Completed  12/17/2024 0338 by Anahy Alatorre RN  Outcome: Progressing     Problem: Chronic Conditions and Co-morbidities  Goal: Patient's chronic conditions and co-morbidity symptoms are monitored and maintained or improved  12/17/2024 1203 by Daren Gutierres RN  Outcome: Completed  12/17/2024 0338 by Anahy Alatorre RN  Outcome: Progressing     Problem: Safety - Adult  Goal: Free from fall injury  12/17/2024 1203 by Daren Gutierres RN  Outcome: Completed  12/17/2024 0338 by Anahy Alatorre RN  Outcome: Progressing

## 2024-12-17 NOTE — PROGRESS NOTES
Outpatient Pharmacy Progress Note for Meds-to-Beds    Total number of Prescriptions Filled: 2    Additional Documentation:  Patient picked-up the medication(s) in the OP Pharmacy  Patient declined mucinex      Thank you for letting us serve your patients.  [address] [address]    Phone: [phone]    Fax: [phone]

## 2024-12-18 LAB
BLOOD BANK BLOOD PRODUCT EXPIRATION DATE: NORMAL
BLOOD BANK DISPENSE STATUS: NORMAL
BLOOD BANK ISBT PRODUCT BLOOD TYPE: 5100
BLOOD BANK PRODUCT CODE: NORMAL
BLOOD BANK UNIT TYPE AND RH: NORMAL
BPU ID: NORMAL
COMPONENT: NORMAL
TRANSFUSION STATUS: NORMAL
UNIT DIVISION: 0
UNIT ISSUE DATE/TIME: NORMAL

## 2024-12-19 ENCOUNTER — HOSPITAL ENCOUNTER (OUTPATIENT)
Age: 54
Setting detail: SPECIMEN
Discharge: HOME | End: 2024-12-19

## 2024-12-19 LAB
HGB BLD-MCNC: 8.4 G/DL (ref 13.5–18)
PF4 HEPARIN CMPLX IGG SERPL IA: 1.27 O.D. (ref 0–0.4)

## 2024-12-19 PROCEDURE — 85018 HEMOGLOBIN: CPT

## 2024-12-20 LAB
ACTIVATED CLOTTING TIME, HIGH RANGE: 101 SEC (ref 81–125)
ACTIVATED CLOTTING TIME, HIGH RANGE: 107 SEC (ref 81–125)
ACTIVATED CLOTTING TIME, HIGH RANGE: 539 SEC (ref 81–125)
ACTIVATED CLOTTING TIME, HIGH RANGE: 576 SEC (ref 81–125)
ACTIVATED CLOTTING TIME, HIGH RANGE: 618 SEC (ref 81–125)
ACTIVATED CLOTTING TIME, HIGH RANGE: 646 SEC (ref 81–125)

## 2024-12-23 ENCOUNTER — HOSPITAL ENCOUNTER (OUTPATIENT)
Age: 54
Setting detail: SPECIMEN
Discharge: HOME | End: 2024-12-23

## 2024-12-23 LAB
ANION GAP SERPL CALCULATED.3IONS-SCNC: 15 MMOL/L (ref 9–17)
BUN SERPL-MCNC: 59 MG/DL (ref 7–20)
CALCIUM SERPL-MCNC: 8.7 MG/DL (ref 8.3–10.6)
CHLORIDE SERPL-SCNC: 98 MMOL/L (ref 99–110)
CO2 SERPL-SCNC: 26 MMOL/L (ref 21–32)
CREAT SERPL-MCNC: 4.8 MG/DL (ref 0.9–1.3)
GFR, ESTIMATED: 13 ML/MIN/1.73M2
GLUCOSE SERPL-MCNC: 146 MG/DL (ref 74–99)
HGB BLD-MCNC: 9.2 G/DL (ref 13.5–18)
POTASSIUM SERPL-SCNC: 4.5 MMOL/L (ref 3.5–5.1)
SODIUM SERPL-SCNC: 138 MMOL/L (ref 136–145)

## 2024-12-23 PROCEDURE — 85018 HEMOGLOBIN: CPT

## 2024-12-23 PROCEDURE — 80048 BASIC METABOLIC PNL TOTAL CA: CPT

## 2024-12-30 ENCOUNTER — HOSPITAL ENCOUNTER (OUTPATIENT)
Age: 54
Discharge: HOME | End: 2024-12-30
Payer: MEDICARE

## 2024-12-30 ENCOUNTER — OFFICE VISIT (OUTPATIENT)
Dept: CARDIOTHORACIC SURGERY | Age: 54
End: 2024-12-30

## 2024-12-30 ENCOUNTER — TELEPHONE (OUTPATIENT)
Dept: CARDIOTHORACIC SURGERY | Age: 54
End: 2024-12-30

## 2024-12-30 ENCOUNTER — HOSPITAL ENCOUNTER (OUTPATIENT)
Age: 54
End: 2024-12-30
Payer: MEDICARE

## 2024-12-30 ENCOUNTER — HOSPITAL ENCOUNTER (OUTPATIENT)
Dept: GENERAL RADIOLOGY | Age: 54
Discharge: HOME | End: 2024-12-30
Payer: MEDICARE

## 2024-12-30 VITALS
DIASTOLIC BLOOD PRESSURE: 68 MMHG | SYSTOLIC BLOOD PRESSURE: 116 MMHG | HEART RATE: 61 BPM | HEIGHT: 72 IN | WEIGHT: 206.25 LBS | OXYGEN SATURATION: 99 % | BODY MASS INDEX: 27.93 KG/M2

## 2024-12-30 DIAGNOSIS — Z95.1 POSTSURGICAL AORTOCORONARY BYPASS STATUS: ICD-10-CM

## 2024-12-30 DIAGNOSIS — R06.02 SOB (SHORTNESS OF BREATH): ICD-10-CM

## 2024-12-30 DIAGNOSIS — I25.10 CORONARY ARTERY DISEASE INVOLVING NATIVE CORONARY ARTERY OF NATIVE HEART WITHOUT ANGINA PECTORIS: Primary | ICD-10-CM

## 2024-12-30 DIAGNOSIS — Z09 POSTOPERATIVE FOLLOW-UP: Primary | ICD-10-CM

## 2024-12-30 DIAGNOSIS — I25.10 CORONARY ARTERY DISEASE INVOLVING NATIVE CORONARY ARTERY OF NATIVE HEART WITHOUT ANGINA PECTORIS: ICD-10-CM

## 2024-12-30 DIAGNOSIS — Z03.89 CORONARY ARTERY DISEASE (CAD) EXCLUDED: ICD-10-CM

## 2024-12-30 LAB
ALBUMIN SERPL-MCNC: 4 G/DL (ref 3.4–5)
ALBUMIN/GLOB SERPL: 2 {RATIO} (ref 1.1–2.2)
ALP SERPL-CCNC: 88 U/L (ref 40–129)
ALT SERPL-CCNC: 29 U/L (ref 10–40)
ANION GAP SERPL CALCULATED.3IONS-SCNC: 14 MMOL/L (ref 9–17)
AST SERPL-CCNC: 28 U/L (ref 15–37)
BASOPHILS # BLD: 0.08 K/UL
BASOPHILS NFR BLD: 1 % (ref 0–1)
BILIRUB SERPL-MCNC: 0.4 MG/DL (ref 0–1)
BUN SERPL-MCNC: 37 MG/DL (ref 7–20)
CALCIUM SERPL-MCNC: 9.1 MG/DL (ref 8.3–10.6)
CHLORIDE SERPL-SCNC: 99 MMOL/L (ref 99–110)
CO2 SERPL-SCNC: 25 MMOL/L (ref 21–32)
CREAT SERPL-MCNC: 4.3 MG/DL (ref 0.9–1.3)
EOSINOPHIL # BLD: 0.21 K/UL
EOSINOPHILS RELATIVE PERCENT: 3 % (ref 0–3)
ERYTHROCYTE [DISTWIDTH] IN BLOOD BY AUTOMATED COUNT: 15.1 % (ref 11.7–14.9)
GFR, ESTIMATED: 14 ML/MIN/1.73M2
GLUCOSE SERPL-MCNC: 158 MG/DL (ref 74–99)
HCT VFR BLD AUTO: 32.3 % (ref 42–52)
HGB BLD-MCNC: 10.2 G/DL (ref 13.5–18)
IMM GRANULOCYTES # BLD AUTO: 0.04 K/UL
IMM GRANULOCYTES NFR BLD: 1 %
LYMPHOCYTES NFR BLD: 0.95 K/UL
LYMPHOCYTES RELATIVE PERCENT: 15 % (ref 24–44)
MCH RBC QN AUTO: 29.9 PG (ref 27–31)
MCHC RBC AUTO-ENTMCNC: 31.6 G/DL (ref 32–36)
MCV RBC AUTO: 94.7 FL (ref 78–100)
MONOCYTES NFR BLD: 0.46 K/UL
MONOCYTES NFR BLD: 7 % (ref 0–4)
NEUTROPHILS NFR BLD: 72 % (ref 36–66)
NEUTS SEG NFR BLD: 4.44 K/UL
PLATELET # BLD AUTO: 327 K/UL (ref 140–440)
PMV BLD AUTO: 10.7 FL (ref 7.5–11.1)
POTASSIUM SERPL-SCNC: 4.4 MMOL/L (ref 3.5–5.1)
PROT SERPL-MCNC: 6 G/DL (ref 6.4–8.2)
RBC # BLD AUTO: 3.41 M/UL (ref 4.6–6.2)
SODIUM SERPL-SCNC: 138 MMOL/L (ref 136–145)
WBC OTHER # BLD: 6.2 K/UL (ref 4–10.5)

## 2024-12-30 PROCEDURE — 71046 X-RAY EXAM CHEST 2 VIEWS: CPT

## 2024-12-30 PROCEDURE — 85025 COMPLETE CBC W/AUTO DIFF WBC: CPT

## 2024-12-30 PROCEDURE — 99024 POSTOP FOLLOW-UP VISIT: CPT | Performed by: THORACIC SURGERY (CARDIOTHORACIC VASCULAR SURGERY)

## 2024-12-30 PROCEDURE — 80053 COMPREHEN METABOLIC PANEL: CPT

## 2024-12-30 NOTE — PROGRESS NOTES
Cardiothoracic Surgery   Postoperative Follow Up    Cardiologist:  Dr Yan    Procedure:   CORONARY ARTERY BYPASS GRAFT X4, LIMA - LAD, SVG - RAMUS, SVG -PDA, SVG - OM2; LEFT ENDOSCOPIC GREATER SAPHENOUS VEIN HARVEST; INTRAOPERATIVE TRANSESOPHAGEAL ECHOCARDIOGRAM on 12/11/24          HISTORY OF PRESENT ILLNESS       Landry Morley is a 54 y.o. male who presents today for a postoperative follow up appointment.     Pt is doing well, continues to improve, increasing activity level as tolerated - no SOB or CP noted.     OBJECTIVE   VITALS:  /68 (Site: Left Upper Arm, Position: Sitting, Cuff Size: Large Adult)   Pulse 61   Ht 1.829 m (6' 0.01\")   Wt 93.6 kg (206 lb 4 oz)   SpO2 99%   BMI 27.97 kg/m²      Physical Exam:  Gen: alert, well appearing, and in no distress, oriented to person, place, and time, and normal appearing weight  Chest:  Sternotomy site well approximated, clean and dry.  No signs of erythema, swelling, or drainage. Chest tube insertion sites clean and dry.  No signs of infection.  Lung: clear to auscultation, no wheezes or rales, and unlabored breathing  Heart: S1 and S2 normal, no murmur, click, gallop or rub  Extremities: peripheral pulses normal, no pedal edema, no clubbing or cyanosis     Radiological and other results:  CXR: no pneumothorax or effusion        Assessment:     S/p  CORONARY ARTERY BYPASS GRAFT X4, LIMA - LAD, SVG - RAMUS, SVG -PDA, SVG - OM2; LEFT ENDOSCOPIC GREATER SAPHENOUS VEIN HARVEST; INTRAOPERATIVE TRANSESOPHAGEAL ECHOCARDIOGRAM on 12/11/24            Plan     Follow up with Cardiology on 2/6/25 as scheduled or sooner if possible  recommend cardiac rehab  RTO PRN

## 2024-12-30 NOTE — TELEPHONE ENCOUNTER
[facility] calling stating they need a new order placed for a CXR, CBC & CMP as the CAD diagnosis will not be accepted by insurance. New CXR, CBC & CMP order placed.

## 2025-01-04 NOTE — PROGRESS NOTES
Physician Progress Note      PATIENT:               SHAMAR LEE  [facility] #:                  [id number]  :                       1970  ADMIT DATE:       2024 5:38 AM  DISCH DATE:        2024 11:50 AM  RESPONDING  PROVIDER #:        Shayan Kapadia MD          QUERY TEXT:    Pt admitted with CAD with CABG and has anemia documented. If possible, please   document in progress notes and discharge summary further specificity regarding   the acuity and type of anemia:    The medical record reflects the following:  Risk Factors: 54 y.o male with pMHx of CHF, CAD, ESRD, and HTN.  Clinical Indicators:  - H+H - 11.3/34.3 >  - H+H - 7.6/23.0.  -   Cards - Anemic with a hemoglobin of 9.7 probably secondary to chronic kidney   disease.  Treatment: blood transfusion, serial CBC.  Options provided:  -- This patient has acute blood loss anemia.  -- Other - I will add my own diagnosis  -- Disagree - Not applicable / Not valid  -- Disagree - Clinically unable to determine / Unknown  -- Refer to Clinical Documentation Reviewer    PROVIDER RESPONSE TEXT:    Anemia was secondary to his chronic kidney disease and recent cardiac surgery    Query created by: Tommy Keller on 2024 10:24 AM      Electronically signed by:  Shayan Kapadia MD 2025 10:35 AM

## 2025-01-27 ENCOUNTER — OFFICE VISIT (OUTPATIENT)
Dept: FAMILY MEDICINE CLINIC | Age: 55
End: 2025-01-27
Payer: MEDICARE

## 2025-01-27 VITALS
BODY MASS INDEX: 28.2 KG/M2 | DIASTOLIC BLOOD PRESSURE: 66 MMHG | HEART RATE: 78 BPM | TEMPERATURE: 100.1 F | SYSTOLIC BLOOD PRESSURE: 108 MMHG | WEIGHT: 208 LBS | OXYGEN SATURATION: 93 %

## 2025-01-27 DIAGNOSIS — J40 BRONCHITIS: ICD-10-CM

## 2025-01-27 DIAGNOSIS — A08.4 VIRAL GASTROENTERITIS: Primary | ICD-10-CM

## 2025-01-27 LAB
INFLUENZA A ANTIGEN, POC: NEGATIVE
INFLUENZA B ANTIGEN, POC: NEGATIVE
LOT EXPIRE DATE: NORMAL
LOT KIT NUMBER: NORMAL
SARS-COV-2, POC: NORMAL
VALID INTERNAL CONTROL: NORMAL
VENDOR AND KIT NAME POC: NORMAL

## 2025-01-27 PROCEDURE — 87428 SARSCOV & INF VIR A&B AG IA: CPT | Performed by: NURSE PRACTITIONER

## 2025-01-27 PROCEDURE — 3017F COLORECTAL CA SCREEN DOC REV: CPT | Performed by: NURSE PRACTITIONER

## 2025-01-27 PROCEDURE — 3078F DIAST BP <80 MM HG: CPT | Performed by: NURSE PRACTITIONER

## 2025-01-27 PROCEDURE — 1036F TOBACCO NON-USER: CPT | Performed by: NURSE PRACTITIONER

## 2025-01-27 PROCEDURE — 99213 OFFICE O/P EST LOW 20 MIN: CPT | Performed by: NURSE PRACTITIONER

## 2025-01-27 PROCEDURE — G8427 DOCREV CUR MEDS BY ELIG CLIN: HCPCS | Performed by: NURSE PRACTITIONER

## 2025-01-27 PROCEDURE — G8417 CALC BMI ABV UP PARAM F/U: HCPCS | Performed by: NURSE PRACTITIONER

## 2025-01-27 PROCEDURE — 3074F SYST BP LT 130 MM HG: CPT | Performed by: NURSE PRACTITIONER

## 2025-01-27 RX ORDER — BENZONATATE 100 MG/1
100 CAPSULE ORAL 3 TIMES DAILY PRN
Qty: 20 CAPSULE | Refills: 0 | Status: SHIPPED | OUTPATIENT
Start: 2025-01-27

## 2025-01-27 RX ORDER — AMOXICILLIN AND CLAVULANATE POTASSIUM 500; 125 MG/1; MG/1
1 TABLET, FILM COATED ORAL 2 TIMES DAILY
COMMUNITY
Start: 2025-01-21 | End: 2025-01-28

## 2025-01-27 RX ORDER — ONDANSETRON 4 MG/1
4 TABLET, ORALLY DISINTEGRATING ORAL EVERY 8 HOURS PRN
Qty: 15 TABLET | Refills: 0 | Status: SHIPPED | OUTPATIENT
Start: 2025-01-27

## 2025-01-27 NOTE — PROGRESS NOTES
Landry Morley   54 y.o.  male  [id number]      Chief Complaint   Patient presents with    Cold Symptoms     Pt stated is fever- vomiting- coughing- diarrhea- body aches- ongoing for about 2 days         Subjective:  54 y.o.male is here for a follow up. He has the following chronic/acute medical problems:  Patient Active Problem List   Diagnosis    Chest pain    Type 2 diabetes mellitus with circulatory disorder, without long-term current use of insulin (HCC)    History of myocardial infarction    History of PTCA    H/O echocardiogram    Precordial pain    Coronary artery disease involving native coronary artery of native heart without angina pectoris    Mixed hyperlipidemia    Essential hypertension    Family history of coronary artery disease    Acute renal failure (HCC)    Persistent proteinuria    Diabetic nephropathy associated with type 2 diabetes mellitus (HCC)    Metabolic syndrome    Renal agenesis    Erectile disorder due to medical condition in male patient    Hypertension, secondary    Class 2 severe obesity due to excess calories with serious comorbidity and body mass index (BMI) of 39.0 to 39.9 in adult    Spinal stenosis of lumbar region    Constipation    Hyperpotassemia    Chronic kidney disease, stage V ([facility])    Nephrotic syndrome    Anxiety associated with depression    CAD (coronary artery disease)    Acute respiratory failure    NSTEMI (non-ST elevated myocardial infarction) ([facility])    Pneumonia of right lower lobe due to infectious organism    Coronary atherosclerosis of native coronary artery    Coronary artery disease (CAD) excluded       HPI   History of Present Illness  The patient presents for evaluation of cold-like symptoms.    He has been experiencing a constellation of symptoms, including diminished appetite, chills, fever, fatigue, cough, congestion, postnasal drainage, rhinorrhea, sinus pain and pressure, diarrhea, vomiting, body aches, and headaches. He reports no ear pain, sneezing,

## 2025-01-29 ENCOUNTER — TELEPHONE (OUTPATIENT)
Dept: CARDIOLOGY CLINIC | Age: 55
End: 2025-01-29

## 2025-01-29 NOTE — PROGRESS NOTES
Patient Name: Landry Morley  : 1970  MRN# [id number]    REASON FOR VISIT: 3 month  Patient Active Problem List    Diagnosis Date Noted    Precordial pain     Coronary artery disease involving native coronary artery of native heart without angina pectoris     Mixed hyperlipidemia     Essential hypertension     Hyperpotassemia 2023    Coronary artery disease (CAD) excluded 2024    Coronary atherosclerosis of native coronary artery 2024    Acute respiratory failure 2024    NSTEMI (non-ST elevated myocardial infarction) (HCC) 2024    Pneumonia of right lower lobe due to infectious organism 2024    CAD (coronary artery disease) 10/16/2024    Anxiety associated with depression 2024    Chronic kidney disease, stage V (HCC) 2024    Nephrotic syndrome 2024    Class 2 severe obesity due to excess calories with serious comorbidity and body mass index (BMI) of 39.0 to 39.9 in adult 06/15/2021    Spinal stenosis of lumbar region 06/15/2021    Constipation 06/15/2021    Acute renal failure (HCC) 2020    Persistent proteinuria 2020    Diabetic nephropathy associated with type 2 diabetes mellitus (HCC) 2020    Metabolic syndrome 2020    Renal agenesis 2020    Erectile disorder due to medical condition in male patient 2020    Hypertension, secondary 2020    Family history of coronary artery disease     H/O echocardiogram 04/15/2015    Type 2 diabetes mellitus with circulatory disorder, without long-term current use of insulin (HCC)     Chest pain 2012    History of myocardial infarction 2006    History of PTCA 2006     CURRENT SX:     Chest Pain :    When did it begin:    How long does it last:    How severe 1-10:    Radiation:    Aggravating factors:    Relieving factors:    Associated features:    Tenderness to palp:    Shortness of Breath:    When did it start:    How many flights of stairs can they climb

## 2025-01-30 ENCOUNTER — OFFICE VISIT (OUTPATIENT)
Dept: CARDIOLOGY CLINIC | Age: 55
End: 2025-01-30
Payer: MEDICARE

## 2025-01-30 VITALS
BODY MASS INDEX: 28.25 KG/M2 | SYSTOLIC BLOOD PRESSURE: 102 MMHG | WEIGHT: 208.6 LBS | HEART RATE: 64 BPM | DIASTOLIC BLOOD PRESSURE: 68 MMHG | HEIGHT: 72 IN

## 2025-01-30 DIAGNOSIS — E78.2 MIXED HYPERLIPIDEMIA: ICD-10-CM

## 2025-01-30 DIAGNOSIS — I21.4 NSTEMI (NON-ST ELEVATED MYOCARDIAL INFARCTION) (HCC): ICD-10-CM

## 2025-01-30 DIAGNOSIS — Z98.61 HISTORY OF PTCA: ICD-10-CM

## 2025-01-30 DIAGNOSIS — I10 ESSENTIAL HYPERTENSION: ICD-10-CM

## 2025-01-30 DIAGNOSIS — I25.10 CORONARY ARTERY DISEASE INVOLVING NATIVE CORONARY ARTERY OF NATIVE HEART WITHOUT ANGINA PECTORIS: Primary | ICD-10-CM

## 2025-01-30 DIAGNOSIS — Z95.1 S/P CABG X 4: ICD-10-CM

## 2025-01-30 DIAGNOSIS — I25.2 HISTORY OF MYOCARDIAL INFARCTION: ICD-10-CM

## 2025-01-30 DIAGNOSIS — N18.5 CHRONIC KIDNEY DISEASE, STAGE V (HCC): ICD-10-CM

## 2025-01-30 DIAGNOSIS — E11.59 TYPE 2 DIABETES MELLITUS WITH OTHER CIRCULATORY COMPLICATION, WITHOUT LONG-TERM CURRENT USE OF INSULIN (HCC): ICD-10-CM

## 2025-01-30 PROBLEM — Z03.89 CORONARY ARTERY DISEASE (CAD) EXCLUDED: Status: RESOLVED | Noted: 2024-12-11 | Resolved: 2025-01-30

## 2025-01-30 PROCEDURE — 3078F DIAST BP <80 MM HG: CPT | Performed by: INTERNAL MEDICINE

## 2025-01-30 PROCEDURE — 3046F HEMOGLOBIN A1C LEVEL >9.0%: CPT | Performed by: INTERNAL MEDICINE

## 2025-01-30 PROCEDURE — 99214 OFFICE O/P EST MOD 30 MIN: CPT | Performed by: INTERNAL MEDICINE

## 2025-01-30 PROCEDURE — 1036F TOBACCO NON-USER: CPT | Performed by: INTERNAL MEDICINE

## 2025-01-30 PROCEDURE — 3074F SYST BP LT 130 MM HG: CPT | Performed by: INTERNAL MEDICINE

## 2025-01-30 PROCEDURE — G8427 DOCREV CUR MEDS BY ELIG CLIN: HCPCS | Performed by: INTERNAL MEDICINE

## 2025-01-30 PROCEDURE — 2022F DILAT RTA XM EVC RTNOPTHY: CPT | Performed by: INTERNAL MEDICINE

## 2025-01-30 PROCEDURE — G8417 CALC BMI ABV UP PARAM F/U: HCPCS | Performed by: INTERNAL MEDICINE

## 2025-01-30 PROCEDURE — 3017F COLORECTAL CA SCREEN DOC REV: CPT | Performed by: INTERNAL MEDICINE

## 2025-01-30 RX ORDER — METOPROLOL TARTRATE 25 MG/1
25 TABLET, FILM COATED ORAL 2 TIMES DAILY
Qty: 60 TABLET | Refills: 3 | Status: CANCELLED | OUTPATIENT
Start: 2025-01-30

## 2025-01-30 RX ORDER — METOPROLOL TARTRATE 25 MG/1
12.5 TABLET, FILM COATED ORAL 2 TIMES DAILY
Qty: 30 TABLET | Refills: 5 | Status: SHIPPED | OUTPATIENT
Start: 2025-01-30

## 2025-01-30 NOTE — PATIENT INSTRUCTIONS
CORONARY ARTERY DISEASE:Yes  clinically stable. Patient is on optimal medical regimen ( see medication list above )  Patient is currently  asymptomatic from CAD.   -changes in  treatment:   no. Patient is being treated with ASA, Plavix & Metoprolol.  Counseled regarding regular exercise & its benefits.  -Testing ordered:  yes, Left heart with coronary angiography but no Left ventriculography.  Surinamese classification: 1      History of PTCA 4/2006     STENT TO RCA    History of PTCA 7/12     STENT TO LAD, RCA       History of cardiac catheterization 03/01/2017     Stenting of the LAD         STRESS TEST:3/21/2017   Good Exercise capacity    Hypertensive BP response to exercise.    ETT non diagnostic as THR is not achieved    No Ischemic changes on EKG.    Increase Diovan dose to 320 mg daily.    Patient does not want to go to Rehab     11/19/2024 Cath  The left main coronary artery has no significant disease.   Left anterior descending artery has 99% stenosis prior to mid LAD stent  Circumflex artery has no 99% proximal segment stenosis  Large ramus has 85% proximal segment stenosis  The right coronary artery is a dominant vessel with 95% PLV stenosis, mid RCA 50% stenosis    Impression:  Severe multivessel disease, DM, depressed LVEF, non compliant patient      12/11/2024 S/p CORONARY ARTERY BYPASS GRAFT X4, LIMA - LAD, SVG - RAMUS, SVG -PDA, SVG - OM2;      HYPERTENSION:Yes  well controlled on current medical regimen.  - changes in  treatment: no. Patient is on amlodipine and metoprolol.  Counseled regarding low salt diet, exercise & weight control.     VALVULAR HEART DISEASE:           11/25/2024    Left Ventricle: The EF by visual approximation is 40 - 45%. Mid and apical septal walls are hypokinetic.    Mitral Valve: Moderate regurgitation.    Pulmonic Valve: Trace regurgitation.    Pericardium: No pericardial effusion.    TE Echo 11/27/2024       Left Ventricle: Reduced left ventricular systolic function with

## 2025-01-30 NOTE — PROGRESS NOTES
OFFICE PROGRESS NOTES      Landry is a 54 y.o. male who has    CHIEF COMPLAINT AS FOLLOWS:  CHEST PAIN: Patient denies any C/O chest pains at this time.      SOB: Has SOB with exertion but better than soon after the surgery.   LEG EDEMA: No leg edema   PALPITATIONS: Denies any C/O Palpitations   DIZZINESS: No C/O Dizziness   SYNCOPE: None   OTHER/ ADDITIONAL COMPLAINTS:                                     HPI: Patient is here for F/U on his CAD,  HTN & Dyslipidemia problems.   CAD: Patient has known CAD. Had angioplasty / CABG, both in the past.  HTN: Patient has known essential HTN. Has been treated with guideline recommended medical / physical/ diet therapy as stated below.  Dyslipidemia: Patient has known mixed dyslipidemia. Has been treated with guideline recommended medical / physical/ diet therapy as stated below.                Current Outpatient Medications   Medication Sig Dispense Refill    gabapentin (NEURONTIN) 100 MG capsule Take 1 capsule by mouth 2 times daily for 90 doses. 90 capsule 0    metoprolol tartrate (LOPRESSOR) 25 MG tablet Take 1 tablet by mouth 2 times daily 60 tablet 3    furosemide (LASIX) 80 MG tablet Take 1 tablet by mouth in the morning and 1 tablet in the evening. 60 tablet 1    atorvastatin (LIPITOR) 40 MG tablet Take 1 tablet by mouth daily 90 tablet 1    Omega-3 Krill Oil 300 MG CAPS Take 1 capsule by mouth daily      clopidogrel (PLAVIX) 75 MG tablet Take 1 tablet by mouth daily 90 tablet 3    aspirin 81 MG EC tablet Take 1 tablet by mouth daily      vitamin D (CHOLECALCIFEROL) 25 MCG (1000 UT) TABS tablet Take 1 tablet by mouth daily OTC      polyethylene glycol (MIRALAX MIX-IN PAX) 17 g packet Take 1 packet by mouth daily as needed for Constipation 527 g 5    Insulin Pen Needle (TRUEPLUS 5-BEVEL PEN NEEDLES) 31G X 5 MM MISC Use one needle two times daily as directed on package. 100 each 5    Lancets Thin MISC 1 actuation by Does not apply route daily 200 each 1    Blood

## 2025-02-05 ENCOUNTER — TELEPHONE (OUTPATIENT)
Dept: CARDIOLOGY CLINIC | Age: 55
End: 2025-02-05

## 2025-02-17 ENCOUNTER — OFFICE VISIT (OUTPATIENT)
Dept: FAMILY MEDICINE CLINIC | Age: 55
End: 2025-02-17

## 2025-02-17 VITALS
SYSTOLIC BLOOD PRESSURE: 108 MMHG | OXYGEN SATURATION: 99 % | BODY MASS INDEX: 28.86 KG/M2 | WEIGHT: 213.1 LBS | HEART RATE: 62 BPM | DIASTOLIC BLOOD PRESSURE: 58 MMHG | HEIGHT: 72 IN

## 2025-02-17 DIAGNOSIS — J96.00 ACUTE RESPIRATORY FAILURE, UNSPECIFIED WHETHER WITH HYPOXIA OR HYPERCAPNIA (HCC): ICD-10-CM

## 2025-02-17 DIAGNOSIS — I50.22 CHRONIC SYSTOLIC CONGESTIVE HEART FAILURE (HCC): ICD-10-CM

## 2025-02-17 DIAGNOSIS — Z00.00 MEDICARE ANNUAL WELLNESS VISIT, SUBSEQUENT: Primary | ICD-10-CM

## 2025-02-17 DIAGNOSIS — I10 ESSENTIAL HYPERTENSION: ICD-10-CM

## 2025-02-17 RX ORDER — FUROSEMIDE 80 MG/1
80 TABLET ORAL 2 TIMES DAILY
Qty: 60 TABLET | Refills: 1 | Status: SHIPPED | OUTPATIENT
Start: 2025-02-17

## 2025-02-17 ASSESSMENT — PATIENT HEALTH QUESTIONNAIRE - PHQ9
5. POOR APPETITE OR OVEREATING: SEVERAL DAYS
1. LITTLE INTEREST OR PLEASURE IN DOING THINGS: NOT AT ALL
2. FEELING DOWN, DEPRESSED OR HOPELESS: NOT AT ALL
9. THOUGHTS THAT YOU WOULD BE BETTER OFF DEAD, OR OF HURTING YOURSELF: NOT AT ALL
3. TROUBLE FALLING OR STAYING ASLEEP: NOT AT ALL
SUM OF ALL RESPONSES TO PHQ QUESTIONS 1-9: 4
SUM OF ALL RESPONSES TO PHQ QUESTIONS 1-9: 4
6. FEELING BAD ABOUT YOURSELF - OR THAT YOU ARE A FAILURE OR HAVE LET YOURSELF OR YOUR FAMILY DOWN: NOT AT ALL
SUM OF ALL RESPONSES TO PHQ9 QUESTIONS 1 & 2: 0
8. MOVING OR SPEAKING SO SLOWLY THAT OTHER PEOPLE COULD HAVE NOTICED. OR THE OPPOSITE, BEING SO FIGETY OR RESTLESS THAT YOU HAVE BEEN MOVING AROUND A LOT MORE THAN USUAL: NOT AT ALL
4. FEELING TIRED OR HAVING LITTLE ENERGY: NEARLY EVERY DAY
10. IF YOU CHECKED OFF ANY PROBLEMS, HOW DIFFICULT HAVE THESE PROBLEMS MADE IT FOR YOU TO DO YOUR WORK, TAKE CARE OF THINGS AT HOME, OR GET ALONG WITH OTHER PEOPLE: NOT DIFFICULT AT ALL
SUM OF ALL RESPONSES TO PHQ QUESTIONS 1-9: 4
7. TROUBLE CONCENTRATING ON THINGS, SUCH AS READING THE NEWSPAPER OR WATCHING TELEVISION: NOT AT ALL
SUM OF ALL RESPONSES TO PHQ QUESTIONS 1-9: 4

## 2025-02-17 ASSESSMENT — LIFESTYLE VARIABLES
HOW OFTEN DO YOU HAVE A DRINK CONTAINING ALCOHOL: NEVER
HOW MANY STANDARD DRINKS CONTAINING ALCOHOL DO YOU HAVE ON A TYPICAL DAY: PATIENT DOES NOT DRINK

## 2025-02-17 NOTE — PROGRESS NOTES
Medicare Annual Wellness Visit    Landry Morley is here for Medicare AWV    Assessment & Plan   Medicare annual wellness visit, subsequent  Essential hypertension  -     furosemide (LASIX) 80 MG tablet; Take 1 tablet by mouth in the morning and 1 tablet in the evening., Disp-60 tablet, R-1Normal  Chronic systolic congestive heart failure (HCC)  Acute respiratory failure, unspecified whether with hypoxia or hypercapnia (HCC)       Return in about 6 months (around 8/17/2025).     Subjective       Patient's complete Health Risk Assessment and screening values have been reviewed and are found in Flowsheets. The following problems were reviewed today and where indicated follow up appointments were made and/or referrals ordered.    Positive Risk Factor Screenings with Interventions:            Self-assessment of health:  In general, how would you say your health is?: (!) Poor    Interventions:  Patient advised to follow-up in this office for further evaluation and treatment           Safety:  Do you always fasten your seatbelt when you are in a car?: (!) No  Interventions:  Patient advised to follow up in the office for further evaluation and treatment     Advanced Directives:  Do you have a Living Will?: (!) No    Intervention:  has NO advanced directive - information provided         Lung Cancer Screening:                    Objective   Vitals:    02/17/25 1523   BP: (!) 108/58   Site: Left Upper Arm   Position: Sitting   Cuff Size: Medium Adult   Pulse: 62   SpO2: 99%   Weight: 96.7 kg (213 lb 1.6 oz)   Height: 1.829 m (6')      Body mass index is 28.9 kg/m².                No Known Allergies  Prior to Visit Medications    Medication Sig Taking? Authorizing Provider   furosemide (LASIX) 80 MG tablet Take 1 tablet by mouth in the morning and 1 tablet in the evening. Yes Raza Fairchild R, DO   metoprolol tartrate (LOPRESSOR) 25 MG tablet Take 0.5 tablets by mouth 2 times daily Yes Bandar Yan MD traZODone 
STENT TO RCA    History of PTCA 2012    STENT TO LAD, RCA    Hyperlipidemia     Hypertension 2012    Hypertriglyceridemia     Sepsis (HCC) 2024    Type II or unspecified type diabetes mellitus without mention of complication, not stated as uncontrolled        FAMILY HISTORY  Family History   Problem Relation Age of Onset    High Blood Pressure Mother     High Cholesterol Mother     Heart Disease Father     Cancer Sister     Arthritis Maternal Grandmother     Cancer Maternal Grandfather        SOCIAL HISTORY  Social History     Socioeconomic History    Marital status:      Spouse name: None    Number of children: None    Years of education: None    Highest education level: None   Occupational History    Occupation:    Tobacco Use    Smoking status: Former     Current packs/day: 0.00     Average packs/day: 1 pack/day for 27.0 years (27.0 ttl pk-yrs)     Types: Cigarettes     Start date: 12/3/1989     Quit date: 12/3/2016     Years since quittin.2     Passive exposure: Past    Smokeless tobacco: Never    Tobacco comments:     Pt states he smoked for 30 years reviewed 10/16/24   Vaping Use    Vaping status: Never Used   Substance and Sexual Activity    Alcohol use: Not Currently     Comment: Caffiene: 1 cup coffee daily    Drug use: No    Sexual activity: Not Currently     Partners: Female     Social Determinants of Health     Financial Resource Strain: Low Risk  (3/14/2023)    Overall Financial Resource Strain (CARDIA)     Difficulty of Paying Living Expenses: Not hard at all   Food Insecurity: No Food Insecurity (2024)    Hunger Vital Sign     Worried About Running Out of Food in the Last Year: Never true     Ran Out of Food in the Last Year: Never true   Transportation Needs: No Transportation Needs (2024)    PRAPARE - Transportation     Lack of Transportation (Medical): No     Lack of Transportation (Non-Medical): No   Physical Activity: Insufficiently Active (2025)

## 2025-02-17 NOTE — PATIENT INSTRUCTIONS

## 2025-02-21 ASSESSMENT — ENCOUNTER SYMPTOMS
NAUSEA: 0
ABDOMINAL PAIN: 0
SHORTNESS OF BREATH: 0
SORE THROAT: 0
WHEEZING: 0

## 2025-04-03 ENCOUNTER — TELEPHONE (OUTPATIENT)
Dept: CARDIOLOGY CLINIC | Age: 55
End: 2025-04-03

## 2025-04-03 NOTE — TELEPHONE ENCOUNTER
Called pt to see if it would be possible to reschedule appt. Pt states he is not having symptoms but has something he would like to speak with Dr. Yan about and does not want to move his appt.

## 2025-04-14 NOTE — PROGRESS NOTES
Patient Name: Landry Morley  : 1970  MRN# [id number]    REASON FOR VISIT: Results of Testing  Patient Active Problem List    Diagnosis Date Noted    Precordial pain     Coronary artery disease involving native coronary artery of native heart without angina pectoris     Mixed hyperlipidemia     Essential hypertension     Hyperpotassemia 2023    Chronic systolic congestive heart failure (HCC) 2025    S/P CABG x 4 2025    Coronary atherosclerosis of native coronary artery 2024    Acute respiratory failure 2024    NSTEMI (non-ST elevated myocardial infarction) ([facility]) 2024    Pneumonia of right lower lobe due to infectious organism 2024    CAD (coronary artery disease) 10/16/2024    Anxiety associated with depression 2024    Chronic kidney disease, stage V ([facility]) 2024    Nephrotic syndrome 2024    Class 2 severe obesity due to excess calories with serious comorbidity and body mass index (BMI) of 39.0 to 39.9 in adult 06/15/2021    Spinal stenosis of lumbar region 06/15/2021    Constipation 06/15/2021    Acute renal failure 2020    Persistent proteinuria 2020    Diabetic nephropathy associated with type 2 diabetes mellitus 2020    Metabolic syndrome 2020    Renal agenesis 2020    Erectile disorder due to medical condition in male patient 2020    Hypertension, secondary 2020    Family history of coronary artery disease     H/O echocardiogram 04/15/2015    Type 2 diabetes mellitus with circulatory disorder, without long-term current use of insulin ([facility])     Chest pain 2012    History of myocardial infarction 2006    History of PTCA 2006     LABS:  Lab Results   Component Value Date    CHOL 113 2023    TRIG 82 2023    HDL 33 (L) 2023    LDLDIRECT 92 2021    TSH 2.18 2024     Hemoglobin A1C   Date Value Ref Range Status   2024 5.8 4.2 - 6.3 % Final

## 2025-04-21 ENCOUNTER — OFFICE VISIT (OUTPATIENT)
Dept: CARDIOLOGY CLINIC | Age: 55
End: 2025-04-21
Payer: MEDICARE

## 2025-04-21 VITALS
SYSTOLIC BLOOD PRESSURE: 120 MMHG | BODY MASS INDEX: 29.26 KG/M2 | HEIGHT: 72 IN | WEIGHT: 216 LBS | HEART RATE: 60 BPM | DIASTOLIC BLOOD PRESSURE: 78 MMHG

## 2025-04-21 DIAGNOSIS — Z95.1 S/P CABG X 4: ICD-10-CM

## 2025-04-21 DIAGNOSIS — E11.59 TYPE 2 DIABETES MELLITUS WITH OTHER CIRCULATORY COMPLICATION, WITHOUT LONG-TERM CURRENT USE OF INSULIN (HCC): ICD-10-CM

## 2025-04-21 DIAGNOSIS — I10 ESSENTIAL HYPERTENSION: ICD-10-CM

## 2025-04-21 DIAGNOSIS — I21.4 NSTEMI (NON-ST ELEVATED MYOCARDIAL INFARCTION) (HCC): ICD-10-CM

## 2025-04-21 DIAGNOSIS — N18.5 CHRONIC KIDNEY DISEASE, STAGE V (HCC): ICD-10-CM

## 2025-04-21 DIAGNOSIS — E78.2 MIXED HYPERLIPIDEMIA: ICD-10-CM

## 2025-04-21 DIAGNOSIS — I25.2 HISTORY OF MYOCARDIAL INFARCTION: ICD-10-CM

## 2025-04-21 DIAGNOSIS — Z98.61 HISTORY OF PTCA: ICD-10-CM

## 2025-04-21 DIAGNOSIS — I25.10 CORONARY ARTERY DISEASE INVOLVING NATIVE CORONARY ARTERY OF NATIVE HEART WITHOUT ANGINA PECTORIS: Primary | ICD-10-CM

## 2025-04-21 PROCEDURE — 3074F SYST BP LT 130 MM HG: CPT | Performed by: INTERNAL MEDICINE

## 2025-04-21 PROCEDURE — G8417 CALC BMI ABV UP PARAM F/U: HCPCS | Performed by: INTERNAL MEDICINE

## 2025-04-21 PROCEDURE — 1036F TOBACCO NON-USER: CPT | Performed by: INTERNAL MEDICINE

## 2025-04-21 PROCEDURE — 99214 OFFICE O/P EST MOD 30 MIN: CPT | Performed by: INTERNAL MEDICINE

## 2025-04-21 PROCEDURE — 2022F DILAT RTA XM EVC RTNOPTHY: CPT | Performed by: INTERNAL MEDICINE

## 2025-04-21 PROCEDURE — 3078F DIAST BP <80 MM HG: CPT | Performed by: INTERNAL MEDICINE

## 2025-04-21 PROCEDURE — 3046F HEMOGLOBIN A1C LEVEL >9.0%: CPT | Performed by: INTERNAL MEDICINE

## 2025-04-21 PROCEDURE — 3017F COLORECTAL CA SCREEN DOC REV: CPT | Performed by: INTERNAL MEDICINE

## 2025-04-21 PROCEDURE — G8427 DOCREV CUR MEDS BY ELIG CLIN: HCPCS | Performed by: INTERNAL MEDICINE

## 2025-04-21 NOTE — PROGRESS NOTES
CLINICAL STAFF DOCUMENTATION    Dr. Bandar Morley  1970  [id number]    Have you had any Chest Pain recently? - No    Have you had any Shortness of Breath - No    Have you had any dizziness - No    Have you had any palpitations recently? - No    Any thyroid issues? - No    Do you have any edema - swelling in No      When did you have your last labs drawn 12/2024  What doctor ordered Anatoliy/Marky   Do we have the labs in their chart Yes    Do you need any prescriptions refilled? - No    Do you have a surgery or procedure scheduled in the near future - Yes, At [facility] with Dr Negro  Type of surgery Catheter     Do use tobacco products? - No  Do you drink alcohol? - No  Do you use any illicit drugs? - No  Caffeine? - Yes, coffee      Check medication list thoroughly!!! AND RECONCILE OUTSIDE MEDICATIONS  If dose has changed change the entire order not just the MG  BE SURE TO ASK PATIENT IF THEY NEED MEDICATION REFILLS  Verify Pharmacy and update if incorrect  Add to every patient's \"wrap up\" the following dot phrase AFTERVISITCARDIOHEARTHOUSE and ensure we explain this to our patients   
treadmill stress test as target heart rate is not achieved.  Patient's baseline EKG reveals normal sinus rhythm at 63 bpm with poor R wave progression  Inferior T wave inversions.  Patient exercised for a total of 6 minutes on Naveen protocol achieving a total workload of 7 METS exercise was terminated because of complaints of shortness of breath.  Patient did not achieve the target heart rate.  Increased ventricular ectopic activity seen.  Physiological blood pressure response to exercise noted.  Clinical correlation is recommended     HYPERTENSION:Yes  well controlled on current medical regimen.  - changes in  treatment: no. Patient is on amlodipine and metoprolol.  Counseled regarding low salt diet, exercise & weight control.     VALVULAR HEART DISEASE:           11/25/2024    Left Ventricle: The EF by visual approximation is 40 - 45%. Mid and apical septal walls are hypokinetic.    Mitral Valve: Moderate regurgitation.    Pulmonic Valve: Trace regurgitation.    Pericardium: No pericardial effusion.     TE Echo 11/27/2024       Left Ventricle: Reduced left ventricular systolic function with a visually estimated EF of 30 - 35%.    Right Atrium: Catheter present in the right atrium.    Interatrial Septum: Agitated saline study was negative with and without provocation.    Left Atrium: No left atrial appendage thrombus noted.    Pericardium: No pericardial effusion.    Mitral Valve: Mild regurgitation.     12/12/2024    Left Ventricle: Reduced left ventricular systolic function with a visually estimated EF of 40 - 45%. Left ventricle size is normal. Mildly increased wall thickness. Scarring of septal wall segment. Septal and apical wall segments appear hypokinetic.    No significant valvular disease noted.    Pericardium: There is evidence of epicardial fat. No pericardial effusion.     DYSLIPIDEMIA: yes,   Patient's profile is at / near Goal.yes,   HDL is low / High  Tolerating current medical regimen well yes.

## 2025-04-21 NOTE — PATIENT INSTRUCTIONS
Thank you for allowing us to care for you today!   We want to ensure we can follow your treatment plan and we strive to give you the best outcomes and experience possible.   If you ever have a life threatening emergency and call 911 - for an ambulance (EMS)  REMEMBER  Our providers can only care for you at:   [facility] or [facility]   Even if you have someone take you or you drive yourself we can only care for you in a [facility] facility. Our providers are not setup at the other healthcare locations!    PLEASE CALL OUR OFFICE DURING NORMAL BUSINESS HOURS  Monday through Friday 8 am to 5 pm  AFTER HOURS the physician on-call cannot help with scheduling, rescheduling, procedure instruction questions or any type of medication need or issue.  [facility] P:[phone] - [facility] P:[phone] - [facility] P:[phone]      If you receive a survey:  We would appreciate you taking the time to share your experience concerning your provider visit in the office.    These surveys are confidential!  We are eager to improve and are counting on you to share your feedback so we can ensure you get the best care possible.    This is a nondiagnostic treadmill stress test as target heart rate is not achieved.  Patient's baseline EKG reveals normal sinus rhythm at 63 bpm with poor R wave progression  Inferior T wave inversions.  Patient exercised for a total of 6 minutes on Naveen protocol achieving a total workload of 7 METS exercise was terminated because of complaints of shortness of breath.  Patient did not achieve the target heart rate.  Increased ventricular ectopic activity seen.  Physiological blood pressure response to exercise noted.  Clinical correlation is recommended

## 2025-04-24 DIAGNOSIS — I10 ESSENTIAL HYPERTENSION: ICD-10-CM

## 2025-04-25 RX ORDER — FUROSEMIDE 80 MG/1
TABLET ORAL
Qty: 60 TABLET | Refills: 2 | Status: SHIPPED | OUTPATIENT
Start: 2025-04-25

## 2025-04-28 ENCOUNTER — COMMUNITY OUTREACH (OUTPATIENT)
Dept: FAMILY MEDICINE CLINIC | Age: 55
End: 2025-04-28

## 2025-05-07 ENCOUNTER — OFFICE VISIT (OUTPATIENT)
Dept: SURGERY | Age: 55
End: 2025-05-07
Payer: MEDICARE

## 2025-05-07 VITALS
HEIGHT: 72 IN | OXYGEN SATURATION: 94 % | DIASTOLIC BLOOD PRESSURE: 70 MMHG | SYSTOLIC BLOOD PRESSURE: 90 MMHG | HEART RATE: 64 BPM | WEIGHT: 217.7 LBS | BODY MASS INDEX: 29.49 KG/M2

## 2025-05-07 DIAGNOSIS — Z01.818 PRE-OP EVALUATION: ICD-10-CM

## 2025-05-07 DIAGNOSIS — N18.6 ESRD (END STAGE RENAL DISEASE) (HCC): Primary | ICD-10-CM

## 2025-05-07 PROCEDURE — 99204 OFFICE O/P NEW MOD 45 MIN: CPT | Performed by: SURGERY

## 2025-05-07 PROCEDURE — 1036F TOBACCO NON-USER: CPT | Performed by: SURGERY

## 2025-05-07 PROCEDURE — G8417 CALC BMI ABV UP PARAM F/U: HCPCS | Performed by: SURGERY

## 2025-05-07 PROCEDURE — 3078F DIAST BP <80 MM HG: CPT | Performed by: SURGERY

## 2025-05-07 PROCEDURE — 3017F COLORECTAL CA SCREEN DOC REV: CPT | Performed by: SURGERY

## 2025-05-07 PROCEDURE — 3074F SYST BP LT 130 MM HG: CPT | Performed by: SURGERY

## 2025-05-07 PROCEDURE — G8427 DOCREV CUR MEDS BY ELIG CLIN: HCPCS | Performed by: SURGERY

## 2025-05-07 RX ORDER — SODIUM CHLORIDE 0.9 % (FLUSH) 0.9 %
5-40 SYRINGE (ML) INJECTION PRN
OUTPATIENT
Start: 2025-05-07

## 2025-05-07 RX ORDER — SODIUM CHLORIDE 0.9 % (FLUSH) 0.9 %
5-40 SYRINGE (ML) INJECTION EVERY 12 HOURS SCHEDULED
OUTPATIENT
Start: 2025-05-07

## 2025-05-07 RX ORDER — SODIUM CHLORIDE 9 MG/ML
INJECTION, SOLUTION INTRAVENOUS PRN
OUTPATIENT
Start: 2025-05-07

## 2025-05-07 NOTE — PROGRESS NOTES
Chief Complaint   Patient presents with    Consultation     NP - PD Cath Consult         SUBJECTIVE:  HPI: Patient is here with complaints of ESRD and need for peritoneal dialysis catheter.  He is a patient of Dr. Bermudez.  He reports being on hemodialysis via a catheter since last fall.  He was initially evaluated for possible transplant surgery but then had open heart surgery.  He has not been reevaluated for transplant.  He would like to proceed with peritoneal dialysis.    I have reviewed the patient's(pertinent information to this visit) medical history, family history(scanned in  the [employer] under \"patient questioner\"), social history and review of systems with the patient today in the office.            Past Surgical History:   Procedure Laterality Date    CARDIAC PROCEDURE N/A 11/19/2024    Left heart cath / coronary angiography performed by Alex Ashton MD at [facility] CARDIAC CATH LAB    CORONARY ANGIOPLASTY WITH STENT PLACEMENT  01/01/2006    CORONARY ARTERY BYPASS GRAFT N/A 12/11/2024    CORONARY ARTERY BYPASS GRAFT X4, LIMA - LAD, SVG - RAMUS, SVG -PDA, SVG - OM2; LEFT ENDOSCOPIC GREATER SAPHENOUS VEIN HARVEST; INTRAOPERATIVE TRANSESOPHAGEAL ECHOCARDIOGRAM performed by Shayan Kapadia MD at [facility] OR    HERNIA REPAIR  1985    IR NONTUNNELED VASCULAR CATHETER > 5 YEARS  11/20/2024    IR NONTUNNELED VASCULAR CATHETER 11/20/2024 [facility] SPECIAL PROCEDURES    IR TUNNELED CVC PLACE WO SQ PORT/PUMP > 5 YEARS  11/25/2024    IR TUNNELED CATHETER PLACEMENT GREATER THAN 5 YEARS 11/25/2024 [facility] SPECIAL PROCEDURES     Past Medical History:   Diagnosis Date    Anxiety associated with depression 06/26/2024    CAD (coronary artery disease)     H/O MI.    CKD (chronic kidney disease), stage V (HCC)     born with one kidney    Family history of coronary artery disease     H/O echocardiogram 06/2008 6/08 EF>55% TRACE MR, TR    H/O echocardiogram 04/15/2015    EF 50-55% Mild left atrial enlargement. Normal LV

## 2025-05-15 ENCOUNTER — PREP FOR PROCEDURE (OUTPATIENT)
Dept: SURGERY | Age: 55
End: 2025-05-15

## 2025-05-15 DIAGNOSIS — N18.6 END STAGE RENAL DISEASE (HCC): ICD-10-CM

## 2025-05-19 ENCOUNTER — TELEPHONE (OUTPATIENT)
Dept: BARIATRICS/WEIGHT MGMT | Age: 55
End: 2025-05-19

## 2025-05-19 NOTE — TELEPHONE ENCOUNTER
LEFT MESSAGE FOR Landry Morley REGARDING SURGERY CATHETER INSERTION PERITONEAL DIALYSIS LAPAROSCOPIC   [facility]. NOTIFIED OF DATES, TIMES AND LOCATION    PHONE ASSESSMENT   SURGERY - 6/9/25 11  P/O - 6/18/25 10:15    NPO AFTER MIDNIGHT  Plavix stop 5 days before

## 2025-06-03 NOTE — PROGRESS NOTES
LM with my call-back # concerning  surgery @ [facility] on  6/9/25.  Please call the PAT Nurse for a phone assessment and surgery instructions.

## 2025-06-04 RX ORDER — METOPROLOL SUCCINATE 25 MG/1
25 TABLET, EXTENDED RELEASE ORAL NIGHTLY
COMMUNITY
Start: 2025-05-15

## 2025-06-04 NOTE — PROGRESS NOTES
Surgery @ [facility] on 6/9/25 you will be called 6/6/25 with times    NOTHING TO EAT OR DRINK AFTER MIDNIGHT DAY OF SURGERY    1. Enter thru the hospital main entrance on day of surgery, check in at the Information Desk. If you arrive prior to 6:00am, enter thru the ER entrance.    2. Follow the directions as prescribed by the doctor for your procedure and medications.         Morning of surgery take: No Medications      Plavix and ASA- holding for 5 days- last dose 5/3 per physician         Stop vitamins, supplements and NSAIDS:  NOW (Tylenol is ok)     3. Check with your Doctor regarding stopping blood thinners and follow their instructions.    4. Do not smoke, vape or use chewing tobacco morning of surgery. Do not drink any alcoholic beverages 24 hours prior to surgery.       This includes NA Beer. No street drugs 7 days prior to surgery.    5. If you have dentures, contacts of glasses they will be removed before going to the OR; please bring a case.    6. Please bring picture ID, insurance card, paperwork from the doctor’s office (H & P, Consent, & card for implantable devices).    7. Take a shower with an antibacterial soap the night before surgery and the morning of surgery. Do not put anything on your skin      After your morning shower.    8. You will need a responsible adult to drive you home and check on you after surgery.

## 2025-06-06 ENCOUNTER — ANESTHESIA EVENT (OUTPATIENT)
Dept: OPERATING ROOM | Age: 55
End: 2025-06-06
Payer: MEDICARE

## 2025-06-06 NOTE — PROGRESS NOTES
6/6/25 - .LM for patient - surgery @ [facility] on  6/9/25 @ 1045, arrival 0845. NPO status and morning medications reviewed. Please call with any questions.

## 2025-06-09 ENCOUNTER — ANESTHESIA (OUTPATIENT)
Dept: OPERATING ROOM | Age: 55
End: 2025-06-09
Payer: MEDICARE

## 2025-06-09 ENCOUNTER — HOSPITAL ENCOUNTER (OUTPATIENT)
Age: 55
Setting detail: OUTPATIENT SURGERY
Discharge: HOME | End: 2025-06-09
Attending: SURGERY | Admitting: SURGERY
Payer: MEDICARE

## 2025-06-09 VITALS
WEIGHT: 215 LBS | OXYGEN SATURATION: 100 % | TEMPERATURE: 97.6 F | HEART RATE: 55 BPM | HEIGHT: 72 IN | RESPIRATION RATE: 18 BRPM | BODY MASS INDEX: 29.12 KG/M2 | DIASTOLIC BLOOD PRESSURE: 86 MMHG | SYSTOLIC BLOOD PRESSURE: 131 MMHG

## 2025-06-09 DIAGNOSIS — N18.6 END STAGE RENAL DISEASE (HCC): Primary | ICD-10-CM

## 2025-06-09 DIAGNOSIS — N18.5 CHRONIC KIDNEY DISEASE, STAGE V (HCC): ICD-10-CM

## 2025-06-09 DIAGNOSIS — K42.9 UMBILICAL HERNIA WITHOUT OBSTRUCTION AND WITHOUT GANGRENE: ICD-10-CM

## 2025-06-09 LAB
ANION GAP SERPL CALCULATED.3IONS-SCNC: 20 MMOL/L (ref 9–17)
BASOPHILS # BLD: 0.03 K/UL
BASOPHILS NFR BLD: 1 % (ref 0–1)
BUN SERPL-MCNC: 75 MG/DL (ref 7–20)
CALCIUM SERPL-MCNC: 8.6 MG/DL (ref 8.3–10.6)
CHLORIDE SERPL-SCNC: 96 MMOL/L (ref 99–110)
CO2 SERPL-SCNC: 23 MMOL/L (ref 21–32)
CREAT SERPL-MCNC: 7.5 MG/DL (ref 0.9–1.3)
EKG ATRIAL RATE: 69 BPM
EKG DIAGNOSIS: NORMAL
EKG P AXIS: 30 DEGREES
EKG P-R INTERVAL: 206 MS
EKG Q-T INTERVAL: 426 MS
EKG QRS DURATION: 98 MS
EKG QTC CALCULATION (BAZETT): 456 MS
EKG R AXIS: -23 DEGREES
EKG T AXIS: 107 DEGREES
EKG VENTRICULAR RATE: 69 BPM
EOSINOPHIL # BLD: 0.11 K/UL
EOSINOPHILS RELATIVE PERCENT: 2 % (ref 0–3)
ERYTHROCYTE [DISTWIDTH] IN BLOOD BY AUTOMATED COUNT: 13.4 % (ref 11.7–14.9)
GFR, ESTIMATED: 8 ML/MIN/1.73M2
GLUCOSE BLD-MCNC: 130 MG/DL (ref 74–99)
GLUCOSE BLD-MCNC: 97 MG/DL (ref 74–99)
GLUCOSE SERPL-MCNC: 104 MG/DL (ref 74–99)
HCT VFR BLD AUTO: 37.7 % (ref 42–52)
HGB BLD-MCNC: 12.7 G/DL (ref 13.5–18)
IMM GRANULOCYTES # BLD AUTO: 0.03 K/UL
IMM GRANULOCYTES NFR BLD: 1 %
LYMPHOCYTES NFR BLD: 1.39 K/UL
LYMPHOCYTES RELATIVE PERCENT: 26 % (ref 24–44)
MCH RBC QN AUTO: 31.7 PG (ref 27–31)
MCHC RBC AUTO-ENTMCNC: 33.7 G/DL (ref 32–36)
MCV RBC AUTO: 94 FL (ref 78–100)
MONOCYTES NFR BLD: 0.54 K/UL
MONOCYTES NFR BLD: 10 % (ref 0–5)
NEUTROPHILS NFR BLD: 61 % (ref 36–66)
NEUTS SEG NFR BLD: 3.25 K/UL
PLATELET # BLD AUTO: 154 K/UL (ref 140–440)
PMV BLD AUTO: 11.1 FL (ref 7.5–11.1)
POTASSIUM SERPL-SCNC: 4.6 MMOL/L (ref 3.5–5.1)
RBC # BLD AUTO: 4.01 M/UL (ref 4.6–6.2)
SODIUM SERPL-SCNC: 139 MMOL/L (ref 136–145)
WBC OTHER # BLD: 5.4 K/UL (ref 4–10.5)

## 2025-06-09 PROCEDURE — 85025 COMPLETE CBC W/AUTO DIFF WBC: CPT

## 2025-06-09 PROCEDURE — 3600000005 HC SURGERY LEVEL 5 BASE: Performed by: SURGERY

## 2025-06-09 PROCEDURE — 80048 BASIC METABOLIC PNL TOTAL CA: CPT

## 2025-06-09 PROCEDURE — 6370000000 HC RX 637 (ALT 250 FOR IP): Performed by: ANESTHESIOLOGY

## 2025-06-09 PROCEDURE — 2709999900 HC NON-CHARGEABLE SUPPLY: Performed by: SURGERY

## 2025-06-09 PROCEDURE — 2500000003 HC RX 250 WO HCPCS: Performed by: NURSE ANESTHETIST, CERTIFIED REGISTERED

## 2025-06-09 PROCEDURE — 49591 RPR AA HRN 1ST < 3 CM RDC: CPT | Performed by: SURGERY

## 2025-06-09 PROCEDURE — 6360000002 HC RX W HCPCS: Performed by: ANESTHESIOLOGY

## 2025-06-09 PROCEDURE — 6360000002 HC RX W HCPCS: Performed by: SURGERY

## 2025-06-09 PROCEDURE — 7100000001 HC PACU RECOVERY - ADDTL 15 MIN: Performed by: SURGERY

## 2025-06-09 PROCEDURE — 82962 GLUCOSE BLOOD TEST: CPT

## 2025-06-09 PROCEDURE — 6360000002 HC RX W HCPCS: Performed by: NURSE ANESTHETIST, CERTIFIED REGISTERED

## 2025-06-09 PROCEDURE — 93005 ELECTROCARDIOGRAM TRACING: CPT | Performed by: STUDENT IN AN ORGANIZED HEALTH CARE EDUCATION/TRAINING PROGRAM

## 2025-06-09 PROCEDURE — 2500000003 HC RX 250 WO HCPCS: Performed by: SURGERY

## 2025-06-09 PROCEDURE — 2720000010 HC SURG SUPPLY STERILE: Performed by: SURGERY

## 2025-06-09 PROCEDURE — 49324 LAP INSERT TUNNEL IP CATH: CPT | Performed by: SURGERY

## 2025-06-09 PROCEDURE — 6370000000 HC RX 637 (ALT 250 FOR IP): Performed by: NURSE ANESTHETIST, CERTIFIED REGISTERED

## 2025-06-09 PROCEDURE — 93010 ELECTROCARDIOGRAM REPORT: CPT | Performed by: INTERNAL MEDICINE

## 2025-06-09 PROCEDURE — 3600000015 HC SURGERY LEVEL 5 ADDTL 15MIN: Performed by: SURGERY

## 2025-06-09 PROCEDURE — 7100000010 HC PHASE II RECOVERY - FIRST 15 MIN: Performed by: SURGERY

## 2025-06-09 PROCEDURE — C2628 CATHETER, OCCLUSION: HCPCS | Performed by: SURGERY

## 2025-06-09 PROCEDURE — 2580000003 HC RX 258: Performed by: NURSE ANESTHETIST, CERTIFIED REGISTERED

## 2025-06-09 PROCEDURE — 7100000011 HC PHASE II RECOVERY - ADDTL 15 MIN: Performed by: SURGERY

## 2025-06-09 PROCEDURE — C1750 CATH, HEMODIALYSIS,LONG-TERM: HCPCS | Performed by: SURGERY

## 2025-06-09 PROCEDURE — 3700000000 HC ANESTHESIA ATTENDED CARE: Performed by: SURGERY

## 2025-06-09 PROCEDURE — 3700000001 HC ADD 15 MINUTES (ANESTHESIA): Performed by: SURGERY

## 2025-06-09 PROCEDURE — 7100000000 HC PACU RECOVERY - FIRST 15 MIN: Performed by: SURGERY

## 2025-06-09 DEVICE — CATHETER PERITONEAL DLYS 35X53 MMX62 CM FLEX-NECK CLASSIC: Type: IMPLANTABLE DEVICE | Site: ABDOMEN | Status: FUNCTIONAL

## 2025-06-09 RX ORDER — FENTANYL CITRATE 50 UG/ML
INJECTION, SOLUTION INTRAMUSCULAR; INTRAVENOUS
Status: DISCONTINUED | OUTPATIENT
Start: 2025-06-09 | End: 2025-06-09 | Stop reason: SDUPTHER

## 2025-06-09 RX ORDER — NALOXONE HYDROCHLORIDE 0.4 MG/ML
INJECTION, SOLUTION INTRAMUSCULAR; INTRAVENOUS; SUBCUTANEOUS PRN
Status: DISCONTINUED | OUTPATIENT
Start: 2025-06-09 | End: 2025-06-09 | Stop reason: HOSPADM

## 2025-06-09 RX ORDER — SODIUM CHLORIDE 0.9 % (FLUSH) 0.9 %
5-40 SYRINGE (ML) INJECTION EVERY 12 HOURS SCHEDULED
Status: DISCONTINUED | OUTPATIENT
Start: 2025-06-09 | End: 2025-06-09 | Stop reason: HOSPADM

## 2025-06-09 RX ORDER — SODIUM CHLORIDE 0.9 % (FLUSH) 0.9 %
5-40 SYRINGE (ML) INJECTION PRN
Status: DISCONTINUED | OUTPATIENT
Start: 2025-06-09 | End: 2025-06-09 | Stop reason: HOSPADM

## 2025-06-09 RX ORDER — HEPARIN 100 UNIT/ML
SYRINGE INTRAVENOUS
Status: DISCONTINUED | OUTPATIENT
Start: 2025-06-09 | End: 2025-06-09 | Stop reason: HOSPADM

## 2025-06-09 RX ORDER — MIDAZOLAM HYDROCHLORIDE 1 MG/ML
INJECTION, SOLUTION INTRAMUSCULAR; INTRAVENOUS
Status: DISCONTINUED | OUTPATIENT
Start: 2025-06-09 | End: 2025-06-09 | Stop reason: SDUPTHER

## 2025-06-09 RX ORDER — SODIUM CHLORIDE 9 MG/ML
INJECTION, SOLUTION INTRAVENOUS PRN
Status: DISCONTINUED | OUTPATIENT
Start: 2025-06-09 | End: 2025-06-09 | Stop reason: HOSPADM

## 2025-06-09 RX ORDER — BUPIVACAINE HYDROCHLORIDE 5 MG/ML
INJECTION, SOLUTION EPIDURAL; INTRACAUDAL; PERINEURAL
Status: DISCONTINUED | OUTPATIENT
Start: 2025-06-09 | End: 2025-06-09 | Stop reason: HOSPADM

## 2025-06-09 RX ORDER — OXYCODONE AND ACETAMINOPHEN 5; 325 MG/1; MG/1
1 TABLET ORAL ONCE
Refills: 0 | Status: COMPLETED | OUTPATIENT
Start: 2025-06-09 | End: 2025-06-09

## 2025-06-09 RX ORDER — PROCHLORPERAZINE EDISYLATE 5 MG/ML
5 INJECTION INTRAMUSCULAR; INTRAVENOUS
Status: DISCONTINUED | OUTPATIENT
Start: 2025-06-09 | End: 2025-06-09 | Stop reason: HOSPADM

## 2025-06-09 RX ORDER — OXYCODONE AND ACETAMINOPHEN 5; 325 MG/1; MG/1
1 TABLET ORAL EVERY 6 HOURS PRN
Qty: 14 TABLET | Refills: 0 | Status: SHIPPED | OUTPATIENT
Start: 2025-06-09 | End: 2025-06-14

## 2025-06-09 RX ORDER — FENTANYL CITRATE 50 UG/ML
25 INJECTION, SOLUTION INTRAMUSCULAR; INTRAVENOUS EVERY 5 MIN PRN
Status: COMPLETED | OUTPATIENT
Start: 2025-06-09 | End: 2025-06-09

## 2025-06-09 RX ORDER — DEXAMETHASONE SODIUM PHOSPHATE 4 MG/ML
INJECTION, SOLUTION INTRA-ARTICULAR; INTRALESIONAL; INTRAMUSCULAR; INTRAVENOUS; SOFT TISSUE
Status: DISCONTINUED | OUTPATIENT
Start: 2025-06-09 | End: 2025-06-09 | Stop reason: SDUPTHER

## 2025-06-09 RX ORDER — ACETAMINOPHEN 325 MG/1
650 TABLET ORAL
Status: DISCONTINUED | OUTPATIENT
Start: 2025-06-09 | End: 2025-06-09 | Stop reason: HOSPADM

## 2025-06-09 RX ORDER — PROPOFOL 10 MG/ML
INJECTION, EMULSION INTRAVENOUS
Status: DISCONTINUED | OUTPATIENT
Start: 2025-06-09 | End: 2025-06-09 | Stop reason: SDUPTHER

## 2025-06-09 RX ORDER — LIDOCAINE HYDROCHLORIDE 20 MG/ML
INJECTION, SOLUTION INTRAVENOUS
Status: DISCONTINUED | OUTPATIENT
Start: 2025-06-09 | End: 2025-06-09 | Stop reason: SDUPTHER

## 2025-06-09 RX ORDER — SODIUM CHLORIDE, SODIUM LACTATE, POTASSIUM CHLORIDE, CALCIUM CHLORIDE 600; 310; 30; 20 MG/100ML; MG/100ML; MG/100ML; MG/100ML
INJECTION, SOLUTION INTRAVENOUS
Status: DISCONTINUED | OUTPATIENT
Start: 2025-06-09 | End: 2025-06-09 | Stop reason: SDUPTHER

## 2025-06-09 RX ORDER — DOCUSATE SODIUM 100 MG/1
100 CAPSULE, LIQUID FILLED ORAL 2 TIMES DAILY
COMMUNITY
Start: 2025-06-09 | End: 2025-06-23

## 2025-06-09 RX ORDER — ROCURONIUM BROMIDE 10 MG/ML
INJECTION, SOLUTION INTRAVENOUS
Status: DISCONTINUED | OUTPATIENT
Start: 2025-06-09 | End: 2025-06-09 | Stop reason: SDUPTHER

## 2025-06-09 RX ORDER — LIDOCAINE HYDROCHLORIDE 40 MG/ML
SOLUTION TOPICAL
Status: DISCONTINUED | OUTPATIENT
Start: 2025-06-09 | End: 2025-06-09 | Stop reason: SDUPTHER

## 2025-06-09 RX ORDER — ONDANSETRON 2 MG/ML
INJECTION INTRAMUSCULAR; INTRAVENOUS
Status: DISCONTINUED | OUTPATIENT
Start: 2025-06-09 | End: 2025-06-09 | Stop reason: SDUPTHER

## 2025-06-09 RX ORDER — HYDRALAZINE HYDROCHLORIDE 20 MG/ML
10 INJECTION INTRAMUSCULAR; INTRAVENOUS
Status: DISCONTINUED | OUTPATIENT
Start: 2025-06-09 | End: 2025-06-09 | Stop reason: HOSPADM

## 2025-06-09 RX ORDER — ONDANSETRON 2 MG/ML
4 INJECTION INTRAMUSCULAR; INTRAVENOUS
Status: COMPLETED | OUTPATIENT
Start: 2025-06-09 | End: 2025-06-09

## 2025-06-09 RX ORDER — PHENYLEPHRINE HCL IN 0.9% NACL 1 MG/10 ML
SYRINGE (ML) INTRAVENOUS
Status: DISCONTINUED | OUTPATIENT
Start: 2025-06-09 | End: 2025-06-09 | Stop reason: SDUPTHER

## 2025-06-09 RX ADMIN — PROPOFOL 200 MG: 10 INJECTION, EMULSION INTRAVENOUS at 10:02

## 2025-06-09 RX ADMIN — LIDOCAINE HYDROCHLORIDE 40 MG: 20 INJECTION, SOLUTION INTRAVENOUS at 10:02

## 2025-06-09 RX ADMIN — ONDANSETRON 4 MG: 2 INJECTION INTRAMUSCULAR; INTRAVENOUS at 09:43

## 2025-06-09 RX ADMIN — SUGAMMADEX 200 MG: 100 INJECTION, SOLUTION INTRAVENOUS at 10:48

## 2025-06-09 RX ADMIN — LIDOCAINE HYDROCHLORIDE 4 ML: 40 SOLUTION TOPICAL at 10:05

## 2025-06-09 RX ADMIN — ROCURONIUM BROMIDE 50 MG: 10 INJECTION INTRAVENOUS at 10:02

## 2025-06-09 RX ADMIN — OXYCODONE HYDROCHLORIDE AND ACETAMINOPHEN 1 TABLET: 5; 325 TABLET ORAL at 12:29

## 2025-06-09 RX ADMIN — FENTANYL CITRATE 25 MCG: 50 INJECTION INTRAMUSCULAR; INTRAVENOUS at 11:34

## 2025-06-09 RX ADMIN — MIDAZOLAM 2 MG: 1 INJECTION INTRAMUSCULAR; INTRAVENOUS at 09:56

## 2025-06-09 RX ADMIN — SODIUM CHLORIDE, POTASSIUM CHLORIDE, SODIUM LACTATE AND CALCIUM CHLORIDE: 600; 310; 30; 20 INJECTION, SOLUTION INTRAVENOUS at 09:53

## 2025-06-09 RX ADMIN — DEXAMETHASONE SODIUM PHOSPHATE 4 MG: 4 INJECTION, SOLUTION INTRAMUSCULAR; INTRAVENOUS at 09:43

## 2025-06-09 RX ADMIN — FENTANYL CITRATE 50 MCG: 50 INJECTION, SOLUTION INTRAMUSCULAR; INTRAVENOUS at 10:02

## 2025-06-09 RX ADMIN — Medication 100 MCG: at 10:10

## 2025-06-09 RX ADMIN — ONDANSETRON 4 MG: 2 INJECTION INTRAMUSCULAR; INTRAVENOUS at 11:33

## 2025-06-09 RX ADMIN — CEFAZOLIN 2000 MG: 2 INJECTION, POWDER, FOR SOLUTION INTRAMUSCULAR; INTRAVENOUS at 10:10

## 2025-06-09 RX ADMIN — FENTANYL CITRATE 25 MCG: 50 INJECTION INTRAMUSCULAR; INTRAVENOUS at 11:28

## 2025-06-09 RX ADMIN — Medication 100 MCG: at 10:12

## 2025-06-09 RX ADMIN — FENTANYL CITRATE 50 MCG: 50 INJECTION, SOLUTION INTRAMUSCULAR; INTRAVENOUS at 10:21

## 2025-06-09 ASSESSMENT — PAIN - FUNCTIONAL ASSESSMENT
PAIN_FUNCTIONAL_ASSESSMENT: PREVENTS OR INTERFERES SOME ACTIVE ACTIVITIES AND ADLS
PAIN_FUNCTIONAL_ASSESSMENT: 0-10
PAIN_FUNCTIONAL_ASSESSMENT: 0-10
PAIN_FUNCTIONAL_ASSESSMENT: PREVENTS OR INTERFERES SOME ACTIVE ACTIVITIES AND ADLS

## 2025-06-09 ASSESSMENT — PAIN DESCRIPTION - DESCRIPTORS
DESCRIPTORS: ACHING
DESCRIPTORS: ACHING;SORE
DESCRIPTORS: ACHING;SORE
DESCRIPTORS: SORE
DESCRIPTORS: ACHING

## 2025-06-09 ASSESSMENT — PAIN DESCRIPTION - LOCATION
LOCATION: ABDOMEN

## 2025-06-09 ASSESSMENT — PAIN DESCRIPTION - FREQUENCY
FREQUENCY: CONTINUOUS

## 2025-06-09 ASSESSMENT — PAIN SCALES - GENERAL
PAINLEVEL_OUTOF10: 4
PAINLEVEL_OUTOF10: 2

## 2025-06-09 ASSESSMENT — PAIN DESCRIPTION - ONSET
ONSET: ON-GOING

## 2025-06-09 ASSESSMENT — PAIN DESCRIPTION - PAIN TYPE
TYPE: SURGICAL PAIN

## 2025-06-09 ASSESSMENT — PAIN DESCRIPTION - ORIENTATION: ORIENTATION: LOWER

## 2025-06-09 ASSESSMENT — LIFESTYLE VARIABLES: SMOKING_STATUS: 0

## 2025-06-09 NOTE — PROGRESS NOTES
1144: Patient returns to [facility] following procedure, bedside report received from Geneva PANIAGUA, VSS, family at bedside, call light in reach, beverage of choice provided.   1217: VSS, patient complains of pain 4/10, orders obtained from Miroslava Meyer  1230: Discharge instructions reviewed with patient and spouse ayo, both verbalized understanding. Patient medicated for pain  1235: HANDOFF GIVEN TO ALYSSA PANIAGUA

## 2025-06-09 NOTE — ANESTHESIA POSTPROCEDURE EVALUATION
Department of Anesthesiology  Postprocedure Note    Patient: Landry Morley  MRN: [id number]  YOB: 1970  Date of evaluation: 6/9/2025    Procedure Summary       Date: 06/09/25 Room / Location: [address]    Anesthesia Start: 0953 Anesthesia Stop:     Procedure: CATHETER INSERTION PERITONEAL DIALYSIS LAPAROSCOPIC; UMBILICAL HERNIA REPAIR (Abdomen) Diagnosis:       End stage renal disease (HCC)      (End stage renal disease (HCC) [N18.6])    Surgeons: Hayden Tomlin MD Responsible Provider: Nato Blackburn MD    Anesthesia Type: General ASA Status: 4            Anesthesia Type: General    Efrain Phase I: Efrain Score: 10    Efrain Phase II:      Anesthesia Post Evaluation    Patient location during evaluation: PACU  Patient participation: complete - patient participated  Level of consciousness: responsive to verbal stimuli  Pain score: 0  Airway patency: patent  Nausea & Vomiting: no nausea and no vomiting  Cardiovascular status: blood pressure returned to baseline  Respiratory status: acceptable, room air and spontaneous ventilation  Hydration status: euvolemic  Pain management: adequate    There were no known notable events for this encounter.

## 2025-06-09 NOTE — PROGRESS NOTES
1110 Patient arrives to PACU, placed on cardiac monitor, alarms on.  Patient arrives drowsy but easily arousable.  Respirations unlabored.  Patient with 2 lap sites to ABD closed with sutures and glue.  PD site to R upper ABD with biopatch, mediport tape, 4x8, ABD pad and tape.  Dressing dry.  ABD soft.    1112 Warm blankets placed to patient and ABD for comfort.  1119 Blood sugar 130.  1120 Patients family sent message via computer for update.  1128 Patient medicated with 4mg Zofran for nausea and 25mcg Fentanyl for pain per MAR.  1134 Patient medicated with 25mcg Fentanyl per MAR for pain.  1142 Patient leaving PACU with improved pain and unchanged surgical site.

## 2025-06-09 NOTE — H&P
H&P  CC: ESRD      SUBJECTIVE:  HPI:   5/7/25  Patient is here with complaints of ESRD and need for peritoneal dialysis catheter.  He is a patient of Dr. Bermudez.  He reports being on hemodialysis via a catheter since last fall.  He was initially evaluated for possible transplant surgery but then had open heart surgery.  He has not been reevaluated for transplant.  He would like to proceed with peritoneal dialysis.    6/9/2025  Seen and examined again today.  Here for laparoscopic assisted PD catheter placement.  Denies changes in their health since last seen.  Denies questions regarding surgery today.          Past Surgical History:   Procedure Laterality Date    CARDIAC PROCEDURE N/A 11/19/2024    Left heart cath / coronary angiography performed by Alex Ashton MD at [facility] CARDIAC CATH LAB    CORONARY ANGIOPLASTY WITH STENT PLACEMENT  01/01/2006    CORONARY ARTERY BYPASS GRAFT N/A 12/11/2024    CORONARY ARTERY BYPASS GRAFT X4, LIMA - LAD, SVG - RAMUS, SVG -PDA, SVG - OM2; LEFT ENDOSCOPIC GREATER SAPHENOUS VEIN HARVEST; INTRAOPERATIVE TRANSESOPHAGEAL ECHOCARDIOGRAM performed by Shayan Kapadia MD at [facility] OR    HERNIA REPAIR  1985    IR NONTUNNELED VASCULAR CATHETER > 5 YEARS  11/20/2024    IR NONTUNNELED VASCULAR CATHETER 11/20/2024 [facility] SPECIAL PROCEDURES    IR TUNNELED CVC PLACE WO SQ PORT/PUMP > 5 YEARS  11/25/2024    IR TUNNELED CATHETER PLACEMENT GREATER THAN 5 YEARS 11/25/2024 [facility] SPECIAL PROCEDURES     Past Medical History:   Diagnosis Date    Anxiety associated with depression 06/26/2024    CAD (coronary artery disease)     H/O MI.    CKD (chronic kidney disease), stage V (HCC)     born with one kidney    Family history of coronary artery disease     H/O echocardiogram 06/2008 6/08 EF>55% TRACE MR, TR    H/O echocardiogram 04/15/2015    EF 50-55% Mild left atrial enlargement. Normal LV systolic function. Trace MR and TR. Normal sized abdominal aorta.     Hemodialysis patient     Tuesday,

## 2025-06-09 NOTE — ANESTHESIA PRE PROCEDURE
10:43 AM    K 4.4 12/30/2024 10:43 AM    CL 99 12/30/2024 10:43 AM    CO2 25 12/30/2024 10:43 AM    BUN 37 12/30/2024 10:43 AM    CREATININE 4.3 12/30/2024 10:43 AM    GFRAA 35 08/22/2022 11:45 AM    LABGLOM 14 12/30/2024 10:43 AM    LABGLOM 13 03/26/2024 01:09 PM    GLUCOSE 158 12/30/2024 10:43 AM    CALCIUM 9.1 12/30/2024 10:43 AM    BILITOT 0.4 12/30/2024 10:43 AM    ALKPHOS 88 12/30/2024 10:43 AM    AST 28 12/30/2024 10:43 AM    ALT 29 12/30/2024 10:43 AM       POC Tests: No results for input(s): \"POCGLU\", \"POCNA\", \"POCK\", \"POCCL\", \"POCBUN\", \"POCHEMO\", \"POCHCT\" in the last 72 hours.    Coags:   Lab Results   Component Value Date/Time    PROTIME 14.7 12/17/2024 04:55 AM    INR 1.1 12/17/2024 04:55 AM    APTT 35.9 12/17/2024 04:55 AM    APTT 23.4 02/28/2017 11:58 AM       HCG (If Applicable): No results found for: \"PREGTESTUR\", \"PREGSERUM\", \"HCG\", \"HCGQUANT\"     ABGs:   Lab Results   Component Value Date/Time    PHART 7.324 12/13/2024 04:44 AM    PO2ART 103.8 12/13/2024 04:44 AM    BUO7OUK 45.4 12/13/2024 04:44 AM    GSI6GGP 23.1 12/13/2024 04:44 AM        Type & Screen (If Applicable):  Lab Results   Component Value Date    ABORH O NEGATIVE 12/06/2024    LABANTI NEGATIVE 12/06/2024       Drug/Infectious Status (If Applicable):  Lab Results   Component Value Date/Time    HEPCAB NON REACTIVE 08/22/2022 11:44 AM       COVID-19 Screening (If Applicable):   Lab Results   Component Value Date/Time    COVID19 Not-Detected 01/27/2025 10:03 AM    COVID19 NOT DETECTED 06/13/2024 07:48 AM    COVID19 NOT DETECTED 01/05/2022 03:11 PM           Anesthesia Evaluation  Patient summary reviewed  Airway:           Dental:          Pulmonary:       (-) not a current smoker                           Cardiovascular:    (+) hypertension:, past MI:, CAD:, CABG/stent:               ROS comment: 2024 echo   Left Ventricle: Reduced left ventricular systolic function with a visually estimated EF of 40 - 45%. Left ventricle size is normal. 
reviewed and agrees with Preprocedure content      Post-op pain plan if not by surgeon: continuous peripheral nerve block          Ramsey Colorado MD   6/9/2025

## 2025-06-09 NOTE — DISCHARGE INSTRUCTIONS
Patient Discharge Instructions  Dr. Hayden Tomlin  [address] [place] [address]    [address] [facility], [address]  [place] [address]    [phone]         RESUME ACTIVITY:      BATHING:   Sponge bath only until instructed on bathing by dialysis center staff.    Wound:    Keep wound dry and clean.  Leave surgical dressing in place until seen and instructed by dialysis nurses.    Dressing:    Your wounds are sealed with surgical glue.  This will wear off in 1-2 weeks. No need for a dressing over the glue.  If oozing occurs from any of your exposed incisions you may place a dry dressing over the incision and change this daily.  Once the dressing comes off clean without drainage you may again leave your incision open to air.    DRIVING:   No driving for at least 24 hours after your procedure.  No driving until off narcotic pain medications and walking comfortably    RETURN TO WORK: When cleared after your follow up office visit    WALKING:    As tolerated     STAIRS:    As tolerated    LIFTING:   No heavy lifting for 6 weeks.    DIET:    [place] diet on day of surgery then regular diet    SPECIAL INSTRUCTIONS:     Call the office at [phone]  if you have a fever greater than or equal to 101 oF or if your incision becomes red, tender, or has drainage of pus.      If follow up appointment was not given to you, call the Surgical Clinic at [phone] for follow up appointment with Dr. Tomlin in:  1-2 weeks.           [facility]  [phone]    Do not drive, work around machines or use equipment.  Do not drink any alcoholic beverages.  Do not smoke while alone.  Avoid making important decisions.  Plan to spend a quiet, relaxed evening @ home.  Resume normal activities as you begin to feel better.  Eat lightly for your first meal, then gradually increase your diet to what is normal for you.  In case  of nausea, avoid food and drink only clear liquids.  Resume food as nausea ceases.  Notify your surgeon if you experience fever, chills, large amount of bleeding, difficulty breathing, persistent nausea and vomiting or any other disturbing problem.  Call for a follow-up appointment with your surgeon.

## 2025-06-09 NOTE — PROGRESS NOTES
Called pt in early for procedure, arrival 8am, pt states might be a few minutes late but will arrive ASAP

## 2025-06-09 NOTE — OP NOTE
Operative Note      Patient: Landry Morley  YOB: 1970  MRN: [id number]    Date of Procedure: 6/9/2025    Pre-Op Diagnosis Codes:      * End stage renal disease (HCC) [N18.6]    Post-Op Diagnosis: Same plus umbilical hernia       Procedure(s):  CATHETER INSERTION PERITONEAL DIALYSIS LAPAROSCOPIC; UMBILICAL HERNIA REPAIR    Surgeon(s):  Hayden Tomlin MD    Assistant:   First Assistant: Juliana uMsa RN Nikki Clark, MD PGY4    Anesthesia: General    Estimated Blood Loss (mL): 20cc    Complications: None    Specimens:   * No specimens in log *    Implants:  Implant Name Type Inv. Item Serial No.  Lot No. LRB No. Used Action   CATHETER PERITONEAL DLYS 35X53 MMX62 CM FLEX-NECK CLASSIC - [id number] Hemodialysis catheters CATHETER PERITONEAL DLYS 35X53 MMX62 CM FLEX-NECK CLASSIC  [employer]WD [id number] N/A 1 Implanted         Drains: * No LDAs found *    Findings:  Infection Present At Time Of Surgery (PATOS) (choose all levels that have infection present):  No infection present  Other Findings: approximately 2cm umbilical hernia present closed primarily      Procedure Details:  The patient was seen again in the Holding Room. The risks, benefits, complications, treatment options, and expected outcomes were discussed with the patient. The possibilities of reaction to medication, aspiration, injury to bowel or abdominal organs, bleeding, infection and the need for additional procedures were discussed with the patient and/or family. There was concurrence with the proposed plan, and informed consent was obtained.   The patient was taken to the Operating Room, identified as Landry Morley, and the procedure verified. A Time Out was held and the above information confirmed.      DESCRIPTION OF PROCEDURE: The patient was brought into the operating room and placed supine. The abdomen was prepped and draped in the usual sterile fashion. The template was placed on the abdomen  using a drain dressing and 4 x 4s wrapped around the cap of the PD catheter. The port sites were also approximated using 4-0 monocryl in subcuticular fashion. Patient remained stable and was subsequently transferred to recovery room in stable condition. Instrument and lap counts were correct at the end of the case.       Electronically signed by Hayden Tomlin MD on 6/9/2025 at 10:56 AM

## 2025-06-10 ENCOUNTER — TELEPHONE (OUTPATIENT)
Dept: SURGERY | Age: 55
End: 2025-06-10

## 2025-06-10 NOTE — TELEPHONE ENCOUNTER
Landry called back that he is still having severe constipation and that the Lactulose that Dr Negro recommended did not work, as he took it at 12:30 PM. Recommended that he take half bottle of Mag Citrate (5 oz) as Miralax is not helping. Informed him to let us know if no results by tomorrow (6/11).

## 2025-06-10 NOTE — TELEPHONE ENCOUNTER
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     I called the pt today to see how they were doing post-operatively.  The pt's pain is  well controlled with current pain control regimen.   He is complaining of being constipated. He never received anything for constipated recommended colace and miralax. He states that he is already taking Miralax BID. He is at Dialysis at the time of this phone call and Dr Negro recommended Lactulose for the constipation.  Pt's incisions are doing well. Denies erythema, swelling or drainage.   Pt is tolerating eating without N/V.  I reviewed the post-operative instructions, addressed any concerns and answered the patient's questions.     I confirmed the patient's post-op appointment on 6/18/2025        Burt Rueda MA

## 2025-06-18 ENCOUNTER — OFFICE VISIT (OUTPATIENT)
Dept: SURGERY | Age: 55
End: 2025-06-18

## 2025-06-18 VITALS
BODY MASS INDEX: 29.04 KG/M2 | HEART RATE: 72 BPM | OXYGEN SATURATION: 97 % | SYSTOLIC BLOOD PRESSURE: 100 MMHG | HEIGHT: 72 IN | WEIGHT: 214.4 LBS | DIASTOLIC BLOOD PRESSURE: 64 MMHG

## 2025-06-18 DIAGNOSIS — N18.6 END STAGE RENAL DISEASE (HCC): Primary | ICD-10-CM

## 2025-06-18 DIAGNOSIS — Z48.89 POSTOPERATIVE VISIT: ICD-10-CM

## 2025-06-18 PROCEDURE — 99024 POSTOP FOLLOW-UP VISIT: CPT | Performed by: SURGERY

## 2025-06-18 RX ORDER — LACTULOSE 10 G/15ML
10 SOLUTION ORAL 2 TIMES DAILY
COMMUNITY
Start: 2025-06-10

## 2025-06-18 RX ORDER — MIDODRINE HYDROCHLORIDE 5 MG/1
5 TABLET ORAL 3 TIMES DAILY PRN
COMMUNITY
Start: 2025-06-17

## 2025-06-18 NOTE — PROGRESS NOTES
Post-Operative Clinic Note    Chief Complaint   Patient presents with    Post-Op Check     1ST PO-PD CATH @ [facility] 6/9           SUBJECTIVE:  Patient is here today for a post-operative visit.     Patient is s/p laparoscopic assisted peritoneal dialysis catheter placement.  He reports doing okay.  The catheter seems to be functioning well.  He does have some itchiness at the site of his prior abdominal dressing.     Past Surgical History:   Procedure Laterality Date    CARDIAC PROCEDURE N/A 11/19/2024    Left heart cath / coronary angiography performed by Alex Ashton MD at [facility] CARDIAC CATH LAB    CORONARY ANGIOPLASTY WITH STENT PLACEMENT  01/01/2006    CORONARY ARTERY BYPASS GRAFT N/A 12/11/2024    CORONARY ARTERY BYPASS GRAFT X4, LIMA - LAD, SVG - RAMUS, SVG -PDA, SVG - OM2; LEFT ENDOSCOPIC GREATER SAPHENOUS VEIN HARVEST; INTRAOPERATIVE TRANSESOPHAGEAL ECHOCARDIOGRAM performed by Shayan Kapadia MD at [facility] OR    DIALYSIS CATHETER INSERTION N/A 6/9/2025    CATHETER INSERTION PERITONEAL DIALYSIS LAPAROSCOPIC; UMBILICAL HERNIA REPAIR performed by Hayden Tomlin MD at [facility] OR    HERNIA REPAIR  1985    IR NONTUNNELED VASCULAR CATHETER > 5 YEARS  11/20/2024    IR NONTUNNELED VASCULAR CATHETER 11/20/2024 [facility] SPECIAL PROCEDURES    IR TUNNELED CVC PLACE WO SQ PORT/PUMP > 5 YEARS  11/25/2024    IR TUNNELED CATHETER PLACEMENT GREATER THAN 5 YEARS 11/25/2024 [facility] SPECIAL PROCEDURES     Past Medical History:   Diagnosis Date    Anxiety associated with depression 06/26/2024    CAD (coronary artery disease)     H/O MI.    CKD (chronic kidney disease), stage V (HCC)     born with one kidney    Family history of coronary artery disease     H/O echocardiogram 06/2008 6/08 EF>55% TRACE MR, TR    H/O echocardiogram 04/15/2015    EF 50-55% Mild left atrial enlargement. Normal LV systolic function. Trace MR and TR. Normal sized abdominal aorta.     Hemodialysis patient     Tuesday, Thursday and Saturday    History of

## 2025-06-19 ENCOUNTER — TELEPHONE (OUTPATIENT)
Dept: CARDIOLOGY CLINIC | Age: 55
End: 2025-06-19

## 2025-06-23 ENCOUNTER — TELEPHONE (OUTPATIENT)
Dept: CARDIOLOGY CLINIC | Age: 55
End: 2025-06-23

## 2025-06-23 RX ORDER — AMOXICILLIN 500 MG/1
2000 CAPSULE ORAL ONCE
Qty: 4 CAPSULE | Refills: 0 | Status: SHIPPED | OUTPATIENT
Start: 2025-06-23 | End: 2025-06-23

## 2025-06-24 DIAGNOSIS — M79.605 PAIN OF LEFT LOWER EXTREMITY: Primary | ICD-10-CM

## 2025-06-25 ENCOUNTER — OFFICE VISIT (OUTPATIENT)
Dept: SURGERY | Age: 55
End: 2025-06-25
Payer: MEDICARE

## 2025-06-25 VITALS
OXYGEN SATURATION: 97 % | SYSTOLIC BLOOD PRESSURE: 120 MMHG | DIASTOLIC BLOOD PRESSURE: 70 MMHG | HEIGHT: 72 IN | BODY MASS INDEX: 28.96 KG/M2 | HEART RATE: 69 BPM | WEIGHT: 213.8 LBS

## 2025-06-25 DIAGNOSIS — T85.611A PERITONEAL DIALYSIS CATHETER DYSFUNCTION, INITIAL ENCOUNTER: Primary | ICD-10-CM

## 2025-06-25 PROCEDURE — G8427 DOCREV CUR MEDS BY ELIG CLIN: HCPCS | Performed by: SURGERY

## 2025-06-25 PROCEDURE — 3074F SYST BP LT 130 MM HG: CPT | Performed by: SURGERY

## 2025-06-25 PROCEDURE — 3017F COLORECTAL CA SCREEN DOC REV: CPT | Performed by: SURGERY

## 2025-06-25 PROCEDURE — G8417 CALC BMI ABV UP PARAM F/U: HCPCS | Performed by: SURGERY

## 2025-06-25 PROCEDURE — 99213 OFFICE O/P EST LOW 20 MIN: CPT | Performed by: SURGERY

## 2025-06-25 PROCEDURE — 1036F TOBACCO NON-USER: CPT | Performed by: SURGERY

## 2025-06-25 PROCEDURE — 3078F DIAST BP <80 MM HG: CPT | Performed by: SURGERY

## 2025-06-30 ENCOUNTER — HOSPITAL ENCOUNTER (OUTPATIENT)
Dept: GENERAL RADIOLOGY | Age: 55
Discharge: HOME | End: 2025-06-30
Payer: MEDICARE

## 2025-06-30 DIAGNOSIS — N18.6 END STAGE RENAL DISEASE (HCC): ICD-10-CM

## 2025-06-30 DIAGNOSIS — M79.605 PAIN OF LEFT LOWER EXTREMITY: ICD-10-CM

## 2025-06-30 PROCEDURE — 73590 X-RAY EXAM OF LOWER LEG: CPT

## 2025-06-30 PROCEDURE — 74019 RADEX ABDOMEN 2 VIEWS: CPT

## 2025-06-30 PROCEDURE — 73630 X-RAY EXAM OF FOOT: CPT

## 2025-07-02 NOTE — PROGRESS NOTES
SUBJECTIVE:  HPI:     History of Present Illness      Patient presents with slow draining PD cath.  Had half volume infused after recent placement of catheter/primary repair of ventral hernia.  Unsure if he has had catheter infused to try and attempt to open his drain out.  Denies abdominal pain, fevers/chills.  Denies nausea/vomiting.  No chest pain or shortness of breath.    Past Surgical History:   Procedure Laterality Date    CARDIAC PROCEDURE N/A 11/19/2024    Left heart cath / coronary angiography performed by Alex Ashton MD at [facility] CARDIAC CATH LAB    CORONARY ANGIOPLASTY WITH STENT PLACEMENT  01/01/2006    CORONARY ARTERY BYPASS GRAFT N/A 12/11/2024    CORONARY ARTERY BYPASS GRAFT X4, LIMA - LAD, SVG - RAMUS, SVG -PDA, SVG - OM2; LEFT ENDOSCOPIC GREATER SAPHENOUS VEIN HARVEST; INTRAOPERATIVE TRANSESOPHAGEAL ECHOCARDIOGRAM performed by Shayan Kapadia MD at [facility] OR    DIALYSIS CATHETER INSERTION N/A 6/9/2025    CATHETER INSERTION PERITONEAL DIALYSIS LAPAROSCOPIC; UMBILICAL HERNIA REPAIR performed by Hayden Tomlin MD at [facility] OR    HERNIA REPAIR  1985    IR NONTUNNELED VASCULAR CATHETER > 5 YEARS  11/20/2024    IR NONTUNNELED VASCULAR CATHETER 11/20/2024 [facility] SPECIAL PROCEDURES    IR TUNNELED CVC PLACE WO SQ PORT/PUMP > 5 YEARS  11/25/2024    IR TUNNELED CATHETER PLACEMENT GREATER THAN 5 YEARS 11/25/2024 [facility] SPECIAL PROCEDURES     Past Medical History:   Diagnosis Date    Anxiety associated with depression 06/26/2024    CAD (coronary artery disease)     H/O MI.    CKD (chronic kidney disease), stage V (HCC)     born with one kidney    Family history of coronary artery disease     H/O echocardiogram 06/2008 6/08 EF>55% TRACE MR, TR    H/O echocardiogram 04/15/2015    EF 50-55% Mild left atrial enlargement. Normal LV systolic function. Trace MR and TR. Normal sized abdominal aorta.     Hemodialysis patient     Tuesday, Thursday and Saturday    History of blood transfusion     after CABG

## 2025-07-10 ENCOUNTER — OFFICE VISIT (OUTPATIENT)
Dept: SURGERY | Age: 55
End: 2025-07-10
Payer: MEDICARE

## 2025-07-10 VITALS
HEIGHT: 72 IN | BODY MASS INDEX: 28.28 KG/M2 | HEART RATE: 55 BPM | DIASTOLIC BLOOD PRESSURE: 84 MMHG | WEIGHT: 208.8 LBS | SYSTOLIC BLOOD PRESSURE: 124 MMHG | OXYGEN SATURATION: 97 %

## 2025-07-10 DIAGNOSIS — T85.611A PERITONEAL DIALYSIS CATHETER DYSFUNCTION, INITIAL ENCOUNTER: Primary | ICD-10-CM

## 2025-07-10 PROCEDURE — G8427 DOCREV CUR MEDS BY ELIG CLIN: HCPCS | Performed by: SURGERY

## 2025-07-10 PROCEDURE — G8417 CALC BMI ABV UP PARAM F/U: HCPCS | Performed by: SURGERY

## 2025-07-10 PROCEDURE — 1036F TOBACCO NON-USER: CPT | Performed by: SURGERY

## 2025-07-10 PROCEDURE — 3079F DIAST BP 80-89 MM HG: CPT | Performed by: SURGERY

## 2025-07-10 PROCEDURE — 3017F COLORECTAL CA SCREEN DOC REV: CPT | Performed by: SURGERY

## 2025-07-10 PROCEDURE — 99214 OFFICE O/P EST MOD 30 MIN: CPT | Performed by: SURGERY

## 2025-07-10 PROCEDURE — 3074F SYST BP LT 130 MM HG: CPT | Performed by: SURGERY

## 2025-07-10 NOTE — PROGRESS NOTES
Chief Complaint   Patient presents with    Follow-up     FU-PD CATH DISFUNCTION           SUBJECTIVE:    History of Present Illness  The patient presents for peritoneal dialysis catheter issues.    A peritoneal dialysis catheter was inserted by Dr. eNlson on 06/09/2025. Initially, the catheter functioned well after the first flush, but it ceased to work a week later. Currently, the catheter is not allowing fluid to be pushed in or drained out effectively. He prefers peritoneal dialysis due to its less invasive nature, especially considering his history of open-heart surgery. His potassium levels have been stable, but he has experienced issues with phosphorus during dialysis. He is currently unemployed but hopes to return to work as a . He is considering a transplant but had to postpone this due to his open-heart surgery and subsequent heart attack in 11/2024. He also had a hernia repair. He typically sleeps on his left side and plans to switch sides of the bed to be closer to the bathroom if he continues with peritoneal dialysis. His dialysis schedule is Tuesday, Thursday, and Saturday.    PAST SURGICAL HISTORY:  Open-heart surgery  Hernia repair    SOCIAL HISTORY  Occupations:     I have reviewed the patient's(pertinent information to this visit) medical history, family history(scanned in  the [employer] under \"patient questioner\"), social history and review of systems with the patient today in the office.            Past Surgical History:   Procedure Laterality Date    CARDIAC PROCEDURE N/A 11/19/2024    Left heart cath / coronary angiography performed by Alex Ashton MD at [facility] CARDIAC CATH LAB    CORONARY ANGIOPLASTY WITH STENT PLACEMENT  01/01/2006    CORONARY ARTERY BYPASS GRAFT N/A 12/11/2024    CORONARY ARTERY BYPASS GRAFT X4, LIMA - LAD, SVG - RAMUS, SVG -PDA, SVG - OM2; LEFT ENDOSCOPIC GREATER SAPHENOUS VEIN HARVEST; INTRAOPERATIVE TRANSESOPHAGEAL ECHOCARDIOGRAM performed by

## 2025-07-11 LAB
ALBUMIN: 4.4 G/DL
ALP BLD-CCNC: NORMAL U/L
ALT SERPL-CCNC: 12 U/L
ANION GAP SERPL CALCULATED.3IONS-SCNC: NORMAL MMOL/L
AST SERPL-CCNC: 14 U/L
BASOPHILS ABSOLUTE: NORMAL
BASOPHILS RELATIVE PERCENT: NORMAL
BILIRUB SERPL-MCNC: NORMAL MG/DL
BUN BLDV-MCNC: 48 MG/DL
BUN BLDV-MCNC: 48 MG/DL
CALCIUM SERPL-MCNC: 103 MG/DL
CALCIUM SERPL-MCNC: 9.4 MG/DL
CHLORIDE BLD-SCNC: 103 MMOL/L
CHLORIDE BLD-SCNC: 103 MMOL/L
CO2: 25 MMOL/L
CO2: 25 MMOL/L
CREAT SERPL-MCNC: 7.24 MG/DL
CREAT SERPL-MCNC: 7.24 MG/DL
EGFR: NORMAL
EOSINOPHILS ABSOLUTE: NORMAL
EOSINOPHILS RELATIVE PERCENT: NORMAL
GFR, ESTIMATED: NORMAL
GLUCOSE BLD-MCNC: 98 MG/DL
GLUCOSE BLD-MCNC: 98 MG/DL
HCT VFR BLD CALC: 41.7 % (ref 41–53)
HEMOGLOBIN: 13.6 G/DL (ref 13.5–17.5)
LYMPHOCYTES ABSOLUTE: NORMAL
LYMPHOCYTES RELATIVE PERCENT: NORMAL
MCH RBC QN AUTO: 32.4 PG
MCHC RBC AUTO-ENTMCNC: 32.5 G/DL
MCV RBC AUTO: NORMAL FL
MONOCYTES ABSOLUTE: NORMAL
MONOCYTES RELATIVE PERCENT: NORMAL
NEUTROPHILS ABSOLUTE: NORMAL
NEUTROPHILS RELATIVE PERCENT: NORMAL
PLATELET # BLD: 144 K/ΜL
PMV BLD AUTO: 99.5 FL
POTASSIUM SERPL-SCNC: 4.6 MMOL/L
POTASSIUM SERPL-SCNC: 4.6 MMOL/L
RBC # BLD: 4.2 10^6/ΜL
SODIUM BLD-SCNC: 140 MMOL/L
SODIUM BLD-SCNC: 140 MMOL/L
TOTAL PROTEIN: NORMAL
WBC # BLD: 4.8 10^3/ML

## 2025-07-14 NOTE — PROGRESS NOTES
Patient Name: Landry Morley  : 1970  MRN# [id number]    REASON FOR VISIT: 3 month  Patient Active Problem List    Diagnosis Date Noted    Precordial pain     Coronary artery disease involving native coronary artery of native heart without angina pectoris     Mixed hyperlipidemia     Essential hypertension     Hyperpotassemia 2023    End stage renal disease ([facility]) 05/15/2025    Chronic systolic congestive heart failure ([facility]) 2025    S/P CABG x 4 2025    Acute respiratory failure ([facility]) 2024    NSTEMI (non-ST elevated myocardial infarction) ([facility]) 2024    Pneumonia of right lower lobe due to infectious organism 2024    Anxiety associated with depression 2024    Chronic kidney disease, stage V ([facility]) 2024    Nephrotic syndrome 2024    Class 2 severe obesity due to excess calories with serious comorbidity and body mass index (BMI) of 39.0 to 39.9 in adult ([facility]) 06/15/2021    Spinal stenosis of lumbar region 06/15/2021    Constipation 06/15/2021    Acute renal failure 2020    Persistent proteinuria 2020    Diabetic nephropathy associated with type 2 diabetes mellitus ([facility]) 2020    Metabolic syndrome 2020    Renal agenesis 2020    Erectile disorder due to medical condition in male patient 2020    Hypertension, secondary 2020    Family history of coronary artery disease     H/O echocardiogram 04/15/2015    Type 2 diabetes mellitus with circulatory disorder, without long-term current use of insulin ([facility])     Chest pain 2012    History of myocardial infarction 2006    History of PTCA 2006     LABS:  Component  Ref Range & Units 9 mo ago    Non-HDL Cholesterol, Calculated 75    Cholesterol, Total 114    Triglycerides 88    HDL 39 Low     LDL Cholesterol 57          Component 24 0917   TSH 2.18        Hemoglobin A1C   Date Value Ref Range Status   2024 5.8 4.2 - 6.3 % Final

## 2025-07-16 ENCOUNTER — TELEPHONE (OUTPATIENT)
Dept: CARDIOLOGY CLINIC | Age: 55
End: 2025-07-16

## 2025-07-16 NOTE — TELEPHONE ENCOUNTER
Called to remind pt to have Lipid panel completed prior to OV. Patient advised and voices understanding. Pt advised that labs were done at [facility] and they are supposed to send them over.

## 2025-07-18 ASSESSMENT — ENCOUNTER SYMPTOMS
PHOTOPHOBIA: 0
COLOR CHANGE: 0
EYE REDNESS: 0
APNEA: 0
STRIDOR: 0
EYE ITCHING: 0
CHOKING: 0
CONSTIPATION: 0
BACK PAIN: 0
SORE THROAT: 0
RECTAL PAIN: 0
ANAL BLEEDING: 0

## 2025-07-21 PROBLEM — T85.611A PD CATHETER DYSFUNCTION: Status: ACTIVE | Noted: 2025-07-21

## 2025-07-22 ENCOUNTER — TELEPHONE (OUTPATIENT)
Dept: SURGERY | Age: 55
End: 2025-07-22

## 2025-07-22 NOTE — TELEPHONE ENCOUNTER
LEFT MESSAGE FOR Landry Morley REGARDING SURGERY (CATHETER REVISION PERITONEAL DIALYSIS LAPAROSCOPIC ) SCHEDULED @ [facility]. NOTIFIED OF DATES, TIMES AND LOCATION    PHONE ASSESSMENT   SURGERY -8/1 at 130p  P/O -8/14 at 11am    NPO AFTER MIDNIGHT  (ENTER OTHER PREP INFO)  HOLD BLOOD THINNERS - 0plavix 5 days prior

## 2025-07-23 ENCOUNTER — OFFICE VISIT (OUTPATIENT)
Dept: CARDIOLOGY CLINIC | Age: 55
End: 2025-07-23
Payer: MEDICARE

## 2025-07-23 VITALS
SYSTOLIC BLOOD PRESSURE: 102 MMHG | HEART RATE: 66 BPM | BODY MASS INDEX: 29.12 KG/M2 | DIASTOLIC BLOOD PRESSURE: 84 MMHG | HEIGHT: 72 IN | WEIGHT: 215 LBS

## 2025-07-23 DIAGNOSIS — I10 ESSENTIAL HYPERTENSION: ICD-10-CM

## 2025-07-23 DIAGNOSIS — I25.10 CORONARY ARTERY DISEASE INVOLVING NATIVE CORONARY ARTERY OF NATIVE HEART WITHOUT ANGINA PECTORIS: Primary | ICD-10-CM

## 2025-07-23 DIAGNOSIS — E11.59 TYPE 2 DIABETES MELLITUS WITH OTHER CIRCULATORY COMPLICATION, WITHOUT LONG-TERM CURRENT USE OF INSULIN (HCC): ICD-10-CM

## 2025-07-23 DIAGNOSIS — Z98.61 HISTORY OF PTCA: ICD-10-CM

## 2025-07-23 DIAGNOSIS — E78.2 MIXED HYPERLIPIDEMIA: ICD-10-CM

## 2025-07-23 DIAGNOSIS — I25.2 HISTORY OF MYOCARDIAL INFARCTION: ICD-10-CM

## 2025-07-23 DIAGNOSIS — N18.6 END STAGE RENAL DISEASE (HCC): ICD-10-CM

## 2025-07-23 DIAGNOSIS — Z95.1 S/P CABG X 4: ICD-10-CM

## 2025-07-23 PROBLEM — I15.9 HYPERTENSION, SECONDARY: Status: RESOLVED | Noted: 2020-09-24 | Resolved: 2025-07-23

## 2025-07-23 PROCEDURE — 3079F DIAST BP 80-89 MM HG: CPT | Performed by: INTERNAL MEDICINE

## 2025-07-23 PROCEDURE — 1036F TOBACCO NON-USER: CPT | Performed by: INTERNAL MEDICINE

## 2025-07-23 PROCEDURE — G8427 DOCREV CUR MEDS BY ELIG CLIN: HCPCS | Performed by: INTERNAL MEDICINE

## 2025-07-23 PROCEDURE — 3017F COLORECTAL CA SCREEN DOC REV: CPT | Performed by: INTERNAL MEDICINE

## 2025-07-23 PROCEDURE — 3074F SYST BP LT 130 MM HG: CPT | Performed by: INTERNAL MEDICINE

## 2025-07-23 PROCEDURE — 99214 OFFICE O/P EST MOD 30 MIN: CPT | Performed by: INTERNAL MEDICINE

## 2025-07-23 PROCEDURE — G8417 CALC BMI ABV UP PARAM F/U: HCPCS | Performed by: INTERNAL MEDICINE

## 2025-07-23 PROCEDURE — 3046F HEMOGLOBIN A1C LEVEL >9.0%: CPT | Performed by: INTERNAL MEDICINE

## 2025-07-23 PROCEDURE — 2022F DILAT RTA XM EVC RTNOPTHY: CPT | Performed by: INTERNAL MEDICINE

## 2025-07-23 RX ORDER — METOPROLOL SUCCINATE 25 MG/1
25 TABLET, EXTENDED RELEASE ORAL NIGHTLY
Qty: 90 TABLET | Refills: 3 | Status: SHIPPED | OUTPATIENT
Start: 2025-07-23

## 2025-07-23 NOTE — PATIENT INSTRUCTIONS
without provocation.    Left Atrium: No left atrial appendage thrombus noted.    Pericardium: No pericardial effusion.    Mitral Valve: Mild regurgitation.     12/12/2024    Left Ventricle: Reduced left ventricular systolic function with a visually estimated EF of 40 - 45%. Left ventricle size is normal. Mildly increased wall thickness. Scarring of septal wall segment. Septal and apical wall segments appear hypokinetic.    No significant valvular disease noted.    Pericardium: There is evidence of epicardial fat. No pericardial effusion.     DYSLIPIDEMIA: yes,   Patient's profile is at / near Goal.yes,   HDL is low / High  Tolerating current medical regimen well yes. Takes LIPITOR  Does not tolerate medications well due to side effects  See most recent Lab values:( Reviewed Labs from family Dr. ELIDIA     )  LDL is 57  HDL is39  Diabetes mellitis:yes,   BS under good control yes,   Hgb A1c avilable yes, 5.6     TESTS ORDERED:none this visit     PREVIOUSLY ORDERED TESTS REVIEWED & DISCUSSED WITH THE PATIENT:     I personally reviewed & interpreted, all previously ordered tests as copied above. Latest Labs are pulled in to the note with dates.   Labs, specially in Reference to Lipid profile, Cardiac testing in the form of Echo ( dated: ), stress tests ( dated: ) & other relevant cardiac testing reviewed with patient & recommendations made based on assessment of the results.    Discussed role of Cardiac risk factors & effects + treatment of co morbidities with patient & advised accordingly.     MEDICATIONS: List of medications patient is currently taking is reviewed in detail with the patient. Discussed any side effects or problems taking the medication.     Recommend Continue present management & medications as listed.     AFFIRMATION: I reviewed patient's history, previous & current medical problems & all Labs + testing. This includes chart prep even prior to the vosit. Various goals are discussed and multiple questions

## 2025-07-23 NOTE — PROGRESS NOTES
CLINICAL STAFF DOCUMENTATION    Dr. Bandar Morley  1970  [id number]    Have you had any Chest Pain recently? - No          Have you had any Shortness of Breath - No    Have you had any dizziness - No      Have you had any palpitations recently? - No    Any thyroid issues? - No    Do you have any edema - swelling in No            When did you have your last labs drawn 7/11/25    Do we have the labs in their chart Yes      Do you need any prescriptions refilled? - Yes    Do you have a surgery or procedure scheduled in the near future - No      Do use tobacco products? - No  Do you drink alcohol? - No  Do you use any illicit drugs? - No  Caffeine? - occ        Check medication list thoroughly!!! AND RECONCILE OUTSIDE MEDICATIONS  If dose has changed change the entire order not just the MG  BE SURE TO ASK PATIENT IF THEY NEED MEDICATION REFILLS  Verify Pharmacy and update if incorrect    Add to every patient's \"wrap up\" the following dot phrase AFTERVISITCARDIOHEARTHOUSE and ensure we explain this to our patients

## 2025-07-23 NOTE — PROGRESS NOTES
OFFICE PROGRESS NOTES      Landry is a 55 y.o. male who has    CHIEF COMPLAINT AS FOLLOWS:  CHEST PAIN:  Patient denies any C/O chest pains at this time.      SOB: No C/O SOB at this time.            LEG EDEMA: No leg edema   PALPITATIONS: Denies any C/O Palpitations   DIZZINESS: No C/O Dizziness   SYNCOPE: None   OTHER/ ADDITIONAL COMPLAINTS:                                     HPI: Patient is here for F/U on his CAD, HTN & Dyslipidemia problems.   CAD: Patient has known CAD. Had angioplasty / CABG, both in the past.  HTN: Patient has known essential HTN. Has been treated with guideline recommended medical / physical/ diet therapy as stated below.  Dyslipidemia: Patient has known mixed dyslipidemia. Has been treated with guideline recommended medical / physical/ diet therapy as stated below.                Current Outpatient Medications   Medication Sig Dispense Refill    midodrine (PROAMATINE) 5 MG tablet Take 1 tablet by mouth 3 times daily as needed      lactulose (CHRONULAC) 10 GM/15ML solution Take 15 mLs by mouth 2 times daily      metoprolol succinate (TOPROL XL) 25 MG extended release tablet Take 1 tablet by mouth nightly at bedtime      furosemide (LASIX) 80 MG tablet TAKE 1 TABLET BY MOUTH IN THE MORNING AND IN THE EVENING 60 tablet 2    atorvastatin (LIPITOR) 40 MG tablet Take 1 tablet by mouth daily 90 tablet 1    traZODone (DESYREL) 50 MG tablet Take 1 tablet by mouth nightly as needed for Sleep 30 tablet 5    Omega-3 Krill Oil 300 MG CAPS Take 1 capsule by mouth daily      clopidogrel (PLAVIX) 75 MG tablet Take 1 tablet by mouth daily 90 tablet 3    aspirin 81 MG EC tablet Take 1 tablet by mouth daily      vitamin D3 (CHOLECALCIFEROL) 125 MCG (5000 UT) TABS tablet Take 1,000 Units by mouth daily OTC      polyethylene glycol (MIRALAX MIX-IN PAX) 17 g packet Take 1 packet by mouth daily as needed for Constipation 527 g 5    Insulin Pen Needle (TRUEPLUS 5-BEVEL PEN NEEDLES) 31G X 5 MM MISC Use one

## 2025-07-28 ENCOUNTER — TELEPHONE (OUTPATIENT)
Dept: SURGERY | Age: 55
End: 2025-07-28

## 2025-07-28 ENCOUNTER — OFFICE VISIT (OUTPATIENT)
Dept: FAMILY MEDICINE CLINIC | Age: 55
End: 2025-07-28
Payer: MEDICARE

## 2025-07-28 VITALS
BODY MASS INDEX: 29.7 KG/M2 | WEIGHT: 219.3 LBS | HEART RATE: 65 BPM | HEIGHT: 72 IN | SYSTOLIC BLOOD PRESSURE: 124 MMHG | OXYGEN SATURATION: 98 % | DIASTOLIC BLOOD PRESSURE: 70 MMHG

## 2025-07-28 DIAGNOSIS — I25.10 CORONARY ARTERY DISEASE INVOLVING NATIVE CORONARY ARTERY OF NATIVE HEART WITHOUT ANGINA PECTORIS: ICD-10-CM

## 2025-07-28 DIAGNOSIS — N18.5 CHRONIC KIDNEY DISEASE, STAGE V (HCC): ICD-10-CM

## 2025-07-28 DIAGNOSIS — E11.59 TYPE 2 DIABETES MELLITUS WITH OTHER CIRCULATORY COMPLICATION, WITHOUT LONG-TERM CURRENT USE OF INSULIN (HCC): Primary | ICD-10-CM

## 2025-07-28 DIAGNOSIS — I10 ESSENTIAL HYPERTENSION: ICD-10-CM

## 2025-07-28 LAB — HBA1C MFR BLD: 5.8 %

## 2025-07-28 PROCEDURE — 3017F COLORECTAL CA SCREEN DOC REV: CPT | Performed by: STUDENT IN AN ORGANIZED HEALTH CARE EDUCATION/TRAINING PROGRAM

## 2025-07-28 PROCEDURE — G8417 CALC BMI ABV UP PARAM F/U: HCPCS | Performed by: STUDENT IN AN ORGANIZED HEALTH CARE EDUCATION/TRAINING PROGRAM

## 2025-07-28 PROCEDURE — 3078F DIAST BP <80 MM HG: CPT | Performed by: STUDENT IN AN ORGANIZED HEALTH CARE EDUCATION/TRAINING PROGRAM

## 2025-07-28 PROCEDURE — 83036 HEMOGLOBIN GLYCOSYLATED A1C: CPT | Performed by: STUDENT IN AN ORGANIZED HEALTH CARE EDUCATION/TRAINING PROGRAM

## 2025-07-28 PROCEDURE — 2022F DILAT RTA XM EVC RTNOPTHY: CPT | Performed by: STUDENT IN AN ORGANIZED HEALTH CARE EDUCATION/TRAINING PROGRAM

## 2025-07-28 PROCEDURE — 1036F TOBACCO NON-USER: CPT | Performed by: STUDENT IN AN ORGANIZED HEALTH CARE EDUCATION/TRAINING PROGRAM

## 2025-07-28 PROCEDURE — 99214 OFFICE O/P EST MOD 30 MIN: CPT | Performed by: STUDENT IN AN ORGANIZED HEALTH CARE EDUCATION/TRAINING PROGRAM

## 2025-07-28 PROCEDURE — 3074F SYST BP LT 130 MM HG: CPT | Performed by: STUDENT IN AN ORGANIZED HEALTH CARE EDUCATION/TRAINING PROGRAM

## 2025-07-28 PROCEDURE — 3044F HG A1C LEVEL LT 7.0%: CPT | Performed by: STUDENT IN AN ORGANIZED HEALTH CARE EDUCATION/TRAINING PROGRAM

## 2025-07-28 PROCEDURE — G8427 DOCREV CUR MEDS BY ELIG CLIN: HCPCS | Performed by: STUDENT IN AN ORGANIZED HEALTH CARE EDUCATION/TRAINING PROGRAM

## 2025-07-28 RX ORDER — TRAZODONE HYDROCHLORIDE 100 MG/1
100 TABLET ORAL NIGHTLY
COMMUNITY
Start: 2025-07-17

## 2025-07-28 NOTE — TELEPHONE ENCOUNTER
LEFT MESSAGE   PER DR VAZQUEZ START HOLDING PLAVIX TOMORROW, IF PD CATH IS WORKING AT [facility] ON WED PLEASE CALL THE OFFICE TO CANCEL SURGERY IF NEEDED

## 2025-08-04 ASSESSMENT — ENCOUNTER SYMPTOMS
NAUSEA: 0
ABDOMINAL PAIN: 0
SORE THROAT: 0
SHORTNESS OF BREATH: 0
WHEEZING: 0

## 2025-08-25 ENCOUNTER — TELEPHONE (OUTPATIENT)
Dept: CARDIOLOGY CLINIC | Age: 55
End: 2025-08-25

## 2025-08-25 DIAGNOSIS — E11.59 TYPE 2 DIABETES MELLITUS WITH OTHER CIRCULATORY COMPLICATION, WITHOUT LONG-TERM CURRENT USE OF INSULIN (HCC): Primary | ICD-10-CM

## 2025-08-26 RX ORDER — BLOOD-GLUCOSE METER
1 EACH MISCELLANEOUS DAILY
Qty: 1 KIT | Refills: 0 | Status: SHIPPED | OUTPATIENT
Start: 2025-08-26 | End: 2026-02-22

## 2025-08-27 ENCOUNTER — HOSPITAL ENCOUNTER (OUTPATIENT)
Dept: INTERVENTIONAL RADIOLOGY/VASCULAR | Age: 55
Discharge: HOME | End: 2025-08-27
Payer: MEDICARE

## 2025-08-27 ENCOUNTER — HOSPITAL ENCOUNTER (OUTPATIENT)
Dept: GENERAL RADIOLOGY | Age: 55
Discharge: HOME | End: 2025-08-27
Payer: MEDICARE

## 2025-08-27 VITALS
OXYGEN SATURATION: 99 % | SYSTOLIC BLOOD PRESSURE: 142 MMHG | DIASTOLIC BLOOD PRESSURE: 83 MMHG | RESPIRATION RATE: 17 BRPM | WEIGHT: 213 LBS | HEART RATE: 70 BPM | BODY MASS INDEX: 28.89 KG/M2

## 2025-08-27 DIAGNOSIS — N18.6 ESRD (END STAGE RENAL DISEASE) (HCC): ICD-10-CM

## 2025-08-27 PROCEDURE — 36589 REMOVAL TUNNELED CV CATH: CPT | Performed by: RADIOLOGY

## 2025-08-27 PROCEDURE — 36589 REMOVAL TUNNELED CV CATH: CPT

## 2025-08-27 PROCEDURE — 71045 X-RAY EXAM CHEST 1 VIEW: CPT

## 2025-08-27 PROCEDURE — 6360000002 HC RX W HCPCS: Performed by: RADIOLOGY

## 2025-08-27 PROCEDURE — 2709999900 IR REMOVE TUNNELED CVAD WO SQ PORT/PUMP

## 2025-08-27 RX ORDER — LIDOCAINE HYDROCHLORIDE 10 MG/ML
INJECTION, SOLUTION EPIDURAL; INFILTRATION; INTRACAUDAL; PERINEURAL PRN
Status: COMPLETED | OUTPATIENT
Start: 2025-08-27 | End: 2025-08-27

## 2025-08-27 RX ADMIN — LIDOCAINE HYDROCHLORIDE 10 ML: 10 INJECTION, SOLUTION EPIDURAL; INFILTRATION; INTRACAUDAL; PERINEURAL at 12:20

## 2025-08-27 ASSESSMENT — PAIN - FUNCTIONAL ASSESSMENT
PAIN_FUNCTIONAL_ASSESSMENT: 0-10
PAIN_FUNCTIONAL_ASSESSMENT: 0-10

## (undated) PROCEDURE — 5A1221Z PERFORMANCE OF CARDIAC OUTPUT, CONTINUOUS: ICD-10-PCS

## (undated) PROCEDURE — 06BQ4ZZ EXCISION OF LEFT SAPHENOUS VEIN, PERCUTANEOUS ENDOSCOPIC APPROACH: ICD-10-PCS

## (undated) DEVICE — BLANKET WRM W35.4XL86.6IN FULL UNDERBODY + FORC AIR

## (undated) DEVICE — STYLET CATH ADOL AD 62 CM COILED FLEXNECK CATH IMPLANTATION

## (undated) DEVICE — Z INACTIVE USE 2660663 SOLUTION IRRIG 1000ML STRIL H2O USP PLAS POUR BTL

## (undated) DEVICE — SYRINGE MED 30ML STD CLR PLAS LUERLOCK TIP N CTRL DISP

## (undated) DEVICE — LEAD PACE L475MM CHNL A OR V MYOCARDIAL STEROID ELUT SIL

## (undated) DEVICE — AGENT HEMSTAT 3GM OXIDIZED REGENERATED CELOS ABSRB FOR CONT (ORDER MULTIPLES OF 5EA)

## (undated) DEVICE — STERILE LATEX POWDER FREE SURGICAL GLOVES WITH HYDROGEL COATING: Brand: PROTEXIS

## (undated) DEVICE — SPONGE LAP W18XL18IN WHT COT 4 PLY FLD STRUNG RADPQ DISP ST 2 PER PACK

## (undated) DEVICE — 1LYRTR 16FR10ML100%SILTMPS SNP: Brand: MEDLINE INDUSTRIES, INC.

## (undated) DEVICE — RESUSCITATOR,MANUAL,ADLT,MASK,TUBE RES: Brand: MEDLINE INDUSTRIES, INC.

## (undated) DEVICE — SUTURE PROL SZ 4-0 L36IN NONABSORBABLE BLU L26MM SH 1/2 CIR 8521H

## (undated) DEVICE — GLOVE ORANGE PI 7   MSG9070

## (undated) DEVICE — IMPLANTATION KIT PD CATH CONN STYL

## (undated) DEVICE — SUTURE NRLN SZ 1 L18IN NONABSORBABLE BLK L36MM CT-1 1/2 CIR C520D

## (undated) DEVICE — LINER,SEMI-RIGID,3000CC,50EA/CS: Brand: MEDLINE

## (undated) DEVICE — SUTURE VICRYL SZ 3-0 L36IN ABSRB UD L36MM CT-1 1/2 CIR J944H

## (undated) DEVICE — SUTURE VICRYL SZ 3-0 L27IN ABSRB UD L26MM CT-2 1/2 CIR J232H

## (undated) DEVICE — Z DISCONTINUED USE 2846435 CAP DLYS PLAS MINICAP W/ POVIDONE IOD SOL DC

## (undated) DEVICE — DRAIN,WOUND,ROUND,24FR,5/16",FULL-FLUTED: Brand: MEDLINE

## (undated) DEVICE — SET ADMIN PRIMING 67ML L105IN NVENT 180UM FLTR 3 RLER CLMP

## (undated) DEVICE — CANNULA PERF L2IN BLNT TIP 2MM VES CLR RADPQ BODY FEM LUER

## (undated) DEVICE — SENSOR OXMTR SM AD DISP FOR INVOS SYS

## (undated) DEVICE — SUTURE MONOCRYL SZ 3-0 L27IN ABSRB UD L24MM PS-1 3/8 CIR PRIM Y936H

## (undated) DEVICE — 4-PORT MANIFOLD: Brand: NEPTUNE 2

## (undated) DEVICE — CATHETER 4FR AR MOD CORDIS 100CM

## (undated) DEVICE — GAUZE,SPONGE,8"X4",12PLY,XRAY,STRL,LF: Brand: MEDLINE

## (undated) DEVICE — PREVENA INCISION MANAGEMENT SYSTEM- PEEL & PLACE DRESSING: Brand: PREVENA™ PEEL & PLACE™

## (undated) DEVICE — TROCARS: Brand: KII® OPTICAL ACCESS SYSTEM

## (undated) DEVICE — SUTURE PERMAHAND SZ 2-0 L17X18IN NONABSORBABLE BLK SILK SA65H

## (undated) DEVICE — Z INACTIVE NO ACTIVE SUPPLIER APPLICATOR MEDICATED 26 CC TINT HI-LITE ORNG STRL CHLORAPREP

## (undated) DEVICE — DRAIN SURG L3/8-1/2IN DIA3/16IN SIL CARD CONN 1:1 BLAK

## (undated) DEVICE — CATHETER PERITONEAL DLYS 2 CUF STD AD 62 CM 6 CM 6 CC ARC

## (undated) DEVICE — DRESSING TRNSPAR W6XL8IN FLM SURESITE 123

## (undated) DEVICE — ANGIOGRAPHY KIT CUST MANIFOLD

## (undated) DEVICE — TOWEL,OR,DSP,ST,BLUE,STD,6/PK,12PK/CS: Brand: MEDLINE

## (undated) DEVICE — RADIFOCUS OPTITORQUE ANGIOGRAPHIC CATHETER: Brand: OPTITORQUE

## (undated) DEVICE — Device

## (undated) DEVICE — SET ADMIN L105IN 10GTT 3 NDL FREE CK VLV 2 PC M LUERLOCK

## (undated) DEVICE — BLADELESS OBTURATOR, LONG: Brand: WECK VISTA

## (undated) DEVICE — CANNULA PERF ART 20 FRX8 IN 3/8 IN VENTED W/O FLANGE DLP

## (undated) DEVICE — DECANTER BAG 9": Brand: MEDLINE INDUSTRIES, INC.

## (undated) DEVICE — CATHETER PERITONEAL DLYS 35X53 MMX62 CM FLEX-NECK ARC

## (undated) DEVICE — CATHETER DIAG AD 4FR L100CM STD NYL JUDKINS R 4 TRULUMEN

## (undated) DEVICE — SUTURE PERMA-HAND 1 L30IN NONABSORBABLE BLK SILK BRAID W/O SA87G

## (undated) DEVICE — Z DISCONTINUED USE 2764362 SEAL ENDOSCP INSTR DIA5-8MM UNIV FOR CANN DA VINCI XI

## (undated) DEVICE — CLIP SM RED INTERN HMOCLP TITAN LIGATING

## (undated) DEVICE — CATHETER IV SHIELDED 1.77 INX16 GA 1.4X1.7 MM BLD CTRL GRY

## (undated) DEVICE — AGENT HEMOSTATIC SURG ORIGINAL ABS 4X8IN LOOSE KNIT 12/CA

## (undated) DEVICE — SUTURE PROL SZ 6-0 L30IN NONABSORBABLE BLU L13MM RB-2 1/2 8711H

## (undated) DEVICE — SUTURE SZ 7 L18IN NONABSORBABLE SIL CCS L48MM 1/2 CIR STRNM M655G

## (undated) DEVICE — SUTURE VICRYL SZ 4-0 L18IN ABSRB UD L19MM PS-2 3/8 CIR PRIM J496H

## (undated) DEVICE — 3M™ STERI-DRAPE™ INSTRUMENT POUCH 1018: Brand: STERI-DRAPE™

## (undated) DEVICE — Z DUP USE 2218338 DRESSING GRMCDL 6 12FR D1N CNTR HOLE 4MM ANTMCRBL PRTCTVE DI

## (undated) DEVICE — MARKER SURG SKIN UTIL REGULAR/FINE 2 TIP W/ RUL AND 9 LBL

## (undated) DEVICE — RADIFOCUS GLIDEWIRE: Brand: GLIDEWIRE

## (undated) DEVICE — Z DUPLICATE USE 2891725 CLIP LIG M BLU TI HRT SHP WIRE HORZ 600 PER BX

## (undated) DEVICE — GAUZE,SPONGE,4"X4",8PLY,STRL,LF,10/TRAY: Brand: MEDLINE

## (undated) DEVICE — KIT MICROINTRODUCER 4FR ECHOGENIC NDL L7CM 21GA STIFF COAX

## (undated) DEVICE — TROCAR ENDOSCP FALLER S STL

## (undated) DEVICE — OPEN HEART B: Brand: MEDLINE INDUSTRIES, INC.

## (undated) DEVICE — BLADE OPHTH D5MM 15DEG GRN W/ RND KNURLED HNDL MICRO-SHARP

## (undated) DEVICE — BAG TRNSF AUTOLGS SUCT AND ANTICOAG LN AUTOLOG

## (undated) DEVICE — ADHESIVE SKIN CLSR 0.7ML TOP DERMBND ADV

## (undated) DEVICE — GUIDEWIRE VASC L1775IN DIA0035IN STR SFT TIP BOTH END SPR

## (undated) DEVICE — DIANEAL PD-2 1.5% DEX 2L/3L(SYS 2)

## (undated) DEVICE — GOWN,SIRUS,POLYRNF,BRTHSLV,XLN/XL,20/CS: Brand: MEDLINE

## (undated) DEVICE — INTRODUCER SHTH THN WALLED 5 FRX10 CM 22 GA ANGLED RAIN SHTH

## (undated) DEVICE — AGENT HEMSTAT W6XL9IN OXIDIZED REGENERATED CELOS ABSRB FOR

## (undated) DEVICE — ADAPTER Y TYP COR PERF FEM LUER W WHT CLMP

## (undated) DEVICE — NEEDLE ANGIO L1IN DIA21GA 1 THN WALL SMOOTH STD HUB

## (undated) DEVICE — 6 FOOT DISPOSABLE EXTENSION CABLE WITH SAFE CONNECT / SCREW-DOWN

## (undated) DEVICE — SUTURE VICRYL SZ 1 L36IN ABSRB UD CTX L48MM 1/2 CIR J977H

## (undated) DEVICE — SUTURE NONABSORBABLE MONOFILAMENT 7-0 BV-1 1X24 IN PROLENE 8702H

## (undated) DEVICE — APPLICATOR MEDICATED 26 CC SOLUTION HI LT ORNG CHLORAPREP

## (undated) DEVICE — NEEDLE HYPO 20GA L1.5IN YEL POLYPR HUB S STL REG BVL STR

## (undated) DEVICE — DEVICE RESUS AD TB L40IN SELF INFL MASK TEXT BG DBL SWVL EL

## (undated) DEVICE — SUTURE ETHIBOND SZ 2-0 L30IN NONABSORBABLE GRN V-7 L26MM 1/2 X977H

## (undated) DEVICE — CANNULA PERF L5.5IN DIA9FR AORT ROOT AG STD TIP W/ VENT LN

## (undated) DEVICE — CONNECTOR CATH TWO PART AD FOR PERITONEAL DLYS FLX NK

## (undated) DEVICE — CATHETER IV 22GA L1IN OD0.8382-0.9144MM ID0.6096-0.6858MM 382523

## (undated) DEVICE — THE ULTRASET PRODUCTS ARE SGL USE DEV THAT ARE INTENDED FOR

## (undated) DEVICE — MEDI-TRACE CADENCE ADULT, DEFIBRILLATION ELECTRODE -RTS  (10 PR/PK) - ZOLL: Brand: MEDI-TRACE CADENCE

## (undated) DEVICE — Z DSICONTINUED USE 2881387 SUTURE PROL SZ 5-0 L36IN NONABSORBABLE BLU L13MM C-1 3/8 8720H

## (undated) DEVICE — CANNULA PERF VEN 36/46 FRX15 IN TWO STG OVL BSKT MC2

## (undated) DEVICE — Device: Brand: VIRTUOSAPH PLUS WITH RADIAL INDICATION

## (undated) DEVICE — SUTURE MONOCRYL SZ 4-0 L18IN ABSRB UD L19MM PS-2 3/8 CIR PRIM Y496G

## (undated) DEVICE — PLEDGET VASC W3/16XL3/8IN THK1/16IN PTFE SFT

## (undated) DEVICE — DRAIN SURG SGL COLL PT TB FOR ATS BG OASIS

## (undated) DEVICE — CATHETER ANGIO 4FR L100CM S STL NYL JL3.5 3 SEG BRAID SFT

## (undated) DEVICE — SET TBNG DISP TIP FOR AHTO

## (undated) DEVICE — KIT COMPL CK0289R4  BB9L99R8

## (undated) DEVICE — ELECTRODE ES AD CRDLSS PT RET REM POLYHESIVE

## (undated) DEVICE — DISK-SHAPED STYLE, SILICONE (1 PER STERILE PKG): Brand: SCANLAN® RADIOMARK® GRAFT MARKERS

## (undated) DEVICE — CANNULA 96600 117 BIO MEDICUS 17FR EA

## (undated) DEVICE — GLOVE SURG SZ 6 CRM LTX FREE POLYISOPRENE POLYMER BEAD ANTI

## (undated) DEVICE — AGENT HEMOSTATIC SURGIFLOW MATRIX KIT W/THROMBIN

## (undated) DEVICE — SENSOR PLSE OXMTR AD CBL L3FT ADH TRANSMISSIVE

## (undated) DEVICE — GUIDEWIRE VASC L260CM DIA0.035IN RAD 3MM J TIP L7CM PTFE

## (undated) DEVICE — OPEN HEART A: Brand: MEDLINE INDUSTRIES, INC.

## (undated) DEVICE — SUTURE PROL SZ 3-0 L36IN NONABSORBABLE BLU L26MM SH 1/2 CIR 8522H

## (undated) DEVICE — GOWN,SLEEVE,STERILE,W/CSR WRAP,1/P: Brand: MEDLINE

## (undated) DEVICE — BLADE OPHTH 180DEG CUT SURF BLU STR SHRP DBL BVL GRINDLESS

## (undated) DEVICE — TROCAR: Brand: KII FIOS FIRST ENTRY

## (undated) DEVICE — SUTURE VICRYL 2-0 L36IN ABSRB UD CTX L48MM 1/2 CIR TAPERPOINT J979H

## (undated) DEVICE — CATHETER CV KT 9 FRX11.5 CM DL FOR 7.5-8 FR INTRO NDL MAC

## (undated) DEVICE — CONNECTOR DRNGE W3/8-0.5XH3/16XL3/16IN 2:1 SIL CARD STR

## (undated) DEVICE — TUBING INSUFFLATOR HEAT HI FLO SET PNEUMOCLEAR

## (undated) DEVICE — SET TRNSF PRIMING 2ML LAIN MINICAP EXT LIFE PD W/ TWST CLMP

## (undated) DEVICE — SUTURE NABSORBABLE L18IN SZ 8-0 BLU BV175-6 L8MM 3/8 CIR M8742

## (undated) DEVICE — VERESS PNEUMOPERITONEUM NEEDLE: Brand: N.A.

## (undated) DEVICE — GLOVE ORANGE PI 7 1/2   MSG9075

## (undated) DEVICE — CABG ACCESSORY: Brand: MEDLINE INDUSTRIES, INC.